# Patient Record
Sex: FEMALE | Race: WHITE | Employment: OTHER | URBAN - NONMETROPOLITAN AREA
[De-identification: names, ages, dates, MRNs, and addresses within clinical notes are randomized per-mention and may not be internally consistent; named-entity substitution may affect disease eponyms.]

---

## 2017-05-24 PROBLEM — E78.5 HYPERLIPIDEMIA: Status: ACTIVE | Noted: 2017-05-24

## 2017-06-07 ENCOUNTER — HOSPITAL ENCOUNTER (OUTPATIENT)
Dept: LAB | Age: 67
Discharge: HOME | End: 2017-06-07
Payer: MEDICARE

## 2017-06-07 DIAGNOSIS — E78.5 HYPERLIPIDEMIA, UNSPECIFIED HYPERLIPIDEMIA TYPE: ICD-10-CM

## 2017-06-07 DIAGNOSIS — Z13.220 SCREENING, LIPID: ICD-10-CM

## 2017-06-07 DIAGNOSIS — E11.9 TYPE 2 DIABETES MELLITUS WITHOUT COMPLICATION, WITHOUT LONG-TERM CURRENT USE OF INSULIN (HCC): ICD-10-CM

## 2017-06-07 LAB
ALT SERPL-CCNC: 12 U/L (ref 5–33)
AST SERPL-CCNC: 17 U/L
CHOLESTEROL/HDL RATIO: 2.9
CHOLESTEROL: 157 MG/DL
ESTIMATED AVERAGE GLUCOSE: 154 MG/DL
HBA1C MFR BLD: 7 % (ref 4.8–5.9)
HDLC SERPL-MCNC: 55 MG/DL
LDL CHOLESTEROL: 75 MG/DL (ref 0–130)
TRIGL SERPL-MCNC: 136 MG/DL
VLDLC SERPL CALC-MCNC: NORMAL MG/DL (ref 1–30)

## 2017-06-07 PROCEDURE — 84460 ALANINE AMINO (ALT) (SGPT): CPT

## 2017-06-07 PROCEDURE — 80061 LIPID PANEL: CPT

## 2017-06-07 PROCEDURE — 84450 TRANSFERASE (AST) (SGOT): CPT

## 2017-06-07 PROCEDURE — 83036 HEMOGLOBIN GLYCOSYLATED A1C: CPT

## 2017-06-07 PROCEDURE — 36415 COLL VENOUS BLD VENIPUNCTURE: CPT

## 2017-12-06 ENCOUNTER — HOSPITAL ENCOUNTER (OUTPATIENT)
Dept: LAB | Age: 67
Discharge: HOME | End: 2017-12-06
Payer: MEDICARE

## 2017-12-06 DIAGNOSIS — E11.9 TYPE 2 DIABETES MELLITUS WITHOUT COMPLICATION, WITHOUT LONG-TERM CURRENT USE OF INSULIN (HCC): ICD-10-CM

## 2017-12-06 LAB
ESTIMATED AVERAGE GLUCOSE: 146 MG/DL
HBA1C MFR BLD: 6.7 % (ref 4.8–5.9)

## 2017-12-06 PROCEDURE — 83036 HEMOGLOBIN GLYCOSYLATED A1C: CPT

## 2017-12-06 PROCEDURE — 36415 COLL VENOUS BLD VENIPUNCTURE: CPT

## 2017-12-08 ENCOUNTER — HOSPITAL ENCOUNTER (OUTPATIENT)
Dept: WOMENS IMAGING | Age: 67
Discharge: HOME | End: 2017-12-08
Payer: MEDICARE

## 2017-12-08 DIAGNOSIS — Z12.31 SCREENING MAMMOGRAM, ENCOUNTER FOR: ICD-10-CM

## 2017-12-08 PROCEDURE — G0202 SCR MAMMO BI INCL CAD: HCPCS

## 2018-03-19 ENCOUNTER — HOSPITAL ENCOUNTER (OUTPATIENT)
Dept: LAB | Age: 68
Discharge: HOME | End: 2018-03-19
Payer: MEDICARE

## 2018-03-19 DIAGNOSIS — E78.5 HYPERLIPIDEMIA, UNSPECIFIED HYPERLIPIDEMIA TYPE: ICD-10-CM

## 2018-03-19 DIAGNOSIS — E11.9 TYPE 2 DIABETES MELLITUS WITHOUT COMPLICATION, WITHOUT LONG-TERM CURRENT USE OF INSULIN (HCC): ICD-10-CM

## 2018-03-19 LAB
ANION GAP SERPL CALCULATED.3IONS-SCNC: 12 MMOL/L (ref 9–17)
BUN BLDV-MCNC: 21 MG/DL (ref 8–23)
BUN/CREAT BLD: 41 (ref 9–20)
CALCIUM SERPL-MCNC: 9.5 MG/DL (ref 8.6–10.4)
CHLORIDE BLD-SCNC: 97 MMOL/L (ref 98–107)
CHOLESTEROL, FASTING: 128 MG/DL
CHOLESTEROL/HDL RATIO: 2.2
CO2: 29 MMOL/L (ref 20–31)
CREAT SERPL-MCNC: 0.51 MG/DL (ref 0.5–0.9)
ESTIMATED AVERAGE GLUCOSE: 157 MG/DL
GFR AFRICAN AMERICAN: >60 ML/MIN
GFR NON-AFRICAN AMERICAN: >60 ML/MIN
GFR SERPL CREATININE-BSD FRML MDRD: ABNORMAL ML/MIN/{1.73_M2}
GFR SERPL CREATININE-BSD FRML MDRD: ABNORMAL ML/MIN/{1.73_M2}
GLUCOSE FASTING: 163 MG/DL (ref 70–99)
HBA1C MFR BLD: 7.1 % (ref 4.8–5.9)
HDLC SERPL-MCNC: 58 MG/DL
LDL CHOLESTEROL: 50 MG/DL (ref 0–130)
POTASSIUM SERPL-SCNC: 4.4 MMOL/L (ref 3.7–5.3)
SODIUM BLD-SCNC: 138 MMOL/L (ref 135–144)
TRIGLYCERIDE, FASTING: 100 MG/DL
VLDLC SERPL CALC-MCNC: NORMAL MG/DL (ref 1–30)

## 2018-03-19 PROCEDURE — 83036 HEMOGLOBIN GLYCOSYLATED A1C: CPT

## 2018-03-19 PROCEDURE — 80061 LIPID PANEL: CPT

## 2018-03-19 PROCEDURE — 80048 BASIC METABOLIC PNL TOTAL CA: CPT

## 2018-03-19 PROCEDURE — 36415 COLL VENOUS BLD VENIPUNCTURE: CPT

## 2018-06-04 ENCOUNTER — HOSPITAL ENCOUNTER (OUTPATIENT)
Dept: PREADMISSION TESTING | Age: 68
Discharge: HOME | End: 2018-06-08
Attending: PODIATRIST
Payer: MEDICARE

## 2018-06-04 VITALS
BODY MASS INDEX: 35.19 KG/M2 | SYSTOLIC BLOOD PRESSURE: 126 MMHG | RESPIRATION RATE: 18 BRPM | WEIGHT: 198.6 LBS | HEIGHT: 63 IN | TEMPERATURE: 98.1 F | OXYGEN SATURATION: 95 % | DIASTOLIC BLOOD PRESSURE: 70 MMHG | HEART RATE: 77 BPM

## 2018-06-04 LAB
EKG ATRIAL RATE: 66 BPM
EKG P AXIS: 42 DEGREES
EKG P-R INTERVAL: 182 MS
EKG Q-T INTERVAL: 442 MS
EKG QRS DURATION: 76 MS
EKG QTC CALCULATION (BAZETT): 463 MS
EKG R AXIS: -4 DEGREES
EKG T AXIS: 29 DEGREES
EKG VENTRICULAR RATE: 66 BPM
HCT VFR BLD CALC: 39.4 % (ref 36.3–47.1)
HEMOGLOBIN: 12.9 G/DL (ref 11.9–15.1)
MCH RBC QN AUTO: 28.8 PG (ref 25.2–33.5)
MCHC RBC AUTO-ENTMCNC: 32.7 G/DL (ref 28.4–34.8)
MCV RBC AUTO: 87.9 FL (ref 82.6–102.9)
NRBC AUTOMATED: 0 PER 100 WBC
PDW BLD-RTO: 13.5 % (ref 11.8–14.4)
PLATELET # BLD: 334 K/UL (ref 138–453)
PMV BLD AUTO: 9.5 FL (ref 8.1–13.5)
RBC # BLD: 4.48 M/UL (ref 3.95–5.11)
WBC # BLD: 7.8 K/UL (ref 3.5–11.3)

## 2018-06-04 PROCEDURE — 85027 COMPLETE CBC AUTOMATED: CPT

## 2018-06-04 PROCEDURE — 93005 ELECTROCARDIOGRAM TRACING: CPT

## 2018-06-04 PROCEDURE — 36415 COLL VENOUS BLD VENIPUNCTURE: CPT

## 2018-06-14 ENCOUNTER — ANESTHESIA EVENT (OUTPATIENT)
Dept: OPERATING ROOM | Age: 68
End: 2018-06-14
Payer: MEDICARE

## 2018-06-15 ENCOUNTER — APPOINTMENT (OUTPATIENT)
Dept: GENERAL RADIOLOGY | Age: 68
End: 2018-06-15
Attending: PODIATRIST
Payer: MEDICARE

## 2018-06-15 ENCOUNTER — HOSPITAL ENCOUNTER (OUTPATIENT)
Age: 68
Setting detail: OUTPATIENT SURGERY
Discharge: HOME | End: 2018-06-15
Attending: PODIATRIST | Admitting: PODIATRIST
Payer: MEDICARE

## 2018-06-15 ENCOUNTER — ANESTHESIA (OUTPATIENT)
Dept: OPERATING ROOM | Age: 68
End: 2018-06-15
Payer: MEDICARE

## 2018-06-15 VITALS
SYSTOLIC BLOOD PRESSURE: 127 MMHG | BODY MASS INDEX: 35.08 KG/M2 | HEART RATE: 64 BPM | WEIGHT: 198 LBS | HEIGHT: 63 IN | TEMPERATURE: 98.8 F | OXYGEN SATURATION: 96 % | RESPIRATION RATE: 18 BRPM | DIASTOLIC BLOOD PRESSURE: 67 MMHG

## 2018-06-15 VITALS — OXYGEN SATURATION: 95 % | DIASTOLIC BLOOD PRESSURE: 52 MMHG | SYSTOLIC BLOOD PRESSURE: 86 MMHG

## 2018-06-15 DIAGNOSIS — M20.41 HAMMER TOE OF RIGHT FOOT: ICD-10-CM

## 2018-06-15 PROCEDURE — 88300 SURGICAL PATH GROSS: CPT

## 2018-06-15 PROCEDURE — 7100000011 HC PHASE II RECOVERY - ADDTL 15 MIN: Performed by: PODIATRIST

## 2018-06-15 PROCEDURE — 3700000001 HC ADD 15 MINUTES (ANESTHESIA): Performed by: PODIATRIST

## 2018-06-15 PROCEDURE — 3700000000 HC ANESTHESIA ATTENDED CARE: Performed by: PODIATRIST

## 2018-06-15 PROCEDURE — 6360000002 HC RX W HCPCS: Performed by: PODIATRIST

## 2018-06-15 PROCEDURE — 2500000003 HC RX 250 WO HCPCS: Performed by: NURSE ANESTHETIST, CERTIFIED REGISTERED

## 2018-06-15 PROCEDURE — 3600000013 HC SURGERY LEVEL 3 ADDTL 15MIN: Performed by: PODIATRIST

## 2018-06-15 PROCEDURE — 2720000010 HC SURG SUPPLY STERILE: Performed by: PODIATRIST

## 2018-06-15 PROCEDURE — 88311 DECALCIFY TISSUE: CPT

## 2018-06-15 PROCEDURE — 2580000003 HC RX 258: Performed by: PODIATRIST

## 2018-06-15 PROCEDURE — 88304 TISSUE EXAM BY PATHOLOGIST: CPT

## 2018-06-15 PROCEDURE — L3260 AMBULATORY SURGICAL BOOT EAC: HCPCS | Performed by: PODIATRIST

## 2018-06-15 PROCEDURE — 6360000002 HC RX W HCPCS: Performed by: NURSE ANESTHETIST, CERTIFIED REGISTERED

## 2018-06-15 PROCEDURE — 7100000010 HC PHASE II RECOVERY - FIRST 15 MIN: Performed by: PODIATRIST

## 2018-06-15 PROCEDURE — 2500000003 HC RX 250 WO HCPCS: Performed by: PODIATRIST

## 2018-06-15 PROCEDURE — 6370000000 HC RX 637 (ALT 250 FOR IP): Performed by: PODIATRIST

## 2018-06-15 PROCEDURE — 3600000003 HC SURGERY LEVEL 3 BASE: Performed by: PODIATRIST

## 2018-06-15 RX ORDER — PROPOFOL 10 MG/ML
INJECTION, EMULSION INTRAVENOUS CONTINUOUS PRN
Status: DISCONTINUED | OUTPATIENT
Start: 2018-06-15 | End: 2018-06-15 | Stop reason: SDUPTHER

## 2018-06-15 RX ORDER — FENTANYL CITRATE 50 UG/ML
INJECTION, SOLUTION INTRAMUSCULAR; INTRAVENOUS PRN
Status: DISCONTINUED | OUTPATIENT
Start: 2018-06-15 | End: 2018-06-15 | Stop reason: SDUPTHER

## 2018-06-15 RX ORDER — FENTANYL CITRATE 50 UG/ML
50 INJECTION, SOLUTION INTRAMUSCULAR; INTRAVENOUS EVERY 5 MIN PRN
Status: DISCONTINUED | OUTPATIENT
Start: 2018-06-15 | End: 2018-06-15 | Stop reason: HOSPADM

## 2018-06-15 RX ORDER — PROPOFOL 10 MG/ML
INJECTION, EMULSION INTRAVENOUS PRN
Status: DISCONTINUED | OUTPATIENT
Start: 2018-06-15 | End: 2018-06-15 | Stop reason: SDUPTHER

## 2018-06-15 RX ORDER — ONDANSETRON 2 MG/ML
INJECTION INTRAMUSCULAR; INTRAVENOUS PRN
Status: DISCONTINUED | OUTPATIENT
Start: 2018-06-15 | End: 2018-06-15 | Stop reason: SDUPTHER

## 2018-06-15 RX ORDER — LIDOCAINE HYDROCHLORIDE 20 MG/ML
INJECTION, SOLUTION INFILTRATION; PERINEURAL PRN
Status: DISCONTINUED | OUTPATIENT
Start: 2018-06-15 | End: 2018-06-15 | Stop reason: HOSPADM

## 2018-06-15 RX ORDER — DEXAMETHASONE SODIUM PHOSPHATE 4 MG/ML
INJECTION, SOLUTION INTRA-ARTICULAR; INTRALESIONAL; INTRAMUSCULAR; INTRAVENOUS; SOFT TISSUE PRN
Status: DISCONTINUED | OUTPATIENT
Start: 2018-06-15 | End: 2018-06-15 | Stop reason: SDUPTHER

## 2018-06-15 RX ORDER — DEXAMETHASONE SODIUM PHOSPHATE 4 MG/ML
INJECTION, SOLUTION INTRA-ARTICULAR; INTRALESIONAL; INTRAMUSCULAR; INTRAVENOUS; SOFT TISSUE PRN
Status: DISCONTINUED | OUTPATIENT
Start: 2018-06-15 | End: 2018-06-15 | Stop reason: HOSPADM

## 2018-06-15 RX ORDER — ACETAMINOPHEN 325 MG/1
650 TABLET ORAL ONCE
Status: COMPLETED | OUTPATIENT
Start: 2018-06-15 | End: 2018-06-15

## 2018-06-15 RX ORDER — SODIUM CHLORIDE, SODIUM LACTATE, POTASSIUM CHLORIDE, CALCIUM CHLORIDE 600; 310; 30; 20 MG/100ML; MG/100ML; MG/100ML; MG/100ML
INJECTION, SOLUTION INTRAVENOUS CONTINUOUS
Status: DISCONTINUED | OUTPATIENT
Start: 2018-06-15 | End: 2018-06-15 | Stop reason: HOSPADM

## 2018-06-15 RX ORDER — BUPIVACAINE HYDROCHLORIDE AND EPINEPHRINE 5; 5 MG/ML; UG/ML
INJECTION, SOLUTION EPIDURAL; INTRACAUDAL; PERINEURAL PRN
Status: DISCONTINUED | OUTPATIENT
Start: 2018-06-15 | End: 2018-06-15 | Stop reason: HOSPADM

## 2018-06-15 RX ORDER — MIDAZOLAM HYDROCHLORIDE 1 MG/ML
INJECTION INTRAMUSCULAR; INTRAVENOUS PRN
Status: DISCONTINUED | OUTPATIENT
Start: 2018-06-15 | End: 2018-06-15 | Stop reason: SDUPTHER

## 2018-06-15 RX ORDER — LIDOCAINE HYDROCHLORIDE 20 MG/ML
INJECTION, SOLUTION EPIDURAL; INFILTRATION; INTRACAUDAL; PERINEURAL PRN
Status: DISCONTINUED | OUTPATIENT
Start: 2018-06-15 | End: 2018-06-15 | Stop reason: SDUPTHER

## 2018-06-15 RX ORDER — FENTANYL CITRATE 50 UG/ML
25 INJECTION, SOLUTION INTRAMUSCULAR; INTRAVENOUS EVERY 5 MIN PRN
Status: DISCONTINUED | OUTPATIENT
Start: 2018-06-15 | End: 2018-06-15 | Stop reason: HOSPADM

## 2018-06-15 RX ADMIN — FENTANYL CITRATE 25 MCG: 50 INJECTION INTRAMUSCULAR; INTRAVENOUS at 08:24

## 2018-06-15 RX ADMIN — SODIUM CHLORIDE, POTASSIUM CHLORIDE, SODIUM LACTATE AND CALCIUM CHLORIDE: 600; 310; 30; 20 INJECTION, SOLUTION INTRAVENOUS at 07:40

## 2018-06-15 RX ADMIN — DEXAMETHASONE SODIUM PHOSPHATE 4 MG: 4 INJECTION, SOLUTION INTRAMUSCULAR; INTRAVENOUS at 08:24

## 2018-06-15 RX ADMIN — LIDOCAINE HYDROCHLORIDE 50 MG: 20 INJECTION, SOLUTION EPIDURAL; INFILTRATION; INTRACAUDAL; PERINEURAL at 07:56

## 2018-06-15 RX ADMIN — FENTANYL CITRATE 25 MCG: 50 INJECTION INTRAMUSCULAR; INTRAVENOUS at 07:54

## 2018-06-15 RX ADMIN — FENTANYL CITRATE 50 MCG: 50 INJECTION INTRAMUSCULAR; INTRAVENOUS at 07:56

## 2018-06-15 RX ADMIN — ONDANSETRON 4 MG: 2 INJECTION INTRAMUSCULAR; INTRAVENOUS at 08:24

## 2018-06-15 RX ADMIN — Medication 2 G: at 07:46

## 2018-06-15 RX ADMIN — PROPOFOL 130 MG: 10 INJECTION, EMULSION INTRAVENOUS at 07:56

## 2018-06-15 RX ADMIN — ACETAMINOPHEN 650 MG: 325 TABLET ORAL at 07:07

## 2018-06-15 RX ADMIN — MIDAZOLAM HYDROCHLORIDE 2 MG: 1 INJECTION, SOLUTION INTRAMUSCULAR; INTRAVENOUS at 07:52

## 2018-06-15 RX ADMIN — PROPOFOL 125 MCG/KG/MIN: 10 INJECTION, EMULSION INTRAVENOUS at 08:00

## 2018-06-15 ASSESSMENT — PULMONARY FUNCTION TESTS
PIF_VALUE: 1
PIF_VALUE: 0
PIF_VALUE: 1
PIF_VALUE: 0
PIF_VALUE: 1
PIF_VALUE: 0
PIF_VALUE: 0
PIF_VALUE: 1
PIF_VALUE: 2
PIF_VALUE: 1
PIF_VALUE: 2

## 2018-06-15 ASSESSMENT — PAIN - FUNCTIONAL ASSESSMENT: PAIN_FUNCTIONAL_ASSESSMENT: 0-10

## 2018-06-15 ASSESSMENT — PAIN SCALES - GENERAL
PAINLEVEL_OUTOF10: 0

## 2018-06-21 LAB — SURGICAL PATHOLOGY REPORT: NORMAL

## 2018-07-12 ENCOUNTER — HOSPITAL ENCOUNTER (OUTPATIENT)
Dept: LAB | Age: 68
Discharge: HOME | End: 2018-07-12
Payer: MEDICARE

## 2018-07-12 DIAGNOSIS — E78.5 HYPERLIPIDEMIA, UNSPECIFIED HYPERLIPIDEMIA TYPE: ICD-10-CM

## 2018-07-12 DIAGNOSIS — E11.628 TYPE 2 DIABETES MELLITUS WITH OTHER SKIN COMPLICATION, WITHOUT LONG-TERM CURRENT USE OF INSULIN (HCC): ICD-10-CM

## 2018-07-12 DIAGNOSIS — E11.9 TYPE 2 DIABETES MELLITUS WITHOUT COMPLICATION, WITHOUT LONG-TERM CURRENT USE OF INSULIN (HCC): ICD-10-CM

## 2018-07-12 LAB
-: ABNORMAL
ALT SERPL-CCNC: 15 U/L (ref 5–33)
AMORPHOUS: ABNORMAL
AST SERPL-CCNC: 18 U/L
BACTERIA: ABNORMAL
BILIRUBIN URINE: NEGATIVE
CASTS UA: ABNORMAL /LPF
CHOLESTEROL, FASTING: 112 MG/DL
CHOLESTEROL/HDL RATIO: 2.2
COLOR: YELLOW
COMMENT UA: ABNORMAL
CREATININE URINE: 72.5 MG/DL (ref 28–217)
CRYSTALS, UA: ABNORMAL /HPF
EPITHELIAL CELLS UA: ABNORMAL /HPF (ref 0–25)
GLUCOSE URINE: NEGATIVE
HDLC SERPL-MCNC: 50 MG/DL
KETONES, URINE: NEGATIVE
LDL CHOLESTEROL: 46 MG/DL (ref 0–130)
LEUKOCYTE ESTERASE, URINE: ABNORMAL
MICROALBUMIN/CREAT 24H UR: <12 MG/L
MICROALBUMIN/CREAT UR-RTO: NORMAL MCG/MG CREAT
MUCUS: ABNORMAL
NITRITE, URINE: NEGATIVE
OTHER OBSERVATIONS UA: ABNORMAL
PH UA: 7 (ref 5–9)
PROTEIN UA: NEGATIVE
RBC UA: ABNORMAL /HPF (ref 0–2)
RENAL EPITHELIAL, UA: ABNORMAL /HPF
SPECIFIC GRAVITY UA: 1.01 (ref 1.01–1.02)
TRICHOMONAS: ABNORMAL
TRIGLYCERIDE, FASTING: 80 MG/DL
TURBIDITY: CLEAR
URINE HGB: NEGATIVE
UROBILINOGEN, URINE: ABNORMAL
VLDLC SERPL CALC-MCNC: NORMAL MG/DL (ref 1–30)
WBC UA: ABNORMAL /HPF (ref 0–5)
YEAST: ABNORMAL

## 2018-07-12 PROCEDURE — 84460 ALANINE AMINO (ALT) (SGPT): CPT

## 2018-07-12 PROCEDURE — 82043 UR ALBUMIN QUANTITATIVE: CPT

## 2018-07-12 PROCEDURE — 80061 LIPID PANEL: CPT

## 2018-07-12 PROCEDURE — 82570 ASSAY OF URINE CREATININE: CPT

## 2018-07-12 PROCEDURE — 84450 TRANSFERASE (AST) (SGOT): CPT

## 2018-07-12 PROCEDURE — 36415 COLL VENOUS BLD VENIPUNCTURE: CPT

## 2018-07-12 PROCEDURE — 81001 URINALYSIS AUTO W/SCOPE: CPT

## 2018-12-11 PROBLEM — H25.812 COMBINED FORMS OF AGE-RELATED CATARACT OF LEFT EYE: Chronic | Status: ACTIVE | Noted: 2018-12-11

## 2018-12-12 ENCOUNTER — HOSPITAL ENCOUNTER (OUTPATIENT)
Age: 68
Setting detail: OUTPATIENT SURGERY
Discharge: HOME | End: 2018-12-12
Attending: OPHTHALMOLOGY | Admitting: OPHTHALMOLOGY
Payer: MEDICARE

## 2018-12-12 VITALS
HEART RATE: 77 BPM | HEIGHT: 62 IN | WEIGHT: 193 LBS | OXYGEN SATURATION: 96 % | DIASTOLIC BLOOD PRESSURE: 97 MMHG | SYSTOLIC BLOOD PRESSURE: 147 MMHG | TEMPERATURE: 98.1 F | RESPIRATION RATE: 18 BRPM | BODY MASS INDEX: 35.51 KG/M2

## 2018-12-12 PROBLEM — H25.812 COMBINED FORMS OF AGE-RELATED CATARACT OF LEFT EYE: Chronic | Status: RESOLVED | Noted: 2018-12-11 | Resolved: 2018-12-12

## 2018-12-12 PROCEDURE — 3600000013 HC SURGERY LEVEL 3 ADDTL 15MIN: Performed by: OPHTHALMOLOGY

## 2018-12-12 PROCEDURE — 2580000003 HC RX 258: Performed by: OPHTHALMOLOGY

## 2018-12-12 PROCEDURE — 2500000003 HC RX 250 WO HCPCS: Performed by: OPHTHALMOLOGY

## 2018-12-12 PROCEDURE — 99152 MOD SED SAME PHYS/QHP 5/>YRS: CPT | Performed by: OPHTHALMOLOGY

## 2018-12-12 PROCEDURE — 6370000000 HC RX 637 (ALT 250 FOR IP): Performed by: OPHTHALMOLOGY

## 2018-12-12 PROCEDURE — 6360000002 HC RX W HCPCS: Performed by: OPHTHALMOLOGY

## 2018-12-12 PROCEDURE — V2632 POST CHMBR INTRAOCULAR LENS: HCPCS | Performed by: OPHTHALMOLOGY

## 2018-12-12 PROCEDURE — 7100000010 HC PHASE II RECOVERY - FIRST 15 MIN: Performed by: OPHTHALMOLOGY

## 2018-12-12 PROCEDURE — 2709999900 HC NON-CHARGEABLE SUPPLY: Performed by: OPHTHALMOLOGY

## 2018-12-12 PROCEDURE — 3600000003 HC SURGERY LEVEL 3 BASE: Performed by: OPHTHALMOLOGY

## 2018-12-12 PROCEDURE — 7100000011 HC PHASE II RECOVERY - ADDTL 15 MIN: Performed by: OPHTHALMOLOGY

## 2018-12-12 PROCEDURE — 99153 MOD SED SAME PHYS/QHP EA: CPT | Performed by: OPHTHALMOLOGY

## 2018-12-12 DEVICE — IMPLANTABLE DEVICE: Type: IMPLANTABLE DEVICE | Site: EYE | Status: FUNCTIONAL

## 2018-12-12 RX ORDER — PHENYLEPHRINE HCL 2.5 %
1 DROPS OPHTHALMIC (EYE) SEE ADMIN INSTRUCTIONS
Status: DISCONTINUED | OUTPATIENT
Start: 2018-12-12 | End: 2018-12-12 | Stop reason: HOSPADM

## 2018-12-12 RX ORDER — SODIUM CHLORIDE 0.9 % (FLUSH) 0.9 %
10 SYRINGE (ML) INJECTION EVERY 12 HOURS SCHEDULED
Status: DISCONTINUED | OUTPATIENT
Start: 2018-12-12 | End: 2018-12-12 | Stop reason: HOSPADM

## 2018-12-12 RX ORDER — ONDANSETRON 2 MG/ML
4 INJECTION INTRAMUSCULAR; INTRAVENOUS EVERY 6 HOURS PRN
Status: DISCONTINUED | OUTPATIENT
Start: 2018-12-12 | End: 2018-12-12 | Stop reason: HOSPADM

## 2018-12-12 RX ORDER — SODIUM CHLORIDE 0.9 % (FLUSH) 0.9 %
10 SYRINGE (ML) INJECTION PRN
Status: DISCONTINUED | OUTPATIENT
Start: 2018-12-12 | End: 2018-12-12 | Stop reason: HOSPADM

## 2018-12-12 RX ORDER — TETRACAINE HYDROCHLORIDE 5 MG/ML
1 SOLUTION OPHTHALMIC SEE ADMIN INSTRUCTIONS
Status: DISCONTINUED | OUTPATIENT
Start: 2018-12-12 | End: 2018-12-12 | Stop reason: HOSPADM

## 2018-12-12 RX ORDER — MIDAZOLAM HYDROCHLORIDE 1 MG/ML
INJECTION INTRAMUSCULAR; INTRAVENOUS PRN
Status: DISCONTINUED | OUTPATIENT
Start: 2018-12-12 | End: 2018-12-12 | Stop reason: HOSPADM

## 2018-12-12 RX ORDER — ACETAMINOPHEN 325 MG/1
650 TABLET ORAL EVERY 4 HOURS PRN
Status: DISCONTINUED | OUTPATIENT
Start: 2018-12-12 | End: 2018-12-12 | Stop reason: HOSPADM

## 2018-12-12 RX ORDER — LIDOCAINE HYDROCHLORIDE 20 MG/ML
INJECTION, SOLUTION INFILTRATION; PERINEURAL PRN
Status: DISCONTINUED | OUTPATIENT
Start: 2018-12-12 | End: 2018-12-12 | Stop reason: HOSPADM

## 2018-12-12 RX ORDER — FENTANYL CITRATE 50 UG/ML
INJECTION, SOLUTION INTRAMUSCULAR; INTRAVENOUS PRN
Status: DISCONTINUED | OUTPATIENT
Start: 2018-12-12 | End: 2018-12-12 | Stop reason: HOSPADM

## 2018-12-12 RX ORDER — TETRACAINE HYDROCHLORIDE 5 MG/ML
SOLUTION OPHTHALMIC PRN
Status: DISCONTINUED | OUTPATIENT
Start: 2018-12-12 | End: 2018-12-12 | Stop reason: HOSPADM

## 2018-12-12 RX ORDER — MECLIZINE HCL 12.5 MG/1
12.5 TABLET ORAL 3 TIMES DAILY PRN
COMMUNITY
End: 2019-05-13 | Stop reason: SDUPTHER

## 2018-12-12 RX ORDER — TROPICAMIDE 10 MG/ML
1 SOLUTION/ DROPS OPHTHALMIC SEE ADMIN INSTRUCTIONS
Status: DISCONTINUED | OUTPATIENT
Start: 2018-12-12 | End: 2018-12-12 | Stop reason: HOSPADM

## 2018-12-12 RX ORDER — PROPARACAINE HYDROCHLORIDE 5 MG/ML
1 SOLUTION/ DROPS OPHTHALMIC SEE ADMIN INSTRUCTIONS
Status: DISCONTINUED | OUTPATIENT
Start: 2018-12-12 | End: 2018-12-12 | Stop reason: HOSPADM

## 2018-12-12 RX ORDER — SODIUM CHLORIDE 9 MG/ML
INJECTION, SOLUTION INTRAVENOUS CONTINUOUS
Status: DISCONTINUED | OUTPATIENT
Start: 2018-12-12 | End: 2018-12-12 | Stop reason: HOSPADM

## 2018-12-12 RX ADMIN — PHENYLEPHRINE HYDROCHLORIDE 1 DROP: 25 SOLUTION/ DROPS OPHTHALMIC at 10:25

## 2018-12-12 RX ADMIN — TROPICAMIDE 1 DROP: 10 SOLUTION/ DROPS OPHTHALMIC at 10:25

## 2018-12-12 RX ADMIN — TROPICAMIDE 1 DROP: 10 SOLUTION/ DROPS OPHTHALMIC at 10:15

## 2018-12-12 RX ADMIN — PHENYLEPHRINE HYDROCHLORIDE 1 DROP: 25 SOLUTION/ DROPS OPHTHALMIC at 10:15

## 2018-12-12 RX ADMIN — SODIUM CHLORIDE: 9 INJECTION, SOLUTION INTRAVENOUS at 10:30

## 2018-12-12 RX ADMIN — PROPARACAINE HYDROCHLORIDE 1 DROP: 5 SOLUTION/ DROPS OPHTHALMIC at 10:25

## 2018-12-12 RX ADMIN — PROPARACAINE HYDROCHLORIDE 1 DROP: 5 SOLUTION/ DROPS OPHTHALMIC at 10:15

## 2018-12-12 RX ADMIN — PHENYLEPHRINE HYDROCHLORIDE 1 DROP: 25 SOLUTION/ DROPS OPHTHALMIC at 10:20

## 2018-12-12 RX ADMIN — TROPICAMIDE 1 DROP: 10 SOLUTION/ DROPS OPHTHALMIC at 10:20

## 2018-12-12 RX ADMIN — PROPARACAINE HYDROCHLORIDE 1 DROP: 5 SOLUTION/ DROPS OPHTHALMIC at 10:20

## 2018-12-12 ASSESSMENT — PAIN SCALES - GENERAL
PAINLEVEL_OUTOF10: 0
PAINLEVEL_OUTOF10: 0

## 2018-12-12 ASSESSMENT — PAIN - FUNCTIONAL ASSESSMENT: PAIN_FUNCTIONAL_ASSESSMENT: 0-10

## 2018-12-12 NOTE — H&P
MOUTH ONCE DAILY AS NEEDED 5/2/18   Albert Harvey MD   glucose blood VI test strips (ACCU-CHEK BRUNO PLUS) strip 1 each by In Vitro route daily DX--E11.9 NON INSULIN DEP 3/14/18   Albert Harvey MD   Blood Glucose Monitoring Suppl (ACCU-CHEK BRUNO) ANDREA Use once daily. Dx-E11.9 non-insulin dependant 3/12/18   Albert Harvey MD   Lancets MISC USE 1 QD. DX--E11.9 NON-INSULIN DEP 3/12/18   Albert Harvey MD   Multiple Vitamins-Minerals [facility]) TABS TAKE ONE TABLET BY MOUTH ONCE DAILY 2/16/18   Albert Harvey MD   fluocinonide (LIDEX) 0.05 % cream Apply topically 2 times daily. 3/13/17   Albert Harvey MD   clotrimazole (LOTRIMIN) 1 % cream Apply topically 2 times daily. 11/14/16   Albert Harvey MD   docusate sodium (COLACE, DULCOLAX) 100 MG CAPS Take 100 mg by mouth 2 times daily  Patient taking differently: Take 100 mg by mouth 2 times daily as needed  4/25/16   Rell Suggs MD   acetaminophen (TYLENOL) 325 MG tablet 1-2 q 12 hours prn pain 4/25/16   Rell Suggs MD     Scheduled Meds:  Continuous Infusions:  PRN Meds:. Allergies   Allergen Reactions    Aspirin     Ibuprofen     Pepto-Bismol [Bismuth Subsalicylate]        There were no vitals filed for this visit. 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 ASA Score-2  3. Mallampati Score-Class 2    Plan:   1. Risks, benefits, alternatives to surgery discussed with the patient and family. 2. All questions were answered to their satisfaction. 3. Ok to proceed with surgery as planned.     Sariah Gip, DO

## 2018-12-17 ENCOUNTER — HOSPITAL ENCOUNTER (OUTPATIENT)
Dept: PHYSICAL THERAPY | Age: 68
Setting detail: THERAPIES SERIES
Discharge: HOME | End: 2018-12-17
Payer: MEDICARE

## 2018-12-17 PROCEDURE — G8979 MOBILITY GOAL STATUS: HCPCS

## 2018-12-17 PROCEDURE — 97112 NEUROMUSCULAR REEDUCATION: CPT

## 2018-12-17 PROCEDURE — 97162 PT EVAL MOD COMPLEX 30 MIN: CPT

## 2018-12-17 PROCEDURE — G8978 MOBILITY CURRENT STATUS: HCPCS

## 2018-12-17 NOTE — PLAN OF CARE
: 6 weeks  Long term goal 1: Pt will be independent and compliant with HEP. Long term goal 2: Pt will report 75% improvement in dizziness and overall function for ease of ADLs and functional tasks. Long term goal 3: Pt will test (-) with manjit-hallpike maneuver bilaterally to decrease dizziness caused by BPPV.      Prognosis  Prognosis: Good    Treatment Plan   Times per week: 2  Plan weeks: 6  [x]HP/CP      []Electrical Stim   [x]Therapeutic Exercise      []Gait Training  []Aquatics   []Ultrasound         [x]Patient Education/HEP   [x]Manual Therapy  []Traction    [x]Neuro-brenda        [x]Soft Tissue Mobs            []Home TENS  []Iontophoresis    []Orthotic casting/fitting      []Dry Needling             Electronically signed by: Hank Manning PT, DPT    Date: 12/17/2018      ______________________________________ Date: 12/17/2018   Physician Signature

## 2018-12-19 ENCOUNTER — HOSPITAL ENCOUNTER (OUTPATIENT)
Dept: PHYSICAL THERAPY | Age: 68
Setting detail: THERAPIES SERIES
Discharge: HOME | End: 2018-12-19
Payer: MEDICARE

## 2018-12-19 PROCEDURE — 97112 NEUROMUSCULAR REEDUCATION: CPT

## 2018-12-19 NOTE — PROGRESS NOTES
Phone: [place]           Fax: [phone]                           Outpatient Physical Therapy                                                                            Daily Note    Patient: King Mendez : 1950  [facility] #: [id number]   Referring Practitioner:  Ron Rahman MD    Referral Date : 18     Date: 2018    Diagnosis: vertigo  Treatment Diagnosis: vertigo    Onset Date: 18  PT Insurance Information: [employer] Medicare  Total # of Visits Approved: 12 Per Physician Order  Total # of Visits to Date: 2  No Show: 0  Canceled Appointment: 0      Pre-Treatment Pain:  0/10  Subjective: States she feels better following first session. Does states she took 2 \"dizzy\" pills today     Exercises:  Exercise 1: repositioned x2 for (+) R manjit-hallpike                       Modalities:  Moist heat: x5mins between maneuvers for cervical and lumbar discomfort. Assessment  Assessment: pt with only mild complaints of dizziness with right manjit-hallpike. Neg nystagmas noted. Epleys performed but was modified due to limited CROM. Pt getting dizzy when performming supuine to sit transfer. Will see next Wed. Pt is going to try to go without meds if she can. Patient Education  HEP  Pt verbalized/demonstrated good understanding:     [x] Yes         [] No, pt required further clarification. Post Treatment Pain:  0/10      Plan  Times per week: 2  Plan weeks: 6      Goals  (Total # of Visits to Date: 2)   Short Term Goals - Time Frame for Short term goals: 3 weeks     Short term goal 1: Pt will be instructed in [place] April[place] smitaoff exercises as part of HEP. []Met   []Partially met  []Not met   Short term goal 2: Pt will report 25% improvement in dizziness for less dificulty getting in/out of bed.    []Met   []Partially met  []Not met      []Met   []Partially met  []Not met      []Met   []Partially met  []Not

## 2018-12-26 ENCOUNTER — HOSPITAL ENCOUNTER (OUTPATIENT)
Dept: PHYSICAL THERAPY | Age: 68
Setting detail: THERAPIES SERIES
Discharge: HOME | End: 2018-12-26
Payer: MEDICARE

## 2018-12-26 PROCEDURE — 97112 NEUROMUSCULAR REEDUCATION: CPT

## 2018-12-28 ENCOUNTER — APPOINTMENT (OUTPATIENT)
Dept: PHYSICAL THERAPY | Age: 68
End: 2018-12-28
Payer: MEDICARE

## 2019-01-06 PROBLEM — H25.811 COMBINED FORMS OF AGE-RELATED CATARACT OF RIGHT EYE: Chronic | Status: ACTIVE | Noted: 2019-01-06

## 2019-01-07 ENCOUNTER — HOSPITAL ENCOUNTER (OUTPATIENT)
Age: 69
Setting detail: OUTPATIENT SURGERY
Discharge: HOME | End: 2019-01-07
Attending: OPHTHALMOLOGY | Admitting: OPHTHALMOLOGY
Payer: MEDICARE

## 2019-01-07 VITALS
BODY MASS INDEX: 36.07 KG/M2 | SYSTOLIC BLOOD PRESSURE: 126 MMHG | TEMPERATURE: 97.2 F | RESPIRATION RATE: 18 BRPM | OXYGEN SATURATION: 96 % | HEIGHT: 62 IN | DIASTOLIC BLOOD PRESSURE: 69 MMHG | HEART RATE: 68 BPM | WEIGHT: 196 LBS

## 2019-01-07 PROBLEM — H25.811 COMBINED FORMS OF AGE-RELATED CATARACT OF RIGHT EYE: Chronic | Status: RESOLVED | Noted: 2019-01-06 | Resolved: 2019-01-07

## 2019-01-07 PROCEDURE — 99153 MOD SED SAME PHYS/QHP EA: CPT | Performed by: OPHTHALMOLOGY

## 2019-01-07 PROCEDURE — 6370000000 HC RX 637 (ALT 250 FOR IP): Performed by: OPHTHALMOLOGY

## 2019-01-07 PROCEDURE — 6360000002 HC RX W HCPCS: Performed by: OPHTHALMOLOGY

## 2019-01-07 PROCEDURE — 7100000010 HC PHASE II RECOVERY - FIRST 15 MIN: Performed by: OPHTHALMOLOGY

## 2019-01-07 PROCEDURE — 3600000013 HC SURGERY LEVEL 3 ADDTL 15MIN: Performed by: OPHTHALMOLOGY

## 2019-01-07 PROCEDURE — 2709999900 HC NON-CHARGEABLE SUPPLY: Performed by: OPHTHALMOLOGY

## 2019-01-07 PROCEDURE — 99152 MOD SED SAME PHYS/QHP 5/>YRS: CPT | Performed by: OPHTHALMOLOGY

## 2019-01-07 PROCEDURE — 7100000011 HC PHASE II RECOVERY - ADDTL 15 MIN: Performed by: OPHTHALMOLOGY

## 2019-01-07 PROCEDURE — V2632 POST CHMBR INTRAOCULAR LENS: HCPCS | Performed by: OPHTHALMOLOGY

## 2019-01-07 PROCEDURE — 2500000003 HC RX 250 WO HCPCS: Performed by: OPHTHALMOLOGY

## 2019-01-07 PROCEDURE — 2580000003 HC RX 258: Performed by: OPHTHALMOLOGY

## 2019-01-07 PROCEDURE — 3600000003 HC SURGERY LEVEL 3 BASE: Performed by: OPHTHALMOLOGY

## 2019-01-07 DEVICE — IMPLANTABLE DEVICE: Type: IMPLANTABLE DEVICE | Site: EYE | Status: FUNCTIONAL

## 2019-01-07 RX ORDER — FENTANYL CITRATE 50 UG/ML
INJECTION, SOLUTION INTRAMUSCULAR; INTRAVENOUS PRN
Status: DISCONTINUED | OUTPATIENT
Start: 2019-01-07 | End: 2019-01-07 | Stop reason: HOSPADM

## 2019-01-07 RX ORDER — SODIUM CHLORIDE 0.9 % (FLUSH) 0.9 %
10 SYRINGE (ML) INJECTION EVERY 12 HOURS SCHEDULED
Status: DISCONTINUED | OUTPATIENT
Start: 2019-01-07 | End: 2019-01-07 | Stop reason: HOSPADM

## 2019-01-07 RX ORDER — ONDANSETRON 2 MG/ML
4 INJECTION INTRAMUSCULAR; INTRAVENOUS EVERY 6 HOURS PRN
Status: CANCELLED | OUTPATIENT
Start: 2019-01-07

## 2019-01-07 RX ORDER — PHENYLEPHRINE HCL 2.5 %
1 DROPS OPHTHALMIC (EYE) SEE ADMIN INSTRUCTIONS
Status: DISCONTINUED | OUTPATIENT
Start: 2019-01-07 | End: 2019-01-07 | Stop reason: HOSPADM

## 2019-01-07 RX ORDER — TETRACAINE HYDROCHLORIDE 5 MG/ML
SOLUTION OPHTHALMIC PRN
Status: DISCONTINUED | OUTPATIENT
Start: 2019-01-07 | End: 2019-01-07 | Stop reason: HOSPADM

## 2019-01-07 RX ORDER — LIDOCAINE HYDROCHLORIDE 10 MG/ML
INJECTION, SOLUTION EPIDURAL; INFILTRATION; INTRACAUDAL; PERINEURAL PRN
Status: DISCONTINUED | OUTPATIENT
Start: 2019-01-07 | End: 2019-01-07 | Stop reason: HOSPADM

## 2019-01-07 RX ORDER — ACETAMINOPHEN 325 MG/1
650 TABLET ORAL EVERY 4 HOURS PRN
Status: CANCELLED | OUTPATIENT
Start: 2019-01-07

## 2019-01-07 RX ORDER — PROPARACAINE HYDROCHLORIDE 5 MG/ML
1 SOLUTION/ DROPS OPHTHALMIC SEE ADMIN INSTRUCTIONS
Status: DISCONTINUED | OUTPATIENT
Start: 2019-01-07 | End: 2019-01-07 | Stop reason: HOSPADM

## 2019-01-07 RX ORDER — TETRACAINE HYDROCHLORIDE 5 MG/ML
1 SOLUTION OPHTHALMIC SEE ADMIN INSTRUCTIONS
Status: DISCONTINUED | OUTPATIENT
Start: 2019-01-07 | End: 2019-01-07 | Stop reason: HOSPADM

## 2019-01-07 RX ORDER — TROPICAMIDE 10 MG/ML
1 SOLUTION/ DROPS OPHTHALMIC SEE ADMIN INSTRUCTIONS
Status: DISCONTINUED | OUTPATIENT
Start: 2019-01-07 | End: 2019-01-07 | Stop reason: HOSPADM

## 2019-01-07 RX ORDER — MIDAZOLAM HYDROCHLORIDE 1 MG/ML
INJECTION INTRAMUSCULAR; INTRAVENOUS PRN
Status: DISCONTINUED | OUTPATIENT
Start: 2019-01-07 | End: 2019-01-07 | Stop reason: HOSPADM

## 2019-01-07 RX ORDER — SODIUM CHLORIDE 0.9 % (FLUSH) 0.9 %
10 SYRINGE (ML) INJECTION EVERY 12 HOURS SCHEDULED
Status: CANCELLED | OUTPATIENT
Start: 2019-01-07

## 2019-01-07 RX ORDER — SODIUM CHLORIDE 9 MG/ML
INJECTION, SOLUTION INTRAVENOUS CONTINUOUS
Status: DISCONTINUED | OUTPATIENT
Start: 2019-01-07 | End: 2019-01-07 | Stop reason: HOSPADM

## 2019-01-07 RX ORDER — SODIUM CHLORIDE 0.9 % (FLUSH) 0.9 %
10 SYRINGE (ML) INJECTION PRN
Status: CANCELLED | OUTPATIENT
Start: 2019-01-07

## 2019-01-07 RX ORDER — SODIUM CHLORIDE 0.9 % (FLUSH) 0.9 %
10 SYRINGE (ML) INJECTION PRN
Status: DISCONTINUED | OUTPATIENT
Start: 2019-01-07 | End: 2019-01-07 | Stop reason: HOSPADM

## 2019-01-07 RX ADMIN — PHENYLEPHRINE HYDROCHLORIDE 1 DROP: 25 SOLUTION/ DROPS OPHTHALMIC at 15:10

## 2019-01-07 RX ADMIN — PHENYLEPHRINE HYDROCHLORIDE 1 DROP: 25 SOLUTION/ DROPS OPHTHALMIC at 15:03

## 2019-01-07 RX ADMIN — PROPARACAINE HYDROCHLORIDE 1 DROP: 5 SOLUTION/ DROPS OPHTHALMIC at 15:10

## 2019-01-07 RX ADMIN — PROPARACAINE HYDROCHLORIDE 1 DROP: 5 SOLUTION/ DROPS OPHTHALMIC at 14:54

## 2019-01-07 RX ADMIN — PHENYLEPHRINE HYDROCHLORIDE 1 DROP: 25 SOLUTION/ DROPS OPHTHALMIC at 14:55

## 2019-01-07 RX ADMIN — TROPICAMIDE 1 DROP: 10 SOLUTION/ DROPS OPHTHALMIC at 14:56

## 2019-01-07 RX ADMIN — TROPICAMIDE 1 DROP: 10 SOLUTION/ DROPS OPHTHALMIC at 15:03

## 2019-01-07 RX ADMIN — TETRACAINE HYDROCHLORIDE 1 DROP: 25 LIQUID OPHTHALMIC at 15:21

## 2019-01-07 RX ADMIN — PROPARACAINE HYDROCHLORIDE 1 DROP: 5 SOLUTION/ DROPS OPHTHALMIC at 15:03

## 2019-01-07 RX ADMIN — TROPICAMIDE 1 DROP: 10 SOLUTION/ DROPS OPHTHALMIC at 15:11

## 2019-01-07 RX ADMIN — SODIUM CHLORIDE: 9 INJECTION, SOLUTION INTRAVENOUS at 15:09

## 2019-01-07 ASSESSMENT — PAIN SCALES - GENERAL
PAINLEVEL_OUTOF10: 0

## 2019-01-07 ASSESSMENT — PAIN - FUNCTIONAL ASSESSMENT: PAIN_FUNCTIONAL_ASSESSMENT: 0-10

## 2019-07-17 ENCOUNTER — HOSPITAL ENCOUNTER (OUTPATIENT)
Dept: LAB | Age: 69
Discharge: HOME | End: 2019-07-17
Payer: MEDICARE

## 2019-07-17 DIAGNOSIS — E78.5 HYPERLIPIDEMIA, UNSPECIFIED HYPERLIPIDEMIA TYPE: ICD-10-CM

## 2019-07-17 DIAGNOSIS — E11.628 TYPE 2 DIABETES MELLITUS WITH OTHER SKIN COMPLICATION, WITHOUT LONG-TERM CURRENT USE OF INSULIN (HCC): ICD-10-CM

## 2019-07-17 LAB
-: NORMAL
ALT SERPL-CCNC: 25 U/L (ref 5–33)
AMORPHOUS: NORMAL
ANION GAP SERPL CALCULATED.3IONS-SCNC: 11 MMOL/L (ref 9–17)
AST SERPL-CCNC: 26 U/L
BACTERIA: NORMAL
BILIRUBIN URINE: NEGATIVE
BUN BLDV-MCNC: 16 MG/DL (ref 8–23)
BUN/CREAT BLD: 34 (ref 9–20)
CALCIUM SERPL-MCNC: 8.9 MG/DL (ref 8.6–10.4)
CASTS UA: NORMAL /LPF
CHLORIDE BLD-SCNC: 102 MMOL/L (ref 98–107)
CO2: 27 MMOL/L (ref 20–31)
COLOR: YELLOW
COMMENT UA: NORMAL
CREAT SERPL-MCNC: 0.47 MG/DL (ref 0.5–0.9)
CREATININE URINE: 44.3 MG/DL (ref 28–217)
CRYSTALS, UA: NORMAL /HPF
EPITHELIAL CELLS UA: NORMAL /HPF (ref 0–25)
GFR AFRICAN AMERICAN: >60 ML/MIN
GFR NON-AFRICAN AMERICAN: >60 ML/MIN
GFR SERPL CREATININE-BSD FRML MDRD: ABNORMAL ML/MIN/{1.73_M2}
GFR SERPL CREATININE-BSD FRML MDRD: ABNORMAL ML/MIN/{1.73_M2}
GLUCOSE BLD-MCNC: 182 MG/DL (ref 70–99)
GLUCOSE URINE: NEGATIVE
KETONES, URINE: NEGATIVE
LEUKOCYTE ESTERASE, URINE: NEGATIVE
MICROALBUMIN/CREAT 24H UR: <12 MG/L
MICROALBUMIN/CREAT UR-RTO: NORMAL MCG/MG CREAT
MUCUS: NORMAL
NITRITE, URINE: NEGATIVE
OTHER OBSERVATIONS UA: NORMAL
PH UA: 8 (ref 5–9)
POTASSIUM SERPL-SCNC: 4.5 MMOL/L (ref 3.7–5.3)
PROTEIN UA: NEGATIVE
RBC UA: NORMAL /HPF (ref 0–2)
RENAL EPITHELIAL, UA: NORMAL /HPF
SODIUM BLD-SCNC: 140 MMOL/L (ref 135–144)
SPECIFIC GRAVITY UA: 1.01 (ref 1.01–1.02)
TRICHOMONAS: NORMAL
TURBIDITY: CLEAR
URINE HGB: NEGATIVE
UROBILINOGEN, URINE: NORMAL
WBC UA: NORMAL /HPF (ref 0–5)
YEAST: NORMAL

## 2019-07-17 PROCEDURE — 84460 ALANINE AMINO (ALT) (SGPT): CPT

## 2019-07-17 PROCEDURE — 81001 URINALYSIS AUTO W/SCOPE: CPT

## 2019-07-17 PROCEDURE — 36415 COLL VENOUS BLD VENIPUNCTURE: CPT

## 2019-07-17 PROCEDURE — 82043 UR ALBUMIN QUANTITATIVE: CPT

## 2019-07-17 PROCEDURE — 80048 BASIC METABOLIC PNL TOTAL CA: CPT

## 2019-07-17 PROCEDURE — 84450 TRANSFERASE (AST) (SGOT): CPT

## 2019-07-17 PROCEDURE — 82570 ASSAY OF URINE CREATININE: CPT

## 2019-11-22 PROBLEM — R13.10 DYSPHAGIA: Status: ACTIVE | Noted: 2019-11-22

## 2019-11-26 ENCOUNTER — HOSPITAL ENCOUNTER (OUTPATIENT)
Age: 69
Setting detail: OUTPATIENT SURGERY
Discharge: HOME | End: 2019-11-26
Attending: INTERNAL MEDICINE | Admitting: INTERNAL MEDICINE
Payer: MEDICARE

## 2019-11-26 ENCOUNTER — ANESTHESIA EVENT (OUTPATIENT)
Dept: OPERATING ROOM | Age: 69
End: 2019-11-26
Payer: MEDICARE

## 2019-11-26 ENCOUNTER — ANESTHESIA (OUTPATIENT)
Dept: OPERATING ROOM | Age: 69
End: 2019-11-26
Payer: MEDICARE

## 2019-11-26 VITALS
HEIGHT: 62 IN | WEIGHT: 193 LBS | RESPIRATION RATE: 16 BRPM | OXYGEN SATURATION: 98 % | SYSTOLIC BLOOD PRESSURE: 106 MMHG | TEMPERATURE: 97.9 F | HEART RATE: 70 BPM | BODY MASS INDEX: 35.51 KG/M2 | DIASTOLIC BLOOD PRESSURE: 68 MMHG

## 2019-11-26 VITALS
DIASTOLIC BLOOD PRESSURE: 67 MMHG | RESPIRATION RATE: 15 BRPM | OXYGEN SATURATION: 97 % | SYSTOLIC BLOOD PRESSURE: 127 MMHG

## 2019-11-26 LAB — GLUCOSE BLD-MCNC: 157 MG/DL (ref 74–100)

## 2019-11-26 PROCEDURE — 2580000003 HC RX 258: Performed by: INTERNAL MEDICINE

## 2019-11-26 PROCEDURE — 43239 EGD BIOPSY SINGLE/MULTIPLE: CPT | Performed by: INTERNAL MEDICINE

## 2019-11-26 PROCEDURE — 3700000000 HC ANESTHESIA ATTENDED CARE: Performed by: INTERNAL MEDICINE

## 2019-11-26 PROCEDURE — 2500000003 HC RX 250 WO HCPCS: Performed by: NURSE ANESTHETIST, CERTIFIED REGISTERED

## 2019-11-26 PROCEDURE — 7100000010 HC PHASE II RECOVERY - FIRST 15 MIN: Performed by: INTERNAL MEDICINE

## 2019-11-26 PROCEDURE — 2709999900 HC NON-CHARGEABLE SUPPLY: Performed by: INTERNAL MEDICINE

## 2019-11-26 PROCEDURE — 3609017700 HC EGD DILATION GASTRIC/DUODENAL STRICTURE: Performed by: INTERNAL MEDICINE

## 2019-11-26 PROCEDURE — 3609012400 HC EGD TRANSORAL BIOPSY SINGLE/MULTIPLE: Performed by: INTERNAL MEDICINE

## 2019-11-26 PROCEDURE — 6360000002 HC RX W HCPCS: Performed by: NURSE ANESTHETIST, CERTIFIED REGISTERED

## 2019-11-26 PROCEDURE — 43248 EGD GUIDE WIRE INSERTION: CPT | Performed by: INTERNAL MEDICINE

## 2019-11-26 PROCEDURE — 88305 TISSUE EXAM BY PATHOLOGIST: CPT

## 2019-11-26 PROCEDURE — 82947 ASSAY GLUCOSE BLOOD QUANT: CPT

## 2019-11-26 PROCEDURE — 3700000001 HC ADD 15 MINUTES (ANESTHESIA): Performed by: INTERNAL MEDICINE

## 2019-11-26 PROCEDURE — 7100000011 HC PHASE II RECOVERY - ADDTL 15 MIN: Performed by: INTERNAL MEDICINE

## 2019-11-26 RX ORDER — LIDOCAINE HYDROCHLORIDE 20 MG/ML
INJECTION, SOLUTION INFILTRATION; PERINEURAL PRN
Status: DISCONTINUED | OUTPATIENT
Start: 2019-11-26 | End: 2019-11-26 | Stop reason: SDUPTHER

## 2019-11-26 RX ORDER — SODIUM CHLORIDE, SODIUM LACTATE, POTASSIUM CHLORIDE, CALCIUM CHLORIDE 600; 310; 30; 20 MG/100ML; MG/100ML; MG/100ML; MG/100ML
INJECTION, SOLUTION INTRAVENOUS CONTINUOUS
Status: DISCONTINUED | OUTPATIENT
Start: 2019-11-26 | End: 2019-11-26 | Stop reason: HOSPADM

## 2019-11-26 RX ORDER — PROPOFOL 10 MG/ML
INJECTION, EMULSION INTRAVENOUS CONTINUOUS PRN
Status: DISCONTINUED | OUTPATIENT
Start: 2019-11-26 | End: 2019-11-26 | Stop reason: SDUPTHER

## 2019-11-26 RX ADMIN — PHENYLEPHRINE HYDROCHLORIDE 80 MCG: 10 INJECTION INTRAVENOUS at 09:14

## 2019-11-26 RX ADMIN — PROPOFOL 180 MCG/KG/MIN: 10 INJECTION, EMULSION INTRAVENOUS at 09:05

## 2019-11-26 RX ADMIN — SODIUM CHLORIDE, POTASSIUM CHLORIDE, SODIUM LACTATE AND CALCIUM CHLORIDE: 600; 310; 30; 20 INJECTION, SOLUTION INTRAVENOUS at 08:43

## 2019-11-26 RX ADMIN — LIDOCAINE HYDROCHLORIDE 100 MG: 20 INJECTION, SOLUTION INFILTRATION; PERINEURAL at 09:05

## 2019-11-26 ASSESSMENT — PAIN SCALES - GENERAL: PAINLEVEL_OUTOF10: 0

## 2019-11-26 NOTE — PROGRESS NOTES
Patient verbalizes readiness for discharge. All criteria met. Discharge Criteria    Inpatients must meet Criteria 1 through 7. All other patients are either YES or N/A. If a NO is chosen then Anesthesia or Surgeon must be notified. 1.  Minimum 30 minutes after last dose of sedative medication, minimum 120 minutes after last dose of reversal agent. Yes      2. Systolic BP stable within 20 mmHg for 30 minutes & systolic BP between 90 & 553 or within 10 mmHg of baseline. Yes      3. Pulse between 60 and 100 or within 10 bpm of baseline. Yes      4. Spontaneous respiratory rate >/= 10 per minute. Yes      5. SaO2 >/= 95 or  >/= baseline. Yes      6. Able to cough and swallow or return to baseline function. Yes      7. Alert and oriented or return to baseline mental status. Yes      8. Demonstrates controlled, coordinated movements, ambulates with steady gait, or return to baseline activity function. Yes      9. Minimal or no pain or nausea, or at a level tolerable and acceptable to patient. Yes      10. Takes and retains oral fluids as allowed. Yes      11. Procedural / perioperative site stable. Minimal or no bleeding. Yes          12. If GI endoscopy procedure, minimal or no abdominal distention or passing flatus. Yes      13. Written discharge instructions and emergency telephone number provided. Yes      14. Accompanied by a responsible adult. Yes      Adult patient discharged from facility without responsible person meets above criteria plus the following:   a) remains awake without stimulus for 30 minutes     b) oriented appropriate for age      c) all vital signs stable   d) no significant risk of losing protective reflexes      e) able to maintain pre-procedure mobility without assistance   f) no nausea or dizziness      g) transportation arrangements that do not require patient to operate motor Vehicle.      N/A

## 2019-11-26 NOTE — PROGRESS NOTES
Discharge instructions reviewed with patient and patient's sister. Verbalized understanding and denied any questions.

## 2019-11-29 LAB — SURGICAL PATHOLOGY REPORT: NORMAL

## 2019-12-12 ENCOUNTER — HOSPITAL ENCOUNTER (OUTPATIENT)
Dept: LAB | Age: 69
Discharge: HOME | End: 2019-12-12
Payer: MEDICARE

## 2019-12-12 DIAGNOSIS — I10 ESSENTIAL HYPERTENSION: ICD-10-CM

## 2019-12-12 DIAGNOSIS — E78.5 HYPERLIPIDEMIA, UNSPECIFIED HYPERLIPIDEMIA TYPE: ICD-10-CM

## 2019-12-12 LAB
CHOLESTEROL, FASTING: 131 MG/DL
CHOLESTEROL/HDL RATIO: 2.3
HDLC SERPL-MCNC: 57 MG/DL
LDL CHOLESTEROL: 57 MG/DL (ref 0–130)
TRIGLYCERIDE, FASTING: 83 MG/DL
VLDLC SERPL CALC-MCNC: NORMAL MG/DL (ref 1–30)

## 2019-12-12 PROCEDURE — 80061 LIPID PANEL: CPT

## 2019-12-12 PROCEDURE — 36415 COLL VENOUS BLD VENIPUNCTURE: CPT

## 2020-01-23 PROBLEM — Z12.11 COLON CANCER SCREENING: Status: ACTIVE | Noted: 2020-01-23

## 2020-02-22 PROBLEM — Z12.11 COLON CANCER SCREENING: Status: RESOLVED | Noted: 2020-01-23 | Resolved: 2020-02-22

## 2020-02-25 PROBLEM — E66.01 MORBIDLY OBESE (HCC): Status: ACTIVE | Noted: 2020-02-25

## 2020-02-25 PROBLEM — F33.42 RECURRENT MAJOR DEPRESSIVE DISORDER, IN FULL REMISSION (HCC): Status: ACTIVE | Noted: 2020-02-25

## 2020-03-03 ENCOUNTER — HOSPITAL ENCOUNTER (OUTPATIENT)
Age: 70
Discharge: HOME | End: 2020-03-03
Payer: MEDICARE

## 2020-03-03 PROCEDURE — 87086 URINE CULTURE/COLONY COUNT: CPT

## 2020-03-04 LAB
CULTURE: NORMAL
Lab: NORMAL
SPECIMEN DESCRIPTION: NORMAL

## 2020-04-05 PROBLEM — Z12.11 COLON CANCER SCREENING: Status: RESOLVED | Noted: 2020-01-23 | Resolved: 2020-04-05

## 2020-05-14 ENCOUNTER — HOSPITAL ENCOUNTER (OUTPATIENT)
Dept: NON INVASIVE DIAGNOSTICS | Age: 70
Discharge: HOME | End: 2020-05-14
Payer: MEDICARE

## 2020-05-14 ENCOUNTER — HOSPITAL ENCOUNTER (OUTPATIENT)
Age: 70
Discharge: HOME | End: 2020-05-14
Payer: MEDICARE

## 2020-05-14 LAB
ABSOLUTE EOS #: 0.18 K/UL (ref 0–0.44)
ABSOLUTE IMMATURE GRANULOCYTE: 0.03 K/UL (ref 0–0.3)
ABSOLUTE LYMPH #: 2.29 K/UL (ref 1.1–3.7)
ABSOLUTE MONO #: 0.56 K/UL (ref 0.1–1.2)
ALBUMIN SERPL-MCNC: 4 G/DL (ref 3.5–5.2)
ALBUMIN/GLOBULIN RATIO: 1.3 (ref 1–2.5)
ALP BLD-CCNC: 103 U/L (ref 35–104)
ALT SERPL-CCNC: 19 U/L (ref 5–33)
ANION GAP SERPL CALCULATED.3IONS-SCNC: 13 MMOL/L (ref 9–17)
AST SERPL-CCNC: 23 U/L
BASOPHILS # BLD: 1 % (ref 0–2)
BASOPHILS ABSOLUTE: 0.06 K/UL (ref 0–0.2)
BILIRUB SERPL-MCNC: 0.44 MG/DL (ref 0.3–1.2)
BUN BLDV-MCNC: 17 MG/DL (ref 8–23)
BUN/CREAT BLD: 34 (ref 9–20)
CALCIUM SERPL-MCNC: 9 MG/DL (ref 8.6–10.4)
CHLORIDE BLD-SCNC: 97 MMOL/L (ref 98–107)
CO2: 26 MMOL/L (ref 20–31)
CREAT SERPL-MCNC: 0.5 MG/DL (ref 0.5–0.9)
DIFFERENTIAL TYPE: ABNORMAL
EOSINOPHILS RELATIVE PERCENT: 2 % (ref 1–4)
GFR AFRICAN AMERICAN: >60 ML/MIN
GFR NON-AFRICAN AMERICAN: >60 ML/MIN
GFR SERPL CREATININE-BSD FRML MDRD: ABNORMAL ML/MIN/{1.73_M2}
GFR SERPL CREATININE-BSD FRML MDRD: ABNORMAL ML/MIN/{1.73_M2}
GLUCOSE BLD-MCNC: 190 MG/DL (ref 70–99)
HCT VFR BLD CALC: 38.6 % (ref 36.3–47.1)
HEMOGLOBIN: 12.1 G/DL (ref 11.9–15.1)
IMMATURE GRANULOCYTES: 0 %
LV EF: 60 %
LVEF MODALITY: NORMAL
LYMPHOCYTES # BLD: 22 % (ref 24–43)
MCH RBC QN AUTO: 26.4 PG (ref 25.2–33.5)
MCHC RBC AUTO-ENTMCNC: 31.3 G/DL (ref 28.4–34.8)
MCV RBC AUTO: 84.3 FL (ref 82.6–102.9)
MONOCYTES # BLD: 6 % (ref 3–12)
NRBC AUTOMATED: 0 PER 100 WBC
PDW BLD-RTO: 15.2 % (ref 11.8–14.4)
PLATELET # BLD: 339 K/UL (ref 138–453)
PLATELET ESTIMATE: ABNORMAL
PMV BLD AUTO: 9.7 FL (ref 8.1–13.5)
POTASSIUM SERPL-SCNC: 3.5 MMOL/L (ref 3.7–5.3)
RBC # BLD: 4.58 M/UL (ref 3.95–5.11)
RBC # BLD: ABNORMAL 10*6/UL
SEG NEUTROPHILS: 69 % (ref 36–65)
SEGMENTED NEUTROPHILS ABSOLUTE COUNT: 7.11 K/UL (ref 1.5–8.1)
SODIUM BLD-SCNC: 136 MMOL/L (ref 135–144)
TOTAL PROTEIN: 7 G/DL (ref 6.4–8.3)
WBC # BLD: 10.2 K/UL (ref 3.5–11.3)
WBC # BLD: ABNORMAL 10*3/UL

## 2020-05-14 PROCEDURE — 93306 TTE W/DOPPLER COMPLETE: CPT

## 2020-05-14 PROCEDURE — 93225 XTRNL ECG REC<48 HRS REC: CPT

## 2020-05-14 PROCEDURE — 3430000000 HC RX DIAGNOSTIC RADIOPHARMACEUTICAL: Performed by: INTERNAL MEDICINE

## 2020-05-14 PROCEDURE — 80053 COMPREHEN METABOLIC PANEL: CPT

## 2020-05-14 PROCEDURE — 93017 CV STRESS TEST TRACING ONLY: CPT

## 2020-05-14 PROCEDURE — 36415 COLL VENOUS BLD VENIPUNCTURE: CPT

## 2020-05-14 PROCEDURE — 78452 HT MUSCLE IMAGE SPECT MULT: CPT

## 2020-05-14 PROCEDURE — 85025 COMPLETE CBC W/AUTO DIFF WBC: CPT

## 2020-05-14 PROCEDURE — A9500 TC99M SESTAMIBI: HCPCS | Performed by: INTERNAL MEDICINE

## 2020-05-14 PROCEDURE — 6360000002 HC RX W HCPCS: Performed by: FAMILY MEDICINE

## 2020-05-14 RX ADMIN — REGADENOSON 0.4 MG: 0.08 INJECTION, SOLUTION INTRAVENOUS at 08:45

## 2020-05-14 RX ADMIN — Medication 30 MILLICURIE: at 08:45

## 2020-05-15 ENCOUNTER — HOSPITAL ENCOUNTER (OUTPATIENT)
Dept: NON INVASIVE DIAGNOSTICS | Age: 70
Discharge: HOME | End: 2020-05-15
Payer: MEDICARE

## 2020-05-15 PROCEDURE — 3430000000 HC RX DIAGNOSTIC RADIOPHARMACEUTICAL: Performed by: INTERNAL MEDICINE

## 2020-05-15 PROCEDURE — A9500 TC99M SESTAMIBI: HCPCS | Performed by: INTERNAL MEDICINE

## 2020-05-15 PROCEDURE — 78452 HT MUSCLE IMAGE SPECT MULT: CPT

## 2020-05-15 RX ADMIN — Medication 30 MILLICURIE: at 09:21

## 2020-05-18 ENCOUNTER — OFFICE VISIT (OUTPATIENT)
Dept: CARDIOLOGY | Age: 70
End: 2020-05-18
Payer: MEDICARE

## 2020-05-18 VITALS
HEIGHT: 62 IN | RESPIRATION RATE: 20 BRPM | DIASTOLIC BLOOD PRESSURE: 69 MMHG | SYSTOLIC BLOOD PRESSURE: 126 MMHG | WEIGHT: 200 LBS | HEART RATE: 83 BPM | BODY MASS INDEX: 36.8 KG/M2 | OXYGEN SATURATION: 94 %

## 2020-05-18 PROCEDURE — 99205 OFFICE O/P NEW HI 60 MIN: CPT | Performed by: FAMILY MEDICINE

## 2020-05-18 RX ORDER — ASPIRIN 81 MG/1
81 TABLET ORAL DAILY
Qty: 90 TABLET | Refills: 3 | Status: ON HOLD | COMMUNITY
Start: 2020-05-18 | End: 2020-05-21 | Stop reason: HOSPADM

## 2020-05-18 RX ORDER — VITAMIN B COMPLEX
1 CAPSULE ORAL DAILY
COMMUNITY

## 2020-05-19 ENCOUNTER — TELEPHONE (OUTPATIENT)
Dept: CARDIOLOGY | Age: 70
End: 2020-05-19

## 2020-05-19 LAB
ACQUISITION DURATION: NORMAL S
AVERAGE HEART RATE: 75 BPM
EKG DIAGNOSIS: NORMAL
FASTEST SUPRAVENTRICULAR RATE: 135 BPM
HOLTER MAX HEART RATE: 126 BPM
HOOKUP DATE: NORMAL
HOOKUP TIME: NORMAL
LONGEST SUPRAVENTRICULAR RATE: 129 BPM
MAX HEART RATE TIME/DATE: NORMAL
MIN HEART RATE TIME/DATE: NORMAL
MIN HEART RATE: 54 BPM
NUMBER OF FASTEST SUPRAVENTRICULAR BEATS: 3
NUMBER OF LONGEST SUPRAVENTRICULAR BEATS: 5
NUMBER OF QRS COMPLEXES: NORMAL
NUMBER OF SUPRAVENTRICULAR BEATS IN RUNS: 8
NUMBER OF SUPRAVENTRICULAR COUPLETS: 0
NUMBER OF SUPRAVENTRICULAR ECTOPICS: 37
NUMBER OF SUPRAVENTRICULAR ISOLATED BEATS: 29
NUMBER OF SUPRAVENTRICULAR RUNS: 2
NUMBER OF VENTRICULAR BEATS IN RUNS: 0
NUMBER OF VENTRICULAR BIGEMINAL CYCLES: 434
NUMBER OF VENTRICULAR COUPLETS: 0
NUMBER OF VENTRICULAR ECTOPICS: 1374
NUMBER OF VENTRICULAR ISOLATED BEATS: 1374
NUMBER OF VENTRICULAR RUNS: 0

## 2020-05-21 ENCOUNTER — HOSPITAL ENCOUNTER (OUTPATIENT)
Dept: CARDIAC CATH/INVASIVE PROCEDURES | Age: 70
Discharge: HOME | End: 2020-05-21
Attending: FAMILY MEDICINE | Admitting: FAMILY MEDICINE
Payer: MEDICARE

## 2020-05-21 VITALS
DIASTOLIC BLOOD PRESSURE: 67 MMHG | SYSTOLIC BLOOD PRESSURE: 126 MMHG | HEART RATE: 80 BPM | OXYGEN SATURATION: 95 % | HEIGHT: 62 IN | WEIGHT: 200 LBS | RESPIRATION RATE: 16 BRPM | BODY MASS INDEX: 36.8 KG/M2 | TEMPERATURE: 98.9 F

## 2020-05-21 PROBLEM — R94.39 ABNORMAL CARDIOVASCULAR STRESS TEST: Status: ACTIVE | Noted: 2020-05-21

## 2020-05-21 LAB — GLUCOSE BLD-MCNC: 167 MG/DL (ref 74–100)

## 2020-05-21 PROCEDURE — 82947 ASSAY GLUCOSE BLOOD QUANT: CPT

## 2020-05-21 PROCEDURE — U0003 INFECTIOUS AGENT DETECTION BY NUCLEIC ACID (DNA OR RNA); SEVERE ACUTE RESPIRATORY SYNDROME CORONAVIRUS 2 (SARS-COV-2) (CORONAVIRUS DISEASE [COVID-19]), AMPLIFIED PROBE TECHNIQUE, MAKING USE OF HIGH THROUGHPUT TECHNOLOGIES AS DESCRIBED BY CMS-2020-01-R: HCPCS

## 2020-05-21 PROCEDURE — 99152 MOD SED SAME PHYS/QHP 5/>YRS: CPT | Performed by: FAMILY MEDICINE

## 2020-05-21 PROCEDURE — C1725 CATH, TRANSLUMIN NON-LASER: HCPCS

## 2020-05-21 PROCEDURE — 6360000004 HC RX CONTRAST MEDICATION: Performed by: FAMILY MEDICINE

## 2020-05-21 PROCEDURE — 93458 L HRT ARTERY/VENTRICLE ANGIO: CPT | Performed by: FAMILY MEDICINE

## 2020-05-21 PROCEDURE — C1769 GUIDE WIRE: HCPCS

## 2020-05-21 PROCEDURE — C1894 INTRO/SHEATH, NON-LASER: HCPCS

## 2020-05-21 PROCEDURE — 2580000003 HC RX 258: Performed by: FAMILY MEDICINE

## 2020-05-21 PROCEDURE — 2709999900 HC NON-CHARGEABLE SUPPLY

## 2020-05-21 RX ORDER — NITROGLYCERIN 0.4 MG/1
0.4 TABLET SUBLINGUAL EVERY 5 MIN PRN
Status: DISCONTINUED | OUTPATIENT
Start: 2020-05-21 | End: 2020-05-21 | Stop reason: HOSPADM

## 2020-05-21 RX ORDER — SODIUM CHLORIDE 9 MG/ML
INJECTION, SOLUTION INTRAVENOUS CONTINUOUS
Status: DISCONTINUED | OUTPATIENT
Start: 2020-05-21 | End: 2020-05-21 | Stop reason: HOSPADM

## 2020-05-21 RX ORDER — ACETAMINOPHEN 325 MG/1
650 TABLET ORAL EVERY 4 HOURS PRN
Status: DISCONTINUED | OUTPATIENT
Start: 2020-05-21 | End: 2020-05-21 | Stop reason: HOSPADM

## 2020-05-21 RX ORDER — ACETAMINOPHEN 325 MG/1
650 TABLET ORAL EVERY 6 HOURS PRN
COMMUNITY

## 2020-05-21 RX ORDER — SODIUM CHLORIDE 0.9 % (FLUSH) 0.9 %
10 SYRINGE (ML) INJECTION PRN
Status: DISCONTINUED | OUTPATIENT
Start: 2020-05-21 | End: 2020-05-21 | Stop reason: HOSPADM

## 2020-05-21 RX ORDER — SODIUM CHLORIDE 0.9 % (FLUSH) 0.9 %
10 SYRINGE (ML) INJECTION EVERY 12 HOURS SCHEDULED
Status: DISCONTINUED | OUTPATIENT
Start: 2020-05-21 | End: 2020-05-21 | Stop reason: HOSPADM

## 2020-05-21 RX ORDER — DIPHENHYDRAMINE HCL 25 MG
50 CAPSULE ORAL ONCE
Status: DISCONTINUED | OUTPATIENT
Start: 2020-05-21 | End: 2020-05-21 | Stop reason: HOSPADM

## 2020-05-21 RX ADMIN — SODIUM CHLORIDE: 9 INJECTION, SOLUTION INTRAVENOUS at 10:48

## 2020-05-21 RX ADMIN — IOPAMIDOL 60 ML: 755 INJECTION, SOLUTION INTRAVENOUS at 12:13

## 2020-05-21 NOTE — PROGRESS NOTES
Writer contacted Khari Cardoza,  patient's sister and reviewed discharge instructions with patient. hKari Cardoza denies questions.

## 2020-05-23 LAB — SARS-COV-2, NAA: NOT DETECTED

## 2020-05-24 ENCOUNTER — TELEPHONE (OUTPATIENT)
Dept: PRIMARY CARE CLINIC | Age: 70
End: 2020-05-24

## 2020-10-07 ENCOUNTER — HOSPITAL ENCOUNTER (OUTPATIENT)
Dept: WOMENS IMAGING | Age: 70
Discharge: HOME | End: 2020-10-09
Payer: MEDICARE

## 2020-10-07 ENCOUNTER — HOSPITAL ENCOUNTER (OUTPATIENT)
Dept: LAB | Age: 70
Discharge: HOME | End: 2020-10-07
Payer: MEDICARE

## 2020-10-07 LAB
ALBUMIN SERPL-MCNC: 4 G/DL (ref 3.5–5.2)
ALBUMIN/GLOBULIN RATIO: 1.3 (ref 1–2.5)
ALP BLD-CCNC: 95 U/L (ref 35–104)
ALT SERPL-CCNC: 16 U/L (ref 5–33)
ANION GAP SERPL CALCULATED.3IONS-SCNC: 13 MMOL/L (ref 9–17)
AST SERPL-CCNC: 20 U/L
BILIRUB SERPL-MCNC: 0.42 MG/DL (ref 0.3–1.2)
BUN BLDV-MCNC: 22 MG/DL (ref 8–23)
BUN/CREAT BLD: 48 (ref 9–20)
CALCIUM SERPL-MCNC: 8.9 MG/DL (ref 8.6–10.4)
CHLORIDE BLD-SCNC: 99 MMOL/L (ref 98–107)
CHOLESTEROL/HDL RATIO: 2.4
CHOLESTEROL: 125 MG/DL
CO2: 25 MMOL/L (ref 20–31)
CREAT SERPL-MCNC: 0.46 MG/DL (ref 0.5–0.9)
CREATININE URINE: 69.1 MG/DL (ref 28–217)
GFR AFRICAN AMERICAN: >60 ML/MIN
GFR NON-AFRICAN AMERICAN: >60 ML/MIN
GFR SERPL CREATININE-BSD FRML MDRD: ABNORMAL ML/MIN/{1.73_M2}
GFR SERPL CREATININE-BSD FRML MDRD: ABNORMAL ML/MIN/{1.73_M2}
GLUCOSE BLD-MCNC: 154 MG/DL (ref 70–99)
HCT VFR BLD CALC: 39.1 % (ref 36.3–47.1)
HDLC SERPL-MCNC: 52 MG/DL
HEMOGLOBIN: 12.5 G/DL (ref 11.9–15.1)
LDL CHOLESTEROL: 53 MG/DL (ref 0–130)
MCH RBC QN AUTO: 27.6 PG (ref 25.2–33.5)
MCHC RBC AUTO-ENTMCNC: 32 G/DL (ref 28.4–34.8)
MCV RBC AUTO: 86.3 FL (ref 82.6–102.9)
MICROALBUMIN/CREAT 24H UR: 41 MG/L
MICROALBUMIN/CREAT UR-RTO: 59 MCG/MG CREAT
NRBC AUTOMATED: 0 PER 100 WBC
PDW BLD-RTO: 15.6 % (ref 11.8–14.4)
PLATELET # BLD: 343 K/UL (ref 138–453)
PMV BLD AUTO: 9.8 FL (ref 8.1–13.5)
POTASSIUM SERPL-SCNC: 4.3 MMOL/L (ref 3.7–5.3)
RBC # BLD: 4.53 M/UL (ref 3.95–5.11)
SODIUM BLD-SCNC: 137 MMOL/L (ref 135–144)
TOTAL PROTEIN: 7.2 G/DL (ref 6.4–8.3)
TRIGL SERPL-MCNC: 98 MG/DL
VLDLC SERPL CALC-MCNC: NORMAL MG/DL (ref 1–30)
WBC # BLD: 8 K/UL (ref 3.5–11.3)

## 2020-10-07 PROCEDURE — 36415 COLL VENOUS BLD VENIPUNCTURE: CPT

## 2020-10-07 PROCEDURE — 82570 ASSAY OF URINE CREATININE: CPT

## 2020-10-07 PROCEDURE — 83036 HEMOGLOBIN GLYCOSYLATED A1C: CPT

## 2020-10-07 PROCEDURE — 85027 COMPLETE CBC AUTOMATED: CPT

## 2020-10-07 PROCEDURE — 80053 COMPREHEN METABOLIC PANEL: CPT

## 2020-10-07 PROCEDURE — 82043 UR ALBUMIN QUANTITATIVE: CPT

## 2020-10-07 PROCEDURE — 80061 LIPID PANEL: CPT

## 2020-10-07 PROCEDURE — 77080 DXA BONE DENSITY AXIAL: CPT

## 2020-10-08 LAB
ESTIMATED AVERAGE GLUCOSE: 160 MG/DL
HBA1C MFR BLD: 7.2 % (ref 4–6)

## 2020-11-05 ENCOUNTER — HOSPITAL ENCOUNTER (OUTPATIENT)
Dept: LAB | Age: 70
Discharge: HOME | End: 2020-11-05
Payer: MEDICARE

## 2020-11-05 ENCOUNTER — HOSPITAL ENCOUNTER (OUTPATIENT)
Dept: CT IMAGING | Age: 70
Discharge: HOME | End: 2020-11-07
Payer: MEDICARE

## 2020-11-05 LAB
BUN BLDV-MCNC: 18 MG/DL (ref 8–23)
CREAT SERPL-MCNC: 0.45 MG/DL (ref 0.5–0.9)
GFR AFRICAN AMERICAN: >60 ML/MIN
GFR NON-AFRICAN AMERICAN: >60 ML/MIN
GFR SERPL CREATININE-BSD FRML MDRD: ABNORMAL ML/MIN/{1.73_M2}
GFR SERPL CREATININE-BSD FRML MDRD: ABNORMAL ML/MIN/{1.73_M2}

## 2020-11-05 PROCEDURE — 82565 ASSAY OF CREATININE: CPT

## 2020-11-05 PROCEDURE — 36415 COLL VENOUS BLD VENIPUNCTURE: CPT

## 2020-11-05 PROCEDURE — 74177 CT ABD & PELVIS W/CONTRAST: CPT

## 2020-11-05 PROCEDURE — 6360000004 HC RX CONTRAST MEDICATION: Performed by: NURSE PRACTITIONER

## 2020-11-05 PROCEDURE — 84520 ASSAY OF UREA NITROGEN: CPT

## 2020-11-05 RX ADMIN — IOPAMIDOL 18 ML: 755 INJECTION, SOLUTION INTRAVENOUS at 12:17

## 2020-11-05 RX ADMIN — IOPAMIDOL 75 ML: 755 INJECTION, SOLUTION INTRAVENOUS at 12:17

## 2020-11-18 ENCOUNTER — OFFICE VISIT (OUTPATIENT)
Dept: SURGERY | Age: 70
End: 2020-11-18
Payer: MEDICARE

## 2020-11-18 VITALS
HEART RATE: 72 BPM | SYSTOLIC BLOOD PRESSURE: 132 MMHG | BODY MASS INDEX: 37.69 KG/M2 | HEIGHT: 61 IN | DIASTOLIC BLOOD PRESSURE: 80 MMHG | TEMPERATURE: 98.2 F | WEIGHT: 199.6 LBS | RESPIRATION RATE: 20 BRPM

## 2020-11-18 PROCEDURE — 99203 OFFICE O/P NEW LOW 30 MIN: CPT | Performed by: SURGERY

## 2020-11-18 RX ORDER — POLYETHYLENE GLYCOL 3350 17 G/17G
17 POWDER, FOR SOLUTION ORAL DAILY
COMMUNITY

## 2020-11-18 NOTE — PROGRESS NOTES
GENERAL SURGERY CONSULTATION / OFFICE VISIT      Patient's Name/ Date of Birth/ Gender: Richy Dean / 1950 (34 y.o.) / female     PCP: OLIVIA Mcdaniels CNP    History of present Illness:  Patient is a pleasant 69 y.o. female  kindly referred by OLIVIA Mcdaniels CNP for evaluation of hernia. She also is due for a screening colonoscopy. She has a history of open RNY gastric bypass 20 years ago with about a 100 lb weight loss. She had a year later open large ventral incisional hernia repair, she thinks she overdid lifting or returned to work too soon, got a recurrence of there hernia. She says she was supposed to get a follow up screening colonoscopy with Dr. Kendall Burns, then KAITLYN oneill, then Dr. Kendall Burns retired, she is now overdue. Family history colon cancer. She denies any obstructive symptoms and has done a good job avoiding excessive heavy lifting or straining/constipation. She is 70. She lives with her sister. She says I did surgery on her family members in the past. She uses a walker. She is a non-smoker. She is diabetic. No fevers or chills. No significant abdominal pain. No nausea or vomiting. No GI obstructive symptoms. Uses walker. At 68 yo she does not do heavy lifting and avoids constipation/straining. Lives with sister. Nonsmoker. Had open VIH with mesh 1 yr after bypass in [place] many years ago. Past Medical History:  has a past medical history of Anemia, Arthritis, Depression, Diabetes mellitus ([address] History of cardiac cath, Hypertension, Neck fracture ([address] Obesity (BMI 30-39.9), Splenic artery aneurysm ([address] Type II or unspecified type diabetes mellitus without mention of complication, not stated as uncontrolled, and Ventral incisional hernia.     Past Surgical History:   Past Surgical History:   Procedure Laterality Date    APPENDECTOMY      CATARACT REMOVAL WITH IMPLANT Left 12/12/2018    per Dr. Jame Thomas Right 01/07/2019     [place]    CERVICAL LAMINECTOMY  03/24/2016    C 2- C 7    CHOLECYSTECTOMY      COLONOSCOPY  2014    DILATATION, ESOPHAGUS      GASTRIC BYPASS SURGERY  2001    HERNIA REPAIR      [facility]    INTRACAPSULAR CATARACT EXTRACTION Left 12/12/2018    EYE CATARACT EMULSIFICATION IOL IMPLANT performed by Katie Kim DO at [address] Right 1/7/2019    EYE CATARACT EMULSIFICATION IOL IMPLANT performed by Katie Kim DO at [address] OFFICE/OUTPT [facility] Right 6/15/2018    TOE HAMMER REPAIR, RIGHT 5TH DIGIT DEROTATIONAL SKINPLASTY/ARTHOPLASTY performed by Pollo Camargo DPM at [facility] 105 N/A 11/26/2019    EGD DILATION SAVORY performed by Samuel Leary MD at [facility].  11/26/2019    -bx(normal)dilation,evidence of gastric bypass with very small gastric remnant       Social History:  reports that she has never smoked. She has never used smokeless tobacco. She reports that she does not drink alcohol or use drugs. Family History: family history includes Alcohol Abuse in her father; Cancer in her mother; Diabetes in her mother; Heart Attack in her mother; High Blood Pressure in her mother; High Cholesterol in her mother. Review of Systems:   General: Denies fever, chills, night sweats, weight loss, malaise, fatigue  HEENT: Denies sore throat, sinus problems, allergic rhinosinusitis  Card: Denies chest pain, palpitations, orthopnea/PND. HTN. Pulm: Denies cough, shortness of breath, dyspnea on exertion  GI:  per HPI. : Denies polyuria, dysuria, hematuria  Endo: DM  Heme: neg  Psych: depression  Neuro: Denies h/o CVA, TIA  Skin: Denies rashes, ulcers  Musculoskeletal: no gross motor dysfunction.  OA    Allergies: Ibuprofen and Pepto-bismol [bismuth subsalicylate]    Current Meds:  Current Outpatient Medications:     polyethylene glycol (MIRALAX) 17 g PACK packet, Take needed ), Disp: , Rfl:     Physical Exam:  Vital signs and Nurse's note reviewed  Gen:  A&Ox3, NAD. Pleasant and cooperative. HEENT: PERRLA, EOMI, no scleral icterus  Neck:  no goiter  CVS: Regular rate and rhythm  Resp: Good bilateral air entry, no active wheezing, no labored breathing  Abd: soft, non-tender, non-distended, bowel sounds present. Large midline incision with ventral incisional hernia at superior aspect near falciform ligament with reducible colon. Ext: Moves all extremities, no gross focal motor deficits  Skin: No erythema or ulcerations     Labs:   Lab Results   Component Value Date    WBC 8.0 10/07/2020    HGB 12.5 10/07/2020    HCT 39.1 10/07/2020    MCV 86.3 10/07/2020     10/07/2020     05/07/2012     Lab Results   Component Value Date     10/07/2020    K 4.3 10/07/2020    CL 99 10/07/2020    CO2 25 10/07/2020    BUN 18 11/05/2020    CREATININE 0.45 11/05/2020    GLUCOSE 154 10/07/2020    GLUCOSE 156 05/07/2012    CALCIUM 8.9 10/07/2020     Lab Results   Component Value Date    ALKPHOS 95 10/07/2020    ALT 16 10/07/2020    AST 20 10/07/2020    PROT 7.2 10/07/2020    BILITOT 0.42 10/07/2020    BILIDIR 0.12 04/06/2016    LABALBU 4.0 10/07/2020     No results found for: LACTA  Lab Results   Component Value Date    AMYLASE 40 02/13/2013     Lab Results   Component Value Date    LIPASE 31 02/13/2013     Lab Results   Component Value Date    INR 0.9 03/22/2016       Radiologic Studies:    EXAMINATION:    CT OF THE ABDOMEN AND PELVIS WITH CONTRAST         11/5/2020 11:57 am         TECHNIQUE:    CT of the abdomen and pelvis was performed with the administration of    intravenous contrast. Multiplanar reformatted images are provided for review.     Dose modulation, iterative reconstruction, and/or weight based adjustment of    the mA/kV was utilized to reduce the radiation dose to as low as reasonably    achievable.         COMPARISON:    Abdomen and pelvis CT 02/14/2013      HISTORY:    ORDERING SYSTEM PROVIDED HISTORY: Abdominal hernia without obstruction and    without gangrene, recurrence not specified, unspecified hernia type    TECHNOLOGIST PROVIDED HISTORY:         abdominal wall hernia         FINDINGS:    LOWER CHEST:  Patchy linear opacities in each lung base, likely scarring or    atelectasis.  Mild cardiomegaly.  No pleural nor pericardial effusions.         ORGANS:  Hepatic granulomatous calcification.  0.4 cm hypodense lesion in    hepatic segment 5, too small to characterize but likely a cyst or hemangioma.     Surgically absent gallbladder.  No intrahepatic nor extrahepatic biliary    dilation.  Moderate pancreatic atrophy.  Normal spleen, adrenal glands, and    kidneys.         GI/BOWEL:  Tiny sliding hiatal hernia.  Changes of Jose Angel-en-Y gastric bypass    with expected anastomotic dilation.  Otherwise normal course and caliber of    the stomach, small bowel, colon, and rectum without obstruction.  Eccentric    wall thickening in the jejunum at the Jose Angel limb anastomosis.  No    visualization of the appendix, reportedly surgically absent.  Mild to    moderate stool.  Mild colonic diverticulosis without findings of acute    diverticulitis.         PELVIS:  Age-appropriate atrophy of the uterus and ovaries.  Normal urinary    bladder.  Laxity of the pelvic floor musculature.         PERITONEUM/RETROPERITONEUM:  Mild systemic atherosclerosis.  Unchanged 0.9 cm    peripherally calcified aneurysm of mid to distal splenic artery.  Nonenlarged    to mildly enlarged mesenteric lymph nodes.  No retroperitoneal nor pelvic    lymphadenopathy.  No free intraperitoneal fluid nor gas.         SOFT TISSUES/BONES:  Healed midline laparotomy incision.  Laxity of the    anterior and lateral abdominal wall musculature with diastasis recti.  Small    right paramedian supraumbilical hernia containing nonobstructed transverse    colon and associated mesenteric fat, associated with a fascial defect    measuring up to 2.5 cm x 4.6 cm.  Additional tiny fat containing midline and    right paramedian supraumbilical hernias.  Small fat containing right direct    inguinal hernia.  No inguinal lymphadenopathy.  Diffuse osseous    demineralization.  No acute fractures nor suspicious osseous lesions.              Impression    1. A healed midline laparotomy incision is associated with diastasis recti. There are a few tiny to small supraumbilical incisional hernias predominantly    just right of midline, the largest containing nonobstructed transverse colon    and associated with a 2.5 cm x 4.6 cm fascial defect. 2. Eccentric wall thickening in the jejunum at the Jose Angel limb anastomosis    could be related to inflammation but could also be seen with malignancy. Adjacent mesenteric lymph nodes could be reactive or metastatic.  Consider    short-term follow-up or endoscopy. 3. Unchanged 0.9 cm aneurysm of the mid to distal splenic artery.  Recommend    follow-up imaging in 1 year as below.         RECOMMENDATIONS:    Incidental Vascular Findings on CT and MRI         Splenic artery aneurysm, <2 cm:  Follow-up imaging every 1 year         Reference:  Tanisha et al. Ketty Zeng incidental findings on abdominal and    pelvic CT and MRI, part 2: white paper of the ACR Incidental Findings    Committee II on vascular findings.  J Am Nolan Radiol 7477;[id number].             Impressions/Recommendations:       1) History RNY gastric bypass 2001. Prior ventral hernia repair. Recurrent Complex ventral incisional hernia with transverse colon reducible, defect is 5 cm. I discussed risks and benefits of repair. Recommend open repair with mesh rather than robotic/laparoscopic due to large open incision. I told her since she has already had an open repair with mesh, any subsequent repair increases chances of recurrence. She is at low risk for incarceration given the location and size of defect.  It could potentially get bigger if she strains or gains weight. This hernia is chronic. She asks intelligent questions and has a good grasp of the information conveyed today. I can repair this open in [place] with a likely overnight stay. She would like to hold off on the hernia repair and first get the colonoscopy that is overdue. I do recommend the colonoscopy first anyway. I will see if her symptoms have changed any with regards to the hernia when I see her in pre-op at the time of the overdue screening colonoscopy. 2) Overdue for screening colonoscopy. Family history colon cancer (mother). Observe hernia for now. Re-assess hernia during sedation at scope. Risks and benefits of colonoscopy have been discussed in detail with the patient. Risks of the procedure include bleeding, bowel perforation, possibly missing smaller lesions if the bowel prep is not adequate. Alternative studies include barium enema and virtual colonoscopy; however, if a lesion is seen, then one would still need to proceed with the gold standard colonoscopy to retrieve or biopsy the lesion. All the patient's questions are answered. Will proceed with colonoscopy under MAC. Thank you OLIVIA Esparza - CNP for allowing me to participate in the care of this very nice lady.     Pollo Calles DO, MPH, [address] [facility], [facility] Surgery, [address] [place] [address] office [phone]  [place] office [phone]

## 2020-12-28 ENCOUNTER — HOSPITAL ENCOUNTER (OUTPATIENT)
Dept: PREADMISSION TESTING | Age: 70
Setting detail: SPECIMEN
Discharge: HOME | End: 2021-01-01
Payer: MEDICARE

## 2020-12-28 DIAGNOSIS — Z01.818 PREOPERATIVE TESTING: ICD-10-CM

## 2020-12-28 PROCEDURE — C9803 HOPD COVID-19 SPEC COLLECT: HCPCS

## 2020-12-28 PROCEDURE — U0003 INFECTIOUS AGENT DETECTION BY NUCLEIC ACID (DNA OR RNA); SEVERE ACUTE RESPIRATORY SYNDROME CORONAVIRUS 2 (SARS-COV-2) (CORONAVIRUS DISEASE [COVID-19]), AMPLIFIED PROBE TECHNIQUE, MAKING USE OF HIGH THROUGHPUT TECHNOLOGIES AS DESCRIBED BY CMS-2020-01-R: HCPCS

## 2020-12-29 LAB
SARS-COV-2, RAPID: NORMAL
SARS-COV-2: NORMAL
SARS-COV-2: NOT DETECTED
SOURCE: NORMAL

## 2020-12-31 ENCOUNTER — ANESTHESIA EVENT (OUTPATIENT)
Dept: OPERATING ROOM | Age: 70
End: 2020-12-31
Payer: MEDICARE

## 2021-01-04 ENCOUNTER — ANESTHESIA (OUTPATIENT)
Dept: OPERATING ROOM | Age: 71
End: 2021-01-04
Payer: MEDICARE

## 2021-01-04 ENCOUNTER — HOSPITAL ENCOUNTER (OUTPATIENT)
Age: 71
Setting detail: OUTPATIENT SURGERY
Discharge: HOME | End: 2021-01-04
Attending: SURGERY | Admitting: SURGERY
Payer: MEDICARE

## 2021-01-04 VITALS
OXYGEN SATURATION: 97 % | RESPIRATION RATE: 16 BRPM | HEIGHT: 61 IN | TEMPERATURE: 98.3 F | WEIGHT: 201.4 LBS | HEART RATE: 81 BPM | SYSTOLIC BLOOD PRESSURE: 128 MMHG | DIASTOLIC BLOOD PRESSURE: 71 MMHG | BODY MASS INDEX: 38.02 KG/M2

## 2021-01-04 VITALS
RESPIRATION RATE: 12 BRPM | SYSTOLIC BLOOD PRESSURE: 106 MMHG | OXYGEN SATURATION: 95 % | DIASTOLIC BLOOD PRESSURE: 41 MMHG

## 2021-01-04 LAB — GLUCOSE BLD-MCNC: 141 MG/DL (ref 74–100)

## 2021-01-04 PROCEDURE — G0105 COLORECTAL SCRN; HI RISK IND: HCPCS | Performed by: SURGERY

## 2021-01-04 PROCEDURE — 3700000000 HC ANESTHESIA ATTENDED CARE: Performed by: SURGERY

## 2021-01-04 PROCEDURE — 6360000002 HC RX W HCPCS: Performed by: NURSE ANESTHETIST, CERTIFIED REGISTERED

## 2021-01-04 PROCEDURE — 2709999900 HC NON-CHARGEABLE SUPPLY: Performed by: SURGERY

## 2021-01-04 PROCEDURE — 7100000011 HC PHASE II RECOVERY - ADDTL 15 MIN: Performed by: SURGERY

## 2021-01-04 PROCEDURE — 3609027000 HC COLONOSCOPY: Performed by: SURGERY

## 2021-01-04 PROCEDURE — 2500000003 HC RX 250 WO HCPCS: Performed by: NURSE ANESTHETIST, CERTIFIED REGISTERED

## 2021-01-04 PROCEDURE — 2580000003 HC RX 258: Performed by: SURGERY

## 2021-01-04 PROCEDURE — 82947 ASSAY GLUCOSE BLOOD QUANT: CPT

## 2021-01-04 PROCEDURE — 3700000001 HC ADD 15 MINUTES (ANESTHESIA): Performed by: SURGERY

## 2021-01-04 PROCEDURE — 7100000010 HC PHASE II RECOVERY - FIRST 15 MIN: Performed by: SURGERY

## 2021-01-04 RX ORDER — PROPOFOL 10 MG/ML
INJECTION, EMULSION INTRAVENOUS CONTINUOUS PRN
Status: DISCONTINUED | OUTPATIENT
Start: 2021-01-04 | End: 2021-01-04 | Stop reason: SDUPTHER

## 2021-01-04 RX ORDER — PROPOFOL 10 MG/ML
INJECTION, EMULSION INTRAVENOUS PRN
Status: DISCONTINUED | OUTPATIENT
Start: 2021-01-04 | End: 2021-01-04 | Stop reason: SDUPTHER

## 2021-01-04 RX ORDER — SODIUM CHLORIDE, SODIUM LACTATE, POTASSIUM CHLORIDE, CALCIUM CHLORIDE 600; 310; 30; 20 MG/100ML; MG/100ML; MG/100ML; MG/100ML
INJECTION, SOLUTION INTRAVENOUS CONTINUOUS
Status: DISCONTINUED | OUTPATIENT
Start: 2021-01-04 | End: 2021-01-04 | Stop reason: HOSPADM

## 2021-01-04 RX ORDER — SODIUM, POTASSIUM,MAG SULFATES 17.5-3.13G
1 SOLUTION, RECONSTITUTED, ORAL ORAL ONCE
Qty: 1 BOTTLE | Refills: 0 | Status: SHIPPED | OUTPATIENT
Start: 2021-01-08 | End: 2021-01-07

## 2021-01-04 RX ORDER — LIDOCAINE HYDROCHLORIDE 20 MG/ML
INJECTION, SOLUTION EPIDURAL; INFILTRATION; INTRACAUDAL; PERINEURAL PRN
Status: DISCONTINUED | OUTPATIENT
Start: 2021-01-04 | End: 2021-01-04 | Stop reason: SDUPTHER

## 2021-01-04 RX ADMIN — PROPOFOL 50 MG: 10 INJECTION, EMULSION INTRAVENOUS at 09:00

## 2021-01-04 RX ADMIN — LIDOCAINE HYDROCHLORIDE 50 MG: 20 INJECTION, SOLUTION EPIDURAL; INFILTRATION; INTRACAUDAL; PERINEURAL at 09:00

## 2021-01-04 RX ADMIN — PROPOFOL 150 MCG/KG/MIN: 10 INJECTION, EMULSION INTRAVENOUS at 09:00

## 2021-01-04 RX ADMIN — SODIUM CHLORIDE, POTASSIUM CHLORIDE, SODIUM LACTATE AND CALCIUM CHLORIDE: 600; 310; 30; 20 INJECTION, SOLUTION INTRAVENOUS at 08:50

## 2021-01-04 ASSESSMENT — ENCOUNTER SYMPTOMS: SHORTNESS OF BREATH: 1

## 2021-01-04 NOTE — ANESTHESIA PRE PROCEDURE
Department of Anesthesiology  Preprocedure Note       Name:  Nestor Heller   Age:  69 y.o.  :  1950                                          MRN:  [id number]         Date:  2021      Surgeon: Jorge Mcclain):  Tye Hudson DO    Procedure: Procedure(s):  COLORECTAL CANCER SCREENING, NOT HIGH RISK    Medications prior to admission:   Prior to Admission medications    Medication Sig Start Date End Date Taking? Authorizing Provider   gabapentin (NEURONTIN) 300 MG capsule TAKE 1 CAPSULE BY MOUTH TWICE DAILY FOR 30 DAYS 20  OLIVIA Tomas CNP   amLODIPine (NORVASC) 10 MG tablet TAKE 1 TABLET DAILY 20   OLIVIA Tomas CNP   Lancets MISC USE 1 QD. DX--E11.9 NON-INSULIN DEP 20   OLIVIA Tomas CNP   blood glucose test strips (ASCENSIA AUTODISC VI;ONE TOUCH ULTRA TEST VI) strip 1 each by In Vitro route daily ONE TOUCH VERIO STRIPS. DX--E11.9 NON-INSULIN DEPENDENT 20   OLIVIA Tomas CNP   blood glucose monitor strips USE 1 QD.   DX--E11.9 NON-INSULIN DEPENDENT  ONE TOUCH 20   OLIVIA Tomas CNP   oxybutynin (DITROPAN) 5 MG tablet Take 5 mg by mouth 2 times daily    Historical Provider, MD   polyethylene glycol (MIRALAX) 17 g PACK packet Take 17 g by mouth daily    Historical Provider, MD   sertraline (ZOLOFT) 100 MG tablet TAKE 1 TABLET DAILY 11/3/20   OLIVIA Tomas CNP   calcium carbonate (OSCAL) 500 MG TABS tablet Take 1,000 mg by mouth daily    Historical Provider, MD   vitamin D3 (CHOLECALCIFEROL) 10 MCG (400 UNIT) TABS tablet Take 800 Units by mouth daily    Historical Provider, MD   alendronate (FOSAMAX) 70 MG tablet Take 1 tablet by mouth every 7 days 10/8/20   OLIVIA Tomas CNP   metFORMIN (GLUCOPHAGE) 1000 MG tablet Take 1 tablet by mouth 2 times daily (with meals) 10/8/20   OLIVIA Tomas CNP   Blood Glucose Monitoring Suppl (ACCU-CHEK BRUNO) ANDREA Use once daily. Dx-E11.9 non-insulin dependant 9/29/20   Barrett Flowers, APRN - CNP   ALPRAZolam Jobrittanie Kate) 0.25 MG tablet Take 1 tablet by mouth 3 times daily as needed for Anxiety for up to 180 days. 9/2/20 3/1/21  Arlena Saint, MD   acetaminophen (TYLENOL) 325 MG tablet Take 650 mg by mouth every 6 hours as needed for Pain (arthritis)    Historical Provider, MD   b complex vitamins capsule Take 1 capsule by mouth daily    Historical Provider, MD   triamterene-hydroCHLOROthiazide (DYAZIDE) 37.5-25 MG per capsule TAKE 1 CAPSULE ONCE DAILY  AS NEEDED  Patient taking differently: Take by mouth daily  3/16/20   Yolanda Persaud MD   atorvastatin (LIPITOR) 10 MG tablet TAKE 1 TABLET DAILY 3/16/20   Yolanda Persaud MD   docusate sodium (COLACE, DULCOLAX) 100 MG CAPS Take 100 mg by mouth 2 times daily  Patient taking differently: Take 100 mg by mouth 2 times daily as needed  4/25/16   Alana Gonzalez MD       Current medications:    Current Facility-Administered Medications   Medication Dose Route Frequency Provider Last Rate Last Admin    lactated ringers infusion   Intravenous Continuous Moni I Nazemi, DO           Allergies: Allergies   Allergen Reactions    Ibuprofen     Pepto-Bismol [Bismuth Subsalicylate]        Problem List:    Patient Active Problem List   Diagnosis Code    Acute reaction to stress F43.0    Depression F32.9    Osteoarthritis M19.90    Asymptomatic varicose veins I83.90    Iron deficiency anemia D50.9    Fall at home W19. Divya Alt, Y92.009    Closed fracture of orbital floor (HCC) S02.30XA    Closed fracture of left orbit (HCC) S02.85XA    Closed fracture of maxilla (HCC) S02.401A    Upper extremity weakness R29.898    Facial abrasion S00.81XA    Leukocytosis D72.829    Concussion and edema of cervical spinal cord (HCC) S14. 0XXA    Sprain of ligaments of cervical spine S13. 4XXA    Decreased mobility R26.89    Fall from other slipping, tripping, or stumbling W01. 1821 Pyrites, Ne cord syndrome ([facility] [address] S14.129A    Wound of right leg S81.801A    Wound of left leg S81.802A    Diabetes mellitus ([facility] [address] E11.9    Quadriparesis (HCC) G82.50    Mobility impaired Z74.09    Essential hypertension I10    Urinary incontinence R32    Hyperlipidemia E78.5    Dysphagia R13.10    Status post gastric bypass for obesity Z98.84    Morbidly obese (HCC) E66.01    Recurrent major depressive disorder, in full remission ([facility] [address] F33.42    Abnormal cardiovascular stress test R94.39       Past Medical History:        Diagnosis Date    Anemia     Arthritis     Depression     Diabetes mellitus ([facility] [address]     History of cardiac cath 05/21/2020    [employer]/Dr. Valderrama/Left [facility]     Hypertension     Neck fracture ([facility] [address] 2016    Obesity (BMI 30-39. 9)     Splenic artery aneurysm (HCC)     small less than 1.5 cm, stable    Type II or unspecified type diabetes mellitus without mention of complication, not stated as uncontrolled     Ventral incisional hernia        Past Surgical History:        Procedure Laterality Date    APPENDECTOMY      CATARACT REMOVAL WITH IMPLANT Left 12/12/2018    per Dr. Crow Raza Right 01/07/2019    Dr. Issa Muller  03/24/2016    C 2- C 7    CHOLECYSTECTOMY      COLONOSCOPY  2014    DILATATION, ESOPHAGUS      GASTRIC BYPASS SURGERY  2001    HERNIA REPAIR      [facility]    INTRACAPSULAR CATARACT EXTRACTION Left 12/12/2018    EYE CATARACT EMULSIFICATION IOL IMPLANT performed by Elle Jones DO at [address]  Right 1/7/2019    EYE CATARACT EMULSIFICATION IOL IMPLANT performed by Elle Jones DO at [address] [facility] OFFICE/OUTPT [facility] Right 6/15/2018    TOE HAMMER REPAIR, RIGHT 5TH DIGIT DEROTATIONAL SKINPLASTY/ARTHOPLASTY performed by Ana Gray DPM at [address] [facility] 11/26/2019    EGD DILATION SAVORY performed by Estella Stearns Natanael Addison MD at [address] [facility]  11/26/2019    -bx(normal)dilation,evidence of gastric bypass with very small gastric remnant       Social History:    Social History     Tobacco Use    Smoking status: Never Smoker    Smokeless tobacco: Never Used   Substance Use Topics    Alcohol use: No                                Counseling given: Not Answered      Vital Signs (Current):   Vitals:    12/18/20 0917 01/04/21 0800   BP:  132/67   Pulse:  74   Resp:  18   Temp:  36.8 °C (98.3 °F)   TempSrc:  Temporal   SpO2:  96%   Weight: 197 lb (89.4 kg) 201 lb 6.4 oz (91.4 kg)   Height: 5' 1\" (1.549 m) 5' 1\" (1.549 m)                                              BP Readings from Last 3 Encounters:   01/04/21 132/67   11/18/20 132/80   10/27/20 135/85       NPO Status: Time of last liquid consumption: 0330                        Time of last solid consumption: 1700                        Date of last liquid consumption: 01/04/21                        Date of last solid food consumption: 01/02/21    BMI:   Wt Readings from Last 3 Encounters:   01/04/21 201 lb 6.4 oz (91.4 kg)   11/18/20 199 lb 9.6 oz (90.5 kg)   10/27/20 196 lb 8 oz (89.1 kg)     Body mass index is 38.05 kg/m².     CBC:   Lab Results   Component Value Date    WBC 8.0 10/07/2020    RBC 4.53 10/07/2020    RBC 4.46 05/07/2012    HGB 12.5 10/07/2020    HCT 39.1 10/07/2020    MCV 86.3 10/07/2020    RDW 15.6 10/07/2020     10/07/2020     05/07/2012       CMP:   Lab Results   Component Value Date     10/07/2020    K 4.3 10/07/2020    CL 99 10/07/2020    CO2 25 10/07/2020    BUN 18 11/05/2020    CREATININE 0.45 11/05/2020    GFRAA >60 11/05/2020    LABGLOM >60 11/05/2020    GLUCOSE 154 10/07/2020    GLUCOSE 156 05/07/2012    PROT 7.2 10/07/2020    CALCIUM 8.9 10/07/2020    BILITOT 0.42 10/07/2020    ALKPHOS 95 10/07/2020    AST 20 10/07/2020    ALT 16 10/07/2020       POC Tests: No results for input(s): POCGLU, Louisa Henley, POCCL, POCBUN, POCHEMO, POCHCT in the last 72 hours. Coags:   Lab Results   Component Value Date    PROTIME 10.1 03/22/2016    INR 0.9 03/22/2016    APTT 23.3 03/22/2016       HCG (If Applicable): No results found for: PREGTESTUR, PREGSERUM, HCG, HCGQUANT     ABGs: No results found for: PHART, PO2ART, PGO6YWK, GZB6XRY, BEART, D3HLLRVT     Type & Screen (If Applicable):  No results found for: LABABO, LABRH    Drug/Infectious Status (If Applicable):  No results found for: HIV, HEPCAB    COVID-19 Screening (If Applicable):   Lab Results   Component Value Date    COVID19 Not Detected 12/28/2020    COVID19 Not Detected 05/21/2020         Anesthesia Evaluation  Patient summary reviewed and Nursing notes reviewed  Airway: Mallampati: II  TM distance: >3 FB   Neck ROM: full  Mouth opening: > = 3 FB Dental:          Pulmonary:normal exam  breath sounds clear to auscultation  (+) shortness of breath: no interval change,                             Cardiovascular:  Exercise tolerance: no interval change,   (+) hypertension: mild, CAD: no interval change,       ECG reviewed  Rhythm: regular  Rate: normal  Echocardiogram reviewed  Stress test reviewed  Cleared by cardiology           ROS comment: Pt with abnormal EKG previously, unchanged. History of abnormal echo with left heart cath showing mild CAD. EF 60%. No interval changes     Neuro/Psych:   (+) psychiatric history: stable with treatmentdepression/anxiety             GI/Hepatic/Renal: Neg GI/Hepatic/Renal ROS            Endo/Other:    (+) DiabetesType II DM, , .                 Abdominal:           Vascular: negative vascular ROS. Anesthesia Plan      MAC     ASA 3       Induction: intravenous. Anesthetic plan and risks discussed with patient.                       OLIVIA Zepeda - CRNA   1/4/2021

## 2021-01-04 NOTE — BRIEF OP NOTE
Brief Postoperative Note      Patient: Jake Jones  YOB: 1950  MRN: Jez Coronel, APRN - CNP      Date of Procedure: 1/4/2021    Pre-Op Diagnosis: family history colon cancer (1st degree relative). Screening colonoscopy    Post-Op Diagnosis: Same. Very poor prep. Procedure:    Colonoscopy to cecum (exttremely poor prep throughout- needs rescheduled one month.  Message sent to clinic)    Surgeon(s):  Pauline Oakes DO      Anesthesia: Monitor Anesthesia Care    Estimated Blood Loss (mL): none    Complications: None    Specimens: none    Electronically signed by Pauline Oakes DO, FACOS, FACS on 1/4/2021 at 9:35 AM

## 2021-01-04 NOTE — OP NOTE
Operative Note        Patient: Fei Boudreaux  YOB: 1950  MRN: Jez Quinn, APRN - CNP      Date of Procedure: 1/4/2021    Pre-Op Diagnosis: family history colon cancer (1st degree relative). Screening colonoscopy    Post-Op Diagnosis: Same. Very poor prep. Procedure:    Colonoscopy to cecum (exttremely poor prep throughout- needs rescheduled one month. Message sent to clinic)    Surgeon(s):  Michaela Myles DO      Anesthesia: Monitor Anesthesia Care    Estimated Blood Loss (mL): none    Complications: None    Specimens: none    Procedure details:    I do meet with the patient in preoperative holding area. Please see H&P for indications for the above named procedure. Patient was brought to the endoscopy suite and placed under monitored anesthesia. Patient was positioned in left lateral position. Time out called and procedure confirmed. The lubricated variable stiffness pediatric colonoscope was carefully passed under direct vision into the rectum, advanced through the sigmoid colon, transverse colon, ascending colon and the cecum. The ileocecal valve was well visualized. Additional findings:    Findings:     Patient had additional tap water enema in pre-op. All segments of the colon are coated with thick liquid stool. No obvious lumen masses but needs a repeat scope with a better prep , will reschedule for 1 month. Cecum was reached but covered with food particles, stool, old pills. Next colonoscopy  1 month. Will send Suprep to pharmacy and send message to office.          Electronically signed by Michaela Myles DO, FACOS, FACS on 1/4/2021 at 9:35 AM
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
## 2021-01-05 ENCOUNTER — TELEPHONE (OUTPATIENT)
Dept: SURGERY | Age: 71
End: 2021-01-05

## 2021-01-05 NOTE — TELEPHONE ENCOUNTER
Writer MARVIN for pt to call us back. We had a cancellation on Friday 2/5/2021 and we were checking to see if we could put her there.

## 2021-01-05 NOTE — TELEPHONE ENCOUNTER
----- Message from Francine Siegel DO sent at 1/4/2021  9:42 AM EST -----  Regarding: reschedule colon needed see below  Surgery clinic:    Her bowel prep was very very poor. She needs to be rescheduled for colonoscopy next month, please try to squeeze her in somewhere. I sent the Suprep bowel prep to her pharmacy [facility].      Cc: Connee Oktibbeha

## 2021-01-07 ENCOUNTER — HOSPITAL ENCOUNTER (OUTPATIENT)
Age: 71
Discharge: HOME | End: 2021-01-07
Payer: MEDICARE

## 2021-01-07 DIAGNOSIS — R30.0 DYSURIA: ICD-10-CM

## 2021-01-07 DIAGNOSIS — E11.628 TYPE 2 DIABETES MELLITUS WITH OTHER SKIN COMPLICATION, WITHOUT LONG-TERM CURRENT USE OF INSULIN (HCC): ICD-10-CM

## 2021-01-07 LAB
-: ABNORMAL
ALBUMIN SERPL-MCNC: 4.1 G/DL (ref 3.5–5.2)
ALBUMIN/GLOBULIN RATIO: 1.3 (ref 1–2.5)
ALP BLD-CCNC: 73 U/L (ref 35–104)
ALT SERPL-CCNC: 23 U/L (ref 5–33)
AMORPHOUS: ABNORMAL
ANION GAP SERPL CALCULATED.3IONS-SCNC: 10 MMOL/L (ref 9–17)
AST SERPL-CCNC: 26 U/L
BACTERIA: ABNORMAL
BILIRUB SERPL-MCNC: 0.36 MG/DL (ref 0.3–1.2)
BILIRUBIN URINE: NEGATIVE
BUN BLDV-MCNC: 12 MG/DL (ref 8–23)
BUN/CREAT BLD: 25 (ref 9–20)
CALCIUM SERPL-MCNC: 8.9 MG/DL (ref 8.6–10.4)
CASTS UA: ABNORMAL /LPF
CHLORIDE BLD-SCNC: 95 MMOL/L (ref 98–107)
CO2: 27 MMOL/L (ref 20–31)
COLOR: YELLOW
COMMENT UA: ABNORMAL
CREAT SERPL-MCNC: 0.48 MG/DL (ref 0.5–0.9)
CRYSTALS, UA: ABNORMAL /HPF
EPITHELIAL CELLS UA: ABNORMAL /HPF (ref 0–25)
GFR AFRICAN AMERICAN: >60 ML/MIN
GFR NON-AFRICAN AMERICAN: >60 ML/MIN
GFR SERPL CREATININE-BSD FRML MDRD: ABNORMAL ML/MIN/{1.73_M2}
GFR SERPL CREATININE-BSD FRML MDRD: ABNORMAL ML/MIN/{1.73_M2}
GLUCOSE BLD-MCNC: 108 MG/DL (ref 70–99)
GLUCOSE URINE: NEGATIVE
KETONES, URINE: NEGATIVE
LEUKOCYTE ESTERASE, URINE: ABNORMAL
MUCUS: ABNORMAL
NITRITE, URINE: POSITIVE
OTHER OBSERVATIONS UA: ABNORMAL
PH UA: 7.5 (ref 5–9)
POTASSIUM SERPL-SCNC: 3.6 MMOL/L (ref 3.7–5.3)
PROTEIN UA: NEGATIVE
RBC UA: ABNORMAL /HPF (ref 0–2)
RENAL EPITHELIAL, UA: ABNORMAL /HPF
SODIUM BLD-SCNC: 132 MMOL/L (ref 135–144)
SPECIFIC GRAVITY UA: 1.01 (ref 1.01–1.02)
TOTAL PROTEIN: 7.2 G/DL (ref 6.4–8.3)
TRICHOMONAS: ABNORMAL
TURBIDITY: ABNORMAL
URINE HGB: ABNORMAL
UROBILINOGEN, URINE: NORMAL
WBC UA: ABNORMAL /HPF (ref 0–5)
YEAST: ABNORMAL

## 2021-01-07 PROCEDURE — 87086 URINE CULTURE/COLONY COUNT: CPT

## 2021-01-07 PROCEDURE — 81001 URINALYSIS AUTO W/SCOPE: CPT

## 2021-01-07 PROCEDURE — 80053 COMPREHEN METABOLIC PANEL: CPT

## 2021-01-07 PROCEDURE — 87186 SC STD MICRODIL/AGAR DIL: CPT

## 2021-01-07 PROCEDURE — 87088 URINE BACTERIA CULTURE: CPT

## 2021-01-07 PROCEDURE — 36415 COLL VENOUS BLD VENIPUNCTURE: CPT

## 2021-01-07 PROCEDURE — 83036 HEMOGLOBIN GLYCOSYLATED A1C: CPT

## 2021-01-08 LAB
CULTURE: ABNORMAL
ESTIMATED AVERAGE GLUCOSE: 140 MG/DL
HBA1C MFR BLD: 6.5 % (ref 4–6)
Lab: ABNORMAL
SPECIMEN DESCRIPTION: ABNORMAL

## 2021-01-28 ENCOUNTER — TELEPHONE (OUTPATIENT)
Dept: SURGERY | Age: 71
End: 2021-01-28

## 2021-01-28 NOTE — TELEPHONE ENCOUNTER
Patient called inquiring about prep. Prep instructions thoroughly reviewed along with diabetic instructions. Patient voiced understanding. Encouraged patient to call office with any further questions/concerns.

## 2021-02-01 ENCOUNTER — HOSPITAL ENCOUNTER (OUTPATIENT)
Dept: PREADMISSION TESTING | Age: 71
Setting detail: SPECIMEN
Discharge: HOME | End: 2021-02-05
Payer: MEDICARE

## 2021-02-01 DIAGNOSIS — Z01.818 PREOP TESTING: Primary | ICD-10-CM

## 2021-02-01 DIAGNOSIS — Z01.818 PREOP TESTING: ICD-10-CM

## 2021-02-01 PROCEDURE — U0005 INFEC AGEN DETEC AMPLI PROBE: HCPCS

## 2021-02-01 PROCEDURE — C9803 HOPD COVID-19 SPEC COLLECT: HCPCS

## 2021-02-01 PROCEDURE — U0003 INFECTIOUS AGENT DETECTION BY NUCLEIC ACID (DNA OR RNA); SEVERE ACUTE RESPIRATORY SYNDROME CORONAVIRUS 2 (SARS-COV-2) (CORONAVIRUS DISEASE [COVID-19]), AMPLIFIED PROBE TECHNIQUE, MAKING USE OF HIGH THROUGHPUT TECHNOLOGIES AS DESCRIBED BY CMS-2020-01-R: HCPCS

## 2021-02-02 ENCOUNTER — TELEPHONE (OUTPATIENT)
Dept: SURGERY | Age: 71
End: 2021-02-02

## 2021-02-02 LAB
SARS-COV-2, RAPID: NORMAL
SARS-COV-2: NORMAL
SARS-COV-2: NOT DETECTED
SOURCE: NORMAL

## 2021-02-02 NOTE — TELEPHONE ENCOUNTER
Patient called to review bowel prep instructions for colonoscopy scheduled for 2/5/21. Reviewed instructions, patient voiced understanding. Encouraged patient to call with any further instructions.

## 2021-02-04 ENCOUNTER — TELEPHONE (OUTPATIENT)
Dept: SURGERY | Age: 71
End: 2021-02-04

## 2021-02-04 NOTE — TELEPHONE ENCOUNTER
Patient called to review bowel prep, prep reviewed and all questions answered. Encouraged patient to call office with any further questions/concerns.

## 2021-02-05 ENCOUNTER — ANESTHESIA EVENT (OUTPATIENT)
Dept: OPERATING ROOM | Age: 71
End: 2021-02-05
Payer: MEDICARE

## 2021-02-05 ENCOUNTER — HOSPITAL ENCOUNTER (OUTPATIENT)
Age: 71
Setting detail: OUTPATIENT SURGERY
Discharge: HOME | End: 2021-02-05
Attending: SURGERY | Admitting: SURGERY
Payer: MEDICARE

## 2021-02-05 ENCOUNTER — ANESTHESIA (OUTPATIENT)
Dept: OPERATING ROOM | Age: 71
End: 2021-02-05
Payer: MEDICARE

## 2021-02-05 VITALS
TEMPERATURE: 98.2 F | DIASTOLIC BLOOD PRESSURE: 65 MMHG | HEART RATE: 68 BPM | OXYGEN SATURATION: 97 % | RESPIRATION RATE: 16 BRPM | HEIGHT: 61 IN | WEIGHT: 196 LBS | SYSTOLIC BLOOD PRESSURE: 132 MMHG | BODY MASS INDEX: 37 KG/M2

## 2021-02-05 VITALS — DIASTOLIC BLOOD PRESSURE: 46 MMHG | OXYGEN SATURATION: 99 % | SYSTOLIC BLOOD PRESSURE: 132 MMHG

## 2021-02-05 LAB — GLUCOSE BLD-MCNC: 131 MG/DL (ref 74–100)

## 2021-02-05 PROCEDURE — 2709999900 HC NON-CHARGEABLE SUPPLY: Performed by: SURGERY

## 2021-02-05 PROCEDURE — 3700000001 HC ADD 15 MINUTES (ANESTHESIA): Performed by: SURGERY

## 2021-02-05 PROCEDURE — 7100000011 HC PHASE II RECOVERY - ADDTL 15 MIN: Performed by: SURGERY

## 2021-02-05 PROCEDURE — 6360000002 HC RX W HCPCS: Performed by: NURSE ANESTHETIST, CERTIFIED REGISTERED

## 2021-02-05 PROCEDURE — 3609027000 HC COLONOSCOPY: Performed by: SURGERY

## 2021-02-05 PROCEDURE — 2580000003 HC RX 258: Performed by: SURGERY

## 2021-02-05 PROCEDURE — 2500000003 HC RX 250 WO HCPCS: Performed by: NURSE ANESTHETIST, CERTIFIED REGISTERED

## 2021-02-05 PROCEDURE — G0105 COLORECTAL SCRN; HI RISK IND: HCPCS | Performed by: SURGERY

## 2021-02-05 PROCEDURE — 3700000000 HC ANESTHESIA ATTENDED CARE: Performed by: SURGERY

## 2021-02-05 PROCEDURE — 7100000010 HC PHASE II RECOVERY - FIRST 15 MIN: Performed by: SURGERY

## 2021-02-05 PROCEDURE — 82947 ASSAY GLUCOSE BLOOD QUANT: CPT

## 2021-02-05 RX ORDER — LIDOCAINE HYDROCHLORIDE 20 MG/ML
INJECTION, SOLUTION EPIDURAL; INFILTRATION; INTRACAUDAL; PERINEURAL PRN
Status: DISCONTINUED | OUTPATIENT
Start: 2021-02-05 | End: 2021-02-05 | Stop reason: SDUPTHER

## 2021-02-05 RX ORDER — PROPOFOL 10 MG/ML
INJECTION, EMULSION INTRAVENOUS PRN
Status: DISCONTINUED | OUTPATIENT
Start: 2021-02-05 | End: 2021-02-05 | Stop reason: SDUPTHER

## 2021-02-05 RX ORDER — SODIUM CHLORIDE, SODIUM LACTATE, POTASSIUM CHLORIDE, CALCIUM CHLORIDE 600; 310; 30; 20 MG/100ML; MG/100ML; MG/100ML; MG/100ML
INJECTION, SOLUTION INTRAVENOUS CONTINUOUS
Status: DISCONTINUED | OUTPATIENT
Start: 2021-02-05 | End: 2021-02-05 | Stop reason: HOSPADM

## 2021-02-05 RX ORDER — PROPOFOL 10 MG/ML
INJECTION, EMULSION INTRAVENOUS CONTINUOUS PRN
Status: DISCONTINUED | OUTPATIENT
Start: 2021-02-05 | End: 2021-02-05 | Stop reason: SDUPTHER

## 2021-02-05 RX ORDER — PHENYLEPHRINE HYDROCHLORIDE 10 MG/ML
INJECTION INTRAVENOUS PRN
Status: DISCONTINUED | OUTPATIENT
Start: 2021-02-05 | End: 2021-02-05 | Stop reason: SDUPTHER

## 2021-02-05 RX ADMIN — PHENYLEPHRINE HYDROCHLORIDE 100 MCG: 10 INJECTION INTRAVENOUS at 10:41

## 2021-02-05 RX ADMIN — PHENYLEPHRINE HYDROCHLORIDE 100 MCG: 10 INJECTION INTRAVENOUS at 10:29

## 2021-02-05 RX ADMIN — SODIUM CHLORIDE, POTASSIUM CHLORIDE, SODIUM LACTATE AND CALCIUM CHLORIDE: 600; 310; 30; 20 INJECTION, SOLUTION INTRAVENOUS at 09:57

## 2021-02-05 RX ADMIN — LIDOCAINE HYDROCHLORIDE 100 MG: 20 INJECTION, SOLUTION EPIDURAL; INFILTRATION; INTRACAUDAL; PERINEURAL at 10:09

## 2021-02-05 RX ADMIN — PHENYLEPHRINE HYDROCHLORIDE 100 MCG: 10 INJECTION INTRAVENOUS at 10:24

## 2021-02-05 RX ADMIN — PHENYLEPHRINE HYDROCHLORIDE 200 MCG: 10 INJECTION INTRAVENOUS at 10:43

## 2021-02-05 RX ADMIN — PROPOFOL 50 MG: 10 INJECTION, EMULSION INTRAVENOUS at 10:48

## 2021-02-05 RX ADMIN — PROPOFOL 80 MG: 10 INJECTION, EMULSION INTRAVENOUS at 10:09

## 2021-02-05 RX ADMIN — PROPOFOL 180 MCG/KG/MIN: 10 INJECTION, EMULSION INTRAVENOUS at 10:09

## 2021-02-05 RX ADMIN — PHENYLEPHRINE HYDROCHLORIDE 200 MCG: 10 INJECTION INTRAVENOUS at 10:31

## 2021-02-05 RX ADMIN — PHENYLEPHRINE HYDROCHLORIDE 100 MCG: 10 INJECTION INTRAVENOUS at 10:20

## 2021-02-05 ASSESSMENT — PAIN SCALES - GENERAL
PAINLEVEL_OUTOF10: 0
PAINLEVEL_OUTOF10: 0

## 2021-02-05 ASSESSMENT — ENCOUNTER SYMPTOMS: SHORTNESS OF BREATH: 1

## 2021-02-05 NOTE — ANESTHESIA PRE PROCEDURE
Department of Anesthesiology  Preprocedure Note       Name:  Win Miguel   Age:  69 y.o.  :  1950                                          MRN:  [id number]         Date:  2021      Surgeon: Monique Ware):  Grace Hernandez DO    Procedure: Procedure(s):  COLORECTAL CANCER SCREENING, HIGH RISK    Medications prior to admission:   Prior to Admission medications    Medication Sig Start Date End Date Taking? Authorizing Provider   gabapentin (NEURONTIN) 300 MG capsule TAKE 1 CAPSULE BY MOUTH Three times DAILY FOR 30 DAYS 21  OLIVIA Chiang CNP   amLODIPine (NORVASC) 10 MG tablet TAKE 1 TABLET DAILY 20   OLIVIA Chiang CNP   Lancets MISC USE 1 QD. DX--E11.9 NON-INSULIN DEP 20   OLIVIA Chiang CNP   blood glucose test strips (ASCENSIA AUTODISC VI;ONE TOUCH ULTRA TEST VI) strip 1 each by In Vitro route daily ONE TOUCH VERIO STRIPS. DX--E11.9 NON-INSULIN DEPENDENT 20   OLIVIA Chiang CNP   blood glucose monitor strips USE 1 QD. DX--E11.9 NON-INSULIN DEPENDENT  ONE TOUCH 20   OLIVIA Chiang CNP   oxybutynin (DITROPAN) 5 MG tablet Take 5 mg by mouth 3 times daily     Historical Provider, MD   polyethylene glycol (MIRALAX) 17 g PACK packet Take 17 g by mouth daily    Historical Provider, MD   sertraline (ZOLOFT) 100 MG tablet TAKE 1 TABLET DAILY 11/3/20   OLIVIA Chiang CNP   vitamin D3 (CHOLECALCIFEROL) 10 MCG (400 UNIT) TABS tablet Take 800 Units by mouth daily    Historical Provider, MD   alendronate (FOSAMAX) 70 MG tablet Take 1 tablet by mouth every 7 days 10/8/20   OLIVIA Chiang CNP   metFORMIN (GLUCOPHAGE) 1000 MG tablet Take 1 tablet by mouth 2 times daily (with meals) 10/8/20   OLIVIA Chiang CNP   Blood Glucose Monitoring Suppl (ACCU-CHEK BRUNO) ANDREA Use once daily.   Dx-E11.9 non-insulin dependant 20   OLIVIA Chiang CNP   acetaminophen History:        Diagnosis Date    Anemia     Arthritis     Depression     [facility]     History of cardiac cath 05/21/2020    [facility] [place]/Dr. Valderrama/Left Rachelner     Hypertension     Neck fracture ([facility] 2016    Obesity (BMI 30-39. 9)     Splenic artery aneurysm (HCC)     small less than 1.5 cm, stable    Type II or unspecified type diabetes mellitus without mention of complication, not stated as uncontrolled     Ventral incisional hernia        Past Surgical History:        Procedure Laterality Date    APPENDECTOMY      CATARACT REMOVAL WITH IMPLANT Left 12/12/2018    per Dr. Riri Monroy Right 01/07/2019    Dr. Davi Mitchell  03/24/2016    C 2- C 7    CHOLECYSTECTOMY      COLONOSCOPY  2014    COLONOSCOPY  01/04/2021    COLONOSCOPY N/A 1/4/2021    COLORECTAL CANCER SCREENING, NOT HIGH RISK performed by Una Howard DO at [address], [facility]      GASTRIC BYPASS SURGERY  2001    HERNIA REPAIR      [facility]    INTRACAPSULAR CATARACT EXTRACTION Left 12/12/2018    EYE CATARACT EMULSIFICATION IOL IMPLANT performed by Jaime Kruse DO at [address] Right 1/7/2019    EYE CATARACT EMULSIFICATION IOL IMPLANT performed by Jaime Kruse DO at [address] [facility] OFFICE/OUTPT [facility] Right 6/15/2018    TOE HAMMER REPAIR, RIGHT 5TH DIGIT DEROTATIONAL SKINPLASTY/ARTHOPLASTY performed by Kristie Escobedo DPM at [facility] 11/26/2019    EGD DILATION SAVORY performed by Jamie Ascencio MD at [address] [facility]  11/26/2019    -bx(normal)dilation,evidence of gastric bypass with very small gastric remnant       Social History:    Social History     Tobacco Use    Smoking status: Never Smoker    Smokeless tobacco: Never Used   Substance Use Topics    Alcohol use:  No Counseling given: Not Answered      Vital Signs (Current): There were no vitals filed for this visit. BP Readings from Last 3 Encounters:   02/05/21 116/81   01/07/21 124/87   01/04/21 (!) 106/41       NPO Status:                                                                                 BMI:   Wt Readings from Last 3 Encounters:   02/05/21 196 lb (88.9 kg)   01/07/21 196 lb (88.9 kg)   01/04/21 201 lb 6.4 oz (91.4 kg)     There is no height or weight on file to calculate BMI.    CBC:   Lab Results   Component Value Date    WBC 8.0 10/07/2020    RBC 4.53 10/07/2020    RBC 4.46 05/07/2012    HGB 12.5 10/07/2020    HCT 39.1 10/07/2020    MCV 86.3 10/07/2020    RDW 15.6 10/07/2020     10/07/2020     05/07/2012       CMP:   Lab Results   Component Value Date     01/07/2021    K 3.6 01/07/2021    CL 95 01/07/2021    CO2 27 01/07/2021    BUN 12 01/07/2021    CREATININE 0.48 01/07/2021    GFRAA >60 01/07/2021    LABGLOM >60 01/07/2021    GLUCOSE 108 01/07/2021    GLUCOSE 156 05/07/2012    PROT 7.2 01/07/2021    CALCIUM 8.9 01/07/2021    BILITOT 0.36 01/07/2021    ALKPHOS 73 01/07/2021    AST 26 01/07/2021    ALT 23 01/07/2021       POC Tests: No results for input(s): POCGLU, POCNA, POCK, POCCL, POCBUN, POCHEMO, POCHCT in the last 72 hours.     Coags:   Lab Results   Component Value Date    PROTIME 10.1 03/22/2016    INR 0.9 03/22/2016    APTT 23.3 03/22/2016       HCG (If Applicable): No results found for: PREGTESTUR, PREGSERUM, HCG, HCGQUANT     ABGs: No results found for: PHART, PO2ART, PUZ3UYR, SJD6QUH, BEART, M0MIFDRF     Type & Screen (If Applicable):  No results found for: LABABO, LABRH    Drug/Infectious Status (If Applicable):  No results found for: HIV, HEPCAB    COVID-19 Screening (If Applicable):   Lab Results   Component Value Date    COVID19 Not Detected 02/01/2021    COVID19 Not Detected 05/21/2020         Anesthesia Evaluation  Patient summary reviewed and Nursing notes reviewed  Airway: Mallampati: II  TM distance: >3 FB   Neck ROM: full  Mouth opening: > = 3 FB Dental:          Pulmonary:normal exam  breath sounds clear to auscultation  (+) shortness of breath: no interval change,                             Cardiovascular:  Exercise tolerance: no interval change,   (+) hypertension: mild, CAD: no interval change,       ECG reviewed  Rhythm: regular  Rate: normal  Echocardiogram reviewed  Stress test reviewed  Cleared by cardiology           ROS comment: Pt with abnormal EKG previously, unchanged. History of abnormal echo with left heart cath showing mild CAD. EF 60%. No interval changes     Neuro/Psych:   (+) psychiatric history: stable with treatmentdepression/anxiety             GI/Hepatic/Renal: Neg GI/Hepatic/Renal ROS            Endo/Other:    (+) DiabetesType II DM, , .                 Abdominal:           Vascular: negative vascular ROS. Anesthesia Plan      MAC     ASA 3       Induction: intravenous. Anesthetic plan and risks discussed with patient.                       OLIVIA Sierra - CRNA   2/5/2021

## 2021-02-05 NOTE — H&P
GENERAL SURGERY H&P        Patient's Name/ Date of Birth/ Gender: Elle Vincent / [birth year] (71 y.o.) Taras Love     PCP: OLIVIA Wick CNP     History of present Illness:  Patient is a pleasant 67 y. o. female  kindly referred by OLIVIA Piedra CNP for evaluation of hernia. She also is due for a screening colonoscopy. She has a history of open RNY gastric bypass 20 years ago with about a 100 lb weight loss. She had a year later open large ventral incisional hernia repair, she thinks she overdid lifting or returned to work too soon, got a recurrence of there hernia. She says she was supposed to get a follow up screening colonoscopy with Dr. Bety Francis, then KAITLYN oneill, then Dr. Bety Francis retired, she is now overdue. Family history colon cancer. She denies any obstructive symptoms and has done a good job avoiding excessive heavy lifting or straining/constipation. She is 70. She lives with her sister. She says I did surgery on her family members in the past. She uses a walker. She is a non-smoker. She is diabetic. No fevers or chills. No significant abdominal pain. No nausea or vomiting. No GI obstructive symptoms. Uses walker. At 68 yo she does not do heavy lifting and avoids constipation/straining. Lives with sister. Nonsmoker.  Had open VIH with mesh 1 yr after bypass in [place] many years ago.     Had a very poor bowel prep last month, rescheduled for today 3/5/21     Past Medical History:  has a past medical history of Anemia, Arthritis, Depression, Diabetes mellitus ([address] History of cardiac cath, Hypertension, Neck fracture ([address] Obesity (BMI 30-39.9), Splenic artery aneurysm ([address] Type II or unspecified type diabetes mellitus without mention of complication, not stated as uncontrolled, and Ventral incisional hernia.     Past Surgical History:   Past Surgical History             Past Surgical History:   Procedure Laterality Date    APPENDECTOMY        CATARACT REMOVAL WITH IMPLANT Left 12/12/2018     per Dr. Venancio Noriega Right 01/07/2019     Dr. Skinny Agarwal   03/24/2016     C 2- C 7    CHOLECYSTECTOMY        COLONOSCOPY   2014    DILATATION, ESOPHAGUS        GASTRIC BYPASS SURGERY   2001    HERNIA REPAIR         [facility]    INTRACAPSULAR CATARACT EXTRACTION Left 12/12/2018     EYE CATARACT EMULSIFICATION IOL IMPLANT performed by Renita Medina DO at [address] Right 1/7/2019     EYE CATARACT EMULSIFICATION IOL IMPLANT performed by Renita Medina DO at [address] OFFICE/OUTPT [address] Right 6/15/2018     TOE HAMMER REPAIR, RIGHT 5TH DIGIT DEROTATIONAL SKINPLASTY/ARTHOPLASTY performed by Apple Kirkpatrick DPM at [address] [facility] 11/26/2019     EGD DILATION SAVORY performed by Rachel Pretty MD at [facility]   11/26/2019     -bx(normal)dilation,evidence of gastric bypass with very small gastric remnant            Social History:  reports that she has never smoked. She has never used smokeless tobacco. She reports that she does not drink alcohol or use drugs.     Family History: family history includes Alcohol Abuse in her father; Cancer in her mother; Diabetes in her mother; Heart Attack in her mother; High Blood Pressure in her mother; High Cholesterol in her mother.     Review of Systems:   General: Denies fever, chills, night sweats, weight loss, malaise, fatigue  HEENT: Denies sore throat, sinus problems, allergic rhinosinusitis  Card: Denies chest pain, palpitations, orthopnea/PND. HTN. Pulm: Denies cough, shortness of breath, dyspnea on exertion  GI:  per HPI. : Denies polyuria, dysuria, hematuria  Endo: DM  Heme: neg  Psych: depression  Neuro: Denies h/o CVA, TIA  Skin: Denies rashes, ulcers  Musculoskeletal: no gross motor dysfunction.  OA     Allergies: Ibuprofen and Pepto-bismol [bismuth subsalicylate]     Current Meds:  Current Medication   Current Outpatient Medications:     polyethylene glycol (MIRALAX) 17 g PACK packet, Take 17 g by mouth daily, Disp: , Rfl:     gabapentin (NEURONTIN) 300 MG capsule, Take 1 capsule by mouth 2 times daily for 30 days. Intended supply: 30 days, Disp: 60 capsule, Rfl: 0    sertraline (ZOLOFT) 100 MG tablet, TAKE 1 TABLET DAILY, Disp: 90 tablet, Rfl: 3    blood glucose monitor strips, USE 1 QD.  DX--E11.9 NON-INSULIN DEPENDENT  ONE TOUCH, Disp: 100 strip, Rfl: 3    calcium carbonate (OSCAL) 500 MG TABS tablet, Take 1,000 mg by mouth daily, Disp: , Rfl:     vitamin D3 (CHOLECALCIFEROL) 10 MCG (400 UNIT) TABS tablet, Take 800 Units by mouth daily, Disp: , Rfl:     alendronate (FOSAMAX) 70 MG tablet, Take 1 tablet by mouth every 7 days, Disp: 4 tablet, Rfl: 11    metFORMIN (GLUCOPHAGE) 1000 MG tablet, Take 1 tablet by mouth 2 times daily (with meals), Disp: 180 tablet, Rfl: 1    Blood Glucose Monitoring Suppl (ACCU-CHEK BRUNO) ANDREA, Use once daily.  Dx-E11.9 non-insulin dependant, Disp: 1 Device, Rfl: 0    ALPRAZolam (XANAX) 0.25 MG tablet, Take 1 tablet by mouth 3 times daily as needed for Anxiety for up to 180 days. , Disp: 90 tablet, Rfl: 5    acetaminophen (TYLENOL) 325 MG tablet, Take 650 mg by mouth every 6 hours as needed for Pain (arthritis), Disp: , Rfl:     b complex vitamins capsule, Take 1 capsule by mouth daily, Disp: , Rfl:     triamterene-hydroCHLOROthiazide (DYAZIDE) 37.5-25 MG per capsule, TAKE 1 CAPSULE ONCE DAILY  AS NEEDED (Patient taking differently: Take by mouth daily ), Disp: 90 capsule, Rfl: 3    atorvastatin (LIPITOR) 10 MG tablet, TAKE 1 TABLET DAILY, Disp: 90 tablet, Rfl: 3    amLODIPine (NORVASC) 10 MG tablet, TAKE 1 TABLET DAILY, Disp: 90 tablet, Rfl: 3    Lancets MISC, USE 1 QD.  DX--E11.9 NON-INSULIN DEP, Disp: 100 each, Rfl: 3    oxybutynin (DITROPAN) 5 MG tablet, Take 5 mg by mouth 2 times daily, Disp: , Rfl:   docusate sodium (COLACE, DULCOLAX) 100 MG CAPS, Take 100 mg by mouth 2 times daily (Patient taking differently: Take 100 mg by mouth 2 times daily as needed ), Disp: , Rfl:          Physical Exam:  Vital signs and Nurse's note reviewed  Gen:  A&Ox3, NAD. Pleasant and cooperative.   HEENT: PERRLA, EOMI, no scleral icterus  Neck:  no goiter  CVS: Regular rate and rhythm  Resp: Good bilateral air entry, no active wheezing, no labored breathing  Abd: soft, non-tender, non-distended, bowel sounds present. Large midline incision with ventral incisional hernia at superior aspect near falciform ligament with reducible colon.   Ext: Moves all extremities, no gross focal motor deficits  Skin: No erythema or ulcerations      Labs:             Lab Results   Component Value Date     WBC 8.0 10/07/2020     HGB 12.5 10/07/2020     HCT 39.1 10/07/2020     MCV 86.3 10/07/2020      10/07/2020      05/07/2012                Lab Results   Component Value Date      10/07/2020     K 4.3 10/07/2020     CL 99 10/07/2020     CO2 25 10/07/2020     BUN 18 11/05/2020     CREATININE 0.45 11/05/2020     GLUCOSE 154 10/07/2020     GLUCOSE 156 05/07/2012     CALCIUM 8.9 10/07/2020                Lab Results   Component Value Date     ALKPHOS 95 10/07/2020     ALT 16 10/07/2020     AST 20 10/07/2020     PROT 7.2 10/07/2020     BILITOT 0.42 10/07/2020     BILIDIR 0.12 04/06/2016     LABALBU 4.0 10/07/2020      No results found for: LACTA            Lab Results   Component Value Date     AMYLASE 40 02/13/2013                Lab Results   Component Value Date     LIPASE 31 02/13/2013                Lab Results   Component Value Date     INR 0.9 03/22/2016         Radiologic Studies:     EXAMINATION:    CT OF THE ABDOMEN AND PELVIS WITH CONTRAST         11/5/2020 11:57 am         TECHNIQUE:    CT of the abdomen and pelvis was performed with the administration of    intravenous contrast. Multiplanar reformatted images are provided for review. Dose modulation, iterative reconstruction, and/or weight based adjustment of    the mA/kV was utilized to reduce the radiation dose to as low as reasonably    achievable.         COMPARISON:    Abdomen and pelvis CT 02/14/2013         HISTORY:    ORDERING SYSTEM PROVIDED HISTORY: Abdominal hernia without obstruction and    without gangrene, recurrence not specified, unspecified hernia type    TECHNOLOGIST PROVIDED HISTORY:         abdominal wall hernia         FINDINGS:    LOWER CHEST:  Patchy linear opacities in each lung base, likely scarring or    atelectasis.  Mild cardiomegaly.  No pleural nor pericardial effusions.         ORGANS:  Hepatic granulomatous calcification.  0.4 cm hypodense lesion in    hepatic segment 5, too small to characterize but likely a cyst or hemangioma.     Surgically absent gallbladder.  No intrahepatic nor extrahepatic biliary    dilation.  Moderate pancreatic atrophy.  Normal spleen, adrenal glands, and    kidneys.         GI/BOWEL:  Tiny sliding hiatal hernia.  Changes of [place] Angel-en-Y gastric bypass    with expected anastomotic dilation.  Otherwise normal course and caliber of    the stomach, small bowel, colon, and rectum without obstruction.  Eccentric    wall thickening in the jejunum at the [place] Angel limb anastomosis.  No    visualization of the appendix, reportedly surgically absent.  Mild to    moderate stool.  Mild colonic diverticulosis without findings of acute    diverticulitis.         PELVIS:  Age-appropriate atrophy of the uterus and ovaries.  Normal urinary    bladder.  Laxity of the pelvic floor musculature.         PERITONEUM/RETROPERITONEUM:  Mild systemic atherosclerosis.  Unchanged 0.9 cm    peripherally calcified aneurysm of mid to distal splenic artery.  Nonenlarged    to mildly enlarged mesenteric lymph nodes.  No retroperitoneal nor pelvic    lymphadenopathy.  No free intraperitoneal fluid nor gas.         SOFT TISSUES/BONES:  Healed midline laparotomy incision.  Laxity of the    anterior and lateral abdominal wall musculature with diastasis recti.  Small    right paramedian supraumbilical hernia containing nonobstructed transverse    colon and associated mesenteric fat, associated with a fascial defect    measuring up to 2.5 cm x 4.6 cm.  Additional tiny fat containing midline and    right paramedian supraumbilical hernias.  Small fat containing right direct    inguinal hernia.  No inguinal lymphadenopathy.  Diffuse osseous    demineralization.  No acute fractures nor suspicious osseous lesions.           Impression    1. A healed midline laparotomy incision is associated with diastasis recti. There are a few tiny to small supraumbilical incisional hernias predominantly    just right of midline, the largest containing nonobstructed transverse colon    and associated with a 2.5 cm x 4.6 cm fascial defect. 2. Eccentric wall thickening in the jejunum at the [place] Angel limb anastomosis    could be related to inflammation but could also be seen with malignancy. Adjacent mesenteric lymph nodes could be reactive or metastatic.  Consider    short-term follow-up or endoscopy. 3. Unchanged 0.9 cm aneurysm of the mid to distal splenic artery.  Recommend    follow-up imaging in 1 year as below.         RECOMMENDATIONS:    Incidental Vascular Findings on CT and MRI         Splenic artery aneurysm, <2 cm:  Follow-up imaging every 1 year         Reference:  Tanisha et al. Ruth Jacobs incidental findings on abdominal and    pelvic CT and MRI, part 2: white paper of the ACR Incidental Findings    Committee II on vascular findings.  J Am Nolan Radiol 9555;54:[id number].               Impressions/Recommendations:         1) History RNY gastric bypass 2001. Prior ventral hernia repair. Recurrent Complex ventral incisional hernia with transverse colon reducible, defect is 5 cm.   I discussed risks and benefits of repair.  Recommend open repair with mesh rather than their perioperative period and any recovery window from their procedure. The patient was made aware that [place] Covid-19  may worsen their prognosis for recovering from their procedure  and lend to a higher morbidity and/or mortality risk. All material risks, benefits, and reasonable alternatives including postponing the procedure were discussed. The patient does wish to proceed with the procedure at this time.         Electronically signed by Eusebia Haynes DO  on 2/5/2021 at 9:57 AM

## 2021-02-05 NOTE — OP NOTE
Operative Note        Patient: Tyrone Nye  YOB: 1950  MRN: Jez Dhillon, APRN - CNP      Date of Procedure: 2/5/2021    Pre-Op Diagnosis: overdue screening colonoscopy. Family history (mother colon cancer)    Post-Op Diagnosis: Same. Less than ideal bowel prep, redundant colon, sigmoid diverticulosis, no large polyps seen, additional time spent pedal irrigating mucosal surfaces of left colon where the prep was less than ideal. The right colon had a good prep    Procedure:    Colonoscopy to cecum      Surgeon(s):  Jermaine Sanchez DO    Assistant:  * No surgical staff found *    Anesthesia: Monitor Anesthesia Care    Estimated Blood Loss (mL): none    Complications: None    Specimens:   None    Procedure details:    I do meet with the patient in preoperative holding area. Please see H&P for indications for the above named procedure. Patient was brought to the endoscopy suite and placed under monitored anesthesia. Patient was positioned in left lateral position. Time out called and procedure confirmed. The lubricated variable stiffness pediatric colonoscope was carefully passed under direct vision into the rectum, advanced through the sigmoid colon, transverse colon, ascending colon and the cecum. The ileocecal valve was well visualized. Additional findings:    Findings:     Cecum: normal cecal pouch. IC valve and appendiceal orifice well confirmed  Ascending: normal  Transverse: normal, redundant  Descending: normal, redundant  Sigmoid: mild diverticulosis, redundant  Rectum: normal  Rectal exam: no masses, mild external hemorrhoids without bleeding or thrombosis  Bowel prep quality: less than adequate on left side as above described    At the distal 1/3 of the rectum, the scope was retroflexed and was negative. The patient tolerated the procedure well. The abdomen was soft after the procedure. I spoke with pt/family afterwards.      Recommendations:  One more colonoscopy 5 yrs due to family history  mother colon cancer, patient age (appears younger), and do a 2-3 day bowel prep next time    Electronically signed by Amarilys Reyna DO on 2/5/2021 at 9:58 AM

## 2021-02-05 NOTE — PROGRESS NOTES
Discharge instructions given to pt and visitor. Discharge Criteria    Inpatients must meet Criteria 1 through 7. All other patients are either YES or N/A. If a NO is chosen then Anesthesia or Surgeon must be notified. 1.  Minimum 30 minutes after last dose of sedative medication, minimum 120 minutes after last dose of reversal agent. Yes      2. Systolic BP stable within 20 mmHg for 30 minutes & systolic BP between 90 & 008 or within 10 mmHg of baseline. Yes      3. Pulse between 60 and 100 or within 10 bpm of baseline. Yes      4. Spontaneous respiratory rate >/= 10 per minute. Yes      5. SaO2 >/= 95 or  >/= baseline. Yes      6. Able to cough and swallow or return to baseline function. Yes      7. Alert and oriented or return to baseline mental status. Yes      8. Demonstrates controlled, coordinated movements, ambulates with steady gait, or return to baseline activity function. Yes      9. Minimal or no pain or nausea, or at a level tolerable and acceptable to patient. Yes      10. Takes and retains oral fluids as allowed. Yes      11. Procedural / perioperative site stable. Minimal or no bleeding. Yes          12. If GI endoscopy procedure, minimal or no abdominal distention or passing flatus. Yes      13. Written discharge instructions and emergency telephone number provided. Yes      14. Accompanied by a responsible adult.     Yes

## 2021-02-05 NOTE — ANESTHESIA POSTPROCEDURE EVALUATION
Department of Anesthesiology  Postprocedure Note    Patient: Nestor Heller  MRN: [id number]  YOB: 1950  Date of evaluation: 2/5/2021  Time:  11:12 AM     Procedure Summary     Date: 02/05/21 Room / Location: [address] [place] [address]    Anesthesia Start: 1004 Anesthesia Stop: 1101    Procedure: [address], HIGH RISK (N/A ) Diagnosis: (FAMILY HX OF COLON CANCER OVERDUE SCREENING)    Surgeons: Tye Hudson DO Responsible Provider: OLIVIA Saucedo CRNA    Anesthesia Type: MAC ASA Status: 3          Anesthesia Type: MAC    Thor Phase I: Thor Score: 10    Thor Phase II: Thor Score: 10    Last vitals: Reviewed and per EMR flowsheets.        Anesthesia Post Evaluation    Patient location during evaluation: PACU  Patient participation: complete - patient participated  Level of consciousness: awake and alert  Airway patency: patent  Nausea & Vomiting: no vomiting and no nausea  Complications: no  Cardiovascular status: hemodynamically stable  Respiratory status: spontaneous ventilation and room air  Hydration status: stable

## 2021-05-12 ENCOUNTER — HOSPITAL ENCOUNTER (OUTPATIENT)
Age: 71
Discharge: HOME | End: 2021-05-12
Payer: MEDICARE

## 2021-06-22 ENCOUNTER — HOSPITAL ENCOUNTER (OUTPATIENT)
Age: 71
Discharge: HOME | End: 2021-06-22
Payer: MEDICARE

## 2021-06-22 DIAGNOSIS — E11.628 TYPE 2 DIABETES MELLITUS WITH OTHER SKIN COMPLICATION, WITHOUT LONG-TERM CURRENT USE OF INSULIN (HCC): ICD-10-CM

## 2021-06-22 LAB
ALBUMIN SERPL-MCNC: 3.9 G/DL (ref 3.5–5.2)
ALBUMIN/GLOBULIN RATIO: 1.1 (ref 1–2.5)
ALP BLD-CCNC: 73 U/L (ref 35–104)
ALT SERPL-CCNC: 17 U/L (ref 5–33)
ANION GAP SERPL CALCULATED.3IONS-SCNC: 15 MMOL/L (ref 9–17)
AST SERPL-CCNC: 23 U/L
BILIRUB SERPL-MCNC: 0.36 MG/DL (ref 0.3–1.2)
BUN BLDV-MCNC: 21 MG/DL (ref 8–23)
BUN/CREAT BLD: 38 (ref 9–20)
CALCIUM SERPL-MCNC: 9.6 MG/DL (ref 8.6–10.4)
CHLORIDE BLD-SCNC: 97 MMOL/L (ref 98–107)
CO2: 26 MMOL/L (ref 20–31)
CREAT SERPL-MCNC: 0.56 MG/DL (ref 0.5–0.9)
ESTIMATED AVERAGE GLUCOSE: 137 MG/DL
GFR AFRICAN AMERICAN: >60 ML/MIN
GFR NON-AFRICAN AMERICAN: >60 ML/MIN
GFR SERPL CREATININE-BSD FRML MDRD: ABNORMAL ML/MIN/{1.73_M2}
GFR SERPL CREATININE-BSD FRML MDRD: ABNORMAL ML/MIN/{1.73_M2}
GLUCOSE BLD-MCNC: 154 MG/DL (ref 70–99)
HBA1C MFR BLD: 6.4 % (ref 4–6)
POTASSIUM SERPL-SCNC: 4.4 MMOL/L (ref 3.7–5.3)
SODIUM BLD-SCNC: 138 MMOL/L (ref 135–144)
TOTAL PROTEIN: 7.3 G/DL (ref 6.4–8.3)

## 2021-06-22 PROCEDURE — 36415 COLL VENOUS BLD VENIPUNCTURE: CPT

## 2021-06-22 PROCEDURE — 83036 HEMOGLOBIN GLYCOSYLATED A1C: CPT

## 2021-06-22 PROCEDURE — 80053 COMPREHEN METABOLIC PANEL: CPT

## 2021-07-06 ENCOUNTER — TELEPHONE (OUTPATIENT)
Dept: SURGERY | Age: 71
End: 2021-07-06

## 2021-07-06 NOTE — TELEPHONE ENCOUNTER
Patient called inquiring how \"risky\" hernia surgery will be. Patient stated hernia \"really bothered her over the weekend\" and she \"doesn't want it to bust.\" Patient also inquired what surgeon is recommended for repair. Please advice.

## 2021-07-07 NOTE — TELEPHONE ENCOUNTER
She will need to see me in office for examination/re-assessment, detailed discussion in person, thank you

## 2021-07-13 ENCOUNTER — HOSPITAL ENCOUNTER (OUTPATIENT)
Dept: PHYSICAL THERAPY | Age: 71
Setting detail: THERAPIES SERIES
Discharge: HOME | End: 2021-07-13
Payer: MEDICARE

## 2021-07-13 PROCEDURE — 97161 PT EVAL LOW COMPLEX 20 MIN: CPT

## 2021-07-13 PROCEDURE — 97110 THERAPEUTIC EXERCISES: CPT

## 2021-07-13 NOTE — PLAN OF CARE
[facility]           Phone: [phone]             Outpatient Physical Therapy  Fax: [phone]                                           Date: 2021  Patient: Cinthya Norris : 1950 CSN #: [id number]   Referring Practitioner:  Dr. Nova Moralez MD Referral Date:  21       [x] Plan of Care   [] Updated Plan of Care    Dates of Service to Include: 2021 to 21    Diagnosis:  Weakness of both legs, R29.898    Rehab (Treatment) Diagnosis:  Difficulty walking             Onset Date:       Attendance  Total # of Visits to Date: 1 No Show: 0 Canceled Appointment: 0    Assessment  Assessment: Patient is 69year old female with dx of B LE weakness who presents with sig hx falls and progressive weakness over the past year. Patient amb with FWW and fwd flexed posture with wide SHAD and increased postural sway. Patient with B LE and UE ROM WFL. B LE weakness:hip flex/abd/add: 3+/5, knee ext: 3+/5, flex: 4-/5, ankle DF: 4-/5 and decreased B UE strength 4-/5 grossly. TUG: hhax2, FWW, SBA 24seconds, fwd flexed posture, lack of B push off; 5x sit/stand from mat table: 14seconds. Patient to benefit from physical therapy to improve general functional strength and endurance and decrease fall risk and improve gait with LRAD to return to PLOF. Goals  Short term goals  Time Frame for Short term goals: 3 weeks  Short term goal 1: Patient  to initiate HEP to improve core and B LE and UE strength. Short term goal 2: Patient to be instructed in general functional strengthening of core and B LE's and UE's. Short term goal 3: Patient to tolerate 45 min of ther ex/act with minimal rest breaks to improve endurance. Long term goals  Time Frame for Long term goals : 6 weeks  Long term goal 1: Patient to be independent and compliant with HEP.   Long term goal 2: Patient to have improved B LE strength >/=4/5 grossly and improved B UE strength >/=4/5 grossly for improved ease with transfers and ADL's. Long term goal 3: Patient to initiate gait training with SPC as able to improve functional mobility and independence. Long term goal 4: Patient to have improved Tinetti balance score >/=24/28 to decrease fall risk.      Prognosis  Prognosis: Good    Treatment Plan   Times per week: 3  Plan weeks: 4  [x] HP/CP      [] Electrical Stim   [x] Therapeutic Exercise      [x] Gait Training  [] Aquatics   [] Ultrasound         [x] Patient Education/HEP   [x] Manual Therapy  [] Traction    [x] Neuro-brenda        [x] Soft Tissue Mobs            [] Home TENS  [] Iontophoresis    [] Orthotic casting/fitting      [] Dry Needling             Electronically signed by: Navid Renteria PT DPT    Date: 7/13/2021      ______________________________________ Date: 7/13/2021   Physician Signature

## 2021-07-13 NOTE — PROGRESS NOTES
Phone: [facility]          Fax: [phone]                      Outpatient Physical Therapy                                                                      Evaluation  Date: 2021  Patient: René Hills  : 1950  CSN #: [id number]  Referring Practitioner: Dr. 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 Patient reports she has been relying on her walker since 2020 but would like to return to walking with a cane and being able to reach up high into shelves without her back hurting. She reports problems with balance and has fallen twice in the past year and had to call the squad. Additional Pertinent Hx: DM2, anemia, arthritis, depression, HTN    Objective     Observation/Palpation  Observation: Patient amb with FWW and fwd flexed posture with wide SHAD and increased postural sway    Strength RLE  Comment: hip flex/abd/add: 3+/5, knee ext: 3+/5, flex: 4-/5, ankle DF: 4-/5  Strength LLE  Comment: hip flex/abd/add: 3+/5, knee ext: 3+/5, flex: 4-/5, ankle DF: 4-/5  Strength RUE  Comment: 4-/5 grossly  Strength LUE  Comment: 4-/5 grossly    Additional Measures  Special Tests: TUG: hhax2, FWW, SBA 24seconds, fwd flexed posture, lack of B push off; 5x sit/stand from mat table: 14seconds      Exercises:  Exercise 1: HEP: sink exercises    Functional Outcome Measures  Sitting Balance: Steady, safe  Arises: Able, uses arms to help  Attempts to Arise: Able, requires more than one attempt  Immediate Standing Balance (First 5 Seconds):  Unsteady (swaggers, moves feet, trunk sway)  Standing Balance: Steady but wide stance, uses cane or other support  Nudged: Staggers, grabs, catches self  Eyes Closed: Unsteady  Turned 360 Degrees: Steadiness: Unsteady (grabs, staggers)  Turned 360 Degrees: Continuity of Steps: Discontinuous steps  Sitting Down: Uses arms or not a smooth motion  Initiation of Gait: No hesitancy  Step Height: R Swing Foot: Right foot complete clears floor  Step Length: R Swing Foot: Does not pass left stance foot with step  Step Height: L Swing Foot: Left foot complete clears floor  Step Length: L Swing Foot: Does not pass right stance foot with step  Step Symmetry: Right and left step appear equal  Step Continuity: Steps appear continuous  Path: Mild/moderate deviation or uses walking aid  Trunk: Marked sway or uses walking aid  Walking Time: Heels apart  Gait Score: 6  Tinetti Total Score: 12    Assessment  Assessment: Patient is 69year old female with dx of B LE weakness who presents with sig hx falls and progressive weakness over the past year. Patient amb with FWW and fwd flexed posture with wide SHAD and increased postural sway. Patient with B LE and UE ROM WFL. B LE weakness:hip flex/abd/add: 3+/5, knee ext: 3+/5, flex: 4-/5, ankle DF: 4-/5 and decreased B UE strength 4-/5 grossly. TUG: hhax2, FWW, SBA 24seconds, fwd flexed posture, lack of B push off; 5x sit/stand from mat table: 14seconds. Patient to benefit from physical therapy to improve general functional strength and endurance and decrease fall risk and improve gait with LRAD to return to PLOF. Prognosis: Good    Patient Education  PT eval, POC, HEP    Pt verbalized/demonstrated good understanding:     [X] Yes         [] No, pt required further clarification. Goals  Short term goals  Time Frame for Short term goals: 3 weeks  Short term goal 1: Patient  to initiate HEP to improve core and B LE and UE strength.   Short term goal 2: Patient to be instructed in general functional strengthening of core and B LE's and UE's. Short term goal 3: Patient to tolerate 45 min of ther ex/act with minimal rest breaks to improve endurance. Long term goals  Time Frame for Long term goals : 6 weeks  Long term goal 1: Patient to be independent and compliant with HEP. Long term goal 2: Patient to have improved B LE strength >/=4/5 grossly and improved B UE strength >/=4/5 grossly for improved ease with transfers and ADL's. Long term goal 3: Patient to initiate gait training with SPC as able to improve functional mobility and independence. Long term goal 4: Patient to have improved Tinetti balance score >/=24/28 to decrease fall risk.       Patient goals : \"Walk straight, walk with cane only\"        Minutes Tracking:  Time In: 1333  Time Out: 1407  Minutes: 34  Timed Code Treatment Minutes: [facility], PT, DPT    7/13/2021

## 2021-07-16 ENCOUNTER — OFFICE VISIT (OUTPATIENT)
Dept: SURGERY | Age: 71
End: 2021-07-16
Payer: MEDICARE

## 2021-07-16 VITALS
BODY MASS INDEX: 35.87 KG/M2 | SYSTOLIC BLOOD PRESSURE: 115 MMHG | DIASTOLIC BLOOD PRESSURE: 67 MMHG | TEMPERATURE: 98.2 F | HEIGHT: 61 IN | HEART RATE: 41 BPM | WEIGHT: 190 LBS

## 2021-07-16 DIAGNOSIS — K43.2 RECURRENT VENTRAL INCISIONAL HERNIA: Primary | ICD-10-CM

## 2021-07-16 DIAGNOSIS — Z01.818 PRE-OP TESTING: ICD-10-CM

## 2021-07-16 PROCEDURE — 99214 OFFICE O/P EST MOD 30 MIN: CPT | Performed by: SURGERY

## 2021-07-16 NOTE — PROGRESS NOTES
7/16/21 office visit    Carlos Goodson is here with her sister today. She has a known large ventral incisional hernia that I evaluated in the past. She is getting increasingly symptomatic. I do have a detailed discussion with her and her sister about modified plans for surgery, mainly my concer is on her exam today, the colon herniating through is getting more pronounced and symptomatic and I am concerned about intermittent incarceration and potential strangulation of the bowel in the future. She is doing everything possible to avoid constipation. Taking stool softeners and/or Metamucil or Miralax with good results. She does not do heavy lifting. She uses a walker. She is a non-smoker. She had a prior open gastric bypass in 2001. She had a prior open VIH repair with mesh elsewhere. CT scan images from 2020 reviewed. Not much different in comparison on today's exam. No fevers or chills. Vitals stable. Colon herniating through is reducible but with effort. No peritonitis. I recommend open repair with reduction of bowel. Mesh reinforcement, mainly limited to the colon at risk. Plan as outpatient, likely overnight stay as a precaution. Risks and benefits discussed. All questions answered. She asks intelligent questions. Clear liquid diet day prior to surgery. Pre-op EKG ordered. Labs already done. 2 hours plan. [place] [facility].

## 2021-07-20 ENCOUNTER — HOSPITAL ENCOUNTER (OUTPATIENT)
Dept: PHYSICAL THERAPY | Age: 71
Setting detail: THERAPIES SERIES
Discharge: HOME | End: 2021-07-20
Payer: MEDICARE

## 2021-07-20 PROCEDURE — 97110 THERAPEUTIC EXERCISES: CPT

## 2021-07-20 NOTE — PROGRESS NOTES
Phone: [facility]           Fax: [phone]                           Outpatient Physical Therapy                                                                            Daily Note    Patient: Steven Hurt : 1950  CSN #: [id number]   Referring Practitioner:  Dr. Thuy Noel MD    Referral Date : 21     Date: 2021    Diagnosis: Weakness of both legs, R29.898  Treatment Diagnosis: Difficulty walking    PT Insurance Information: [employer] Medicare/Medicaid  Total # of Visits Approved: 12 Per Physician Order  Total # of Visits to Date: 2  No Show: 0  Canceled Appointment: 0      Pre-Treatment Pain:  0/10  Subjective: Pt denies pain, states she is weak in LE and UE's. States she is having abdominal surgery on 21. Exercises:  Exercise 2: Bike 10 min  Exercise 3: sit to stands 10x2  Exercise 4: sink exercie   10-15x  Exercise 5: walking along island 4x  Exercise 6: Seated UBE 4/4      Assessment  Assessment: Pt initiated on LE and UE strengthening with good tolerance. States getting off low surfaces is becoming more difficult. Rest needed during session due to fatigue. Plan to slowly progress program as tolerated    Activity Tolerance  Activity Tolerance: Patient Tolerated treatment well    Patient Education  Patient Education: Progression of exercise  Pt verbalized/demonstrated good understanding:     [x] Yes         [] No, pt required further clarification. Post Treatment Pain:  0/10      Plan  Times per week: 3  Plan weeks: 4      Goals  (Total # of Visits to Date: 2)      Short term goals  Time Frame for Short term goals: 3 weeks  Short term goal 1: Patient  to initiate HEP to improve core and B LE and UE strength. Short term goal 2: Patient to be instructed in general functional strengthening of core and B LE's and UE's. Short term goal 3: Patient to tolerate 45 min of ther ex/act with minimal rest breaks to improve endurance.     Long term goals  Time Frame for Long term goals : 6 weeks  Long term goal 1: Patient to be independent and compliant with HEP. Long term goal 2: Patient to have improved B LE strength >/=4/5 grossly and improved B UE strength >/=4/5 grossly for improved ease with transfers and ADL's. Long term goal 3: Patient to initiate gait training with SPC as able to improve functional mobility and independence. Long term goal 4: Patient to have improved Tinetti balance score >/=24/28 to decrease fall risk.     Minutes Tracking:  Time In: 0511  Time Out: 3683  Minutes: 48  Timed Code Treatment Minutes: 45 Minutes       Jake Trevino     Date: 7/20/2021

## 2021-07-21 ENCOUNTER — HOSPITAL ENCOUNTER (OUTPATIENT)
Dept: PHYSICAL THERAPY | Age: 71
Setting detail: THERAPIES SERIES
Discharge: HOME | End: 2021-07-21
Payer: MEDICARE

## 2021-07-21 PROCEDURE — 97110 THERAPEUTIC EXERCISES: CPT

## 2021-07-21 NOTE — PROGRESS NOTES
Phone: [facility]           Fax: [phone]                           Outpatient Physical Therapy                                                                            Daily Note    Patient: Franck Rodrigez : 1950  CSN #: [id number]   Referring Practitioner:  Dr. Misty Bernheim, MD    Referral Date : 21     Date: 2021    Diagnosis: Weakness of both legs, R29.898  Treatment Diagnosis: Difficulty walking    PT Insurance Information: [employer]na Medicare/Medicaid  Total # of Visits Approved: 12 Per Physician Order  Total # of Visits to Date: 3  No Show: 0  Canceled Appointment: 0      Pre-Treatment Pain:  0/10  Subjective: Patient reports she had a good workout last time. Exercises:  Exercise 1: HEP: sink exercises  Exercise 2: Bike 10 min  Exercise 3: sit to stands 10x2, no hha  Exercise 4: sink exercise   10-15x  Exercise 5: walking along island 4x  Exercise 7: GTB rows/ext 15x ea    Assessment  Assessment: Added GTB rows/ext to progress scapular strength and stability. Patient still requires frequent, prolonged rest breaks d/t fatigue. Able to complete about half of her sit/stands with no hha. Will continue. Activity Tolerance  Activity Tolerance: Patient Tolerated treatment well    Patient Education  Exercise technique    Pt verbalized/demonstrated good understanding:     [x] Yes         [] No, pt required further clarification. Post Treatment Pain:  0/10      Plan  Times per week: 3  Plan weeks: 4      Goals  (Total # of Visits to Date: 3)      Short term goals  Time Frame for Short term goals: 3 weeks  Short term goal 1: Patient  to initiate HEP to improve core and B LE and UE strength. -met  Short term goal 2: Patient to be instructed in general functional strengthening of core and B LE's and UE's.-met/cont  Short term goal 3: Patient to tolerate 45 min of ther ex/act with minimal rest breaks to improve endurance.     Long term goals  Time Frame for Long term goals : 6 weeks  Long term goal 1: Patient to be independent and compliant with HEP. Long term goal 2: Patient to have improved B LE strength >/=4/5 grossly and improved B UE strength >/=4/5 grossly for improved ease with transfers and ADL's. Long term goal 3: Patient to initiate gait training with SPC as able to improve functional mobility and independence. Long term goal 4: Patient to have improved Tinetti balance score >/=24/28 to decrease fall risk.     Minutes Tracking:  Time In: 1344  Time Out: [facility]  Minutes: 45  Timed Code Treatment Minutes: Angelina Hollins , DPT     Date: 7/21/2021

## 2021-07-23 ENCOUNTER — HOSPITAL ENCOUNTER (OUTPATIENT)
Dept: PHYSICAL THERAPY | Age: 71
Setting detail: THERAPIES SERIES
Discharge: HOME | End: 2021-07-23
Payer: MEDICARE

## 2021-07-23 PROCEDURE — 97110 THERAPEUTIC EXERCISES: CPT

## 2021-07-23 NOTE — PROGRESS NOTES
Phone: [facility]           Fax: [phone]                           Outpatient Physical Therapy                                                                            Daily Note    Patient: Ronald Pascual : 1950  CSN #: [id number]   Referring Practitioner:  Dr. Amaya Pineda MD    Referral Date : 21     Date: 2021    Diagnosis: Weakness of both legs, R29.898  Treatment Diagnosis: Difficulty walking    PT Insurance Information: [employer]na Medicare/Medicaid  Total # of Visits Approved: 12 Per Physician Order  Total # of Visits to Date: 4  No Show: 0  Canceled Appointment: 0      Pre-Treatment Pain:  0/10  Subjective: Patient with no complaints; is feeling well. Exercises:  Exercise 1: HEP: sink exercises  Exercise 2: Bike 10 min  Exercise 3: sit to stands 10x2, no hha  Exercise 4: sink exercise   15x  Exercise 5: walking along island 4x  Exercise 7: GTB rows/ext 20x ea--seated today    Assessment  Assessment: Seated GTB rows/ext this date d/t balance concerns. Able to increase reps of ther ex this date with decreased rest breaks. Will progress endurance and strength as able. Activity Tolerance  Activity Tolerance: Patient Tolerated treatment well    Patient Education  Exercise technique    Pt verbalized/demonstrated good understanding:     [x] Yes         [] No, pt required further clarification. Post Treatment Pain:  0/10      Plan  Times per week: 3  Plan weeks: 4      Goals  (Total # of Visits to Date: 4)      Short term goals  Time Frame for Short term goals: 3 weeks  Short term goal 1: Patient  to initiate HEP to improve core and B LE and UE strength. -met  Short term goal 2: Patient to be instructed in general functional strengthening of core and B LE's and UE's.-met/cont  Short term goal 3: Patient to tolerate 45 min of ther ex/act with minimal rest breaks to improve endurance.     Long term goals  Time Frame for Long term goals : 6 weeks  Long term goal 1: Patient to be independent and compliant with HEP. Long term goal 2: Patient to have improved B LE strength >/=4/5 grossly and improved B UE strength >/=4/5 grossly for improved ease with transfers and ADL's. Long term goal 3: Patient to initiate gait training with SPC as able to improve functional mobility and independence. Long term goal 4: Patient to have improved Tinetti balance score >/=24/28 to decrease fall risk.     Minutes Tracking:  Time In: 1400  Time Out: 3358  Minutes: 41  Timed Code Treatment Minutes: 1800 Tony Cordon,[address], PT , DPT     Date: 7/23/2021

## 2021-07-27 ENCOUNTER — HOSPITAL ENCOUNTER (OUTPATIENT)
Dept: PHYSICAL THERAPY | Age: 71
Setting detail: THERAPIES SERIES
Discharge: HOME | End: 2021-07-27
Payer: MEDICARE

## 2021-07-27 PROCEDURE — 97110 THERAPEUTIC EXERCISES: CPT

## 2021-07-27 NOTE — PROGRESS NOTES
Phone: [facility]           Fax: [phone]                           Outpatient Physical Therapy                                                                            Daily Note    Patient: Val Arteaga : 1950  CSN #: [id number]   Referring Practitioner:  Dr. Arelis Brown MD    Referral Date : 21     Date: 2021    Diagnosis: Weakness of both legs, R29.898  Treatment Diagnosis: Difficulty walking    PT Insurance Information: A[employer]na Medicare/Medicaid  Total # of Visits Approved: 12 Per Physician Order  Total # of Visits to Date: 5  No Show: 0  Canceled Appointment: 0      Pre-Treatment Pain:  0/10  Subjective: Patient was feeling sick this weekend but feeling better now. Exercises:  Exercise 1: HEP: sink exercises  Exercise 2: Bike 10 min, hills, L1.0  Exercise 3: sit to stands 10x2, no hha  Exercise 5: walking along island 4x  Exercise 7: BTB rows/ext 20x ea--seated today  Exercise 8: FSU 6in 10x ea LE    Assessment  Assessment: Added FSU's to progress functional strength of B LE's; patient able to complete 10reps ea LE before seated rest break required. Will continue. Activity Tolerance  Activity Tolerance: Patient Tolerated treatment well    Patient Education  Exercise technique    Pt verbalized/demonstrated good understanding:     [x] Yes         [] No, pt required further clarification. Post Treatment Pain:  0/10      Plan  Times per week: 3  Plan weeks: 4      Goals  (Total # of Visits to Date: 5)      Short term goals  Time Frame for Short term goals: 3 weeks  Short term goal 1: Patient  to initiate HEP to improve core and B LE and UE strength. -met  Short term goal 2: Patient to be instructed in general functional strengthening of core and B LE's and UE's.-met/cont  Short term goal 3: Patient to tolerate 45 min of ther ex/act with minimal rest breaks to improve endurance.     Long term goals  Time Frame for Long term goals : 6 weeks  Long term goal 1: Patient to be independent and compliant with HEP. Long term goal 2: Patient to have improved B LE strength >/=4/5 grossly and improved B UE strength >/=4/5 grossly for improved ease with transfers and ADL's. Long term goal 3: Patient to initiate gait training with SPC as able to improve functional mobility and independence. Long term goal 4: Patient to have improved Tinetti balance score >/=24/28 to decrease fall risk.     Minutes Tracking:  Time In: 8942  Time Out: 9235  Minutes: 46  Timed Code Treatment Minutes: [facility] , DPT     Date: 7/27/2021

## 2021-07-28 ENCOUNTER — HOSPITAL ENCOUNTER (OUTPATIENT)
Dept: PHYSICAL THERAPY | Age: 71
Setting detail: THERAPIES SERIES
Discharge: HOME | End: 2021-07-28
Payer: MEDICARE

## 2021-07-28 PROCEDURE — 97110 THERAPEUTIC EXERCISES: CPT

## 2021-07-30 ENCOUNTER — HOSPITAL ENCOUNTER (OUTPATIENT)
Dept: PHYSICAL THERAPY | Age: 71
Setting detail: THERAPIES SERIES
Discharge: HOME | End: 2021-07-30
Payer: MEDICARE

## 2021-07-30 PROCEDURE — 97110 THERAPEUTIC EXERCISES: CPT

## 2021-07-30 PROCEDURE — 97116 GAIT TRAINING THERAPY: CPT

## 2021-07-30 NOTE — PROGRESS NOTES
Phone: [facility]           Fax: [phone]                           Outpatient Physical Therapy                                                                            Daily Note    Patient: Chanelle Zhu : 1950  CSN #: [id number]   Referring Practitioner:  Dr. Fely Roach MD    Referral Date : 21     Date: 2021    Diagnosis: Weakness of both legs, R29.898  Treatment Diagnosis: Difficulty walking    PT Insurance Information: A[employer]na Medicare/Medicaid  Total # of Visits Approved: 12 Per Physician Order  Total # of Visits to Date: 7  No Show: 0  Canceled Appointment: 0      Pre-Treatment Pain:  0/10  Subjective: Patient reports she is feeling well today, just a little tired. Exercises:  Exercise 1: HEP: sink exercises  Exercise 2: Bike 10 min, hills, L2.5  Exercise 3: sit to stands 10x2, no hha  Exercise 5: walking along island 4x GTB  Exercise 7: BTB rows/ext 20x ea--seated today  Exercise 8: FSU 6in 10x ea LE  Exercise 9: toe taps on steps 10x ea  Exercise 10: GTB hip ABD at counter 10x ea  Exercise 11: 50'x2 SC and quad cane with HHA    Assessment  Assessment: shorter and less frequent rest breaks required today during exercise meeting short term goal. Practiced walking with quad cane again. Patient states she is getting her own walker. Will progress as able. Activity Tolerance  Activity Tolerance: Patient Tolerated treatment well    Patient Education  Exercise technique    Pt verbalized/demonstrated good understanding:     [x] Yes         [] No, pt required further clarification. Post Treatment Pain:  0/10      Plan  Times per week: 3  Plan weeks: 4      Goals  (Total # of Visits to Date: 7)      Short term goals  Time Frame for Short term goals: 3 weeks  Short term goal 1: Patient  to initiate HEP to improve core and B LE and UE strength. -met  Short term goal 2: Patient to be instructed in general functional strengthening of core and B LE's and UE's.-met/cont  Short term goal 3: Patient to tolerate 45 min of ther ex/act with minimal rest breaks to improve endurance. -met    Long term goals  Time Frame for Long term goals : 6 weeks  Long term goal 1: Patient to be independent and compliant with HEP. Long term goal 2: Patient to have improved B LE strength >/=4/5 grossly and improved B UE strength >/=4/5 grossly for improved ease with transfers and ADL's. Long term goal 3: Patient to initiate gait training with SPC as able to improve functional mobility and independence. Long term goal 4: Patient to have improved Tinetti balance score >/=24/28 to decrease fall risk.     Minutes Tracking:  Time In: 1314  Time Out: 0798  Minutes: 44  Timed Code Treatment Minutes: [address], [place] , DPT     Date: 7/30/2021

## 2021-08-03 ENCOUNTER — HOSPITAL ENCOUNTER (OUTPATIENT)
Dept: PHYSICAL THERAPY | Age: 71
Setting detail: THERAPIES SERIES
Discharge: HOME | End: 2021-08-03
Payer: MEDICARE

## 2021-08-03 PROCEDURE — 97110 THERAPEUTIC EXERCISES: CPT

## 2021-08-03 NOTE — PROGRESS NOTES
Phone: [facility]           Fax: [phone]                           Outpatient Physical Therapy                                                                            Daily Note    Patient: Urbano Pino : 1950  CSN #: [id number]   Referring Practitioner:  Dr. Brcie Brunson MD    Referral Date : 21     Date: 8/3/2021    Diagnosis: Weakness of both legs, R29.898  Treatment Diagnosis: Difficulty walking    PT Insurance Information: A[employer]na Medicare/Medicaid  Total # of Visits Approved: 12 Per Physician Order  Total # of Visits to Date: 8  No Show: 0      Pre-Treatment Pain:  0/10  Subjective: Pt feels like her strength is coming along    Exercises:  Exercise 2: Bike 10 min, hills, L2.5  Exercise 3: sit to stands 10x2, no hha  Exercise 4: sink exercise   15x  Exercise 5: walking along [employer] GTB  Exercise 6: Seated UBE 4/4  Exercise 7: BTB rows/ext 20x ea--seated today  Exercise 8: FSU 6in 10x ea LE  Exercise 9: toe taps on steps 10x ea       Assessment  Assessment: Pt feels therapy is helping. Pt unsteady when not using her waker. Pt to continue to use for safety. Will continue to progress with exercise as tolerated    Activity Tolerance       Patient Education  Patient Education: Home safety  Pt verbalized/demonstrated good understanding:     [x] Yes         [] No, pt required further clarification. Post Treatment Pain:  0/10      Plan  Times per week: 3  Plan weeks: 4      Goals  (Total # of Visits to Date: 8)      Short term goals  Time Frame for Short term goals: 3 weeks  Short term goal 1: Patient  to initiate HEP to improve core and B LE and UE strength. -met  Short term goal 2: Patient to be instructed in general functional strengthening of core and B LE's and UE's.-met/cont  Short term goal 3: Patient to tolerate 45 min of ther ex/act with minimal rest breaks to improve endurance. -met    Long term goals  Time Frame for Long term goals : 6 weeks  Long term goal 1: Patient to be independent and compliant with HEP. Long term goal 2: Patient to have improved B LE strength >/=4/5 grossly and improved B UE strength >/=4/5 grossly for improved ease with transfers and ADL's. Long term goal 3: Patient to initiate gait training with SPC as able to improve functional mobility and independence. Long term goal 4: Patient to have improved Tinetti balance score >/=24/28 to decrease fall risk.     Minutes Tracking:  Time In: 1430  Time Out: 1398  Minutes: 45  Timed Code Treatment Minutes: [facility] 76.     Date: 8/3/2021

## 2021-08-04 ENCOUNTER — HOSPITAL ENCOUNTER (OUTPATIENT)
Dept: PHYSICAL THERAPY | Age: 71
Setting detail: THERAPIES SERIES
Discharge: HOME | End: 2021-08-04
Payer: MEDICARE

## 2021-08-04 PROCEDURE — 97110 THERAPEUTIC EXERCISES: CPT

## 2021-08-04 PROCEDURE — 97116 GAIT TRAINING THERAPY: CPT

## 2021-08-04 NOTE — PROGRESS NOTES
Phone: [facility]           Fax: [phone]                           Outpatient Physical Therapy                                                                            Daily Note    Patient: Amanda Rodas : 1950  CSN #: [id number]   Referring Practitioner:  Dr. Reji Taylor MD    Referral Date : 21     Date: 2021    Diagnosis: Weakness of both legs, R29.898  Treatment Diagnosis: Difficulty walking    PT Insurance Information: A[employer]na Medicare/Medicaid  Total # of Visits Approved: 12 Per Physician Order  Total # of Visits to Date: 9  No Show: 0  Canceled Appointment: 0      Pre-Treatment Pain:  0/10  Subjective: Patient with no complaints this date. Exercises:  Exercise 2: Bike 10 min, hills, L2.5  Exercise 3: sit to stands 10x2, no hha  Exercise 4: sink exercise   15x  Exercise 5: walking along island 4x GTB  Exercise 6: Seated UBE 4/4  Exercise 7: BTB rows/ext 20x ea--seated today  Exercise 8: FSU 6in 10x ea LE  Exercise 9: toe taps on steps 10x ea  Exercise 10: GTB hip ABD at counter 10x ea  Exercise 11: 50'x2 SC and quad cane with HHA    Assessment  Assessment: Patient with less reliance on L hand held assist during gait training with hurrycane 50ftx2. Will progress gait and functional strength and endurance as tolerated. Activity Tolerance  Activity Tolerance: Patient Tolerated treatment well    Patient Education  Exercise technique; continue use of walker for safety    Pt verbalized/demonstrated good understanding:     [x] Yes         [] No, pt required further clarification. Post Treatment Pain:  0/10      Plan  Times per week: 3  Plan weeks: 4      Goals  (Total # of Visits to Date: 5)      Short term goals  Time Frame for Short term goals: 3 weeks  Short term goal 1: Patient  to initiate HEP to improve core and B LE and UE strength. -met  Short term goal 2: Patient to be instructed in general functional strengthening of core and B LE's and UE's.-met/cont  Short term goal 3: Patient to tolerate 45 min of ther ex/act with minimal rest breaks to improve endurance. -met    Long term goals  Time Frame for Long term goals : 6 weeks  Long term goal 1: Patient to be independent and compliant with HEP. Long term goal 2: Patient to have improved B LE strength >/=4/5 grossly and improved B UE strength >/=4/5 grossly for improved ease with transfers and ADL's. Long term goal 3: Patient to initiate gait training with SPC as able to improve functional mobility and independence. Long term goal 4: Patient to have improved Tinetti balance score >/=24/28 to decrease fall risk.     Minutes Tracking:  Time In: 2867  Time Out: 5763  Minutes: 54  Timed Code Treatment Minutes: [address], [place] , DPT     Date: 8/4/2021

## 2021-08-10 ENCOUNTER — HOSPITAL ENCOUNTER (OUTPATIENT)
Dept: PHYSICAL THERAPY | Age: 71
Setting detail: THERAPIES SERIES
Discharge: HOME | End: 2021-08-10
Payer: MEDICARE

## 2021-08-10 PROCEDURE — 97110 THERAPEUTIC EXERCISES: CPT

## 2021-08-10 NOTE — PROGRESS NOTES
Phone: [facility]           Fax: [phone]                           Outpatient Physical Therapy                                                                            Daily Note    Patient: Augustina Chen : 1950  CSN #: [id number]   Referring Practitioner:  Dr. Danielle Pratt MD    Referral Date : 21     Date: 8/10/2021    Diagnosis: Weakness of both legs, R29.898  Treatment Diagnosis: Difficulty walking    PT Insurance Information: A[employer]na Medicare/Medicaid  Total # of Visits Approved: 12 Per Physician Order  Total # of Visits to Date: 10  No Show: 0  Canceled Appointment: 0      Pre-Treatment Pain:  0/10  Subjective: Pt feels strength is improving    Exercises:  Exercise 2: Bike 10 min, hills, L2.5  Exercise 3: sit to stands 10x2, no hha  Exercise 4: sink exercise   15x  Exercise 5: walking along [employer] GTB  Exercise 6: Seated UBE 4/4  Exercise 7: BTB rows/ext 20x ea--seated today  Exercise 8: FSU 6in 10x ea LE  Exercise 9: toe taps on steps 10x ea  Exercise 11: ST cane the entire session             Assessment  Assessment: Pt walked in using cane today. Pt completed all exercise with several rest breaks needed. Pt feels stronger overall. Pt feeling more comportable using st cane . Will continue as planned    Activity Tolerance  Activity Tolerance: Patient Tolerated treatment well    Patient Education  Patient Education: HEP as tolerated  Pt verbalized/demonstrated good understanding:     [x] Yes         [] No, pt required further clarification. Post Treatment Pain:  0/10      Plan  Times per week: 3  Plan weeks: 4      Goals  (Total # of Visits to Date: 10)      Short term goals  Time Frame for Short term goals: 3 weeks  Short term goal 1: Patient  to initiate HEP to improve core and B LE and UE strength. -met  Short term goal 2: Patient to be instructed in general functional strengthening of core and B LE's and UE's.-met/cont  Short term goal 3: Patient to tolerate 45 min of ther ex/act with minimal rest breaks to improve endurance. -met    Long term goals  Time Frame for Long term goals : 6 weeks  Long term goal 1: Patient to be independent and compliant with HEP. Long term goal 2: Patient to have improved B LE strength >/=4/5 grossly and improved B UE strength >/=4/5 grossly for improved ease with transfers and ADL's. Long term goal 3: Patient to initiate gait training with SPC as able to improve functional mobility and independence. Long term goal 4: Patient to have improved Tinetti balance score >/=24/28 to decrease fall risk.     Minutes Tracking:  Time In: 0900  Time Out: 0945  Minutes: 45  Timed Code Treatment Minutes: [facility]     Date: 8/10/2021

## 2021-08-11 ENCOUNTER — HOSPITAL ENCOUNTER (OUTPATIENT)
Dept: PHYSICAL THERAPY | Age: 71
Setting detail: THERAPIES SERIES
Discharge: HOME | End: 2021-08-11
Payer: MEDICARE

## 2021-08-11 ENCOUNTER — OFFICE VISIT (OUTPATIENT)
Dept: CARDIOLOGY | Age: 71
End: 2021-08-11
Payer: MEDICARE

## 2021-08-11 VITALS
DIASTOLIC BLOOD PRESSURE: 68 MMHG | HEIGHT: 61 IN | WEIGHT: 187.2 LBS | BODY MASS INDEX: 35.34 KG/M2 | RESPIRATION RATE: 17 BRPM | SYSTOLIC BLOOD PRESSURE: 104 MMHG | OXYGEN SATURATION: 97 % | HEART RATE: 89 BPM

## 2021-08-11 DIAGNOSIS — R94.31 ABNORMAL ECG: Primary | ICD-10-CM

## 2021-08-11 DIAGNOSIS — Z01.810 PREOP CARDIOVASCULAR EXAM: ICD-10-CM

## 2021-08-11 DIAGNOSIS — E78.2 MIXED HYPERLIPIDEMIA: ICD-10-CM

## 2021-08-11 DIAGNOSIS — I10 ESSENTIAL HYPERTENSION: ICD-10-CM

## 2021-08-11 PROCEDURE — 93000 ELECTROCARDIOGRAM COMPLETE: CPT | Performed by: FAMILY MEDICINE

## 2021-08-11 PROCEDURE — 97110 THERAPEUTIC EXERCISES: CPT

## 2021-08-11 PROCEDURE — 99214 OFFICE O/P EST MOD 30 MIN: CPT | Performed by: FAMILY MEDICINE

## 2021-08-11 NOTE — PROGRESS NOTES
Courtney Herrera am scribing for and in the presence of Rociada Chamber. Shaji Barker MD, MS, F.A.C.C..    Patient: Skip Ramirez  : 1950  Date of Visit: 2021    REASON FOR VISIT / CONSULTATION: Cardiac Clearance (Hx:HTN,HLD,Mild CAD. She is having a hernia/mesh repair on / Collin Half. She is feeling fine. Some SOB, lightheaded/dizziness, not worsening. Palpitations that happen randomly, do not last long. Denies: CP. )    History of Present Illness:         Dear Gilles Cullen, APRN - CNP    I had the pleasure of seeing Skip Ramirez in my office today. Ms. Phil Bloom is a 66 y.o. female who recently underwent a cardiovascular stress test because of increased shortness of breath which shown evidence of reversible ischemia. She also was feeling heart palpitations that have been occurring about a couple weeks ago. This can last a couple minutes in duration. She can hear it in her ears at times. Some episodes are more strong than other. She also reported a significant increase in shortness of breath with exertion leading to her recent stress test and echo. denies any previous heart related issues. She does have history of hypertensions and hyperlipidemia. Her mother had heart conditions that started in her 52's. She never was a smoker. She had her stress test done on 2020 that was abnormal myocardial perfusion study. 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 She also had an Echo done on 2020 that showed an EF of 60% with mildly increased left ventricular wall thickness with a normal left ventricular cavity size and also evidence of mild diastolic dysfunction is seen. Heart cath done on 2020 showed No significant coronary artery disease.  Normal left ventricular end diastolic pressure (LVEDP). Ms. Eulalia Causey is here today for a cardiac clearance. She has had some chest pain that feels like a pounding. Its only happening a couple times a month and only last a few seconds. She does have some shortness of breath. She denies any dizziness or lightheadedness. She denied any abdominal pain, bleeding problems, problems with her medications or any other concerns at this time. She did fall off the couch. Exercise Tolerance: Ms. Eulalia Causey reports that she has a fairly good exercise tolerance. She did go to physical therapy and walked to our office from there with her waler. She did have some shortness of breath doing this. PAST MEDICAL HISTORY:        Past Medical History:   Diagnosis Date    Anemia     Arthritis     Depression     Diabetes mellitus ([facility]     History of cardiac cath 05/21/2020    [facility]) [place]/Dr. Valderrama/Left Ulner     Hypertension     Neck fracture ([facility] 2016    Obesity (BMI 30-39. 9)     Splenic artery aneurysm (HCC)     small less than 1.5 cm, stable    Type II or unspecified type diabetes mellitus without mention of complication, not stated as uncontrolled     Ventral incisional hernia            CURRENT ALLERGIES: Ibuprofen and Pepto-bismol [bismuth subsalicylate] REVIEW OF SYSTEMS: 14 systems were reviewed. Pertinent positives and negatives as above, all else negative.      Past Surgical History:   Procedure Laterality Date    APPENDECTOMY      CATARACT REMOVAL WITH IMPLANT Left 12/12/2018    per Dr. Remington Herman Right 01/07/2019    Dr. Adriana Patterson  03/24/2016    C 2- C 7    CHOLECYSTECTOMY      COLONOSCOPY  2014    COLONOSCOPY  01/04/2021    COLONOSCOPY N/A 1/4/2021    COLORECTAL CANCER SCREENING, NOT HIGH RISK performed by Zee Oliveira DO at [facility] COLONOSCOPY  02/05/2021    COLONOSCOPY N/A 2/5/2021    COLORECTAL CANCER SCREENING, HIGH RISK performed by Zee Oliveira DO at [address] [place], [place] [address]orly ESOPHAGUS      GASTRIC BYPASS SURGERY  2001    HERNIA REPAIR      [facility]    INTRACAPSULAR CATARACT EXTRACTION Left 12/12/2018    EYE CATARACT EMULSIFICATION IOL IMPLANT performed by Elbert Saunders DO at [address] Right 1/7/2019    EYE CATARACT EMULSIFICATION IOL IMPLANT performed by Elbert Saunders DO at [address] [facility] [address] OFFICE/OUTPT [address] [facility] Right 6/15/2018    TOE HAMMER REPAIR, RIGHT 5TH DIGIT DEROTATIONAL SKINPLASTY/ARTHOPLASTY performed by Neetu Tee DPM at [address] [place] [address] 11/26/2019    EGD DILATION SAVORY performed by Shoaib Fairbanks MD at [place] [address]  11/26/2019    -bx(normal)dilation,evidence of gastric bypass with very small gastric remnant    Social History:  Social History     Tobacco Use    Smoking status: Never Smoker    Smokeless tobacco: Never Used   Vaping Use    Vaping Use: Never used   Substance Use Topics    Alcohol use: No    Drug use: No        CURRENT MEDICATIONS:        Outpatient Medications Marked as Taking for the 8/11/21 encounter (Office Visit) with Goyo Maria MD   Medication Sig Dispense Refill    oxybutynin (DITROPAN) 5 MG tablet TAKE 1 TABLET 3 TIMES A DAY 90 tablet 1    amLODIPine (NORVASC) 10 MG tablet TAKE 1 TABLET DAILY 90 tablet 1    atorvastatin (LIPITOR) 10 MG tablet Take 1 tablet by mouth daily 90 tablet 1    triamterene-hydroCHLOROthiazide (DYAZIDE) 37.5-25 MG per capsule Take 1 capsule by mouth daily TAKE 1 CAPSULE ONCE DAILY  AS NEEDED 90 capsule 1    gabapentin (NEURONTIN) 300 MG capsule TAKE 1 CAPSULE BY MOUTH Three times DAILY FOR 30 DAYS 270 capsule 0    alendronate (FOSAMAX) 70 MG tablet Take 1 tablet by mouth every 7 days 12 tablet 2    omeprazole (PRILOSEC) 40 MG delayed release capsule Take 1 capsule by mouth every morning (before breakfast) 90 capsule 1    metFORMIN (GLUCOPHAGE) 1000 MG tablet Take 1 tablet by mouth 2 times daily (with meals) 180 tablet 1    Handicap Placard MISC by Does not apply route 1 each 0    Lancets MISC USE 1 QD. DX--E11.9 NON-INSULIN  each 3    blood glucose test strips (ASCENSIA AUTODISC VI;ONE TOUCH ULTRA TEST VI) strip 1 each by In Vitro route daily ONE TOUCH VERIO STRIPS. DX--E11.9 NON-INSULIN DEPENDENT 100 each 3    blood glucose monitor strips USE 1 QD. DX--E11.9 NON-INSULIN DEPENDENT  ONE TOUCH 100 strip 3    polyethylene glycol (MIRALAX) 17 g PACK packet Take 17 g by mouth daily       sertraline (ZOLOFT) 100 MG tablet TAKE 1 TABLET DAILY 90 tablet 3    vitamin D3 (CHOLECALCIFEROL) 10 MCG (400 UNIT) TABS tablet Take 800 Units by mouth daily      Blood Glucose Monitoring Suppl (ACCU-CHEK BRUNO) ANDREA Use once daily. Dx-E11.9 non-insulin dependant 1 Device 0    acetaminophen (TYLENOL) 325 MG tablet Take 650 mg by mouth every 6 hours as needed for Pain (arthritis)      b complex vitamins capsule Take 1 capsule by mouth daily         FAMILY HISTORY: family history includes Alcohol Abuse in her father; Cancer in her mother; Diabetes in her mother; Heart Attack in her mother; High Blood Pressure in her mother; High Cholesterol in her mother. Physical Examination:     /68 (Site: Left Upper Arm, Position: Sitting, Cuff Size: Medium Adult)   Pulse 89   Resp 17   Ht 5' 1\" (1.549 m)   Wt 187 lb 3.2 oz (84.9 kg)   LMP  (LMP Unknown)   SpO2 97%   BMI 35.37 kg/m²  Body mass index is 35.37 kg/m². Constitutional: She appeared oriented to person and place. She appears well-developed and well-nourished. In no acute distress. HEENT: Normocephalic and atraumatic. No JVD present. Carotid bruit is not present. No mass and no thyromegaly present. No lymphadenopathy noted. Cardiovascular: Normal rate, regularly irregular rhythm, normal heart sounds. Exam reveals no gallop and no friction rubs. No murmur was heard.   Pulmonary/Chest: Effort normal and breath sounds normal. No respiratory distress. She has no wheezes, rhonchi or rales. Abdominal: Soft, non-tender. She exhibits no organomegaly, mass or bruit. Extremities: Trace. No cyanosis or clubbing. 2+ radial and carotid pulses. Distal extremity pulses: 2+ bilaterally. Compression stockings in place. Neurological: Alertness and orientation as per Constitutional exam. No evidence of gross cranial nerve deficit. Coordination appeared normal.   Skin: Skin is warm and dry. There is no rash or diaphoresis. Psychiatric: She has a normal mood and affect. Her speech is normal and behavior is normal.      MOST RECENT LABS ON RECORD:   Lab Results   Component Value Date    WBC 8.0 10/07/2020    HGB 12.5 10/07/2020    HCT 39.1 10/07/2020     10/07/2020    CHOL 125 10/07/2020    TRIG 98 10/07/2020    HDL 52 10/07/2020    ALT 17 06/22/2021    AST 23 06/22/2021     06/22/2021    K 4.4 06/22/2021    CL 97 (L) 06/22/2021    CREATININE 0.56 06/22/2021    BUN 21 06/22/2021    CO2 26 06/22/2021    TSH 1.15 03/24/2016    INR 0.9 03/22/2016    GLUF 163 (H) 03/19/2018    LABA1C 6.4 (H) 06/22/2021    LABMICR 59 (H) 10/07/2020       ASSESSMENT:        1. Abnormal ECG    2. Preop cardiovascular exam    3. Essential hypertension    4. Mixed hyperlipidemia       PLAN:      Pre-Op Clearance:   Pre-Operative Risk assessment using 2014 ACC/AHA guidelines.  Hernia/ mesh repair with Dr Hemal Hogan on 8/19/2021  Emergent procedure No  Active Cardiac Condition No (decompensated HF, Arrhythmia, MI <3 weeks, severe valve disease)  Risk Level of Procedure Intermediate Risk (intraperitoneal, intrathoracic, HENT, orthopedic, or carotid endarterectomy, etc.)  Revised Cardiac Risk Index Risk factors: None  Measurement of Exercise Tolerance before Surgery >4 Yes  According to the 2014 ACC/AHA pre-operative risk assessment guidelines Kelsey Amador is at intermediate risk for major cardiac complications during a intermediate risk procedure and may completely reflects the services I personally performed and the decisions made by me. Myla Guaman MD, MS, F.A.C.C.  August 11, 2021

## 2021-08-11 NOTE — PATIENT INSTRUCTIONS
SURVEY:    You may be receiving a survey from [employer] regarding your visit today. Please complete the survey to enable us to provide the highest quality of care to you and your family. If you cannot score us a very good on any question, please call the office to discuss how we could have made your experience a very good one. Thank you.

## 2021-08-11 NOTE — PROGRESS NOTES
Phone: [facility]           Fax: [phone]                           Outpatient Physical Therapy                                                                            Daily Note    Patient: Skip Ramirez : 1950  CSN #: [id number]   Referring Practitioner:  Dr. Gerda Mahmood MD    Referral Date : 21     Date: 2021    Diagnosis: Weakness of both legs, R29.898  Treatment Diagnosis: Difficulty walking    PT Insurance Information: Aetna Medicare/Medicaid  Total # of Visits Approved: 12 Per Physician Order  Total # of Visits to Date: 11  No Show: 0  Canceled Appointment: 0      Pre-Treatment Pain:  3/10  Subjective: No new pain reported. Exercises:  Exercise 2: Bike 10 min, hills, L2.5  Exercise 3: sit to stands 10x2, no hha  Exercise 5: walking along island 4x GTB  Exercise 6: Seated UBE 5/5  Exercise 8: FSU 6in 10x ea LE   :          Assessment  Assessment: No new pain complaints. Pt is doing well with strengthening exercise and can see a difference with endurance. Will continue to progress towards goals. Pt had to leave early for another appt. Activity Tolerance  Activity Tolerance: Patient Tolerated treatment well    Patient Education  Patient Education: HEP as tolerated  Pt verbalized/demonstrated good understanding:     [x] Yes         [] No, pt required further clarification. Post Treatment Pain:  310      Plan  Times per week: 3  Plan weeks: 4      Goals  (Total # of Visits to Date: 6)      Short term goals  Time Frame for Short term goals: 3 weeks  Short term goal 1: Patient  to initiate HEP to improve core and B LE and UE strength. -met  Short term goal 2: Patient to be instructed in general functional strengthening of core and B LE's and UE's.-met/cont  Short term goal 3: Patient to tolerate 45 min of ther ex/act with minimal rest breaks to improve endurance. -met    Long term goals  Time Frame for Long term goals : 6 weeks  Long term goal 1: Patient to be independent and compliant with HEP. Long term goal 2: Patient to have improved B LE strength >/=4/5 grossly and improved B UE strength >/=4/5 grossly for improved ease with transfers and ADL's. Long term goal 3: Patient to initiate gait training with SPC as able to improve functional mobility and independence. Long term goal 4: Patient to have improved Tinetti balance score >/=24/28 to decrease fall risk.     Minutes Tracking:  Time In: 1330  Time Out: [address] [facility]  Minutes: 33  Timed Code Treatment Minutes: Mike Huber A [facility]     Date: 8/11/2021

## 2021-08-13 ENCOUNTER — HOSPITAL ENCOUNTER (OUTPATIENT)
Dept: PHYSICAL THERAPY | Age: 71
Setting detail: THERAPIES SERIES
Discharge: HOME | End: 2021-08-13
Payer: MEDICARE

## 2021-08-13 PROCEDURE — 97110 THERAPEUTIC EXERCISES: CPT

## 2021-08-13 NOTE — PROGRESS NOTES
Phone: [facility]           Fax: [phone]                           Outpatient Physical Therapy                                                                            Daily Note    Patient: Valeriy Man : 1950  CSN #: [id number]   Referring Practitioner:  Dr. Kiarra Horan MD    Referral Date : 21     Date: 2021    Diagnosis: Weakness of both legs, R29.898  Treatment Diagnosis: Difficulty walking    PT Insurance Information: Aetna Medicare/Medicaid  Total # of Visits Approved: 12 Per Physician Order  Total # of Visits to Date: 12  No Show: 0  Canceled Appointment: 0      Pre-Treatment Pain:  0/10  Subjective: Patient reports today is her last day of therapy before her surgery on 21. Exercises:  Exercise 1: HEP: sink exercises  Exercise 2: Bike 10 min, hills, L2.5  Exercise 3: sit to stands 10x2, no hha  Exercise 4: sink exercise   15x  Exercise 7: BTB rows/ext 20x ea--seated today  Exercise 8: FSU 6in 10x ea LE  Exercise 9: toe taps on steps 10x ea      Assessment  Assessment: Patient has attended 12 PT visits for difficulty walking and general weakness and partially met goals for improved B UE and LE strength (4- to 4/5 grossly) and for improved Tinetti score (17/28) and is independent and compliant with HEP. Patient will be placed on hold for two weeks and then dc prn. Activity Tolerance  Activity Tolerance: Patient Tolerated treatment well    Patient Education  Exercise technique    Pt verbalized/demonstrated good understanding:     [x] Yes         [] No, pt required further clarification. Post Treatment Pain:  0/10      Plan  Times per week: 3  Plan weeks: 4      Goals  (Total # of Visits to Date: 15)      Short term goals  Time Frame for Short term goals: 3 weeks  Short term goal 1: Patient  to initiate HEP to improve core and B LE and UE strength. -met  Short term goal 2: Patient to be instructed in general functional strengthening of core and B LE's and UE's.-met/cont  Short term goal 3: Patient to tolerate 45 min of ther ex/act with minimal rest breaks to improve endurance. -met    Long term goals  Time Frame for Long term goals : 6 weeks  Long term goal 1: Patient to be independent and compliant with HEP.   Long term goal 2: Patient to have improved B LE strength >/=4/5 grossly and improved B UE strength >/=4/5 grossly for improved ease with transfers and ADL's.partially met  Long term goal 3: Patient to initiate gait training with SPC as able to improve functional mobility and independence.-not met  Long term goal 4: Patient to have improved Tinetti balance score >/=24/28 to decrease fall risk.-not met (17/28)    Minutes Tracking:  Time In: 1315  Time Out: 1400  Minutes: 45  Timed Code Treatment Minutes: HUMPHREY Hollins , DPT     Date: 8/13/2021

## 2021-08-17 ENCOUNTER — HOSPITAL ENCOUNTER (OUTPATIENT)
Dept: NON INVASIVE DIAGNOSTICS | Age: 71
Discharge: HOME | End: 2021-08-17
Payer: MEDICARE

## 2021-08-17 DIAGNOSIS — Z01.810 PREOP CARDIOVASCULAR EXAM: ICD-10-CM

## 2021-08-17 DIAGNOSIS — I10 ESSENTIAL HYPERTENSION: ICD-10-CM

## 2021-08-17 DIAGNOSIS — E78.2 MIXED HYPERLIPIDEMIA: ICD-10-CM

## 2021-08-17 LAB
LV EF: 55 %
LVEF MODALITY: NORMAL

## 2021-08-17 PROCEDURE — 93306 TTE W/DOPPLER COMPLETE: CPT

## 2021-08-18 ENCOUNTER — TELEPHONE (OUTPATIENT)
Dept: CARDIOLOGY | Age: 71
End: 2021-08-18

## 2021-08-18 ENCOUNTER — ANESTHESIA EVENT (OUTPATIENT)
Dept: OPERATING ROOM | Age: 71
End: 2021-08-18
Payer: MEDICARE

## 2021-08-18 NOTE — TELEPHONE ENCOUNTER
----- Message from Ramiro Meza MD sent at 8/17/2021  6:39 PM EDT -----  Let Ms. Vincent know their test result was ok. Will discuss at next visit. Thanks.

## 2021-08-19 ENCOUNTER — ANESTHESIA (OUTPATIENT)
Dept: OPERATING ROOM | Age: 71
End: 2021-08-19
Payer: MEDICARE

## 2021-08-19 ENCOUNTER — HOSPITAL ENCOUNTER (OUTPATIENT)
Age: 71
Setting detail: OBSERVATION
Discharge: HOME | End: 2021-08-20
Attending: SURGERY | Admitting: SURGERY
Payer: MEDICARE

## 2021-08-19 VITALS — DIASTOLIC BLOOD PRESSURE: 88 MMHG | OXYGEN SATURATION: 99 % | SYSTOLIC BLOOD PRESSURE: 185 MMHG

## 2021-08-19 DIAGNOSIS — K43.2 VENTRAL INCISIONAL HERNIA: ICD-10-CM

## 2021-08-19 DIAGNOSIS — G89.18 ACUTE POSTOPERATIVE PAIN: Primary | ICD-10-CM

## 2021-08-19 LAB — GLUCOSE BLD-MCNC: 135 MG/DL (ref 74–100)

## 2021-08-19 PROCEDURE — 3600000003 HC SURGERY LEVEL 3 BASE: Performed by: SURGERY

## 2021-08-19 PROCEDURE — 6370000000 HC RX 637 (ALT 250 FOR IP): Performed by: SURGERY

## 2021-08-19 PROCEDURE — 7100000011 HC PHASE II RECOVERY - ADDTL 15 MIN: Performed by: SURGERY

## 2021-08-19 PROCEDURE — 49565 PR REPAIR RECURR INCIS HERNIA,REDUC: CPT | Performed by: SURGERY

## 2021-08-19 PROCEDURE — 7100000010 HC PHASE II RECOVERY - FIRST 15 MIN: Performed by: SURGERY

## 2021-08-19 PROCEDURE — G0378 HOSPITAL OBSERVATION PER HR: HCPCS

## 2021-08-19 PROCEDURE — 2500000003 HC RX 250 WO HCPCS: Performed by: NURSE ANESTHETIST, CERTIFIED REGISTERED

## 2021-08-19 PROCEDURE — 3700000001 HC ADD 15 MINUTES (ANESTHESIA): Performed by: SURGERY

## 2021-08-19 PROCEDURE — 7100000001 HC PACU RECOVERY - ADDTL 15 MIN: Performed by: SURGERY

## 2021-08-19 PROCEDURE — 2580000003 HC RX 258: Performed by: NURSE ANESTHETIST, CERTIFIED REGISTERED

## 2021-08-19 PROCEDURE — 2720000010 HC SURG SUPPLY STERILE: Performed by: SURGERY

## 2021-08-19 PROCEDURE — 6360000002 HC RX W HCPCS: Performed by: NURSE ANESTHETIST, CERTIFIED REGISTERED

## 2021-08-19 PROCEDURE — 6360000002 HC RX W HCPCS: Performed by: SURGERY

## 2021-08-19 PROCEDURE — C1765 ADHESION BARRIER: HCPCS | Performed by: SURGERY

## 2021-08-19 PROCEDURE — 2580000003 HC RX 258: Performed by: SURGERY

## 2021-08-19 PROCEDURE — 64488 TAP BLOCK BI INJECTION: CPT | Performed by: NURSE ANESTHETIST, CERTIFIED REGISTERED

## 2021-08-19 PROCEDURE — 2709999900 HC NON-CHARGEABLE SUPPLY: Performed by: SURGERY

## 2021-08-19 PROCEDURE — 7100000000 HC PACU RECOVERY - FIRST 15 MIN: Performed by: SURGERY

## 2021-08-19 PROCEDURE — 3700000000 HC ANESTHESIA ATTENDED CARE: Performed by: SURGERY

## 2021-08-19 PROCEDURE — 82947 ASSAY GLUCOSE BLOOD QUANT: CPT

## 2021-08-19 PROCEDURE — 3600000013 HC SURGERY LEVEL 3 ADDTL 15MIN: Performed by: SURGERY

## 2021-08-19 DEVICE — BARRIER, ABSORBABLE, ADHESION
Type: IMPLANTABLE DEVICE | Site: ABDOMEN | Status: FUNCTIONAL
Brand: SEPRAFILM®

## 2021-08-19 RX ORDER — FENTANYL CITRATE 50 UG/ML
25 INJECTION, SOLUTION INTRAMUSCULAR; INTRAVENOUS EVERY 5 MIN PRN
Status: DISCONTINUED | OUTPATIENT
Start: 2021-08-19 | End: 2021-08-19 | Stop reason: HOSPADM

## 2021-08-19 RX ORDER — ONDANSETRON 2 MG/ML
4 INJECTION INTRAMUSCULAR; INTRAVENOUS EVERY 6 HOURS PRN
Status: DISCONTINUED | OUTPATIENT
Start: 2021-08-19 | End: 2021-08-20 | Stop reason: HOSPADM

## 2021-08-19 RX ORDER — LIDOCAINE HYDROCHLORIDE 20 MG/ML
INJECTION, SOLUTION EPIDURAL; INFILTRATION; INTRACAUDAL; PERINEURAL PRN
Status: DISCONTINUED | OUTPATIENT
Start: 2021-08-19 | End: 2021-08-19 | Stop reason: SDUPTHER

## 2021-08-19 RX ORDER — CHOLECALCIFEROL (VITAMIN D3) 10 MCG
1 TABLET ORAL DAILY
Status: DISCONTINUED | OUTPATIENT
Start: 2021-08-19 | End: 2021-08-20 | Stop reason: HOSPADM

## 2021-08-19 RX ORDER — HYDROCODONE BITARTRATE AND ACETAMINOPHEN 5; 325 MG/1; MG/1
1 TABLET ORAL
Status: COMPLETED | OUTPATIENT
Start: 2021-08-19 | End: 2021-08-19

## 2021-08-19 RX ORDER — FENTANYL CITRATE 50 UG/ML
50 INJECTION, SOLUTION INTRAMUSCULAR; INTRAVENOUS EVERY 5 MIN PRN
Status: DISCONTINUED | OUTPATIENT
Start: 2021-08-19 | End: 2021-08-19 | Stop reason: HOSPADM

## 2021-08-19 RX ORDER — ACETAMINOPHEN 325 MG/1
650 TABLET ORAL EVERY 6 HOURS PRN
Status: DISCONTINUED | OUTPATIENT
Start: 2021-08-19 | End: 2021-08-20 | Stop reason: HOSPADM

## 2021-08-19 RX ORDER — HYDROCODONE BITARTRATE AND ACETAMINOPHEN 5; 325 MG/1; MG/1
1 TABLET ORAL EVERY 4 HOURS PRN
Status: DISCONTINUED | OUTPATIENT
Start: 2021-08-19 | End: 2021-08-20 | Stop reason: HOSPADM

## 2021-08-19 RX ORDER — ALENDRONATE SODIUM 70 MG/1
70 TABLET ORAL
Status: DISCONTINUED | OUTPATIENT
Start: 2021-08-19 | End: 2021-08-19

## 2021-08-19 RX ORDER — GABAPENTIN 300 MG/1
300 CAPSULE ORAL 3 TIMES DAILY
Status: DISCONTINUED | OUTPATIENT
Start: 2021-08-19 | End: 2021-08-20 | Stop reason: HOSPADM

## 2021-08-19 RX ORDER — ONDANSETRON 2 MG/ML
4 INJECTION INTRAMUSCULAR; INTRAVENOUS
Status: DISCONTINUED | OUTPATIENT
Start: 2021-08-19 | End: 2021-08-19 | Stop reason: HOSPADM

## 2021-08-19 RX ORDER — AMLODIPINE BESYLATE 10 MG/1
10 TABLET ORAL DAILY
Status: DISCONTINUED | OUTPATIENT
Start: 2021-08-20 | End: 2021-08-20 | Stop reason: HOSPADM

## 2021-08-19 RX ORDER — OXYBUTYNIN CHLORIDE 5 MG/1
5 TABLET ORAL 3 TIMES DAILY
Status: DISCONTINUED | OUTPATIENT
Start: 2021-08-19 | End: 2021-08-20 | Stop reason: HOSPADM

## 2021-08-19 RX ORDER — SERTRALINE HYDROCHLORIDE 100 MG/1
100 TABLET, FILM COATED ORAL DAILY
Status: DISCONTINUED | OUTPATIENT
Start: 2021-08-19 | End: 2021-08-20 | Stop reason: HOSPADM

## 2021-08-19 RX ORDER — GABAPENTIN 100 MG/1
100 CAPSULE ORAL ONCE
Status: COMPLETED | OUTPATIENT
Start: 2021-08-19 | End: 2021-08-19

## 2021-08-19 RX ORDER — DEXAMETHASONE SODIUM PHOSPHATE 4 MG/ML
INJECTION, SOLUTION INTRA-ARTICULAR; INTRALESIONAL; INTRAMUSCULAR; INTRAVENOUS; SOFT TISSUE PRN
Status: DISCONTINUED | OUTPATIENT
Start: 2021-08-19 | End: 2021-08-19 | Stop reason: SDUPTHER

## 2021-08-19 RX ORDER — FENTANYL CITRATE 50 UG/ML
INJECTION, SOLUTION INTRAMUSCULAR; INTRAVENOUS PRN
Status: DISCONTINUED | OUTPATIENT
Start: 2021-08-19 | End: 2021-08-19 | Stop reason: SDUPTHER

## 2021-08-19 RX ORDER — PROPOFOL 10 MG/ML
INJECTION, EMULSION INTRAVENOUS PRN
Status: DISCONTINUED | OUTPATIENT
Start: 2021-08-19 | End: 2021-08-19 | Stop reason: SDUPTHER

## 2021-08-19 RX ORDER — HYDROCODONE BITARTRATE AND ACETAMINOPHEN 5; 325 MG/1; MG/1
1 TABLET ORAL EVERY 4 HOURS PRN
Qty: 20 TABLET | Refills: 0 | Status: SHIPPED | OUTPATIENT
Start: 2021-08-19 | End: 2021-08-26

## 2021-08-19 RX ORDER — MORPHINE SULFATE 2 MG/ML
2 INJECTION, SOLUTION INTRAMUSCULAR; INTRAVENOUS EVERY 4 HOURS PRN
Status: DISCONTINUED | OUTPATIENT
Start: 2021-08-19 | End: 2021-08-20 | Stop reason: HOSPADM

## 2021-08-19 RX ORDER — DEXAMETHASONE SODIUM PHOSPHATE 10 MG/ML
INJECTION, SOLUTION INTRAMUSCULAR; INTRAVENOUS PRN
Status: DISCONTINUED | OUTPATIENT
Start: 2021-08-19 | End: 2021-08-19 | Stop reason: SDUPTHER

## 2021-08-19 RX ORDER — SODIUM CHLORIDE, SODIUM LACTATE, POTASSIUM CHLORIDE, CALCIUM CHLORIDE 600; 310; 30; 20 MG/100ML; MG/100ML; MG/100ML; MG/100ML
INJECTION, SOLUTION INTRAVENOUS CONTINUOUS
Status: DISCONTINUED | OUTPATIENT
Start: 2021-08-19 | End: 2021-08-19

## 2021-08-19 RX ORDER — TRIAMTERENE AND HYDROCHLOROTHIAZIDE 37.5; 25 MG/1; MG/1
1 TABLET ORAL DAILY
Status: DISCONTINUED | OUTPATIENT
Start: 2021-08-19 | End: 2021-08-20 | Stop reason: HOSPADM

## 2021-08-19 RX ORDER — SODIUM CHLORIDE 9 MG/ML
INJECTION INTRAVENOUS PRN
Status: DISCONTINUED | OUTPATIENT
Start: 2021-08-19 | End: 2021-08-19 | Stop reason: SDUPTHER

## 2021-08-19 RX ORDER — ROCURONIUM BROMIDE 10 MG/ML
INJECTION, SOLUTION INTRAVENOUS PRN
Status: DISCONTINUED | OUTPATIENT
Start: 2021-08-19 | End: 2021-08-19 | Stop reason: SDUPTHER

## 2021-08-19 RX ORDER — PANTOPRAZOLE SODIUM 40 MG/1
40 TABLET, DELAYED RELEASE ORAL
Status: DISCONTINUED | OUTPATIENT
Start: 2021-08-20 | End: 2021-08-20 | Stop reason: HOSPADM

## 2021-08-19 RX ORDER — ATORVASTATIN CALCIUM 10 MG/1
10 TABLET, FILM COATED ORAL DAILY
Status: DISCONTINUED | OUTPATIENT
Start: 2021-08-19 | End: 2021-08-20 | Stop reason: HOSPADM

## 2021-08-19 RX ORDER — POLYETHYLENE GLYCOL 3350 17 G/17G
17 POWDER, FOR SOLUTION ORAL DAILY
Status: DISCONTINUED | OUTPATIENT
Start: 2021-08-19 | End: 2021-08-20 | Stop reason: HOSPADM

## 2021-08-19 RX ORDER — ROPIVACAINE HYDROCHLORIDE 5 MG/ML
INJECTION, SOLUTION EPIDURAL; INFILTRATION; PERINEURAL PRN
Status: DISCONTINUED | OUTPATIENT
Start: 2021-08-19 | End: 2021-08-19 | Stop reason: SDUPTHER

## 2021-08-19 RX ORDER — ONDANSETRON 2 MG/ML
INJECTION INTRAMUSCULAR; INTRAVENOUS PRN
Status: DISCONTINUED | OUTPATIENT
Start: 2021-08-19 | End: 2021-08-19 | Stop reason: SDUPTHER

## 2021-08-19 RX ORDER — OMEGA-3S/DHA/EPA/FISH OIL/D3 300MG-1000
800 CAPSULE ORAL DAILY
Status: DISCONTINUED | OUTPATIENT
Start: 2021-08-19 | End: 2021-08-20 | Stop reason: HOSPADM

## 2021-08-19 RX ORDER — ONDANSETRON 4 MG/1
4 TABLET, FILM COATED ORAL EVERY 8 HOURS PRN
Qty: 15 TABLET | Refills: 0 | Status: SHIPPED | OUTPATIENT
Start: 2021-08-19 | End: 2021-09-24 | Stop reason: SDUPTHER

## 2021-08-19 RX ORDER — ACETAMINOPHEN 325 MG/1
650 TABLET ORAL ONCE
Status: COMPLETED | OUTPATIENT
Start: 2021-08-19 | End: 2021-08-19

## 2021-08-19 RX ADMIN — ATORVASTATIN CALCIUM 10 MG: 10 TABLET, FILM COATED ORAL at 20:20

## 2021-08-19 RX ADMIN — PROPOFOL 200 MG: 10 INJECTION, EMULSION INTRAVENOUS at 12:17

## 2021-08-19 RX ADMIN — ACETAMINOPHEN 650 MG: 325 TABLET ORAL at 10:29

## 2021-08-19 RX ADMIN — GABAPENTIN 300 MG: 300 CAPSULE ORAL at 20:20

## 2021-08-19 RX ADMIN — TRIAMTERENE AND HYDROCHLOROTHIAZIDE 1 TABLET: 37.5; 25 TABLET ORAL at 20:20

## 2021-08-19 RX ADMIN — METFORMIN HYDROCHLORIDE 1000 MG: 500 TABLET ORAL at 20:19

## 2021-08-19 RX ADMIN — SODIUM CHLORIDE 30 ML: 9 INJECTION, SOLUTION INTRAMUSCULAR; INTRAVENOUS; SUBCUTANEOUS at 11:26

## 2021-08-19 RX ADMIN — LIDOCAINE HYDROCHLORIDE 100 MG: 20 INJECTION, SOLUTION EPIDURAL; INFILTRATION; INTRACAUDAL; PERINEURAL at 12:17

## 2021-08-19 RX ADMIN — DEXAMETHASONE SODIUM PHOSPHATE 4 MG: 4 INJECTION, SOLUTION INTRAMUSCULAR; INTRAVENOUS at 12:27

## 2021-08-19 RX ADMIN — SERTRALINE 100 MG: 100 TABLET, FILM COATED ORAL at 20:20

## 2021-08-19 RX ADMIN — SODIUM CHLORIDE, POTASSIUM CHLORIDE, SODIUM LACTATE AND CALCIUM CHLORIDE: 600; 310; 30; 20 INJECTION, SOLUTION INTRAVENOUS at 10:29

## 2021-08-19 RX ADMIN — CHOLECALCIFEROL TAB 10 MCG (400 UNIT) 800 UNITS: 10 TAB at 20:27

## 2021-08-19 RX ADMIN — HYDROCODONE BITARTRATE AND ACETAMINOPHEN 1 TABLET: 5; 325 TABLET ORAL at 14:42

## 2021-08-19 RX ADMIN — ROCURONIUM BROMIDE 50 MG: 10 INJECTION, SOLUTION INTRAVENOUS at 12:17

## 2021-08-19 RX ADMIN — CEFAZOLIN 2000 MG: 10 INJECTION, POWDER, FOR SOLUTION INTRAVENOUS at 12:04

## 2021-08-19 RX ADMIN — SODIUM CHLORIDE, POTASSIUM CHLORIDE, SODIUM LACTATE AND CALCIUM CHLORIDE: 600; 310; 30; 20 INJECTION, SOLUTION INTRAVENOUS at 15:03

## 2021-08-19 RX ADMIN — ONDANSETRON 4 MG: 2 INJECTION INTRAMUSCULAR; INTRAVENOUS at 12:57

## 2021-08-19 RX ADMIN — OXYBUTYNIN CHLORIDE 5 MG: 5 TABLET ORAL at 20:20

## 2021-08-19 RX ADMIN — NEPHROCAP 1 MG: 1 CAP ORAL at 20:20

## 2021-08-19 RX ADMIN — FENTANYL CITRATE 50 MCG: 50 INJECTION, SOLUTION INTRAMUSCULAR; INTRAVENOUS at 11:18

## 2021-08-19 RX ADMIN — ROPIVACAINE HYDROCHLORIDE 52 ML: 5 INJECTION, SOLUTION EPIDURAL; INFILTRATION; PERINEURAL at 11:26

## 2021-08-19 RX ADMIN — DEXAMETHASONE SODIUM PHOSPHATE 10 MG: 10 INJECTION, SOLUTION INTRAMUSCULAR; INTRAVENOUS at 11:26

## 2021-08-19 RX ADMIN — GABAPENTIN 100 MG: 100 CAPSULE ORAL at 10:29

## 2021-08-19 RX ADMIN — HYDROCODONE BITARTRATE AND ACETAMINOPHEN 1 TABLET: 5; 325 TABLET ORAL at 20:27

## 2021-08-19 RX ADMIN — PHENYLEPHRINE HYDROCHLORIDE 150 MCG: 10 INJECTION INTRAVENOUS at 13:07

## 2021-08-19 ASSESSMENT — PAIN DESCRIPTION - DESCRIPTORS
DESCRIPTORS: SORE
DESCRIPTORS: SORE

## 2021-08-19 ASSESSMENT — PAIN SCALES - GENERAL
PAINLEVEL_OUTOF10: 0
PAINLEVEL_OUTOF10: 5
PAINLEVEL_OUTOF10: 0
PAINLEVEL_OUTOF10: 5
PAINLEVEL_OUTOF10: 4
PAINLEVEL_OUTOF10: 0
PAINLEVEL_OUTOF10: 1
PAINLEVEL_OUTOF10: 5

## 2021-08-19 ASSESSMENT — PAIN DESCRIPTION - PROGRESSION
CLINICAL_PROGRESSION: GRADUALLY WORSENING
CLINICAL_PROGRESSION: NOT CHANGED

## 2021-08-19 ASSESSMENT — PAIN DESCRIPTION - LOCATION
LOCATION: ABDOMEN

## 2021-08-19 ASSESSMENT — PAIN - FUNCTIONAL ASSESSMENT
PAIN_FUNCTIONAL_ASSESSMENT: ACTIVITIES ARE NOT PREVENTED
PAIN_FUNCTIONAL_ASSESSMENT: ACTIVITIES ARE NOT PREVENTED

## 2021-08-19 ASSESSMENT — PAIN DESCRIPTION - ONSET
ONSET: GRADUAL
ONSET: GRADUAL

## 2021-08-19 ASSESSMENT — PAIN DESCRIPTION - PAIN TYPE
TYPE: SURGICAL PAIN

## 2021-08-19 ASSESSMENT — PAIN DESCRIPTION - FREQUENCY
FREQUENCY: INTERMITTENT
FREQUENCY: INTERMITTENT

## 2021-08-19 NOTE — DISCHARGE SUMMARY
Surgery Discharge Summary     Patient Identification  Amanda Rodas is a 66 y.o. female. :  1950  Admit Date:  2021  PCP: OLIVIA Parks CNP    Discharge date:   2021  1:30 PM                                   Disposition: home with home PT and home health aide arrangements    Discharge Diagnoses:   Patient Active Problem List   Diagnosis    Depression    Osteoarthritis    Iron deficiency anemia    Concussion and edema of cervical spinal cord ([address]    Central cord syndrome ([address]    Diabetes mellitus ([address]    Quadriparesis ([address]    Mobility impaired    Essential hypertension    Urinary incontinence    Hyperlipidemia    Dysphagia    Status post gastric bypass for obesity    Morbidly obese ([address]    Recurrent major depressive disorder, in full remission ([address]    Abnormal cardiovascular stress test    Abnormal ECG    Ventral incisional hernia         Consults:     Surgery: open recurrent ventral incisional hernia repair primarily and adhesiolysis greater omental adhesions 35 minutes    Patient Instructions: Follow-up in clinic 1 week please  With Dr. Micah Wood, call for appt [phone]     Expect some pulling tugging, numbness, swelling, puffiness, bruising, drainage \"hardness\" all over belly and incisions along left side for 3-4 weeks. Ok to shower after 24 hours after arriving home  You have skin glue that is over the incisions, do not pick at it, it will come off in couple weeks  Ok to leave the cotton ball dressing inside the belly button, ok to shower over it  Wear binder snugly during the daytime when up and about x 1 week  No driving for a week  Ice pack helps reduce swelling and pain over the incisions  NO heating pads. Call office if fevers over 101, uncontrolled nausea or vomiting. Clear to full liquid diet for the rest of the day.  Wait till you pass gas or have a bowel movement before going back to your regular diet     Ok to walk a lot and climb stairs. Just go at a slower than usual pace. It's better to be standing or laying flat or sitting at arecline as best as reasonably possible  Avoid straining/constipation. Take over the counter stool softeners as needed. If no bowel movement in 2-3 days take any over the counter laxative such as Magnesium Citrate (comes in a glass bottle), or Miralax powder as directed. No lifting over 10 pounds x 1 week, then 15 pound limit weeks 2-3, then 20 pound limit weeks 4-6. Discharge Medications:      Chaitanya Morgan   Home Medication Instructions [id number]    Printed on:08/20/21 4696   Medication Information                      acetaminophen (TYLENOL) 325 MG tablet  Take 650 mg by mouth every 6 hours as needed for Pain (arthritis)             alendronate (FOSAMAX) 70 MG tablet  Take 1 tablet by mouth every 7 days             amLODIPine (NORVASC) 10 MG tablet  TAKE 1 TABLET DAILY             atorvastatin (LIPITOR) 10 MG tablet  Take 1 tablet by mouth daily             b complex vitamins capsule  Take 1 capsule by mouth daily             blood glucose monitor strips  USE 1 QD. DX--E11.9 NON-INSULIN DEPENDENT  ONE TOUCH             Blood Glucose Monitoring Suppl (ACCU-CHEK BRUNO) ANDREA  Use once daily. Dx-E11.9 non-insulin dependant             blood glucose test strips (ASCENSIA AUTODISC VI;ONE TOUCH ULTRA TEST VI) strip  1 each by In Vitro route daily ONE TOUCH VERIO STRIPS. DX--E11.9 NON-INSULIN DEPENDENT             gabapentin (NEURONTIN) 300 MG capsule  TAKE 1 CAPSULE BY MOUTH Three times DAILY FOR 30 DAYS             Handicap Placard MISC  by Does not apply route             HYDROcodone-acetaminophen (NORCO) 5-325 MG per tablet  Take 1 tablet by mouth every 4 hours as needed for Pain for up to 7 days. Intended supply: 3 days. Take lowest dose possible to manage pain             Lancets MISC  USE 1 QD.   DX--E11.9 NON-INSULIN DEP             metFORMIN (GLUCOPHAGE) 1000 MG tablet  Take 1 tablet by mouth 2 times daily (with meals)             omeprazole (PRILOSEC) 40 MG delayed release capsule  Take 1 capsule by mouth every morning (before breakfast)             ondansetron (ZOFRAN) 4 MG tablet  Take 1 tablet by mouth every 8 hours as needed for Nausea or Vomiting             oxybutynin (DITROPAN) 5 MG tablet  TAKE 1 TABLET 3 TIMES A DAY             polyethylene glycol (MIRALAX) 17 g PACK packet  Take 17 g by mouth daily              sertraline (ZOLOFT) 100 MG tablet  TAKE 1 TABLET DAILY             triamterene-hydroCHLOROthiazide (DYAZIDE) 37.5-25 MG per capsule  Take 1 capsule by mouth daily TAKE 1 CAPSULE ONCE DAILY  AS NEEDED             vitamin D3 (CHOLECALCIFEROL) 10 MCG (400 UNIT) TABS tablet  Take 800 Units by mouth daily                  HPI and Hospital Course:   Patient was admitted for symptomatic recurrent VIH causing pain and increased risk for bowel obstruction and underwent the above surgery. She required admission for IV pain control and observation monitoring as her caregiver was sick with bronchitis and home health aid not coming till later this week.  helped arrange necessary home PT , she uses a walker. She cannot drive for at least a week. LUCÍA drain training for home.        Evon Pendleton DO, MPH, [address] [place] [address], [place] [address] Surgery  [address] [phone]

## 2021-08-19 NOTE — BRIEF OP NOTE
Brief Postoperative Note      Patient: Cinthya Norris  YOB: 1950  MRN: Jez Lawrence Lazaro, APRN - CNP      Date of Procedure: 8/19/2021    Pre-Op Diagnosis: recurrent ventral incisional hernia  (prior repair done elsewhere)    Post-Op Diagnosis: Same. Greater omental adhesions.         Procedure(s):  Exploratory laparotomy with lysis of adhesions of greater omental adhesions for 35 minutes and primary recurrent repair ventral hernia    Surgeon(s):  Pedro Pablo Flores DO    Anesthesia: General + US guided TAP block    Estimated Blood Loss (mL): 15 ml    Complications: None    Specimens: none    Counts: correct    Drains: 1 LUCÍA in subcutaneous layer    Electronically signed by Pedro Pablo Flores DO, FACOS, CATE on 8/19/2021 at 5:00 PM

## 2021-08-19 NOTE — ANESTHESIA POSTPROCEDURE EVALUATION
Department of Anesthesiology  Postprocedure Note    Patient: Ibis Bautista  MRN: [id number]  YOB: 1950  Date of evaluation: 8/19/2021  Time:  3:13 PM     Procedure Summary     Date: 08/19/21 Room / Location: [address] [place] [address]    Anesthesia Start: 4474 Anesthesia Stop: 6988    Procedure: Exploratory lap, lysis of adhesions, primary repair ventral hernia. (N/A ) Diagnosis: (VENTRAL HERNIA)    Surgeons: Meeta Weiss DO Responsible Provider: OLIVIA Lyon CRNA    Anesthesia Type: general ASA Status: 3          Anesthesia Type: general    Thor Phase I: Thor Score: 9    Thor Phase II: Thor Score: 10    Last vitals: Reviewed and per EMR flowsheets. Anesthesia Post Evaluation    Patient location during evaluation: PACU  Patient participation: complete - patient participated  Level of consciousness: awake and alert  Pain scale: Patient has received Norco for oain at 5/10, states its improving.   Airway patency: patent  Nausea & Vomiting: no nausea and no vomiting  Complications: no  Cardiovascular status: blood pressure returned to baseline  Respiratory status: acceptable and room air  Hydration status: stable

## 2021-08-19 NOTE — ANESTHESIA PRE PROCEDURE
Department of Anesthesiology  Preprocedure Note       Name:  Marciano Myers   Age:  66 y.o.  :  1950                                          MRN:  [id number]         Date:  2021      Surgeon: Nikki Menard):  Gisselle Sharif DO    Procedure: Procedure(s): HERNIA VENTRAL REPAIR OPEN WITH MESH    Medications prior to admission:   Prior to Admission medications    Medication Sig Start Date End Date Taking? Authorizing Provider   oxybutynin (DITROPAN) 5 MG tablet TAKE 1 TABLET 3 TIMES A DAY 7/15/21  Yes OLIVIA Hill CNP   amLODIPine (NORVASC) 10 MG tablet TAKE 1 TABLET DAILY 7/15/21  Yes OLIVIA Hill CNP   atorvastatin (LIPITOR) 10 MG tablet Take 1 tablet by mouth daily 7/15/21  Yes OLIVIA Hill CNP   triamterene-hydroCHLOROthiazide (DYAZIDE) 37.5-25 MG per capsule Take 1 capsule by mouth daily TAKE 1 CAPSULE ONCE DAILY  AS NEEDED 7/15/21  Yes OLIVIA Hill CNP   gabapentin (NEURONTIN) 300 MG capsule TAKE 1 CAPSULE BY MOUTH Three times DAILY FOR 30 DAYS 21 Yes OLIVIA Hill CNP   omeprazole (PRILOSEC) 40 MG delayed release capsule Take 1 capsule by mouth every morning (before breakfast) 3/29/21  Yes OLIVIA Hill CNP   metFORMIN (GLUCOPHAGE) 1000 MG tablet Take 1 tablet by mouth 2 times daily (with meals) 3/29/21  Yes OLIVIA Hill CNP   Handicap Placard MISC by Does not apply route 3/29/21  Yes OLIVIA Hill CNP   Lancets MISC USE 1 QD. DX--E11.9 NON-INSULIN DEP 20  Yes OLIVIA Hill CNP   blood glucose test strips (ASCENSIA AUTODISC VI;ONE TOUCH ULTRA TEST VI) strip 1 each by In Vitro route daily ONE TOUCH VERIO STRIPS. DX--E11.9 NON-INSULIN DEPENDENT 20  Yes OLIVIA Hill CNP   blood glucose monitor strips USE 1 QD.   DX--E11.9 NON-INSULIN DEPENDENT  ONE TOUCH 20  Yes OLIVIA Hill CNP   polyethylene glycol (MIRALAX) 17 g PACK packet Take 17 g by mouth daily    Yes Historical Provider, MD   sertraline (ZOLOFT) 100 MG tablet TAKE 1 TABLET DAILY 11/3/20  Yes OLIVIA Cortez CNP   Blood Glucose Monitoring Suppl (ACCU-CHEK BRUNO) ANDREA Use once daily. Dx-E11.9 non-insulin dependant 9/29/20  Yes OLIVIA Cortez CNP   acetaminophen (TYLENOL) 325 MG tablet Take 650 mg by mouth every 6 hours as needed for Pain (arthritis)   Yes Historical Provider, MD   b complex vitamins capsule Take 1 capsule by mouth daily   Yes Historical Provider, MD   alendronate (FOSAMAX) 70 MG tablet Take 1 tablet by mouth every 7 days 8/19/21   OLIVIA Cortez CNP   vitamin D3 (CHOLECALCIFEROL) 10 MCG (400 UNIT) TABS tablet Take 800 Units by mouth daily    Historical Provider, MD       Current medications:    Current Facility-Administered Medications   Medication Dose Route Frequency Provider Last Rate Last Admin    ceFAZolin (ANCEF) 2000 mg in dextrose 5 % 100 mL IVPB  2,000 mg Intravenous Once Moni I Nazemi, DO        lactated ringers infusion   Intravenous Continuous Moni I Nazemi,  mL/hr at 08/19/21 1029 New Bag at 08/19/21 1029       Allergies: Allergies   Allergen Reactions    Ibuprofen     Pepto-Bismol [Bismuth Subsalicylate]        Problem List:    Patient Active Problem List   Diagnosis Code    Depression F32.9    Osteoarthritis M19.90    Iron deficiency anemia D50.9    Concussion and edema of cervical spinal cord (HCC) S14. 0XXA    Central cord syndrome ([facility] L46.793V    Diabetes mellitus ([address] E11.9    Quadriparesis ([address] G82.50    Mobility impaired Z74.09    Essential hypertension I10    Urinary incontinence R32    Hyperlipidemia E78.5    Dysphagia R13.10    Status post gastric bypass for obesity Z98.84    Morbidly obese (HCC) E66.01    Recurrent major depressive disorder, in full remission ([address] F33.42    Abnormal cardiovascular stress test R94.39    Abnormal ECG R94.31       Past Medical History:        Diagnosis Date    Anemia     Arthritis     Depression     [facility]     History of cardiac cath 05/21/2020    [facility]) [place]/Dr. Valderrama/Yohan Stephanie     Hypertension     Neck fracture ([facility] 2016    Obesity (BMI 30-39. 9)     Splenic artery aneurysm (HCC)     small less than 1.5 cm, stable    Type II or unspecified type diabetes mellitus without mention of complication, not stated as uncontrolled     Ventral incisional hernia        Past Surgical History:        Procedure Laterality Date    APPENDECTOMY      CATARACT REMOVAL WITH IMPLANT Left 12/12/2018    per Dr. Lemuel Libman Right 01/07/2019    Dr. Hamilton Hurst  03/24/2016    C 2- C 7    CHOLECYSTECTOMY      COLONOSCOPY  2014    COLONOSCOPY  01/04/2021    COLONOSCOPY N/A 1/4/2021    COLORECTAL CANCER SCREENING, NOT HIGH RISK performed by Adelaide Fontenot DO at [facility]  02/05/2021    COLONOSCOPY N/A 2/5/2021    COLORECTAL CANCER SCREENING, HIGH RISK performed by Adelaide Fontenot DO at [address]      GASTRIC BYPASS SURGERY  2001    HERNIA REPAIR      [address] [place] [address]    INTRACAPSULAR CATARACT EXTRACTION Left 12/12/2018    EYE CATARACT EMULSIFICATION IOL IMPLANT performed by Queenie Tao DO at [address] Right 1/7/2019    EYE CATARACT EMULSIFICATION IOL IMPLANT performed by Queenie Tao DO at [address] [facility] OFFICE/OUTPT [facility] Right 6/15/2018    TOE HAMMER REPAIR, RIGHT 5TH DIGIT DEROTATIONAL SKINPLASTY/ARTHOPLASTY performed by Zayda Alvarez DPM at [facility] 11/26/2019    EGD DILATION SAVORY performed by Rox Rice MD at [address]  11/26/2019    -bx(normal)dilation,evidence of gastric bypass with very small gastric remnant       Social History:    Social History     Tobacco Use    Smoking status: Never Smoker    Smokeless tobacco: Never Used   Substance Use Topics    Alcohol use: No                                Counseling given: Not Answered      Vital Signs (Current):   Vitals:    08/09/21 1402 08/19/21 1000   BP:  (!) 128/91   Pulse:  86   Resp:  20   Temp:  36.2 °C (97.1 °F)   TempSrc:  Temporal   SpO2:  94%   Weight: 190 lb (86.2 kg) 185 lb (83.9 kg)   Height: 5' 1\" (1.549 m) 5' 1\" (1.549 m)                                              BP Readings from Last 3 Encounters:   08/19/21 (!) 128/91   08/11/21 104/68   07/16/21 115/67       NPO Status: Time of last liquid consumption: 2230                        Time of last solid consumption: 1800                        Date of last liquid consumption: 08/18/21                        Date of last solid food consumption: 08/17/21    BMI:   Wt Readings from Last 3 Encounters:   08/19/21 185 lb (83.9 kg)   08/11/21 187 lb 3.2 oz (84.9 kg)   07/16/21 190 lb (86.2 kg)     Body mass index is 34.96 kg/m².     CBC:   Lab Results   Component Value Date    WBC 8.0 10/07/2020    RBC 4.53 10/07/2020    RBC 4.46 05/07/2012    HGB 12.5 10/07/2020    HCT 39.1 10/07/2020    MCV 86.3 10/07/2020    RDW 15.6 10/07/2020     10/07/2020     05/07/2012       CMP:   Lab Results   Component Value Date     06/22/2021    K 4.4 06/22/2021    CL 97 06/22/2021    CO2 26 06/22/2021    BUN 21 06/22/2021    CREATININE 0.56 06/22/2021    GFRAA >60 06/22/2021    LABGLOM >60 06/22/2021    GLUCOSE 154 06/22/2021    GLUCOSE 156 05/07/2012    PROT 7.3 06/22/2021    CALCIUM 9.6 06/22/2021    BILITOT 0.36 06/22/2021    ALKPHOS 73 06/22/2021    AST 23 06/22/2021    ALT 17 06/22/2021       POC Tests:   Recent Labs     08/19/21  1024   POCGLU 135*       Coags:   Lab Results   Component Value Date    PROTIME 10.1 03/22/2016    INR 0.9 03/22/2016    APTT 23.3 03/22/2016       HCG (If Applicable): No results found for: PREGTESTUR, PREGSERUM, HCG, HCGQUANT     ABGs: No results found for: PHART, PO2ART, PCY6FYO, XGP8KWJ, BEART, W8LDNYRB     Type & Screen (If Applicable):  No results found for: LABABO, LABRH    Drug/Infectious Status (If Applicable):  No results found for: HIV, HEPCAB    COVID-19 Screening (If Applicable):   Lab Results   Component Value Date    COVID19 Not Detected 02/01/2021    COVID19 Not Detected 05/21/2020           Anesthesia Evaluation  Patient summary reviewed and Nursing notes reviewed  Airway: Mallampati: I  TM distance: >3 FB   Neck ROM: limited  Comment: very limited ROM for flexion and extension, approx 45'left lateral and 60' right lateral  Mouth opening: > = 3 FB Dental:    (+) upper dentures and lower dentures      Pulmonary:Negative Pulmonary ROS and normal exam  breath sounds clear to auscultation                             Cardiovascular:  Exercise tolerance: good (>4 METS),   (+) hypertension: mild,         Rhythm: regular  Rate: normal  Echocardiogram reviewed    Cleared by cardiology           ROS comment: EF 55% echo 8/2021     Neuro/Psych:   (+) depression/anxiety    (-) psychiatric history           GI/Hepatic/Renal:   (+) hiatal hernia,          ROS comment: h/o gastric bypass surgery. Endo/Other:    (+) DiabetesType II DM, well controlled, , .                  ROS comment:  today, A1C 6/2021 6.4 Abdominal:             Vascular:           ROS comment: 11/2020 Unchanged 0.9 cm aneurysm of the mid to distal splenic artery. . Other Findings:             Anesthesia Plan      general     ASA 3       Induction: intravenous. Anesthetic plan and risks discussed with patient (Patient agrees to UGRA as part of post-operative pain anengment).                   Veto Mtz, OLIVIA - CRNA   8/19/2021

## 2021-08-19 NOTE — FLOWSHEET NOTE
Transferred from [facility] to room 315 per w/c. To chair at bedside. Denies pain. Water given. Vitals checked and assessment completed. No needs at present time. Family at bedside.

## 2021-08-19 NOTE — H&P
repair with mesh, any subsequent repair increases chances of recurrence. She is at low risk for incarceration given the location and size of defect. It could potentially get bigger if she strains or gains weight. This hernia is chronic. Past Medical History:  has a past medical history of Anemia, Arthritis, Depression, Diabetes mellitus ([address] History of cardiac cath, Hypertension, Neck fracture ([address] Obesity (BMI 30-39.9), Splenic artery aneurysm ([address] Type II or unspecified type diabetes mellitus without mention of complication, not stated as uncontrolled, and Ventral incisional hernia.     Past Surgical History:   Past Surgical History:   Procedure Laterality Date    APPENDECTOMY      CATARACT REMOVAL WITH IMPLANT Left 12/12/2018    per Dr. Hayden Band Right 01/07/2019    Dr. Warner Morning  03/24/2016    C 2- C 7    CHOLECYSTECTOMY      COLONOSCOPY  2014    COLONOSCOPY  01/04/2021    COLONOSCOPY N/A 1/4/2021    COLORECTAL CANCER SCREENING, NOT HIGH RISK performed by Agustin Pinedo DO at [address] COLONOSCOPY  02/05/2021    COLONOSCOPY N/A 2/5/2021    COLORECTAL CANCER SCREENING, HIGH RISK performed by Agustin Pinedo DO at [facility]      GASTRIC BYPASS SURGERY  2001    HERNIA REPAIR      [facility]    INTRACAPSULAR CATARACT EXTRACTION Left 12/12/2018    EYE CATARACT EMULSIFICATION IOL IMPLANT performed by Brittney Hyman DO [facility] [address] [facility] Right 1/7/2019    EYE CATARACT EMULSIFICATION IOL IMPLANT performed by Brittney Hyman DO at [address] [facility] OFFICE/OUTPT [facility] Right 6/15/2018    TOE HAMMER REPAIR, RIGHT 5TH DIGIT DEROTATIONAL SKINPLASTY/ARTHOPLASTY performed by Mendel Baumgarten, DPM at [address] [facility] 11/26/2019    EGD DILATION SAVORY performed by Oli Villafuerte MD at [facility]  11/26/2019 -bx(normal)dilation,evidence of gastric bypass with very small gastric remnant       Social History:  reports that she has never smoked. She has never used smokeless tobacco. She reports that she does not drink alcohol and does not use drugs. Family History: family history includes Alcohol Abuse in her father; Cancer in her mother; Diabetes in her mother; Heart Attack in her mother; High Blood Pressure in her mother; High Cholesterol in her mother.     Review of Systems:   General: Completed and, except as mentioned above, was negative or noncontributory  Psychological:  Completed and, except as mentioned above, was negative or noncontributory  Ophthalmic:  Completed and, except as mentioned above, was negative or noncontributory  ENT:  Completed and, except as mentioned above, was negative or noncontributory  Allergy and Immunology:  Completed and, except as mentioned above, was negative or noncontributory  Hematological and Lymphatic:  Completed and, except as mentioned above, was negative or noncontributory  Endocrine: Completed and, except as mentioned above, was negative or noncontributory  Breast:  Completed and, except as mentioned above, was negative or noncontributory  Respiratory:  Completed and, except as mentioned above, was negative or noncontributory  Cardiovascular:  Completed and, except as mentioned above, was negative or noncontributory  Gastrointestinal: Completed and, except as mentioned above, was negative or noncontributory  Genito-Urinary:  Completed and, except as mentioned above, was negative or noncontributory  Musculoskeletal:  Completed and, except as mentioned above, was negative or noncontributory  Neurological:  Completed and, except as mentioned above, was negative or noncontributory  Dermatological:  Completed and, except as mentioned above, was negative or noncontributory    Allergies: Ibuprofen and Pepto-bismol [bismuth subsalicylate]    Current Meds:  Current Facility-Administered Medications:     ceFAZolin (ANCEF) 2000 mg in dextrose 5 % 100 mL IVPB, 2,000 mg, Intravenous, Once, Moni I Nazemi, DO    lactated ringers infusion, , Intravenous, Continuous, Moni I Nazemi, DO, Last Rate: 100 mL/hr at 08/19/21 1029, New Bag at 08/19/21 1029    Facility-Administered Medications Ordered in Other Encounters:     fentaNYL (SUBLIMAZE) injection, , Intravenous, PRN, OLIVIA Norton CRNA, 50 mcg at 08/19/21 1118    ropivacaine (NAROPIN) 0.5% injection, , Spinal/Regional, PRN, OLIVIA Norton CRNA, 52 mL at 08/19/21 1126    sodium chloride (PF) 0.9 % injection, , Other, PRN, OLIVIA Norton CRNA, 30 mL at 08/19/21 1126    dexamethasone (PF) (DECADRON) injection, , Other, PRN, OLIVIA Norton CRNA, 10 mg at 08/19/21 1126    Physical Exam:  Vital signs and Nurse's note reviewed. BP (!) 128/91   Pulse 86   Temp 97.1 °F (36.2 °C) (Temporal)   Resp 20   Ht 5' 1\" (1.549 m)   Wt 185 lb (83.9 kg)   LMP  (LMP Unknown)   SpO2 94%   BMI 34.96 kg/m²    height is 5' 1\" (1.549 m) and weight is 185 lb (83.9 kg). Her temporal temperature is 97.1 °F (36.2 °C). Her blood pressure is 128/91 (abnormal) and her pulse is 86. Her respiration is 20 and oxygen saturation is 94%. Gen:  A&Ox3, NAD. Pleasant and cooperative.   HEENT: PERRLA, EOMI, no scleral icterus  Neck:  no goiter  CVS: Regular rate and rhythm  Resp: Good bilateral air entry, no active wheezing, no labored breathing  Abd: soft, non-tender, non-distended, bowel sounds present, ventral incisional hernia recurrent defect containing colon and increasingly symptomatic  Ext: Moves all extremities, no gross focal motor deficits  Skin: No erythema or ulcerations     Labs:   Lab Results   Component Value Date    WBC 8.0 10/07/2020    HGB 12.5 10/07/2020    HCT 39.1 10/07/2020    MCV 86.3 10/07/2020     10/07/2020     05/07/2012     Lab Results   Component Value Date     06/22/2021    K 4.4 06/22/2021    CL 97 06/22/2021    CO2 26 06/22/2021    BUN 21 06/22/2021    CREATININE 0.56 06/22/2021    GLUCOSE 154 06/22/2021    GLUCOSE 156 05/07/2012    CALCIUM 9.6 06/22/2021     Lab Results   Component Value Date    ALKPHOS 73 06/22/2021    ALT 17 06/22/2021    AST 23 06/22/2021    PROT 7.3 06/22/2021    BILITOT 0.36 06/22/2021    BILIDIR 0.12 04/06/2016    LABALBU 3.9 06/22/2021     Lab Results   Component Value Date    AMYLASE 40 02/13/2013     Lab Results   Component Value Date    LIPASE 31 02/13/2013     Lab Results   Component Value Date    INR 0.9 03/22/2016       Radiologic Studies:    Narrative   EXAMINATION:   CT OF THE ABDOMEN AND PELVIS WITH CONTRAST       11/5/2020 11:57 am       TECHNIQUE:   CT of the abdomen and pelvis was performed with the administration of   intravenous contrast. Multiplanar reformatted images are provided for review. Dose modulation, iterative reconstruction, and/or weight based adjustment of   the mA/kV was utilized to reduce the radiation dose to as low as reasonably   achievable.       COMPARISON:   Abdomen and pelvis CT 02/14/2013       HISTORY:   ORDERING SYSTEM PROVIDED HISTORY: Abdominal hernia without obstruction and   without gangrene, recurrence not specified, unspecified hernia type   TECHNOLOGIST PROVIDED HISTORY:       abdominal wall hernia       FINDINGS:   LOWER CHEST:  Patchy linear opacities in each lung base, likely scarring or   atelectasis.  Mild cardiomegaly.  No pleural nor pericardial effusions.       ORGANS:  Hepatic granulomatous calcification.  0.4 cm hypodense lesion in   hepatic segment 5, too small to characterize but likely a cyst or hemangioma. Surgically absent gallbladder.  No intrahepatic nor extrahepatic biliary   dilation.  Moderate pancreatic atrophy.  Normal spleen, adrenal glands, and   kidneys.       GI/BOWEL:  Tiny sliding hiatal hernia.  Changes of Jose Angel-en-Y gastric bypass   with expected anastomotic dilation.  Otherwise normal course and caliber of   the stomach, small bowel, colon, and rectum without obstruction.  Eccentric   wall thickening in the jejunum at the Jose Angel limb anastomosis.  No   visualization of the appendix, reportedly surgically absent.  Mild to   moderate stool.  Mild colonic diverticulosis without findings of acute   diverticulitis.       PELVIS:  Age-appropriate atrophy of the uterus and ovaries.  Normal urinary   bladder.  Laxity of the pelvic floor musculature.       PERITONEUM/RETROPERITONEUM:  Mild systemic atherosclerosis.  Unchanged 0.9 cm   peripherally calcified aneurysm of mid to distal splenic artery.  Nonenlarged   to mildly enlarged mesenteric lymph nodes.  No retroperitoneal nor pelvic   lymphadenopathy.  No free intraperitoneal fluid nor gas.       SOFT TISSUES/BONES:  Healed midline laparotomy incision.  Laxity of the   anterior and lateral abdominal wall musculature with diastasis recti.  Small   right paramedian supraumbilical hernia containing nonobstructed transverse   colon and associated mesenteric fat, associated with a fascial defect   measuring up to 2.5 cm x 4.6 cm.  Additional tiny fat containing midline and   right paramedian supraumbilical hernias.  Small fat containing right direct   inguinal hernia.  No inguinal lymphadenopathy.  Diffuse osseous   demineralization.  No acute fractures nor suspicious osseous lesions.           Impression   1. A healed midline laparotomy incision is associated with diastasis recti. There are a few tiny to small supraumbilical incisional hernias predominantly   just right of midline, the largest containing nonobstructed transverse colon   and associated with a 2.5 cm x 4.6 cm fascial defect. 2. Eccentric wall thickening in the jejunum at the Jose Angel limb anastomosis   could be related to inflammation but could also be seen with malignancy.    Adjacent mesenteric lymph nodes could be reactive or metastatic.  Consider   short-term follow-up or endoscopy. 3. Unchanged 0.9 cm aneurysm of the mid to distal splenic artery.  Recommend   follow-up imaging in 1 year as below.       RECOMMENDATIONS:   Incidental Vascular Findings on CT and MRI       Splenic artery aneurysm, <2 cm:  Follow-up imaging every 1 year       Reference:  Tanisha et al.  [place] incidental findings on abdominal and   pelvic CT and MRI, part 2: white paper of the ACR Incidental Findings   Committee II on vascular findings.  J Am Nolan Radiol 9001;31:[id number].           Impressions/Recommendations:     Chronic recurrent ventral incisional hernia containing colon/bowel and getting increasingly symptomatic. She wishes to have repair, risks and benefits of recurrence, infection, seroma, etc discussed in detail       H&P  General Surgery        Pt Name: Cinthya Norris  MRN: [id number]  YOB: 1950  Date of evaluation: 8/19/2021      [x] I have examined the patient and reviewed the H&P/Consult completed, and there are no changes to the patient or plans. [] I have examined the patient and reviewed the H&P/Consult and have noted the following changes: The patient was counseled at length about the risks of [place] Covid-19 during their perioperative period and any recovery window from their procedure. The patient was made aware that [place] Covid-19  may worsen their prognosis for recovering from their procedure  and lend to a higher morbidity and/or mortality risk. All material risks, benefits, and reasonable alternatives including postponing the procedure were discussed. The patient does wish to proceed with the procedure at this time.         Electronically signed by Pedro Pablo Flores DO  on 8/19/2021 at 11:46 AM

## 2021-08-19 NOTE — ANESTHESIA PROCEDURE NOTES
Peripheral Block    Patient location during procedure: pre-op  Start time: 8/19/2021 11:20 AM  End time: 8/19/2021 11:26 AM  Staffing  Performed: resident/CRNA   Resident/CRNA: OLIVIA Arriaza CRNA  Preanesthetic Checklist  Completed: patient identified, IV checked, site marked, risks and benefits discussed, surgical consent, monitors and equipment checked, pre-op evaluation, timeout performed, anesthesia consent given, oxygen available and patient being monitored  Peripheral Block  Patient position: supine  Prep: ChloraPrep  Patient monitoring: continuous pulse ox and IV access (NIBP and EKG immed avail)  Block type: Rectus sheath and TAP  Laterality: bilateral  Injection technique: single-shot  Guidance: ultrasound guided  Local infiltration: decadron and ropivacaine  Provider prep: mask and sterile gloves  Local infiltration: decadron and ropivacaine  Needle  Needle type:  Other   Needle gauge: 22 G  Needle length: 8 cm  Needle localization: ultrasound guidanceOther needle type: Pajunk sonotap  Assessment  Injection assessment: negative aspiration for heme and no paresthesia on injection (local spread in fascial planes observed)  Paresthesia pain: none  Slow fractionated injection: yes  Hemodynamics: stable  Additional Notes  TAP - 20 ml ropivaciane 0.5%+10 ml NaCl inj + 5mg dexamethasone per side  Rectus sheath - 6 ml ropivacaine 0.5%+5ml NaCl inj per side  Reason for block: post-op pain management and at surgeon's request

## 2021-08-19 NOTE — OP NOTE
Operative Note      Patient: Monico Spurling  YOB: 1950  MRN: Jez Mitchell, APRN - CNP      Date of Procedure: 8/19/2021    Pre-Op Diagnosis: recurrent ventral incisional hernia  (prior repair done elsewhere)    Post-Op Diagnosis: Same. Greater omental adhesions. Procedure(s):  Exploratory laparotomy with lysis of adhesions of greater omental adhesions for 35 minutes and primary recurrent repair ventral hernia    Surgeon(s):  Adelaide Fontenot DO    Anesthesia: General + US guided TAP block    Estimated Blood Loss (mL): 15 ml    Complications: None    Specimens: none    Counts: correct    Drains: 1 LUCÍA in subcutaneous layer    Procedure details:    Please see H&P. Pre-op IV antibiotics given. Site inspected/marked in pre-op. Patient brought to OR suite, placed under anesthesia, positioned appropriately, surgical pause performed, site prepped and draped in sterile fashion. Murillo placed. Anesthesia performed US guided TAP block pre-op. CT scan re-reviewed and patient re-examined. Upper midline incision created sharply with 10 blade. Hernia sac containing omentum and colon is encountered in the subcutaneous tissue and opened up to enter the abdominal cavity and open up incision further while protecting the bowel. Ligasure is used to perform adhesiolysis of the greater omental vascular adhesions entangling the intestine and attached to the anterior abdominal wall. The fascia is very thin/attenuated from prior repairs, age, co-morbidities, etc. Several smaller fascial defects containing omentum are also freed up. Several \"Swiss-cheese\" like defects all converted into one defect with Bovie. The hernia containing the colon portion was 4 cm in length. All converted to one large defect, very thin tissue. Irrigation performed. Bowel is inspected and healthy. Prior yovana-n-y surgery. Antiadhesive Sepra film barrier placed. The fascia is re-approximated with looped 0 PDS x 2 sutures.  All sponge,needle, instrument counts correct. Large dead space in subcutaneous layer, LUCÍA 23 Bengali drain placed to drain/prevent seroma. Secured with nylon. Skin closed with staples. Aquacel dressing placed. Tolerated well. Spoke with sister afterwards.       Electronically signed by Chelle Motta DO, FACOS, FACS on 8/19/2021 at 5:00 PM

## 2021-08-20 VITALS
DIASTOLIC BLOOD PRESSURE: 61 MMHG | SYSTOLIC BLOOD PRESSURE: 118 MMHG | TEMPERATURE: 99 F | HEIGHT: 61 IN | OXYGEN SATURATION: 94 % | BODY MASS INDEX: 35.82 KG/M2 | RESPIRATION RATE: 16 BRPM | HEART RATE: 71 BPM | WEIGHT: 189.7 LBS

## 2021-08-20 PROCEDURE — 99024 POSTOP FOLLOW-UP VISIT: CPT | Performed by: SURGERY

## 2021-08-20 PROCEDURE — 6370000000 HC RX 637 (ALT 250 FOR IP): Performed by: SURGERY

## 2021-08-20 PROCEDURE — G0378 HOSPITAL OBSERVATION PER HR: HCPCS

## 2021-08-20 RX ADMIN — NEPHROCAP 1 MG: 1 CAP ORAL at 08:23

## 2021-08-20 RX ADMIN — HYDROCODONE BITARTRATE AND ACETAMINOPHEN 1 TABLET: 5; 325 TABLET ORAL at 07:17

## 2021-08-20 RX ADMIN — AMLODIPINE BESYLATE 10 MG: 10 TABLET ORAL at 08:23

## 2021-08-20 RX ADMIN — OXYBUTYNIN CHLORIDE 5 MG: 5 TABLET ORAL at 08:23

## 2021-08-20 RX ADMIN — POLYETHYLENE GLYCOL (3350) 17 G: 17 POWDER, FOR SOLUTION ORAL at 08:23

## 2021-08-20 RX ADMIN — TRIAMTERENE AND HYDROCHLOROTHIAZIDE 1 TABLET: 37.5; 25 TABLET ORAL at 08:23

## 2021-08-20 RX ADMIN — PANTOPRAZOLE SODIUM 40 MG: 40 TABLET, DELAYED RELEASE ORAL at 07:18

## 2021-08-20 RX ADMIN — CHOLECALCIFEROL TAB 10 MCG (400 UNIT) 800 UNITS: 10 TAB at 08:23

## 2021-08-20 RX ADMIN — GABAPENTIN 300 MG: 300 CAPSULE ORAL at 08:23

## 2021-08-20 RX ADMIN — ATORVASTATIN CALCIUM 10 MG: 10 TABLET, FILM COATED ORAL at 08:23

## 2021-08-20 RX ADMIN — METFORMIN HYDROCHLORIDE 1000 MG: 500 TABLET ORAL at 07:17

## 2021-08-20 RX ADMIN — SERTRALINE 100 MG: 100 TABLET, FILM COATED ORAL at 08:23

## 2021-08-20 ASSESSMENT — PAIN DESCRIPTION - FREQUENCY
FREQUENCY: INTERMITTENT
FREQUENCY: INTERMITTENT

## 2021-08-20 ASSESSMENT — PAIN DESCRIPTION - DESCRIPTORS
DESCRIPTORS: SORE
DESCRIPTORS: ACHING

## 2021-08-20 ASSESSMENT — PAIN DESCRIPTION - ONSET
ONSET: GRADUAL
ONSET: GRADUAL

## 2021-08-20 ASSESSMENT — PAIN DESCRIPTION - PAIN TYPE
TYPE: SURGICAL PAIN
TYPE: SURGICAL PAIN

## 2021-08-20 ASSESSMENT — PAIN SCALES - GENERAL
PAINLEVEL_OUTOF10: 2
PAINLEVEL_OUTOF10: 5
PAINLEVEL_OUTOF10: 0

## 2021-08-20 ASSESSMENT — PAIN DESCRIPTION - PROGRESSION: CLINICAL_PROGRESSION: GRADUALLY WORSENING

## 2021-08-20 ASSESSMENT — PAIN DESCRIPTION - LOCATION
LOCATION: ABDOMEN
LOCATION: ABDOMEN

## 2021-08-20 ASSESSMENT — PAIN DESCRIPTION - ORIENTATION: ORIENTATION: MID

## 2021-08-20 NOTE — PROGRESS NOTES
1605 report given to [facility]. on mmsu.
8/19/21    Patient underwent open ventral incisional hernia, recurrent, today. She was going to go home, but requires now observation stay because she has limited mobility/walks with walker and lives with her sister GERALD Who is currently sick with bronchitis and unable to care for her. Patient does have a home aide that comes to check on her but not everyday. We did check with other sisters, no one is able to care for patient tonight at home and thus patient requires admission observation stay overnight until famliy/aide arrangements can be safely made for planned discharge tomorrow. LUCÍA drain training ( in subcutaneous layer to decrease seroma risk). Regular diet. IV pain control prn, IV antiemetics prn, IV fluids as needed until tolerating po diet. I spoke with sister. I spoke later with admitting RN at 4:57 PM regarding orders.
Discussed discharge plans with the patient. Patient is a 66year old female here with Exploratory laparotomy with lysis of adhesions of greater omental adhesions for 35 minutes and primary recurrent repair ventral hernia. She is alert , oriented , pleasant , and cooperative during our conversation. Patient is a  and lives with her sister. She uses a cane and or walker at home. Patient receives aides twice a week for bathing and ADL's. The sister does the cooking and the house keeping at home. Patient manages her own medication. She drives but her sister does most of the driving. Her PCP is OLIVIA Montes CNP. She has medical insurance that helps with medication costs. The discharge plan is home with her aide's services to resume. She has a DNR CCA on file. VICKYW to monitor and assist with any needs or concerns as they arise.     LORENZO Polanco
Discussed with Dr. Nisha Sherwood and Johan Acevedo regarding PT home health-Patient would PT [facility] to come and help her out.  Zahida Guzman will set this up for her
GENERAL SURGERY PROGRESS NOTE- inpatient      PATIENT NAME: Constance Gandhi     DATE: 8/20/2021    SUBJECTIVE:    Patient POD#1 open VIH repair. Up in chair. Doing well. Good spirits. Good pain control. Needs [facility] PT/home health. Lives with sister. Uses walker. No N/V. Tolerating regular diet. She likes the binder. She normally sleeps on the couch at home. OBJECTIVE:   VITALS:  /61   Pulse 71   Temp 99 °F (37.2 °C) (Temporal)   Resp 16   Ht 5' 1\" (1.549 m)   Wt 189 lb 11.2 oz (86 kg)   LMP  (LMP Unknown)   SpO2 94%   BMI 35.84 kg/m²    height is 5' 1\" (1.549 m) and weight is 189 lb 11.2 oz (86 kg). Her temporal temperature is 99 °F (37.2 °C). Her blood pressure is 118/61 and her pulse is 71. Her respiration is 16 and oxygen saturation is 94%. INTAKE/OUTPUT:    I/O last 3 completed shifts: In: 393 [P.O.:560; I.V.:25]  Out: 530 [Urine:500; Drains:30]  No intake/output data recorded. CONSTITUTIONAL:  awake and alert  LUNGS:  clear to auscultation  HEART:regular rate  ABDOMEN:   normal bowel sounds, soft, non-distended, incisional tenderness, LUCÍA serous. INCISION: clean, dry  EXTREMITIES: no c/c/e      Data:  CBC: No results for input(s): WBC, HGB, HCT, PLT in the last 72 hours. BMP:  No results for input(s): NA, K, CL, CO2, BUN, CREATININE, GLUCOSE in the last 72 hours. Hepatic: No results for input(s): AST, ALT, ALB, BILITOT, ALKPHOS in the last 72 hours. ASSESSMENT & PLAN:    POD#1  Open VIH primary repair/RO omentum. Discharge today. [facility] has helped arrange home PT. Follow up 1 week for LUCÍA and staples removal. Wear binder. Spoke with RN.      Dr. Marlis Brunner
Incentive spirometer given to pt. States she knows how to use and used it. Nina winslow spoke with pt and sister. Pt and her family want her to stay in hospital overnight. Waiting on room on mmsu.
LUCÍA with drain sponge and telfa/tegaderm dressing.
Patient chose [facility] for home PT for 2 weeks.     LORENZO Santamaria
Patient denied to be washed up this morning.
Patient discharged with discharge instructions given to patient with all belongings. All questions and concerns were answered at this time. IV removed with no complications. Gunjan Saldaña Aide-taking patient down to private vehicle via wheelchair.
Patient instructed on the pre-operative, intra-operative, and post-operative process. Patient's surgery arrival time to the hospital and surgery start time confirmed for the day of surgery. Patient instructed on NPO status. Medication instructions reviewed with patient. Pre operative instruction sheet reviewed and given to patient in PAT. CHG skin prep instructions reviewed with the patient via telephone. Pt instructed to only take zoloft, gabapentin, lipitor, amlodipine, and prilosec the morning of surgery with a sip of water only.
EDT    Contact: [phone]

## 2021-08-20 NOTE — PLAN OF CARE
Problem: Falls - Risk of:  Goal: Will remain free from falls  Description: Will remain free from falls  Outcome: Ongoing  Note: Fall assessment completed daily. Bed in lowest position. Call light within reach. Falling star posted to outside of door. Fall risk sticker in place on ID band. Side rails up 2/4. Bed alarm on for safety. Patient ambulates with 1 assist and walker. Will continue to monitor. Goal: Absence of physical injury  Description: Absence of physical injury  Outcome: Ongoing     Problem: Infection:  Goal: Will remain free from infection  Description: Will remain free from infection  Outcome: Ongoing     Problem: Safety:  Goal: Free from accidental physical injury  Description: Free from accidental physical injury  Outcome: Ongoing  Note: ID band correct and in place. Bed locked and in lowest position. Call light within reach. Patient free from injury. Will continue to monitor. Goal: Free from intentional harm  Description: Free from intentional harm  Outcome: Ongoing     Problem: Daily Care:  Goal: Daily care needs are met  Description: Daily care needs are met  Outcome: Ongoing     Problem: Pain:  Goal: Patient's pain/discomfort is manageable  Description: Patient's pain/discomfort is manageable  Outcome: Ongoing  Note: Pain assessed every 4 hours. Patient is able to communicate with staff the need for pain interventions. Patient currently denies pain. Will continue to monitor. Problem: Skin Integrity:  Goal: Skin integrity will stabilize  Description: Skin integrity will stabilize  Outcome: Ongoing  Note: Skin assessment performed, no breakdown noted at this time. Surgical incision to abdomen with aquacel dressing. Patient skin is dry and intact. Will continue to monitor for redness or breakdown.         Problem: Discharge Planning:  Goal: Patients continuum of care needs are met  Description: Patients continuum of care needs are met  Outcome: Ongoing

## 2021-08-23 ENCOUNTER — CARE COORDINATION (OUTPATIENT)
Dept: CASE MANAGEMENT | Age: 71
End: 2021-08-23

## 2021-08-23 DIAGNOSIS — K43.2 VENTRAL INCISIONAL HERNIA: Primary | ICD-10-CM

## 2021-08-23 PROCEDURE — 1111F DSCHRG MED/CURRENT MED MERGE: CPT | Performed by: NURSE PRACTITIONER

## 2021-08-23 NOTE — CARE COORDINATION
Nurse (CTN) contacted the patient by telephone to perform post hospital discharge assessment. Verified name and  with patient as identifiers. Provided introduction to self, and explanation of the CTN role. CTN reviewed discharge instructions, medical action plan and red flags with patient who verbalized understanding. Patient given an opportunity to ask questions and does not have any further questions or concerns at this time. Were discharge instructions available to patient? Yes. Reviewed appropriate site of care based on symptoms and resources available to patient including: PCP, Specialist and When to call 911. The patient agrees to contact the PCP office for questions related to their healthcare. Medication reconciliation was performed with patient, who verbalizes understanding of administration of home medications. Covid Risk Education     Educated patient about risk for severe COVID-19 due to risk factors according to CDC guidelines. CTN reviewed discharge instructions, medical action plan and red flag symptoms with the patient who verbalized understanding. Discussed COVID vaccination status: Yes and pt has been vaccinated. Education provided on COVID-19 vaccination as appropriate. Discussed exposure protocols and quarantine with CDC Guidelines. Patient was given an opportunity to verbalize any questions and concerns and agrees to contact CTN or health care provider for questions related to their healthcare. Reviewed and educated patient on any new and changed medications related to discharge diagnosis. Was patient discharged with a pulse oximeter? No Discussed and confirmed pulse oximeter discharge instructions and when to notify provider or seek emergency care. CTN provided contact information. Plan for follow-up call in 5-7 days based on severity of symptoms and risk factors.   Plan for next call: routine follow up        Care Transitions 24 Hour Call    Care Transitions Interventions         Follow Up  Future Appointments   Date Time Provider Susanne Miguel   8/27/2021  4:30 PM Kwaku Trevino, [place] [facility]   9/24/2021  9:00 AM Walker Ish, APRN - CNP [facility]   3/31/2022  8:15 AM OLIVIA Fam CNP, RN

## 2021-08-24 NOTE — DISCHARGE SUMMARY
Phone: [facility]          Fax: [phone]                            Outpatient Physical Therapy                                                                    Discharge Summary    Patient: Chanelle Zhu  : 1950  [facility] #: [id number]   Referring physician: No admitting provider for patient encounter. Referring Practitioner: Dr. Fely Roach MD      Diagnosis: Weakness of both legs, R29.898      Date Treatment Initiated: 2021  Date of Last Treatment: 2021      PT Visit Information  PT Insurance Information: Aetna Medicare/Medicaid  Total # of Visits Approved: 12  Total # of Visits to Date: 12  Plan of Care/Certification Expiration Date: 21  No Show: 0  Canceled Appointment: 0      Frequency/Duration  3 times per week  4 weeks      Treatment Received  [x] HP/CP      [] Electrical Stim   [x] Therapeutic Exercise      [x] Gait Training  [] Aquatics   [] Ultrasound         [x] Patient Education/HEP   [x] Manual Therapy  [] Traction    [x] Neuro-brenda        [x] Soft Tissue Mobs            [] Home TENS  [] Iontophoresis    [] Orthotic casting/fitting      [] Dry Needling    Assessment  Assessment: Patient attended 12 PT visits for difficulty walking and general weakness and partially met goals for improved general strength and for improved Tinetti score and was Independent and compliant with HEP. Will dc patient at this time d/t optimal function reached. Goals  Short term goals  Time Frame for Short term goals: 3 weeks  Short term goal 1: Patient  to initiate HEP to improve core and B LE and UE strength. -met  Short term goal 2: Patient to be instructed in general functional strengthening of core and B LE's and UE's.-met/cont  Short term goal 3: Patient to tolerate 45 min of ther ex/act with minimal rest breaks to improve endurance. -met    Long term goals  Time Frame for Long term goals : 6 weeks  Long term goal 1: Patient to be independent and compliant with HEP.   Long term goal 2: Patient to have improved B LE strength >/=4/5 grossly and improved B UE strength >/=4/5 grossly for improved ease with transfers and ADL's.partially met  Long term goal 3: Patient to initiate gait training with SPC as able to improve functional mobility and independence.-not met  Long term goal 4: Patient to have improved Tinetti balance score >/=24/28 to decrease fall risk.-not met (17/28)      Reason for Discharge  [] Goals Achieved                        []  Poor Follow Through/Attendance                  [x]  Optimal Function Achieved     []  Patient Discharged Self    []  Hospitalization                         []  Physician discharge      Thank you for this referral      Blair Caballero, PT, DPT               Date: 8/24/2021

## 2021-08-27 ENCOUNTER — OFFICE VISIT (OUTPATIENT)
Dept: SURGERY | Age: 71
End: 2021-08-27
Payer: MEDICARE

## 2021-08-27 VITALS — WEIGHT: 180 LBS | BODY MASS INDEX: 34.01 KG/M2

## 2021-08-27 DIAGNOSIS — Z09 POSTOP CHECK: Primary | ICD-10-CM

## 2021-08-27 PROCEDURE — 99024 POSTOP FOLLOW-UP VISIT: CPT | Performed by: SURGERY

## 2021-08-27 NOTE — PROGRESS NOTES
8/27/21 postop    Bharathi Lee is doing well 1 week s/p open repair recurrent ventral hernia. LUCÍA removed from subcutaneous layer. Still at risk for seroma formation. Return 2 weeks for routine post-op evaluation. Staples removed, steris placed. Feeling great, no complaints. All questions answered. Thank you OLIVIA Angelo CNP for allowing me to participate in the care of your patients.

## 2021-09-29 ENCOUNTER — HOSPITAL ENCOUNTER (OUTPATIENT)
Dept: PHYSICAL THERAPY | Age: 71
Setting detail: THERAPIES SERIES
Discharge: HOME | End: 2021-09-29
Payer: MEDICARE

## 2021-09-29 ENCOUNTER — HOSPITAL ENCOUNTER (OUTPATIENT)
Dept: OCCUPATIONAL THERAPY | Age: 71
Setting detail: THERAPIES SERIES
Discharge: HOME | End: 2021-09-29
Payer: MEDICARE

## 2021-09-29 PROCEDURE — 97166 OT EVAL MOD COMPLEX 45 MIN: CPT

## 2021-09-29 PROCEDURE — 97110 THERAPEUTIC EXERCISES: CPT

## 2021-09-29 PROCEDURE — 97014 ELECTRIC STIMULATION THERAPY: CPT

## 2021-09-29 PROCEDURE — 97530 THERAPEUTIC ACTIVITIES: CPT

## 2021-09-29 PROCEDURE — 97161 PT EVAL LOW COMPLEX 20 MIN: CPT

## 2021-09-29 NOTE — PROGRESS NOTES
Phone: [address]          Fax: [phone]                      Outpatient Physical Therapy                                                                      Evaluation  Date: 2021  Patient: Steven Hurt  : 1950  [facility] #: [id number]  Referring Practitioner: KASANDRA Chapman    Referral Date : 21     Medical Diagnosis: Weakness, R52.1; Frequent falls, R29.6    Treatment Diagnosis: general weakness; difficulty walking  Onset Date: 21  PT Insurance Information: Aetna Medicare  Total # of Visits Approved: 18   Total # of Visits to Date: 1  No Show: 0  Canceled Appointment: 0     Subjective  Subjective: Pt was getting PT earlier this year for weakness and falls. Had hernia repair on the right side and had to stop; mesh needed replaced. Pt states she has had one fall since then which was actually last Saturday. Was trying to step down out the house and fell backwards. Using RW at home and states she occasionally feels as if she is falling backwards. Additional Pertinent Hx: HTN, OA, DM, anxiety, depression    Objective  Observation/Palpation  Observation: Pt ambulates with RW and foward flexed posture    Strength RLE  Comment: hip and knee 3-/5, ankle 4-/5  Strength LLE  Comment: hip and knee 3-/5, ankle 4-/5    Additional Measures  Special Tests: 2 sit to stands in 30 seconds with arms across chest  Other: 3:45 mins; 278ft      Exercises:  Exercise 1: **HEP - sinks  Exercise 2: bike 6 mins  Exercise 3: 2 laps with RW      Functional Outcome Measures  Timed Up and Go: 37.02       Assessment  Assessment: Pt is 67 y/o female with complaints of general weakness and frequent falls. Pt ambulates with RW and foward flexed posture. Strength bilateral hips and knees 3-/5, ankles 4-/5. Pt able to complete 2 sit to stands in 30 seconds with arms across chest. Pt able to ambulate 3mins 45secs without sitting rest break; 278ft.  TUG with RW and SBA: 37.04 seconds. Pt will benefit from PT. Prognosis: Good        Decision Making: Low Complexity    Patient Education  Patient Education: PT eval, POC, and HEP  Pt verbalized/demonstrated good understanding:     [X] Yes         [] No, pt required further clarification. Goals  Short term goals  Time Frame for Short term goals: 3 weeks  Short term goal 1: Pt to be instructed in home program.  Short term goal 2: Pt to complete TUG in less than 35.0 seconds indicating improved gait and stability. Long term goals  Time Frame for Long term goals : 6 weeks  Long term goal 1: Pt to report independence and compliance with home program.  Long term goal 2: Pt to complete TUG in less than 20.0 seconds indicating improved gait. Long term goal 3: Pt to complete 6 sit to stands in 30 seconds with arms across chest indicating improved functional strength of LE's. Long term goal 4: Pt to ambulate 6 mins with RW without sitting rest break indicating improved gait and endurance.       Patient goals : \"to not fall - hand leg strengthen arm\"        Minutes Tracking:  Time In: 5885  Time Out: 3903  Minutes: 50  Timed Code Treatment Minutes: 309 [facility], PT, DPT, CMPT    9/29/2021

## 2021-09-29 NOTE — PLAN OF CARE
Phone: [address]           Fax: [phone]                           [facility]                                                                            Initial Evaluation      Name:  Marciano Myers  Date: 9/29/2021  Referring Practitioner: OLIVIA Orozco CNP  Medical Diagnosis: Weakness, R53.1; Falls, R29.6  Rehab Diagnosis/ICD-10#s:Weakness, M62.81  Insurance: OT Insurance Information: Aetna Medicare - Advantage, Medicaid  Total # of Visits to Date: 1  Next  Appointment: December 2021  Chief Complaint: decreased B hand strength  CSN #: [id number]  Onset of Injury/Condition:  gradually      Precautions:   [x]None  [] Fall Risk  []WB Status  [] Pacemaker   []Other:            Involved Extremity:      [x] Left [x] Right  Dominant: [] Left [x]Right    Work Status:   [] Normal [] Restricted [] Off D/T Injury/Condition [x] Retired [] Unemployed [] Disabled []Other:  Critical Job/Daily Tasks:     Subjective: Patient presents this date with order for OT eval and tx for increased hand weakness and decreased functional use. Patient states that it has gradually gotten worse. Patient states she is even having difficulty holding a pen/pencil. Patient states that she is also experiencing BUE arm pain c pushing walker or cart for longer distance. Patient states that she is having trouble opening her hand, completes slowly. Ptient states numbness/tingling B hands, R > L d/t CTS. Patient states multiple fall history, has PT order.   PMH: HTN, OA, DM, anxiety, depression, neck injury C2-C7    Pain: Intensity:   7/10 Location:   B arm pain  Pain Type: [] Constant [x] Intermittent   [  ] with pain meds at rest   [x] With movement/Resistive activity - sharp [] With Sedentary activity      Objective:    /PINCH STRENGTH  (measured in #) RIGHT LEFT    27 24   2 pt pinch 1 2   3 pt pinch 5 3   Key/lateral pinch 7 5.5     Gross BUE ROM: WFL  *shoulders slightly limited, able to reach functionally to shoulder height    Gross BUE strength  Shoulders: 3+/5  Elbow: 3+/5  Wrist: 4-/5    9 hole peg RIGHT LEFT   1st trial 50 52   2nd trial 37 46   3rd trial 38 33   Average in seconds 41.7 43.7       Functional Impairment:  [] Mobility:    [] Current [] Goal [] Discharge  [x] Carry: [] Current [] Goal [] Discharge  [] Body Position:  [] Current [] Goal [] Discharge  [] Self Care:    [] Current [] Goal [] Discharge  [] Other:    [] Current [] Goal [] Discharge    Functional Impairment Current: Functional Impairment Goal:   [] 0%(CH)    [] 0%(CH)  [] 1-19%(CI)     [] 1-19%(CI)  [] 20-39%(CJ)    [x] 20-39%(CJ)  [] 40-59%(CK)    [] 40-59%(CK)   [x] 60-79%(CL)     [] 60-79%(CL)    [] 80-99%(CM)    [] 80-99%(CM)    [] 100%(CN)    [] 100%(CN)  [] NA  [] NA         Functional Impairment determined by:  [] Clinical Judgment   [x] Outcome Measure: DASH: 70.1% impairment    Patient Goals:   improve hand strength    Problems:     [x] Pain   [] Adherent Scar    [x] ROM  [] Edema  [x] Strength  [] Open Wound  [x] Function  [x] Coordination               [x] Sensation            [x] Falls: History/Risk of      Goals:   Short term goals  Time Frame for Short term goals: 4 weeks  Short term goal 1: Patient to be educated on and state compliance c HEP. Short term goal 2: Patient to improve B FMC and dexterity AEB ability to complete 9 hole peg test <40 seconds. Long term goals  Time Frame for Long term goals : 6 weeks  Long term goal 1: Patient to state <40% impairment throughout daily tasks, as measured by the DASH. Long term goal 2: Patient to improve BUE strength grossly to 4/5 to improve independence. Long term goal 3: Patient to state pain <2/10 at worst B shoulders to improve QOL. Long term goal 4: Patient to improve B FMC and dexterity, AEB ability to complete 9 hole peg test <33 seconds.   Long term goal 5: Patient to improve B  >35#, 2 pt >3#, 3 pt > 7# [id number]  ______________________________________________________________________       HEP Weight/  Level Reps/Time Comments   FMC/dexterity tasks   Initiated as HEP   Putty Green  Initiated as HEP   UE strengthening   Initiated as HEP                          Electronically signed by ERICA Campos/L,KIKO OTR/L 9/29/2021 4:42 PM    Minutes Tracking:  Time In: 0529  Time Out: 1001  Minutes: 62  Timed Code Treatment Minutes: 55 Minutes      Medicare/Regulatory Requirements  We must have a signed plan of care statement by the physician for the current time frame of the prescription. [] I have reviewed this plan of care and certify the need for medically necessary services.     Physician Signature: _________________________ Date: _______________

## 2021-10-01 ENCOUNTER — HOSPITAL ENCOUNTER (OUTPATIENT)
Dept: OCCUPATIONAL THERAPY | Age: 71
Setting detail: THERAPIES SERIES
Discharge: HOME | End: 2021-10-01
Payer: MEDICARE

## 2021-10-01 PROCEDURE — 97014 ELECTRIC STIMULATION THERAPY: CPT

## 2021-10-01 PROCEDURE — 97110 THERAPEUTIC EXERCISES: CPT

## 2021-10-01 PROCEDURE — 97530 THERAPEUTIC ACTIVITIES: CPT

## 2021-10-01 NOTE — PROGRESS NOTES
Phone: [facility]    Fax: [phone]                       Outpatient Occupational Therapy                 DAILY TREATMENT NOTE    Date: 10/1/2021  Patients Name:  Tr Mail  YOB: 1950 (66 y.o.)  Gender:  female  MRN:  [id number]  [facility] #: [id number]  Medical Diagnosis: Weakness, R53.1; Falls, R29.6    Referring Practitioner: OLIVIA Yeager CNP     INSURANCE  OT Insurance Information: Aetna Medicare - Advantage, Medicaid       Total # of Visits to Date: 2       PAIN  []No     [x]Yes      Location:  B upper arms  Pain Rating (0-10 pain scale): 3/10  Pain Description:     SUBJECTIVE   Patient states pain about 3/10 B upper arms. States that she was able to walk for a bit the other day. Flow Sheet   Exercise /   Manual treatment Weight/  Level Reps/Time Comments   K-tape x x R hand index and long for MC and P1 alignment to improve FM; poor results. Egg Yellow - L  Red - R x20 B  and to improve strength   Duckbill Yellow Egg x15  B hands for instrinsic strengthening    Flexbar Yellow x20 Twist and bend to improve UE strength                                              FMC/dexterity/acts 15 minutes x Pop beads to improve both strength as well as [address] and dexterity. Mod difficulty, scissoring of long and ring finger noted. Handwriting: patient wrote one sentence and signed name. Fair legibility, required increased time to complete. Modality Flow Sheet:   START STOP Tx Modality    10' Electrical Stim: 10 minutes B wrist/digits flexors/extensors - Marshallese, recip mode, 2 second ramp with 10/10 cycle time for improvement of strength as well as FMC/dexterity.        Ultrasound: ___ W/cm2 x ___ mins  Duty factor: __100%  __50%  __20% __10%  Head size:   MHz: __1mHz __2 mHz  __3mHz  Location:     Hot Pack:     Paraffin:     Cold Pack:         GOALS   Time Frame for Long term goals : 6 weeks     Long term goal 1: Patient to state <40% impairment throughout daily tasks, as measured by the DASH. Continue []Met  [x]Partially met  []Not met   Long term goal 2: Patient to improve BUE strength grossly to 4/5 to improve independence. Continue      []Met  [x]Partially met  []Not met   Long term goal 3: Patient to state pain <2/10 at worst B shoulders to improve QOL. Continue []Met  [x]Partially met  []Not met   Long term goal 4: Patient to improve B FMC and dexterity, AEB ability to complete 9 hole peg test <33 seconds. Continue []Met  [x]Partially met  []Not met   Long term goal 5: Patient to improve B  >35#, 2 pt >3#, 3 pt > 7# and lateral >8#. Continue []Met  [x]Partially met  []Not met   Time Frame for Short term goals: 4 weeks     Short term goal 1: Patient to be educated on and state compliance c HEP. Continue - Clarified [address] and dexterity this date. []Met  [x]Partially met  []Not met   Short term goal 2: Patient to improve B FMC and dexterity AEB ability to complete 9 hole peg test <40 seconds.  Continue []Met  [x]Partially met  []Not met   [address] Education provided to patient/family/caregiver:    []Yes:     [x]No (Continued review of prior education)   If yes Education Provided:     Method of Education:     [x]Discussion     []Demonstration    [] Written     []Other  Evaluation of Patients Response to Education:         [x]Patient and or caregiver verbalized understanding  []Patient and or Caregiver Demonstrated without assistance   []Patient and or Caregiver Demonstrated with assistance  []Needs additional instruction to demonstrate understanding of education    ASSESSMENT  Patient tolerated todays treatment session:    [x] Good   []  Fair   []  Poor  Limitations/difficulties with treatment session due to:   []Pain     []Fatigue     []Other medical complications     []Other  Goal Assessment: [] No Change    [x]Improved    Minutes Tracking:   Time In: 1016  Time Out: 1101  Minutes: 45  Timed Code Treatment Minutes: 44 Minutes      PLAN  [x]Continue with current plan of care  [][facility]  []IHold per patient request  [] Change Treatment plan:  [] Insurance hold  __ Other        Electronically signed by SHYLA Mcclendon,  10/1/2021 11:07 AM

## 2021-10-04 ENCOUNTER — HOSPITAL ENCOUNTER (OUTPATIENT)
Dept: OCCUPATIONAL THERAPY | Age: 71
Setting detail: THERAPIES SERIES
Discharge: HOME | End: 2021-10-04
Payer: MEDICARE

## 2021-10-04 PROCEDURE — 97530 THERAPEUTIC ACTIVITIES: CPT

## 2021-10-04 PROCEDURE — 97014 ELECTRIC STIMULATION THERAPY: CPT

## 2021-10-04 PROCEDURE — 97110 THERAPEUTIC EXERCISES: CPT

## 2021-10-04 NOTE — PROGRESS NOTES
Phone: [facility]    Fax: [phone]                       Outpatient Occupational Therapy                 DAILY TREATMENT NOTE    Date: 10/4/2021  Patients Name:  Froylan Sherman  YOB: 1950 (66 y.o.)  Gender:  female  MRN:  [id number]  [facility] #: [id number]  Medical Diagnosis: Weakness, R53.1; Falls, R29.6    Referring Practitioner: OLIVIA Thomas CNP     INSURANCE  OT Insurance Information: Aetna Medicare - Advantage, Medicaid       Total # of Visits to Date: 3       PAIN  []No     [x]Yes      Location:  B arms  Pain Rating (0-10 pain scale): 2/10  Pain Description:     SUBJECTIVE   Patient issued ulnar drift splint for R hand to align MP and phalanges for improvement with FM and functional use. Trailed throughout tx.               Flow Sheet   Exercise /   Manual treatment Weight/  Level Reps/Time Comments   K-tape x x R hand index and long for MC and P1 alignment to improve FM; poor results. Egg Yellow - L  Red - R x30 B  and pinches to improve strength   Duckbill    B hands for instrinsic strengthening    Flexbar Yellow x30 Twist and bend to improve UE strength                                                          FMC/dexterity/acts 15 minutes x Attempted writing c ulnar drift splint, patient unable to write with splint. With use of ulnar drift splint, patient completed FM c popping out 1/4\" magnetic discs from board. Min difficulty noted. Various sized binder clips on edge of container to improve strength.  Min difficulty.                                                                                                  Modality Flow Sheet:   START STOP Tx Modality     10' Electrical Stim: 10 minutes B wrist/digits flexors/extensors - Ghanaian, recip mode, 2 second ramp with 10/10 cycle time for improvement of strength as well as FMC/dexterity.          Ultrasound: ___ W/cm2 x ___ mins  Duty factor: __100%  __50%  __20% __10%  Head size: MHz: __1mHz __2 mHz  __3mHz  Location:       Hot Pack:       Paraffin:       Cold Pack:                 GOALS   Time Frame for Long term goals : 6 weeks       Long term goal 1: Patient to state <40% impairment throughout daily tasks, as measured by the DASH. Continue []? Met  [x]? Partially met  []? Not met   Long term goal 2: Patient to improve BUE strength grossly to 4/5 to improve independence. Continue        []? Met  [x]? Partially met  []? Not met   Long term goal 3: Patient to state pain <2/10 at worst B shoulders to improve QOL. Continue []? Met  [x]? Partially met  []? Not met   Long term goal 4: Patient to improve B FMC and dexterity, AEB ability to complete 9 hole peg test <33 seconds. Continue []? Met  [x]? Partially met  []? Not met   Long term goal 5: Patient to improve B  >35#, 2 pt >3#, 3 pt > 7# and lateral >8#. Continue []? Met  [x]? Partially met  []? Not met   Time Frame for Short term goals: 4 weeks       Short term goal 1: Patient to be educated on and state compliance c HEP. Continue - States compliance [x]? Met  []? Partially met  []? Not met   Short term goal 2: Patient to improve B FMC and dexterity AEB ability to complete 9 hole peg test <40 seconds. Continue []? Met  [x]? Partially met  []? Not met   ADDITIONAL COMMENTS            EDUCATION  New Education provided to patient/family/caregiver:    []Yes:     [x]No (Continued review of prior education)   If yes Education Provided:     Method of Education:     [x]Discussion     []Demonstration    [] Written     []Other  Evaluation of Patients Response to Education:         [x]Patient and or caregiver verbalized understanding  []Patient and or Caregiver Demonstrated without assistance   []Patient and or Caregiver Demonstrated with assistance  []Needs additional instruction to demonstrate understanding of education    ASSESSMENT  Patient tolerated todays treatment session:    [x] Good   []  Fair   []  Poor  Limitations/difficulties with treatment session due to:   []Pain     []Fatigue     []Other medical complications     []Other  Goal Assessment: [] No Change    [x]Improved      Minutes Tracking:  Time In: 1030  Time Out: 1430  Minutes: 43  Timed Code Treatment Minutes: 41 Minutes        PLAN  [x]Continue with current plan of care  [][facility]  []IHold per patient request  [] Change Treatment plan:  [] Insurance hold  __ Other        Electronically signed by SHYLA Mckenzie,  10/4/2021 2:38 PM

## 2021-10-06 ENCOUNTER — HOSPITAL ENCOUNTER (OUTPATIENT)
Dept: PHYSICAL THERAPY | Age: 71
Setting detail: THERAPIES SERIES
Discharge: HOME | End: 2021-10-06
Payer: MEDICARE

## 2021-10-06 ENCOUNTER — HOSPITAL ENCOUNTER (OUTPATIENT)
Dept: OCCUPATIONAL THERAPY | Age: 71
Setting detail: THERAPIES SERIES
Discharge: HOME | End: 2021-10-06
Payer: MEDICARE

## 2021-10-06 PROCEDURE — 97110 THERAPEUTIC EXERCISES: CPT

## 2021-10-06 PROCEDURE — 97014 ELECTRIC STIMULATION THERAPY: CPT

## 2021-10-06 PROCEDURE — 97530 THERAPEUTIC ACTIVITIES: CPT

## 2021-10-06 NOTE — PROGRESS NOTES
as FMC/dexterity.          Ultrasound: ___ W/cm2 x ___ mins  Duty factor: __100%  __50%  __20% __10%  Head size:   MHz: __1mHz __2 mHz  __3mHz  Location:       Hot Pack:       Paraffin:       Cold Pack:                    GOALS   Time Frame for Long term goals : 6 weeks       Long term goal 1: Patient to state <40% impairment throughout daily tasks, as measured by the DASH. Continue []? ?Met  [x]? ?Partially met  []? ? Not met   Long term goal 2: Patient to improve BUE strength grossly to 4/5 to improve independence. Continue        []? ?Met  [x]? ?Partially met  []? ? Not met   Long term goal 3: Patient to state pain <2/10 at worst B shoulders to improve QOL. Continue []? ?Met  [x]? ?Partially met  []? ? Not met   Long term goal 4: Patient to improve B FMC and dexterity, AEB ability to complete 9 hole peg test <33 seconds. Continue []? ?Met  [x]? ?Partially met  []? ? Not met   Long term goal 5: Patient to improve B  >35#, 2 pt >3#, 3 pt > 7# and lateral >8#. Continue []? ?Met  [x]? ?Partially met  []? ? Not met   Time Frame for Short term goals: 4 weeks       Short term goal 1: Patient to be educated on and state compliance c HEP. Continue - States compliance [x]? ? Met  []? ?Partially met  []? ? Not met   Short term goal 2: Patient to improve B FMC and dexterity AEB ability to complete 9 hole peg test <40 seconds. Continue []? ?Met  [x]? ?Partially met  []? ? Not met   ADDITIONAL COMMENTS            EDUCATION  New Education provided to patient/family/caregiver:    []Yes:     [x]No (Continued review of prior education)   If yes Education Provided:     Method of Education:     [x]Discussion     []Demonstration    [] Written     []Other  Evaluation of Patients Response to Education:         [x]Patient and or caregiver verbalized understanding  []Patient and or Caregiver Demonstrated without assistance   []Patient and or Caregiver Demonstrated with assistance  []Needs additional instruction to demonstrate understanding of

## 2021-10-08 ENCOUNTER — HOSPITAL ENCOUNTER (OUTPATIENT)
Dept: OCCUPATIONAL THERAPY | Age: 71
Setting detail: THERAPIES SERIES
Discharge: HOME | End: 2021-10-08
Payer: MEDICARE

## 2021-10-08 ENCOUNTER — HOSPITAL ENCOUNTER (OUTPATIENT)
Dept: PHYSICAL THERAPY | Age: 71
Setting detail: THERAPIES SERIES
Discharge: HOME | End: 2021-10-08
Payer: MEDICARE

## 2021-10-08 PROCEDURE — 97014 ELECTRIC STIMULATION THERAPY: CPT

## 2021-10-08 PROCEDURE — 97530 THERAPEUTIC ACTIVITIES: CPT

## 2021-10-08 PROCEDURE — 97110 THERAPEUTIC EXERCISES: CPT

## 2021-10-08 NOTE — PROGRESS NOTES
Phone: [place]    Fax: [phone]                       Outpatient Occupational Therapy                 DAILY TREATMENT NOTE    Date: 10/8/2021  Patients Name:  David Cuevas  YOB: 1950 (66 y.o.)  Gender:  female  MRN:  [id number]  [facility] #: [id number]  Medical Diagnosis: Weakness, R53.1; Falls, R29.6    Referring Practitioner: OLIVIA Stauffer CNP     INSURANCE  OT Insurance Information: Aetna Medicare - Advantage, Medicaid       Total # of Visits to Date: 5       PAIN  []No     [x]Yes      Location: upper arms  Pain Rating (0-10 pain scale): 2/10  Pain Description:      SUBJECTIVE   Patient states that she wore ulnar drift splint, as she feels it does really help.                   Flow Sheet   Exercise /   Manual treatment Weight/  Level Reps/Time Comments   K-tape     R hand index and long for MC and P1 alignment to improve FM; poor results.    Egg Red- L  Green - R x30 B  and pinches to improve strength   Duckbill  red x30  B hands for instrinsic strengthening    Flexbar   Twist and bend to improve UE strength    6 pt stars x x10 Removal of interlocking stars to improve strength    Velcro board x  x B hands to improve FM + strength                                            FMC/dexterity/acts 15 minutes x Lace board - Interlacing various sized ribbons B hands to improve FM coodination    Button snake - Placement of felt into button \"snake\" to improve [address] and dexterity                                                                                                              Modality Flow Sheet:   START STOP Tx Modality     10' Electrical Stim: 10 minutes B wrist/digits flexors/extensors - [place], recip mode, 2 second ramp with 10/10 cycle time for improvement of strength as well as FMC/dexterity.          Ultrasound: ___ W/cm2 x ___ mins  Duty factor: __100%  __50%  __20% __10%  Head size:   MHz: __1mHz __2 mHz  __3mHz  Location:       Hot Pack:     Paraffin:       Cold Pack:                    GOALS   Time Frame for Long term goals : 6 weeks       Long term goal 1: Patient to state <40% impairment throughout daily tasks, as measured by the DASH. Continue []? ? ?Met  [x]? ?? Partially met  []? ? ? Not met   Long term goal 2: Patient to improve BUE strength grossly to 4/5 to improve independence. Continue        []? ? ?Met  [x]? ?? Partially met  []? ? ? Not met   Long term goal 3: Patient to state pain <2/10 at worst B shoulders to improve QOL. Continue []? ? ?Met  [x]? ?? Partially met  []? ? ? Not met   Long term goal 4: Patient to improve B FMC and dexterity, AEB ability to complete 9 hole peg test <33 seconds. Continue []? ? ?Met  [x]? ?? Partially met  []? ? ? Not met   Long term goal 5: Patient to improve B  >35#, 2 pt >3#, 3 pt > 7# and lateral >8#. Continue - see below    Met B 2 pt []? ? ?Met  [x]? ?? Partially met  []? ? ? Not met   Time Frame for Short term goals: 4 weeks       Short term goal 1: Patient to be educated on and state compliance c HEP. Continue - States compliance [x]? ? ? Met  []? ??Partially met  []? ? ? Not met   Short term goal 2: Patient to improve B FMC and dexterity AEB ability to complete 9 hole peg test <40 seconds. Continue []? ? ?Met  [x]? ?? Partially met  []? ? ? Not met   ADDITIONAL COMMENTS       /PINCH STRENGTH  (measured in #) RIGHT LEFT   2 pt pinch 5 3   3 pt pinch 5 7   Key/lateral pinch 5 6       EDUCATION  New Education provided to patient/family/caregiver:    []Yes:     [x]No (Continued review of prior education)   If yes Education Provided:     Method of Education:     [x]Discussion     []Demonstration    [] Written     []Other  Evaluation of Patients Response to Education:         [x]Patient and or caregiver verbalized understanding  []Patient and or Caregiver Demonstrated without assistance   []Patient and or Caregiver Demonstrated with assistance  []Needs additional instruction to demonstrate understanding of education    ASSESSMENT  Patient tolerated todays treatment session:    [x] Good   []  Fair   []  Poor  Limitations/difficulties with treatment session due to:   []Pain     []Fatigue     []Other medical complications     []Other  Goal Assessment: [] No Change    [x]Improved      Therapists observations or comments: improved B hand strength    Minutes Tracking:  Time In: 0930  Time Out: 1018  Minutes: 48  Timed Code Treatment Minutes: [address] [address] [place]  [x]Continue with current plan of care  [][facility]  [][facility] per patient request  [] Change Treatment plan:  [] Insurance hold  __ Other        Electronically signed by SHYLA Torrez,  10/8/2021 10:26 AM

## 2021-10-08 NOTE — PROGRESS NOTES
Phone: [place]           Fax: [phone]                           Outpatient Physical Therapy                                                                            Daily Note    Patient: Goran Blackwell : 1950  [facility] #: [id number]   Referring Practitioner:  KASANDRA Lizama    Referral Date : 21     Date: 10/6/2021    Diagnosis: Weakness, R52.1; Frequent falls, R29.6  Treatment Diagnosis: general weakness; difficulty walking    Onset Date: 21  PT Insurance Information: t Medicare  Total # of Visits Approved: 18 Per Physician Order  Total # of Visits to Date: 2  No Show: 0  Canceled Appointment: 0      Pre-Treatment Pain:  2/10  Subjective: Pt with no complaints this date. No recent falls. Exercises:  Exercise 2: bike 10 mins  Exercise 4: sink exercise   15x  Exercise 5: walking along island 4x no band  Exercise 8: FSU 6in 10x ea LE  Exercise 12: sit to stand 2x10 from chair with use of armrest; 10x from low mat without hands    Assessment  Assessment: Pt demo's significant hip weakness in WB with sink ex. Fatigue noted with sit to stand ex. Will progress LE strengthening as pt tolerates. Activity Tolerance  Activity Tolerance: Patient Tolerated treatment well    Patient Education  Patient Education: Progression of ex  Pt verbalized/demonstrated good understanding:     [x] Yes         [] No, pt required further clarification. Post Treatment Pain:  2/10      Plan  Times per week: 3  Plan weeks: 6      Goals  (Total # of Visits to Date: 2)      Short term goals  Time Frame for Short term goals: 3 weeks  Short term goal 1: Pt to be instructed in home program.  Short term goal 2: Pt to complete TUG in less than 35.0 seconds indicating improved gait and stability. Short term goal 3: Patient to tolerate 45 min of ther ex/act with minimal rest breaks to improve endurance. -met    Long term goals  Time Frame for Long term goals :

## 2021-10-08 NOTE — PROGRESS NOTES
Phone: [place]           Fax: [phone]                           Outpatient Physical Therapy                                                                            Daily Note    Patient: Syed Bryant : 1950  CSN #: [id number]   Referring Practitioner:  KASANDRA Curiel    Referral Date : 21     Date: 10/8/2021    Diagnosis: Weakness, R52.1; Frequent falls, R29.6  Treatment Diagnosis: general weakness; difficulty walking    Onset Date: 21  PT Insurance Information: Aetna Medicare  Total # of Visits Approved: 18 Per Physician Order  Total # of Visits to Date: 3  No Show: 0  Canceled Appointment: 0      Pre-Treatment Pain:  0/10  Subjective: Patient reports fatigue after last visit but feeling better today. Exercises:  Exercise 2: bike 10 mins  Exercise 4: sink exercise   15x  Exercise 5: walking along island 4x no band  Exercise 8: FSU 6in 10x ea LE  Exercise 12: sit to stand 2x10 from chair with use of armrest; 10x from low mat without hands    Assessment  Assessment: Patient requires frequent, moderate rest breaks d/t fatigue; using hands during sit/stands today. Sig L LE weakness noted during fwd step ups this date. Will continue. Activity Tolerance  Activity Tolerance: Patient Tolerated treatment well    Patient Education  Exercise technique    Pt verbalized/demonstrated good understanding:     [x] Yes         [] No, pt required further clarification. Post Treatment Pain:  0/10      Plan  Times per week: 3  Plan weeks: 6      Goals  (Total # of Visits to Date: 3)      Short term goals  Time Frame for Short term goals: 3 weeks  Short term goal 1: Pt to be instructed in home program.  Short term goal 2: Pt to complete TUG in less than 35.0 seconds indicating improved gait and stability. Short term goal 3: Patient to tolerate 45 min of ther ex/act with minimal rest breaks to improve endurance. -met    Long term goals  Time Frame for Long term goals : 6 weeks  Long term goal 1: Pt to report independence and compliance with home program.  Long term goal 2: Pt to complete TUG in less than 20.0 seconds indicating improved gait. Long term goal 3: Pt to complete 6 sit to stands in 30 seconds with arms across chest indicating improved functional strength of LE's. Long term goal 4: Pt to ambulate 6 mins with RW without sitting rest break indicating improved gait and endurance.     Minutes Tracking:  Time In: 1020  Time Out: 1101  Minutes: 41  Timed Code Treatment Minutes: [address], PT , DPT     Date: 10/8/2021

## 2021-10-11 ENCOUNTER — HOSPITAL ENCOUNTER (OUTPATIENT)
Dept: PHYSICAL THERAPY | Age: 71
Setting detail: THERAPIES SERIES
Discharge: HOME | End: 2021-10-11
Payer: MEDICARE

## 2021-10-11 ENCOUNTER — HOSPITAL ENCOUNTER (OUTPATIENT)
Dept: OCCUPATIONAL THERAPY | Age: 71
Setting detail: THERAPIES SERIES
Discharge: HOME | End: 2021-10-11
Payer: MEDICARE

## 2021-10-11 PROCEDURE — 97110 THERAPEUTIC EXERCISES: CPT

## 2021-10-11 PROCEDURE — 97530 THERAPEUTIC ACTIVITIES: CPT

## 2021-10-11 PROCEDURE — 97014 ELECTRIC STIMULATION THERAPY: CPT

## 2021-10-11 NOTE — PROGRESS NOTES
Phone: [place]    Fax: [phone]                       Outpatient Occupational Therapy                 DAILY TREATMENT NOTE    Date: 10/11/2021  Patients Name:  Levern Koyanagi  YOB: 1950 (66 y.o.)  Gender:  female  MRN:  [id number]  [facility] #: [id number]  Medical Diagnosis: Weakness, R53.1; Falls, R29.6    Referring Practitioner: OLIVIA Bernard CNP     INSURANCE  OT Insurance Information: Aetna Medicare - Advantage, Medicaid       Total # of Visits to Date: 6       PAIN  [x]No     []Yes      Location:   Pain Rating (0-10 pain scale):   Pain Description:     SUBJECTIVE   Patient request new straps for UD splint.                  Flow Sheet   Exercise /   Manual treatment Weight/  Level Reps/Time Comments   K-tape     R hand index and long for MC and P1 alignment to improve FM; poor results. Egg Red- L  Green - R x30 B  and pinches to improve strength   Duckbill  red x30  B hands for instrinsic strengthening    Flexbar     Twist and bend to improve UE strength    6 pt stars x x10 Removal of interlocking stars to improve strength, mod difficulty noted this date    Velcro board   B hands to improve FM + strength                                            FMC/dexterity/acts 15 minutes x Placement and retrieval of various sized binder clips on/off edge of container to improve strength and coordination. Min difficulty R hand noted (completed c UD splint)    With use of large tweezers, patient retrieved \"pom poms\" and placed into container.  Mod difficulty B hands.                                                                                                              Modality Flow Sheet:   START STOP Tx Modality     10' Electrical Stim: 10 minutes B wrist/digits flexors/extensors - [place], recip mode, 2 second ramp with 10/10 cycle time for improvement of strength as well as FMC/dexterity.          Ultrasound: ___ W/cm2 x ___ mins  Duty factor: __100% todays treatment session:    [x] Good   []  Fair   []  Poor  Limitations/difficulties with treatment session due to:   []Pain     []Fatigue     []Other medical complications     []Other  Goal Assessment: [] No Change    [x]Improved    Minutes Tracking:  Time In: 3580  Time Out: 9537  Minutes: 43  Timed Code Treatment Minutes: 41 Minutes      PLAN  [x]Continue with current plan of care  [][facility]  []IHold per patient request  [] Change Treatment plan:  [] Insurance hold  __ Other        Electronically signed by SHYLA Mccain,  10/11/2021 12:52 PM

## 2021-10-11 NOTE — PROGRESS NOTES
Phone: [facility]           Fax: [phone]                           Outpatient Physical Therapy                                                                            Daily Note    Patient: Sara Sanford : 1950  [facility] #: [id number]   Referring Practitioner:  KASANDRA Lentz    Referral Date : 21     Date: 10/11/2021    Diagnosis: Weakness, R52.1; Frequent falls, R29.6  Treatment Diagnosis: general weakness; difficulty walking    Onset Date: 21  PT Insurance Information: Aetna Medicare  Total # of Visits Approved: 18 Per Physician Order  Total # of Visits to Date: 4  No Show: 0  Canceled Appointment: 0      Pre-Treatment Pain:  0/10  Subjective: Pt with no new complaints. No recent falls. Doing ex at home. Exercises:  Exercise 2: bike 10 mins 3.0  Exercise 3: 2 laps with RW  Exercise 5: walking along island 4x no band  Exercise 8: FSU 6in 10x ea LE  Exercise 9: toe taps on steps 10x ea  Exercise 12: sit to stand 2x10 from chair with use of armrest; 10x from low mat without hands    Assessment  Assessment: Pt progressing well. Requested one sitting break during laps around department; approx 150ft. No LOB noted with use of RW. Will continue. Activity Tolerance  Activity Tolerance: Patient Tolerated treatment well    Patient Education  Patient Education: continue  Pt verbalized/demonstrated good understanding:     [x] Yes         [] No, pt required further clarification. Post Treatment Pain:  0/10      Plan  Times per week: 3  Plan weeks: 6      Goals  (Total # of Visits to Date: 4)      Short term goals  Time Frame for Short term goals: 3 weeks  Short term goal 1: Pt to be instructed in home program.  Short term goal 2: Pt to complete TUG in less than 35.0 seconds indicating improved gait and stability. Short term goal 3: Patient to tolerate 45 min of ther ex/act with minimal rest breaks to improve endurance. -met    Long term goals  Time Frame for Long term goals : 6 weeks  Long term goal 1: Pt to report independence and compliance with home program.  Long term goal 2: Pt to complete TUG in less than 20.0 seconds indicating improved gait. Long term goal 3: Pt to complete 6 sit to stands in 30 seconds with arms across chest indicating improved functional strength of LE's. Long term goal 4: Pt to ambulate 6 mins with RW without sitting rest break indicating improved gait and endurance.     Minutes Tracking:  Time In: 3341  Time Out: 7782  Minutes: 45  Timed Code Treatment Minutes: [address] Nima Hernandez PT , DPT, CMPT      Date: 10/11/2021

## 2021-10-13 ENCOUNTER — HOSPITAL ENCOUNTER (OUTPATIENT)
Dept: OCCUPATIONAL THERAPY | Age: 71
Setting detail: THERAPIES SERIES
Discharge: HOME | End: 2021-10-13
Payer: MEDICARE

## 2021-10-13 ENCOUNTER — HOSPITAL ENCOUNTER (OUTPATIENT)
Dept: PHYSICAL THERAPY | Age: 71
Setting detail: THERAPIES SERIES
Discharge: HOME | End: 2021-10-13
Payer: MEDICARE

## 2021-10-13 PROCEDURE — 97110 THERAPEUTIC EXERCISES: CPT

## 2021-10-13 PROCEDURE — 97530 THERAPEUTIC ACTIVITIES: CPT

## 2021-10-13 PROCEDURE — 97014 ELECTRIC STIMULATION THERAPY: CPT

## 2021-10-13 PROCEDURE — 97116 GAIT TRAINING THERAPY: CPT

## 2021-10-13 NOTE — PROGRESS NOTES
Phone: [place]    Fax: [phone]                       Outpatient Occupational Therapy                 DAILY TREATMENT NOTE    Date: 10/13/2021  Patients Name:  Syed Bryant  YOB: 1950 (66 y.o.)  Gender:  female  MRN:  [id number]  [facility] #: [id number]  Medical Diagnosis: Weakness, R53.1; Falls, R29.6    Referring Practitioner: OLIVIA Penn - CNP     INSURANCE  OT Insurance Information: Aetna Medicare - Advantage, Medicaid       Total # of Visits to Date: 7       PAIN  [x]No     []Yes      Location:   Pain Rating (0-10 pain scale):   Pain Description:     SUBJECTIVE     Patient reports less pain in shoulders                 Flow Sheet   Exercise /   Manual treatment Weight/  Level Reps/Time Comments   K-tape     R hand index and long for MC and P1 alignment to improve FM; poor results.    Egg Red- L  Green - R x30 B  and pinches to improve strength   Duckbill  red x30  B hands for instrinsic strengthening    Flexbar  yellow x20 Twist and bend to improve UE strength    6 pt stars   Removal of interlocking stars to improve strength, mod difficulty noted this date    Velcro board  x  x B hands to improve FM + strength    Digi flex red x25-30 B hands to improve  strength                                  FMC/dexterity/acts 15 minutes x Mini light bright - retrieval and placement of mini pegs into board to improve FMC and dexterity; mod to max difficulty B hands.                                                                                                              Modality Flow Sheet:   START STOP Tx Modality     10' Electrical Stim: 10 minutes B wrist/digits flexors/extensors - [place], recip mode, 2 second ramp with 10/10 cycle time for improvement of strength as well as FMC/dexterity.          Ultrasound: ___ W/cm2 x ___ mins  Duty factor: __100%  __50%  __20% __10%  Head size:   MHz: __1mHz __2 mHz  __3mHz  Location:       Hot Pack:       Paraffin:       Cold Pack:                    GOALS   Time Frame for Long term goals : 6 weeks       Long term goal 1: Patient to state <40% impairment throughout daily tasks, as measured by the DASH. Continue []??? ? ?Met  [x]??? ? ? Partially met  []??? ??Not met   Long term goal 2: Patient to improve BUE strength grossly to 4/5 to improve independence. Continue        []??? ? ?Met  [x]??? ? ? Partially met  []??? ??Not met   Long term goal 3: Patient to state pain <2/10 at worst B shoulders to improve QOL. Continue []??? ? ?Met  [x]??? ? ? Partially met  []??? ??Not met   Long term goal 4: Patient to improve B FMC and dexterity, AEB ability to complete 9 hole peg test <33 seconds. Continue []??? ? ?Met  [x]??? ? ? Partially met  []??? ??Not met   Long term goal 5: Patient to improve B  >35#, 2 pt >3#, 3 pt > 7# and lateral >8#. Continue     Met B 2 pt []??? ? ?Met  [x]??? ? ? Partially met  []??? ??Not met   Time Frame for Short term goals: 4 weeks       Short term goal 1: Patient to be educated on and state compliance c HEP. Continue - States compliance [x]??? ? ?Met  []??? ?? Partially met  []??? ??Not met   Short term goal 2: Patient to improve B FMC and dexterity AEB ability to complete 9 hole peg test <40 seconds. Continue []??? ? ?Met  [x]??? ? ? Partially met  []??? ??Not met   ADDITIONAL COMMENTS           EDUCATION  New Education provided to patient/family/caregiver:    []Yes:     [x]No (Continued review of prior education)   If yes Education Provided:     Method of Education:     [x]Discussion     []Demonstration    [] Written     []Other  Evaluation of Patients Response to Education:         [x]Patient and or caregiver verbalized understanding  []Patient and or Caregiver Demonstrated without assistance   []Patient and or Caregiver Demonstrated with assistance  []Needs additional instruction to demonstrate understanding of education    ASSESSMENT  Patient tolerated todays treatment session:    [x] Good   []  Fair   [] Poor  Limitations/difficulties with treatment session due to:   []Pain     []Fatigue     []Other medical complications     []Other  Goal Assessment: [] No Change    [x]Improved        Minutes Tracking:  Time In: 1300  Time Out: 6904  Minutes: 45  Timed Code Treatment Minutes: 43 Minutes      PLAN  [x]Continue with current plan of care  [][facility]  []IHold per patient request  [] Change Treatment plan:  [] Insurance hold  __ Other        Electronically signed by SHYLA Hitchcock,  10/13/2021 4:04 PM

## 2021-10-13 NOTE — PROGRESS NOTES
Phone: [facility]           Fax: [phone]                           Outpatient Physical Therapy                                                                            Daily Note    Patient: Levern Koyanagi : 1950  CSN #: [id number]   Referring Practitioner:  KASANDRA Gallegos    Referral Date : 21     Date: 10/13/2021    Diagnosis: Weakness, R52.1; Frequent falls, R29.6  Treatment Diagnosis: general weakness; difficulty walking    Onset Date: 21  PT Insurance Information: Aet Medicare  Total # of Visits Approved: 18 Per Physician Order  Total # of Visits to Date: 5  No Show: 0  Canceled Appointment: 0      Pre-Treatment Pain:  0/10  Subjective: Patient reports she's doing well today. No complaints. Exercises:  Exercise 1: **HEP - sinks  Exercise 2: bike 10 mins 3.5  Exercise 3: 2 laps with RW  Exercise 5: walking along island 4x no band  Exercise 8: FSU 6in 10x ea LE  Exercise 9: toe taps on steps 10x ea  Exercise 12: sit to stand 2x10 from chair with use of armrest    Assessment  Assessment: Progressed to sit/stands with hands on lap vs. use of armrests. Patient with one seated rest break during session. Minimal fatigue noted with standing ther ex. Will progress as tolerated. Activity Tolerance  Activity Tolerance: Patient Tolerated treatment well    Patient Education  Exercise technique    Pt verbalized/demonstrated good understanding:     [x] Yes         [] No, pt required further clarification. Post Treatment Pain:  0/10      Plan  Times per week: 3  Plan weeks: 6      Goals  (Total # of Visits to Date: 5)      Short term goals  Time Frame for Short term goals: 3 weeks  Short term goal 1: Pt to be instructed in home program.-met  Short term goal 2: Pt to complete TUG in less than 35.0 seconds indicating improved gait and stability.   Short term goal 3: Patient to tolerate 45 min of ther ex/act with minimal rest breaks to improve endurance. -met    Long term goals  Time Frame for Long term goals : 6 weeks  Long term goal 1: Pt to report independence and compliance with home program.  Long term goal 2: Pt to complete TUG in less than 20.0 seconds indicating improved gait. Long term goal 3: Pt to complete 6 sit to stands in 30 seconds with arms across chest indicating improved functional strength of LE's. Long term goal 4: Pt to ambulate 6 mins with RW without sitting rest break indicating improved gait and endurance.     Minutes Tracking:  Time In: 1350  Time Out: [facility]  Minutes: 39  Timed Code Treatment Minutes: Isaiah 22, PT , DPT     Date: 10/13/2021

## 2021-10-15 ENCOUNTER — HOSPITAL ENCOUNTER (OUTPATIENT)
Dept: OCCUPATIONAL THERAPY | Age: 71
Setting detail: THERAPIES SERIES
Discharge: HOME | End: 2021-10-15
Payer: MEDICARE

## 2021-10-15 ENCOUNTER — HOSPITAL ENCOUNTER (OUTPATIENT)
Dept: PHYSICAL THERAPY | Age: 71
Setting detail: THERAPIES SERIES
Discharge: HOME | End: 2021-10-15
Payer: MEDICARE

## 2021-10-15 PROCEDURE — 97530 THERAPEUTIC ACTIVITIES: CPT

## 2021-10-15 PROCEDURE — 97110 THERAPEUTIC EXERCISES: CPT

## 2021-10-15 PROCEDURE — 97113 AQUATIC THERAPY/EXERCISES: CPT

## 2021-10-15 PROCEDURE — 97014 ELECTRIC STIMULATION THERAPY: CPT

## 2021-10-15 NOTE — PROGRESS NOTES
minimal rest breaks to improve endurance. -met    Long term goals  Time Frame for Long term goals : 6 weeks  Long term goal 1: Pt to report independence and compliance with home program.  Long term goal 2: Pt to complete TUG in less than 20.0 seconds indicating improved gait. Long term goal 3: Pt to complete 6 sit to stands in 30 seconds with arms across chest indicating improved functional strength of LE's. Long term goal 4: Pt to ambulate 6 mins with RW without sitting rest break indicating improved gait and endurance.     Minutes Tracking:  Time In: 1244  Time Out: [address]  Minutes: [address], [facility]     Date: 10/15/2021

## 2021-10-15 NOTE — PROGRESS NOTES
Phone: [place]    Fax: [phone]                       Outpatient Occupational Therapy                 DAILY TREATMENT NOTE    Date: 10/15/2021  Patients Name:  Armond Brunner  YOB: 1950 (66 y.o.)  Gender:  female  MRN:  [id number]  [facility] #: [id number]  Medical Diagnosis: Weakness, R53.1; Falls, R29.6    Referring Practitioner: OLIVIA Rob CNP     INSURANCE  OT Insurance Information: A[employer]na Medicare - Advantage, Medicaid       Total # of Visits to Date: 8       PAIN  [x]No     []Yes      Location:   Pain Rating (0-10 pain scale):   Pain Description:     SUBJECTIVE    Patient presents with hand fidgets, explained use to improve FM and hand strength                  Flow Sheet   Exercise /   Manual treatment Weight/  Level Reps/Time Comments   K-tape     R hand index and long for MC and P1 alignment to improve FM; poor results. Egg Red- L  Green - R x30 B  and pinches to improve strength   Duckbill Yellow egg x30  B hands for instrinsic strengthening    Flexbar  yellow x20 Twist and bend to improve UE strength    6 pt stars     Removal of interlocking stars to improve strength, mod difficulty noted this date    Velcro board   B hands to improve FM + strength    Digi flex   B hands to improve  strength    Magnets x x10 Digit abduction c magnetic rings to improve strength                        FMC/dexterity/acts 15 minutes x Lacing c rubber lace through various sized straws. Min difficulty. Tweezers c retrieval and placement of pom poms B hands to improve strength + DELTA [facility]    Button bank - patient unable to hold handful of buttons and manipulate to put into bank.  trialed c larger buttons only c increased ability and accuracy.                                                                                                               Modality Flow Sheet:   START STOP Tx Modality     10' Electrical Stim: 10 minutes B wrist/digits flexors/extensors - Swazi, recip mode, 2 second ramp with 10/10 cycle time for improvement of strength as well as FMC/dexterity.          Ultrasound: ___ W/cm2 x ___ mins  Duty factor: __100%  __50%  __20% __10%  Head size:   MHz: __1mHz __2 mHz  __3mHz  Location:       Hot Pack:       Paraffin:       Cold Pack:                    GOALS   Time Frame for Long term goals : 6 weeks       Long term goal 1: Patient to state <40% impairment throughout daily tasks, as measured by the DASH. Continue []???? ??Met  [x]???? ?? Partially met  []???? ?? Not met   Long term goal 2: Patient to improve BUE strength grossly to 4/5 to improve independence. Continue        []???? ??Met  [x]???? ?? Partially met  []???? ?? Not met   Long term goal 3: Patient to state pain <2/10 at worst B shoulders to improve QOL. Continue []???? ??Met  [x]???? ?? Partially met  []???? ?? Not met   Long term goal 4: Patient to improve B FMC and dexterity, AEB ability to complete 9 hole peg test <33 seconds. Continue []???? ??Met  [x]???? ?? Partially met  []???? ?? Not met   Long term goal 5: Patient to improve B  >35#, 2 pt >3#, 3 pt > 7# and lateral >8#. Continue     Met B 2 pt []???? ??Met  [x]???? ?? Partially met  []???? ?? Not met   Time Frame for Short term goals: 4 weeks       Short term goal 1: Patient to be educated on and state compliance c HEP. Continue - States compliance [x]???? ??Met  []???? ?? Partially met  []???? ?? Not met   Short term goal 2: Patient to improve B FMC and dexterity AEB ability to complete 9 hole peg test <40 seconds. Continue []???? ??Met  [x]???? ?? Partially met  []???? ?? Not met   ADDITIONAL COMMENTS            EDUCATION  New Education provided to patient/family/caregiver:    []Yes:     [x]No (Continued review of prior education)   If yes Education Provided:     Method of Education:     [x]Discussion     []Demonstration    [] Written     []Other  Evaluation of Patients Response to Education:         [x]Patient and or caregiver verbalized understanding  []Patient and or Caregiver Demonstrated without assistance   []Patient and or Caregiver Demonstrated with assistance  []Needs additional instruction to demonstrate understanding of education    ASSESSMENT  Patient tolerated todays treatment session:    [x] Good   []  Fair   []  Poor  Limitations/difficulties with treatment session due to:   []Pain     []Fatigue     []Other medical complications     []Other  Goal Assessment: [] No Change    [x]Improved    Minutes Tracking:  Time In: 5470  Time Out: 1430  Minutes: 45  Timed Code Treatment Minutes: 43 Minutes      PLAN  [x]Continue with current plan of care  [][facility]  []IHold per patient request  [] Change Treatment plan:  [] Insurance hold  __ Other        Electronically signed by SHYLA Callejas,  10/15/2021 2:36 PM

## 2021-10-18 ENCOUNTER — HOSPITAL ENCOUNTER (OUTPATIENT)
Dept: OCCUPATIONAL THERAPY | Age: 71
Setting detail: THERAPIES SERIES
Discharge: HOME | End: 2021-10-18
Payer: MEDICARE

## 2021-10-18 ENCOUNTER — HOSPITAL ENCOUNTER (OUTPATIENT)
Dept: PHYSICAL THERAPY | Age: 71
Setting detail: THERAPIES SERIES
Discharge: HOME | End: 2021-10-18
Payer: MEDICARE

## 2021-10-18 PROCEDURE — 97110 THERAPEUTIC EXERCISES: CPT

## 2021-10-18 PROCEDURE — 97530 THERAPEUTIC ACTIVITIES: CPT

## 2021-10-18 PROCEDURE — 97014 ELECTRIC STIMULATION THERAPY: CPT

## 2021-10-18 NOTE — PROGRESS NOTES
Phone: [facility]           Fax: [phone]                           Outpatient Physical Therapy                                                                            Daily Note    Patient: Marva Miguel : 1950  CSN #: [id number]   Referring Practitioner:  KASANDRA Harris    Referral Date : 21     Date: 10/18/2021    Diagnosis: Weakness, R52.1; Frequent falls, R29.6  Treatment Diagnosis: general weakness; difficulty walking    Onset Date: 21  PT Insurance Information: [employer] Medicare  Total # of Visits Approved: 18 Per Physician Order  Total # of Visits to Date: 7  No Show: 0  Canceled Appointment: 0    Pre-Treatment Pain:  0/10  Subjective: Pt reports she was a little tired after the pool but the weekend went well. Pt denies pain but states she has some numbness in her legs. Exercises:  Exercise 1: **HEP - sinks  Exercise 2: bike 10 mins 3.5  Exercise 3: 2 laps with RW  Exercise 8: FSU 6in 10x ea LE  Exercise 9: toe taps on steps 10x ea  Exercise 12: sit to stand 2x10 from chair with use of armrest    Assessment  Assessment: TUG today using RW in 2 attempts 21.47 and 19.66 seconds which is down from initial eval meeting ST[facility]. Activity Tolerance  Activity Tolerance: Patient Tolerated treatment well    Patient Education  Patient Education: continue  Pt verbalized/demonstrated good understanding:     [x] Yes         [] No, pt required further clarification. Post Treatment Pain:  0/10    Plan  Times per week: 3  Plan weeks: 6    Goals  (Total # of Visits to Date: 7)      Short term goals  Time Frame for Short term goals: 3 weeks  Short term goal 1: Pt to be instructed in home program.-met  Short term goal 2: Pt to complete TUG in less than 35.0 seconds indicating improved gait and stability. -MET  Short term goal 3: Patient to tolerate 45 min of ther ex/act with minimal rest breaks to improve endurance. -met    Long term goals  Time Frame for Long term goals : 6 weeks  Long term goal 1: Pt to report independence and compliance with home program.  Long term goal 2: Pt to complete TUG in less than 20.0 seconds indicating improved gait. Long term goal 3: Pt to complete 6 sit to stands in 30 seconds with arms across chest indicating improved functional strength of LE's. Long term goal 4: Pt to ambulate 6 mins with RW without sitting rest break indicating improved gait and endurance.     Minutes Tracking:  Time In: 1146  Time Out: 01995 Ashok Hernandez  Minutes: 43  Timed Code Treatment Minutes: Tk Marinelli           Date: 10/18/2021

## 2021-10-18 NOTE — PROGRESS NOTES
Phone: [place]    Fax: [phone]                       Outpatient Occupational Therapy                 DAILY TREATMENT NOTE    Date: 10/18/2021  Patients Name:  Lea Cardoza  YOB: 1950 (66 y.o.)  Gender:  female  MRN:  [id number]  [facility] #: [id number]  Medical Diagnosis: Weakness, R53.1; Falls, R29.6    Referring Practitioner: OLIVIA Michaels CNP     INSURANCE  OT Insurance Information: A[employer]na Medicare - Advantage, Medicaid       Total # of Visits to Date: 9       PAIN  [x]No     []Yes      Location:   Pain Rating (0-10 pain scale):   Pain Description:     SUBJECTIVE     Patient stated that she practiced her [address] c organizing pills earlier this week.              Flow Sheet   Exercise /   Manual treatment Weight/  Level Reps/Time Comments   K-tape     R hand index and long for MC and P1 alignment to improve FM; poor results. Egg Red- L  Green - R x30 B  and pinches to improve strength   Duckbill Yellow egg x30  B hands for instrinsic strengthening    Flexbar  yellow x20 Twist and bend to improve UE strength    6 pt stars     Removal of interlocking stars to improve strength, mod difficulty noted this date    Velcro board     B hands to improve FM + strength    Digi flex     B hands to improve  strength    Magnets   Digit abduction c magnetic rings to improve strength    Power web Red x15 MP flexion and extension to improve strength   Binder clips x X    B hands to open and place binder clips to edge of container. Max difficulty L hand noted, min R.     FMC/dexterity/acts x 15' With use of large tweezers, patient retrieved and placed small beads to improve strength + FMC and dexterity.  Mod difficulty B hands                                                                                                              Modality Flow Sheet:   START STOP Tx Modality     10' Electrical Stim: 10 minutes B wrist/digits flexors/extensors - Egyptian, recip mode, 2 second ramp with 10/10 cycle time for improvement of strength as well as FMC/dexterity.          Ultrasound: ___ W/cm2 x ___ mins  Duty factor: __100%  __50%  __20% __10%  Head size:   MHz: __1mHz __2 mHz  __3mHz  Location:       Hot Pack:       Paraffin:       Cold Pack:                    GOALS   Time Frame for Long term goals : 6 weeks       Long term goal 1: Patient to state <40% impairment throughout daily tasks, as measured by the DASH. Continue []???????Met  [x]??????? Partially met  []????? ?? Not met   Long term goal 2: Patient to improve BUE strength grossly to 4/5 to improve independence. Continue        []???????Met  [x]??????? Partially met  []????? ?? Not met   Long term goal 3: Patient to state pain <2/10 at worst B shoulders to improve QOL. Continue []???????Met  [x]??????? Partially met  []????? ?? Not met   Long term goal 4: Patient to improve B FMC and dexterity, AEB ability to complete 9 hole peg test <33 seconds. Continue []???????Met  [x]??????? Partially met  []????? ?? Not met   Long term goal 5: Patient to improve B  >35#, 2 pt >3#, 3 pt > 7# and lateral >8#. Continue     Met B 2 pt []???????Met  [x]??????? Partially met  []????? ?? Not met   Time Frame for Short term goals: 4 weeks       Short term goal 1: Patient to be educated on and state compliance c HEP. Continue - States compliance [x]???????Met  []??????? Partially met  []????? ?? Not met   Short term goal 2: Patient to improve B FMC and dexterity AEB ability to complete 9 hole peg test <40 seconds. Continue []???????Met  [x]??????? Partially met  []????? ?? Not met   ADDITIONAL COMMENTS            EDUCATION  New Education provided to patient/family/caregiver:    []Yes:     [x]No (Continued review of prior education)   If yes Education Provided:     Method of Education:     [x]Discussion     []Demonstration    [] Written     []Other  Evaluation of Patients Response to Education:         [x]Patient and or caregiver verbalized understanding  []Patient and or Caregiver Demonstrated without assistance   []Patient and or Caregiver Demonstrated with assistance  []Needs additional instruction to demonstrate understanding of education    ASSESSMENT  Patient tolerated todays treatment session:    [x] Good   []  Fair   []  Poor  Limitations/difficulties with treatment session due to:   []Pain     []Fatigue     []Other medical complications     []Other  Goal Assessment: [] No Change    [x]Improved      Minutes Tracking:  Time In: 4428  Time Out: 1330  Minutes: 45  Timed Code Treatment Minutes: 43 Minutes      PLAN  [x]Continue with current plan of care  [][facility]  []IHold per patient request  [] Change Treatment plan:  [] Insurance hold  __ Other        Electronically signed by SHYLA Mccain,  10/18/2021 2:22 PM

## 2021-10-20 ENCOUNTER — HOSPITAL ENCOUNTER (OUTPATIENT)
Dept: PHYSICAL THERAPY | Age: 71
Setting detail: THERAPIES SERIES
Discharge: HOME | End: 2021-10-20
Payer: MEDICARE

## 2021-10-20 ENCOUNTER — HOSPITAL ENCOUNTER (OUTPATIENT)
Dept: OCCUPATIONAL THERAPY | Age: 71
Setting detail: THERAPIES SERIES
Discharge: HOME | End: 2021-10-20
Payer: MEDICARE

## 2021-10-20 PROCEDURE — 97530 THERAPEUTIC ACTIVITIES: CPT

## 2021-10-20 PROCEDURE — 97014 ELECTRIC STIMULATION THERAPY: CPT

## 2021-10-20 PROCEDURE — 97110 THERAPEUTIC EXERCISES: CPT

## 2021-10-20 NOTE — PROGRESS NOTES
Phone: [facility]           Fax: [phone]                           Outpatient Physical Therapy                                                                            Daily Note    Patient: Miri Freitas : 1950  CSN #: [id number]   Referring Practitioner:  KASANDRA Beltrán    Referral Date : 21     Date: 10/20/2021    Diagnosis: Weakness, R52.1; Frequent falls, R29.6  Treatment Diagnosis: general weakness; difficulty walking    Onset Date: 21  PT Insurance Information: Aetna Medicare  Total # of Visits Approved: 18 Per Physician Order  Total # of Visits to Date: 8  No Show: 0  Canceled Appointment: 0      Pre-Treatment Pain:  0/10  Subjective: Pt with no reports of increased pain. Exercises:  Exercise 1: **HEP - sinks  Exercise 2: bike 10 mins 3.5  Exercise 3: 3 laps with RW  Exercise 8: FSU 6in 10x ea LE  Exercise 9: toe taps on steps 10x ea  Exercise 12: sit to stand 2x10 from chair with use of armrest    Assessment  Assessment: Pt was able to ambulate continuously x3 big loops around clinic before fatigue. Pt required frequent cuing for close AD proximity, FWW height was lowered to decrease elbow flexion. Pt reports relief with adjustment to AD height. WIll progress. Activity Tolerance  Activity Tolerance: Patient Tolerated treatment well    Patient Education  Safety with AD, gait cycle. Pt verbalized/demonstrated good understanding:     [x] Yes         [] No, pt required further clarification. Post Treatment Pain:  0/10      Plan  Times per week: 3  Plan weeks: 6      Goals  (Total # of Visits to Date: 8)      Short term goals  Time Frame for Short term goals: 3 weeks  Short term goal 1: Pt to be instructed in home program.-met  Short term goal 2: Pt to complete TUG in less than 35.0 seconds indicating improved gait and stability.  -MET  Short term goal 3: Patient to tolerate 45 min of ther ex/act with minimal rest breaks to improve endurance. -met    Long term goals  Time Frame for Long term goals : 6 weeks  Long term goal 1: Pt to report independence and compliance with home program.  Long term goal 2: Pt to complete TUG in less than 20.0 seconds indicating improved gait. Long term goal 3: Pt to complete 6 sit to stands in 30 seconds with arms across chest indicating improved functional strength of LE's. -progressing  Long term goal 4: Pt to ambulate 6 mins with RW without sitting rest break indicating improved gait and endurance.     Minutes Tracking:  Time In: 6978  Time Out: 1115  Minutes: [address]     Date: 10/20/2021

## 2021-10-20 NOTE — PROGRESS NOTES
Phone: [phone]                 [facility]    Fax: [phone]                       Outpatient Occupational Therapy                 DAILY TREATMENT NOTE    Date: 10/20/2021  Patients Name:  Goran Blackwell  YOB: 1950 (66 y.o.)  Gender:  female  MRN:  [id number]  [facility] #: [id number]  Medical Diagnosis: Weakness, R53.1; Falls, R29.6    Referring Practitioner: OLIVIA Diaz CNP     INSURANCE  OT Insurance Information: A[employer]na Medicare - Advantage, Medicaid       Total # of Visits to Date: 10       PAIN  [x]No     []Yes      Location:   Pain Rating (0-10 pain scale):   Pain Description:     SUBJECTIVE   Patient states that she feels her handwriting has improved (c UD splint)                Flow Sheet   Exercise /   Manual treatment Weight/  Level Reps/Time Comments   K-tape     R hand index and long for MC and P1 alignment to improve FM; poor results. Egg Red- L  Green - R x30 B  and pinches to improve strength   Duckbill    B hands for instrinsic strengthening    Flexbar orange x30 Twist and bend to improve UE strength    6 pt stars     Removal of interlocking stars to improve strength, mod difficulty noted this date    Velcro board     B hands to improve FM + strength    Digi flex     B hands to improve  strength    Magnets     Digit abduction c magnetic rings to improve strength    Power web   MP flexion and extension to improve strength   Binder clips    B hands to open and place binder clips to edge of container. Max difficulty L hand noted, min R.     FMC/dexterity/acts x 15' *with R ulnar deviation splint c pads between IF and LF and LF and RF*    B hand task to improve FM; R removal of rubber circles from T bar and placement onto doll haile; L removal of bands from haile and placed onto t-bar.  Min to mod difficulty.                                                                                                              Modality Flow Sheet:   START STOP Tx Modality     10' Electrical Stim: 10 minutes B wrist/digits flexors/extensors - Martiniquais, recip mode, 2 second ramp with 10/10 cycle time for improvement of strength as well as FMC/dexterity.          Ultrasound: ___ W/cm2 x ___ mins  Duty factor: __100%  __50%  __20% __10%  Head size:   MHz: __1mHz __2 mHz  __3mHz  Location:       Hot Pack:       Paraffin:       Cold Pack:                    GOALS   Time Frame for Long term goals : 6 weeks       Long term goal 1: Patient to state <40% impairment throughout daily tasks, as measured by the DASH. Continue []????????Met  [x]???????? Partially met  []?????? ?? Not met   Long term goal 2: Patient to improve BUE strength grossly to 4/5 to improve independence. Continue        []????????Met  [x]???????? Partially met  []?????? ?? Not met   Long term goal 3: Patient to state pain <2/10 at worst B shoulders to improve QOL. Continue []????????Met  [x]???????? Partially met  []?????? ?? Not met   Long term goal 4: Patient to improve B FMC and dexterity, AEB ability to complete 9 hole peg test <33 seconds. Continue []????????Met  [x]???????? Partially met  []?????? ?? Not met   Long term goal 5: Patient to improve B  >35#, 2 pt >3#, 3 pt > 7# and lateral >8#. Continue     Met B 2 pt []????????Met  [x]???????? Partially met  []?????? ?? Not met   Time Frame for Short term goals: 4 weeks       Short term goal 1: Patient to be educated on and state compliance c HEP. Continue - States compliance [x]????????Met  []???????? Partially met  []?????? ?? Not met   Short term goal 2: Patient to improve B FMC and dexterity AEB ability to complete 9 hole peg test <40 seconds. Continue []????????Met  [x]???????? Partially met  []?????? ?? Not met   ADDITIONAL COMMENTS  Trialed UD splint R hand during functional task       EDUCATION  New Education provided to patient/family/caregiver:    []Yes:     [x]No (Continued review of prior education)   If yes Education Provided:     Method of Education:     [x]Discussion

## 2021-10-21 ENCOUNTER — HOSPITAL ENCOUNTER (OUTPATIENT)
Dept: OCCUPATIONAL THERAPY | Age: 71
Setting detail: THERAPIES SERIES
Discharge: HOME | End: 2021-10-21
Payer: MEDICARE

## 2021-10-21 ENCOUNTER — HOSPITAL ENCOUNTER (OUTPATIENT)
Dept: PHYSICAL THERAPY | Age: 71
Setting detail: THERAPIES SERIES
Discharge: HOME | End: 2021-10-21
Payer: MEDICARE

## 2021-10-21 PROCEDURE — 97110 THERAPEUTIC EXERCISES: CPT

## 2021-10-21 PROCEDURE — 97530 THERAPEUTIC ACTIVITIES: CPT

## 2021-10-21 PROCEDURE — 97014 ELECTRIC STIMULATION THERAPY: CPT

## 2021-10-21 NOTE — PROGRESS NOTES
Phone: [facility]    Fax: [phone]                       Outpatient Occupational Therapy                 DAILY TREATMENT NOTE    Date: 10/21/2021  Patients Name:  Hung Thompson  YOB: 1950 (66 y.o.)  Gender:  female  MRN:  [id number]  [facility] #: [id number]  Medical Diagnosis: Weakness, R53.1; Falls, R29.6    Referring Practitioner: OLIVIA Reina CNP     INSURANCE  OT Insurance Information: Aetna Medicare - Advantage, Medicaid       Total # of Visits to Date: 6       PAIN  [x]No     []Yes      Location:   Pain Rating (0-10 pain scale):   Pain Description:     SUBJECTIVE     Trailed UD splint - patient to trial over weekend for improved [facility].           Flow Sheet   Exercise /   Manual treatment Weight/  Level Reps/Time Comments   K-tape     R hand index and long for MC and P1 alignment to improve FM; poor results. Egg Red- L  Green - R x30 B  and pinches to improve strength   Duckbill      B hands for instrinsic strengthening    Flexbar orange x30 Twist and bend to improve UE strength   Hand Master Blue ball  Yellow band x20 B hands digit flexion and extension to improve strength    Velcro board     B hands to improve FM + strength    Digi flex     B hands to improve  strength   Resistive clothespins red x20 B hand pinch to improve strength - retrieved and placed pom poms into container    Power web     MP flexion and extension to improve strength   Binder clips      B hands to open and place binder clips to edge of container. Max difficulty L hand noted, min R.     FMC/dexterity/acts x 15' Stringing beads B hand to improve FM. Completed c ulnar drift splint.  Improvement c FMC c splint                                                                                                              Modality Flow Sheet:   START STOP Tx Modality     10' Electrical Stim: 10 minutes B wrist/digits flexors/extensors - Brazilian, recip mode, 2 second ramp with 10/10 cycle time for improvement of strength as well as FMC/dexterity.          Ultrasound: ___ W/cm2 x ___ mins  Duty factor: __100%  __50%  __20% __10%  Head size:   MHz: __1mHz __2 mHz  __3mHz  Location:       Hot Pack:       Paraffin:       Cold Pack:                    GOALS   Time Frame for Long term goals : 6 weeks       Long term goal 1: Patient to state <40% impairment throughout daily tasks, as measured by the DASH. Continue []?????????Met  [x]????????? Partially met  []??????? ?? Not met   Long term goal 2: Patient to improve BUE strength grossly to 4/5 to improve independence. Continue        []?????????Met  [x]????????? Partially met  []??????? ?? Not met   Long term goal 3: Patient to state pain <2/10 at worst B shoulders to improve QOL. Continue []?????????Met  [x]????????? Partially met  []??????? ?? Not met   Long term goal 4: Patient to improve B FMC and dexterity, AEB ability to complete 9 hole peg test <33 seconds. Continue []?????????Met  [x]????????? Partially met  []??????? ?? Not met   Long term goal 5: Patient to improve B  >35#, 2 pt >3#, 3 pt > 7# and lateral >8#. Continue     Met B 2 pt []?????????Met  [x]????????? Partially met  []??????? ?? Not met   Time Frame for Short term goals: 4 weeks       Short term goal 1: Patient to be educated on and state compliance c HEP. Continue - States compliance [x]?????????Met  []????????? Partially met  []??????? ?? Not met   Short term goal 2: Patient to improve B FMC and dexterity AEB ability to complete 9 hole peg test <40 seconds. Continue []?????????Met  [x]????????? Partially met  []??????? ?? Not met   ADDITIONAL COMMENTS  Trialed UD splint R hand during functional task          EDUCATION  New Education provided to patient/family/caregiver:    [x]Yes:     []No (Continued review of prior education)   If yes Education Provided: Splint wear    Method of Education:     [x]Discussion     []Demonstration    [] Written     []Other  Evaluation of Patients Response to Education:         [x]Patient and or caregiver verbalized understanding  []Patient and or Caregiver Demonstrated without assistance   []Patient and or Caregiver Demonstrated with assistance  []Needs additional instruction to demonstrate understanding of education    ASSESSMENT  Patient tolerated todays treatment session:    [x] Good   []  Fair   []  Poor  Limitations/difficulties with treatment session due to:   []Pain     []Fatigue     []Other medical complications     []Other  Goal Assessment: [] No Change    [x]Improved    Minutes Tracking:  Time In: 9270  Time Out: 6835  Minutes: 40  Timed Code Treatment Minutes: 38 Minutes    PLAN  [x]Continue with current plan of care  [][facility]  []IHold per patient request  [] Change Treatment plan:  [] Insurance hold  __ Other        Electronically signed by SHYLA Moralez,  10/21/2021 12:14 PM

## 2021-10-21 NOTE — PROGRESS NOTES
Phone: [place]           Fax: [phone]                           Outpatient Physical Therapy                                                                            Daily Note    Patient: Amy Barrera : 1950  [facility] #: [id number]   Referring Practitioner:  KASANDRA Richardson    Referral Date : 21     Date: 10/21/2021    Diagnosis: Weakness, R52.1; Frequent falls, R29.6  Treatment Diagnosis: general weakness; difficulty walking    Onset Date: 21  PT Insurance Information: t Medicare  Total # of Visits Approved: 18 Per Physician Order  Total # of Visits to Date: 9  No Show: 0  Canceled Appointment: 0      Pre-Treatment Pain:  0/10  Subjective: Pt denies pain or any recent falls    Exercises:  Exercise 2: bike 10 mins 3.5  hills  Exercise 4: sink exercise   15x  Exercise 5: walking along island 4x no band  Exercise 6: Seated UBE 4/4  Exercise 8: FSU 6in 10x ea LE  Exercise 9: toe taps on steps 10x ea  Exercise 12: sit to stand 2x10 from chair with use of armrest             Assessment  Assessment: Pt denies pain or any recent falls. Pt safe walking with rolling walker, CGA needed without. Standing exercise completed for LE strengthening. Reviewed home safety. Activity Tolerance  Activity Tolerance: Patient Tolerated treatment well    Patient Education  Patient Education: HEP as tolerated  Pt verbalized/demonstrated good understanding:     [x] Yes         [] No, pt required further clarification. Post Treatment Pain:  0/10      Plan  Times per week: 3  Plan weeks: 6      Goals  (Total # of Visits to Date: 5)      Short term goals  Time Frame for Short term goals: 3 weeks  Short term goal 1: Pt to be instructed in home program.-met  Short term goal 2: Pt to complete TUG in less than 35.0 seconds indicating improved gait and stability.  -MET  Short term goal 3: Patient to tolerate 45 min of ther ex/act with minimal rest breaks to improve endurance. -met    Long term goals  Time Frame for Long term goals : 6 weeks  Long term goal 1: Pt to report independence and compliance with home program.  Long term goal 2: Pt to complete TUG in less than 20.0 seconds indicating improved gait. Long term goal 3: Pt to complete 6 sit to stands in 30 seconds with arms across chest indicating improved functional strength of LE's. -progressing  Long term goal 4: Pt to ambulate 6 mins with RW without sitting rest break indicating improved gait and endurance.     Minutes Tracking:  Time In: 1045  Time Out: 3297  Minutes: 42  Timed Code Treatment Minutes: [address] [facility] [address] [place]     Date: 10/21/2021

## 2021-10-27 ENCOUNTER — HOSPITAL ENCOUNTER (OUTPATIENT)
Dept: PHYSICAL THERAPY | Age: 71
Setting detail: THERAPIES SERIES
Discharge: HOME | End: 2021-10-27
Payer: MEDICARE

## 2021-10-27 ENCOUNTER — HOSPITAL ENCOUNTER (OUTPATIENT)
Dept: OCCUPATIONAL THERAPY | Age: 71
Setting detail: THERAPIES SERIES
Discharge: HOME | End: 2021-10-27
Payer: MEDICARE

## 2021-10-27 PROCEDURE — 97116 GAIT TRAINING THERAPY: CPT

## 2021-10-27 PROCEDURE — 97110 THERAPEUTIC EXERCISES: CPT

## 2021-10-27 PROCEDURE — 97014 ELECTRIC STIMULATION THERAPY: CPT

## 2021-10-27 PROCEDURE — 97530 THERAPEUTIC ACTIVITIES: CPT

## 2021-10-27 NOTE — PROGRESS NOTES
Phone: [place]           Fax: [phone]                           Outpatient Physical Therapy                                                                            Daily Note    Patient: Tr Mail : 1950  [facility] #: [id number]   Referring Practitioner:  KASANDRA Mathias    Referral Date : 21     Date: 10/27/2021    Diagnosis: Weakness, R52.1; Frequent falls, R29.6  Treatment Diagnosis: general weakness; difficulty walking    Onset Date: 21  PT Insurance Information: t Medicare  Total # of Visits Approved: 18 Per Physician Order  Total # of Visits to Date: 10  No Show: 0  Canceled Appointment: 1      Pre-Treatment Pain:  0/10  Subjective: Patient reports no complaints this date and is feeling well. Exercises:  Exercise 1: **HEP - sinks  Exercise 2: bike 10 mins 4.0  hills  Exercise 3: 4 laps with RW  Exercise 8: FSU 6in 10x ea LE  Exercise 9: toe taps on steps 10x ea  Exercise 12: sit to stand 2x10 from chair with use of armrest, then hands on knees    Assessment  Assessment: Functional testing revealed improvements this date: TU sec with FWW and no LOB or fatigue. Pt able to complete 6 sit/stands in 24seconds with hands on knees progressing toward LTG. Pt met goal for improved functional endurance with ability to walk for 7 minutes without a seated rest break. Will continue to progress toward remaining goals as able. Activity Tolerance  Activity Tolerance: Patient Tolerated treatment well    Patient Education  Exercise technique; progression toward goals    Pt verbalized/demonstrated good understanding:     [x] Yes         [] No, pt required further clarification.        Post Treatment Pain:  0/10      Plan  Times per week: 3  Plan weeks: 6      Goals  (Total # of Visits to Date: 10)      Short term goals  Time Frame for Short term goals: 3 weeks  Short term goal 1: Pt to be instructed in home program.-met  Short term goal 2: Pt to complete TUG in less than 35.0 seconds indicating improved gait and stability. -MET  Short term goal 3: Patient to tolerate 45 min of ther ex/act with minimal rest breaks to improve endurance. -met    Long term goals  Time Frame for Long term goals : 6 weeks  Long term goal 1: Pt to report independence and compliance with home program.  Long term goal 2: Pt to complete TUG in less than 20.0 seconds indicating improved gait. -met (16.0 sec)  Long term goal 3: Pt to complete 6 sit to stands in 30 seconds with arms across chest indicating improved functional strength of LE's. -progressing  Long term goal 4: Pt to ambulate 6 mins with RW without sitting rest break indicating improved gait and endurance. -met (7min)    Minutes Tracking:  Time In: 1300  Time Out: 1403 [facility]  Minutes: 46  Timed Code Treatment Minutes: 900 Patience Sierra, PT , DPT     Date: 10/27/2021

## 2021-10-27 NOTE — PROGRESS NOTES
Phone: [place]    Fax: [phone]                       Outpatient Occupational Therapy                 DAILY TREATMENT NOTE    Date: 10/27/2021  Patients Name:  Joe Ricardo  YOB: 1950 (66 y.o.)  Gender:  female  MRN:  [id number]  [facility] #: [id number]  Medical Diagnosis: Weakness, R53.1; Falls, R29.6    Referring Practitioner: OLIVIA Armendariz CNP     INSURANCE  OT Insurance Information: A[employer]na Medicare - Advantage, Medicaid       Total # of Visits to Date: 15       PAIN  [x]No     []Yes      Location:   Pain Rating (0-10 pain scale):   Pain Description:     SUBJECTIVE   Patient states that she wore the UD splint over the weekend, seemed to help with all FM except c writing.                Flow Sheet   Exercise /   Manual treatment Weight/  Level Reps/Time Comments   K-tape     R hand index and long for MC and P1 alignment to improve FM; poor results. Egg Red- L  Green - R x30 B  and pinches to improve strength   Duckbill      B hands for instrinsic strengthening    Flexbar orange x30 Twist and bend to improve UE strength   Hand Master Blue ball  Yellow band x20 B hands digit flexion and extension to improve strength    Velcro board     B hands to improve FM + strength    Digi flex     B hands to improve  strength   Resistive clothespins red x20 B hand pinch to improve strength - retrieved and placed pom poms into container    Power web     MP flexion and extension to improve strength   Binder clips      B hands to open and place binder clips to edge of container. Max difficulty L hand noted, min R.     FMC/dexterity/acts x 15' Completed all FM tasks c use of R hand UD splint    B hands - Punching pre cut circles out of thin cardstocks to improve [facility] and dexterity skills. Min difficulty. B hands - Marbles in/out of pop it c large tweezers.  Mod difficulty.                                                                                                              Modality Flow Sheet:   START STOP Tx Modality     10' Electrical Stim: 10 minutes B wrist/digits flexors/extensors - [place]i, recip mode, 2 second ramp with 10/10 cycle time for improvement of strength as well as FMC/dexterity.          Ultrasound: ___ W/cm2 x ___ mins  Duty factor: __100%  __50%  __20% __10%  Head size:   MHz: __1mHz __2 mHz  __3mHz  Location:       Hot Pack:       Paraffin:       Cold Pack:                    GOALS   Time Frame for Long term goals : 6 weeks       Long term goal 1: Patient to state <40% impairment throughout daily tasks, as measured by the DASH. Continue []??????????Met  [x]?????????? Partially met  []???????? ?? Not met   Long term goal 2: Patient to improve BUE strength grossly to 4/5 to improve independence. Continue        []??????????Met  [x]?????????? Partially met  []???????? ?? Not met   Long term goal 3: Patient to state pain <2/10 at worst B shoulders to improve QOL. Continue []??????????Met  [x]?????????? Partially met  []???????? ?? Not met   Long term goal 4: Patient to improve B FMC and dexterity, AEB ability to complete 9 hole peg test <33 seconds. Continue []??????????Met  [x]?????????? Partially met  []???????? ?? Not met   Long term goal 5: Patient to improve B  >35#, 2 pt >3#, 3 pt > 7# and lateral >8#. Continue     Met B 2 pt []??????????Met  [x]?????????? Partially met  []???????? ?? Not met   Time Frame for Short term goals: 4 weeks       Short term goal 1: Patient to be educated on and state compliance c HEP. Continue - States compliance [x]??????????Met  []?????????? Partially met  []???????? ?? Not met   Short term goal 2: Patient to improve B FMC and dexterity AEB ability to complete 9 hole peg test <40 seconds. Continue []??????????Met  [x]?????????? Partially met  []???????? ?? Not met   ADDITIONAL COMMENTS           EDUCATION  New Education provided to patient/family/caregiver:    []Yes:     [x]No (Continued review of prior education)   If yes Education Provided:     Method of Education:     [x]Discussion     []Demonstration    [] Written     []Other  Evaluation of Patients Response to Education:         [x]Patient and or caregiver verbalized understanding  []Patient and or Caregiver Demonstrated without assistance   []Patient and or Caregiver Demonstrated with assistance  []Needs additional instruction to demonstrate understanding of education    ASSESSMENT  Patient tolerated todays treatment session:    [x] Good   []  Fair   []  Poor  Limitations/difficulties with treatment session due to:   []Pain     []Fatigue     []Other medical complications     []Other  Goal Assessment: [] No Change    [x]Improved        Minutes Tracking:  Time In: 8307  Time Out: 1430  Minutes: 43  Timed Code Treatment Minutes: 41 Minutes      PLAN  [x]Continue with current plan of care  [][facility]  []IHold per patient request  [] Change Treatment plan:  [] Insurance hold  __ Other        Electronically signed by SHYLA Jules,  10/27/2021 4:45 PM

## 2021-10-29 ENCOUNTER — HOSPITAL ENCOUNTER (OUTPATIENT)
Dept: PHYSICAL THERAPY | Age: 71
Setting detail: THERAPIES SERIES
Discharge: HOME | End: 2021-10-29
Payer: MEDICARE

## 2021-10-29 ENCOUNTER — HOSPITAL ENCOUNTER (OUTPATIENT)
Dept: OCCUPATIONAL THERAPY | Age: 71
Setting detail: THERAPIES SERIES
Discharge: HOME | End: 2021-10-29
Payer: MEDICARE

## 2021-10-29 PROCEDURE — 97530 THERAPEUTIC ACTIVITIES: CPT

## 2021-10-29 PROCEDURE — 97110 THERAPEUTIC EXERCISES: CPT

## 2021-10-29 PROCEDURE — 97014 ELECTRIC STIMULATION THERAPY: CPT

## 2021-10-29 NOTE — PROGRESS NOTES
Phone: [place]    Fax: [phone]                       Outpatient Occupational Therapy                 DAILY TREATMENT NOTE    Date: 10/29/2021  Patients Name:  Rodri Mas  YOB: 1950 (66 y.o.)  Gender:  female  MRN:  [id number]  [facility] #: [id number]  Medical Diagnosis: Weakness, R53.1; Falls, R29.6    Referring Practitioner: OLIVIA Barth CNP     INSURANCE  OT Insurance Information: A[employer]na Medicare - Advantage, Medicaid       Total # of Visits to Date: 15       PAIN  [x]No     []Yes      Location:   Pain Rating (0-10 pain scale):   Pain Description:     SUBJECTIVE    Patient reduced to 2x/week.                 Flow Sheet   Exercise /   Manual treatment Weight/  Level Reps/Time Comments   K-tape     R hand index and long for MC and P1 alignment to improve FM; poor results. Egg Red- L  Green - R x30 B  and pinches to improve strength   Duckbill      B hands for instrinsic strengthening    Flexbar orange x30 Twist and bend to improve UE strength   Hand Master Blue ball  Yellow band x20 B hands digit flexion and extension to improve strength    Velcro board     B hands to improve FM + strength    Digi flex     B hands to improve  strength   Resistive clothespins   B hand pinch to improve strength - retrieved and placed pom poms into container    Power web     MP flexion and extension to improve strength   Binder clips      B hands to open and place binder clips to edge of container. Max difficulty L hand noted, min R.     FMC/dexterity/acts x 15' Completed all FM tasks c use of R hand UD splint     B hands - Retreival and placement of small magnetic dots onto board and into corresponding slots to improve [address] and coordination.  Min difficulty.                                                                                                                   Modality Flow Sheet:   START STOP Tx Modality     10' Electrical Stim: 10 minutes B wrist/digits flexors/extensors - [place], recip mode, 2 second ramp with 10/10 cycle time for improvement of strength as well as FMC/dexterity.          Ultrasound: ___ W/cm2 x ___ mins  Duty factor: __100%  __50%  __20% __10%  Head size:   MHz: __1mHz __2 mHz  __3mHz  Location:       Hot Pack:       Paraffin:       Cold Pack:                    GOALS   Time Frame for Long term goals : 6 weeks       Long term goal 1: Patient to state <40% impairment throughout daily tasks, as measured by the DASH. Continue []???????????Met  [x]??????????? Partially met  []????????? ?? Not met   Long term goal 2: Patient to improve BUE strength grossly to 4/5 to improve independence. Continue        []???????????Met  [x]??????????? Partially met  []????????? ?? Not met   Long term goal 3: Patient to state pain <2/10 at worst B shoulders to improve QOL. Continue []???????????Met  [x]??????????? Partially met  []????????? ?? Not met   Long term goal 4: Patient to improve B FMC and dexterity, AEB ability to complete 9 hole peg test <33 seconds. Continue []???????????Met  [x]??????????? Partially met  []????????? ?? Not met   Long term goal 5: Patient to improve B  >35#, 2 pt >3#, 3 pt > 7# and lateral >8#. Continue     Met B 2 pt []???????????Met  [x]??????????? Partially met  []????????? ?? Not met   Time Frame for Short term goals: 4 weeks       Short term goal 1: Patient to be educated on and state compliance c HEP. Continue - States compliance [x]???????????Met  []??????????? Partially met  []????????? ?? Not met   Short term goal 2: Patient to improve B FMC and dexterity AEB ability to complete 9 hole peg test <40 seconds. Continue []???????????Met  [x]??????????? Partially met  []????????? ?? Not met   ADDITIONAL COMMENTS            EDUCATION  New Education provided to patient/family/caregiver:    []Yes:     [x]No (Continued review of prior education)   If yes Education Provided:     Method of Education:     [x]Discussion     []Demonstration    [] Written     []Other  Evaluation of Patients Response to Education:         [x]Patient and or caregiver verbalized understanding  []Patient and or Caregiver Demonstrated without assistance   []Patient and or Caregiver Demonstrated with assistance  []Needs additional instruction to demonstrate understanding of education    ASSESSMENT  Patient tolerated todays treatment session:    [x] Good   []  Fair   []  Poor  Limitations/difficulties with treatment session due to:   []Pain     []Fatigue     []Other medical complications     []Other  Goal Assessment: [] No Change    [x]Improved    Minutes Tracking:  Time In: 8514  Time Out: 4457  Minutes: 44  Timed Code Treatment Minutes: 42 Minutes    PLAN  []Continue with current plan of care  [][facility]  []IHold per patient request  [] Change Treatment plan:  [] Insurance hold  _x_ Other: reduce to 2x/week for 2 weeks        Electronically signed by ERICA Soler/L,  10/29/2021 12:23 PM

## 2021-11-01 ENCOUNTER — HOSPITAL ENCOUNTER (OUTPATIENT)
Dept: PHYSICAL THERAPY | Age: 71
Setting detail: THERAPIES SERIES
Discharge: HOME | End: 2021-11-01
Payer: MEDICARE

## 2021-11-01 ENCOUNTER — HOSPITAL ENCOUNTER (OUTPATIENT)
Dept: OCCUPATIONAL THERAPY | Age: 71
Setting detail: THERAPIES SERIES
Discharge: HOME | End: 2021-11-01
Payer: MEDICARE

## 2021-11-01 PROCEDURE — 97014 ELECTRIC STIMULATION THERAPY: CPT

## 2021-11-01 PROCEDURE — 97530 THERAPEUTIC ACTIVITIES: CPT

## 2021-11-01 PROCEDURE — 97110 THERAPEUTIC EXERCISES: CPT

## 2021-11-01 NOTE — PROGRESS NOTES
mode, 2 second ramp with 10/10 cycle time for improvement of strength as well as FMC/dexterity.          Ultrasound: ___ W/cm2 x ___ mins  Duty factor: __100%  __50%  __20% __10%  Head size:   MHz: __1mHz __2 mHz  __3mHz  Location:       Hot Pack:       Paraffin:       Cold Pack:                    GOALS   Time Frame for Long term goals : 6 weeks       Long term goal 1: Patient to state <40% impairment throughout daily tasks, as measured by the DASH. Continue []????????????Met  [x]???????????? Partially met  []?????????? ?? Not met   Long term goal 2: Patient to improve BUE strength grossly to 4/5 to improve independence. Continue        []????????????Met  [x]???????????? Partially met  []?????????? ?? Not met   Long term goal 3: Patient to state pain <2/10 at worst B shoulders to improve QOL. Continue []????????????Met  [x]???????????? Partially met  []?????????? ?? Not met   Long term goal 4: Patient to improve B FMC and dexterity, AEB ability to complete 9 hole peg test <33 seconds. Continue []????????????Met  [x]???????????? Partially met  []?????????? ?? Not met   Long term goal 5: Patient to improve B  >35#, 2 pt >3#, 3 pt > 7# and lateral >8#. Continue     Met B 2 pt []????????????Met  [x]???????????? Partially met  []?????????? ?? Not met   Time Frame for Short term goals: 4 weeks       Short term goal 1: Patient to be educated on and state compliance c HEP. Met [x]????????????Met  []???????????? Partially met  []?????????? ?? Not met   Short term goal 2: Patient to improve B FMC and dexterity AEB ability to complete 9 hole peg test <40 seconds. Met  R: 39.7 seconds  L: 34.3 [x]????????????Met  []???????????? Partially met  []?????????? ?? Not met   ADDITIONAL COMMENTS            EDUCATION  New Education provided to patient/family/caregiver:    []Yes:     [x]No (Continued review of prior education)   If yes Education Provided:     Method of Education:     [x]Discussion     []Demonstration    [] Written []Other  Evaluation of Patients Response to Education:         [x]Patient and or caregiver verbalized understanding  []Patient and or Caregiver Demonstrated without assistance   []Patient and or Caregiver Demonstrated with assistance  []Needs additional instruction to demonstrate understanding of education    ASSESSMENT  Patient tolerated todays treatment session:    [x] Good   []  Fair   []  Poor  Limitations/difficulties with treatment session due to:   []Pain     []Fatigue     []Other medical complications     []Other  Goal Assessment: [] No Change    [x]Improved      Therapists observations or comments: improved 9 hole peg score       Minutes Tracking:  Time In: 7555  Time Out: 1412  Minutes: 49  Timed Code Treatment Minutes: 45 Minutes      PLAN  [x]Continue with current plan of care  [][facility]  []IHold per patient request  [] Change Treatment plan:  [] Insurance hold  __ Other        Electronically signed by SHYLA Waggoner,  11/1/2021 1:59 PM

## 2021-11-01 NOTE — PROGRESS NOTES
Phone: [place]           Fax: [phone]                           Outpatient Physical Therapy                                                                            Daily Note    Patient: Sylvia Roberts : 1950  CSN #: [id number]   Referring Practitioner:  KASANDRA Reece    Referral Date : 21     Date: 10/29/2021    Diagnosis: Weakness, R52.1; Frequent falls, R29.6  Treatment Diagnosis: general weakness; difficulty walking    Onset Date: 21  PT Insurance Information: [employer] Medicare  Total # of Visits Approved: 18 Per Physician Order  Total # of Visits to Date: 11  No Show: 0  Canceled Appointment: 1      Pre-Treatment Pain:  0/10  Subjective: Doing well. No recent falls. Exercises:  Exercise 2: bike 10 mins 4.0  hills  Exercise 3: 4 laps with RW  Exercise 5: walking along island 4x no band  Exercise 6: Seated UBE 4/4  Exercise 8: FSU 6in 10x ea LE  Exercise 9: toe taps on steps 10x ea  Exercise 12: sit to stand 2x10 from chair with use of armrest, then hands on knees      Assessment  Assessment: Pt is progressing well. Strength is gradually improving. Performs standing ex SBA with no LOB noted. Will continue to progress as able. Activity Tolerance  Activity Tolerance: Patient Tolerated treatment well    Patient Education  Patient Education: continue  Pt verbalized/demonstrated good understanding:     [x] Yes         [] No, pt required further clarification. Post Treatment Pain:  0/10      Plan  Times per week: 3  Plan weeks: 6      Goals  (Total # of Visits to Date: 6)      Short term goals  Time Frame for Short term goals: 3 weeks  Short term goal 1: Pt to be instructed in home program.-met  Short term goal 2: Pt to complete TUG in less than 35.0 seconds indicating improved gait and stability. -MET  Short term goal 3: Patient to tolerate 45 min of ther ex/act with minimal rest breaks to improve endurance. -met    Long term goals  Time Frame for Long term goals : 6 weeks  Long term goal 1: Pt to report independence and compliance with home program.  Long term goal 2: Pt to complete TUG in less than 20.0 seconds indicating improved gait. -met (16.0 sec)  Long term goal 3: Pt to complete 6 sit to stands in 30 seconds with arms across chest indicating improved functional strength of LE's. -progressing  Long term goal 4: Pt to ambulate 6 mins with RW without sitting rest break indicating improved gait and endurance. -met (7min)    Minutes Tracking:  Time In: 1130  Time Out: 1213  Minutes: 43  Timed Code Treatment Minutes: [address] PT , DPT, CMPT      Date: 10/29/2021

## 2021-11-01 NOTE — PROGRESS NOTES
Phone: [place]           Fax: [phone]                           Outpatient Physical Therapy                                                                            Daily Note    Patient: Goran Blackwell : 1950  [facility] #: [id number]   Referring Practitioner:  KASANDRA Lizama    Referral Date : 21     Date: 2021    Diagnosis: Weakness, R52.1; Frequent falls, R29.6  Treatment Diagnosis: general weakness; difficulty walking    Onset Date: 21  PT Insurance Information: t Medicare  Total # of Visits Approved: 18 Per Physician Order  Total # of Visits to Date: 12  No Show: 0  Canceled Appointment: 1      Pre-Treatment Pain:  0/10  Subjective: States she been feeling pretty good. States she has a little ache in her Left ankle. Pt denies any recent falls    Exercises:  Exercise 2: bike 10 mins 4.0  hills  Exercise 3: 4 laps with RW  Exercise 5: walking along island 4x no band  Exercise 6: Seated UBE 4/4  Exercise 7: BTB rows/ext 20x ea--seated today  Exercise 8: FSU 6in 15x ea LE  Exercise 9: toe taps on steps 10x ea  Exercise 10: GTB hip ABD at counter 10x ea  Exercise 12: sit to stand 2x10 from chair with use of armrest, then hands on knees             Assessment  Assessment: Pt is feeling a little stronger. No recent falls reported. Pt continues to use walker for safety. HEP reviewed    Activity Tolerance  Activity Tolerance: Patient Tolerated treatment well    Patient Education  Patient Education: HEP as tolerated  Pt verbalized/demonstrated good understanding:     [x] Yes         [] No, pt required further clarification.        Post Treatment Pain:  0/10      Plan  Times per week: 3  Plan weeks: 6      Goals  (Total # of Visits to Date: 15)      Short term goals  Time Frame for Short term goals: 3 weeks  Short term goal 1: Pt to be instructed in home program.-met  Short term goal 2: Pt to complete TUG in less than 35.0 seconds indicating improved gait and stability. -MET  Short term goal 3: Patient to tolerate 45 min of ther ex/act with minimal rest breaks to improve endurance. -met    Long term goals  Time Frame for Long term goals : 6 weeks  Long term goal 1: Pt to report independence and compliance with home program.  Long term goal 2: Pt to complete TUG in less than 20.0 seconds indicating improved gait. -met (16.0 sec)  Long term goal 3: Pt to complete 6 sit to stands in 30 seconds with arms across chest indicating improved functional strength of LE's. -progressing  Long term goal 4: Pt to ambulate 6 mins with RW without sitting rest break indicating improved gait and endurance. -met (7min)    Minutes Tracking:  Time In: 9794  Time Out: 1500  Minutes: 45  Timed Code Treatment Minutes: 44 Minutes         James Vizcaino     Date: 11/1/2021

## 2021-11-03 ENCOUNTER — APPOINTMENT (OUTPATIENT)
Dept: OCCUPATIONAL THERAPY | Age: 71
End: 2021-11-03
Payer: MEDICARE

## 2021-11-03 ENCOUNTER — HOSPITAL ENCOUNTER (OUTPATIENT)
Dept: PHYSICAL THERAPY | Age: 71
Setting detail: THERAPIES SERIES
Discharge: HOME | End: 2021-11-03
Payer: MEDICARE

## 2021-11-03 PROCEDURE — 97110 THERAPEUTIC EXERCISES: CPT

## 2021-11-05 ENCOUNTER — HOSPITAL ENCOUNTER (OUTPATIENT)
Dept: OCCUPATIONAL THERAPY | Age: 71
Setting detail: THERAPIES SERIES
Discharge: HOME | End: 2021-11-05
Payer: MEDICARE

## 2021-11-05 ENCOUNTER — HOSPITAL ENCOUNTER (OUTPATIENT)
Dept: PHYSICAL THERAPY | Age: 71
Setting detail: THERAPIES SERIES
Discharge: HOME | End: 2021-11-05
Payer: MEDICARE

## 2021-11-05 PROCEDURE — 97110 THERAPEUTIC EXERCISES: CPT

## 2021-11-05 PROCEDURE — 97530 THERAPEUTIC ACTIVITIES: CPT

## 2021-11-05 PROCEDURE — 97014 ELECTRIC STIMULATION THERAPY: CPT

## 2021-11-05 NOTE — PROGRESS NOTES
Phone: [phone]                 [facility]    Fax: [phone]                       Outpatient Occupational Therapy                 DAILY TREATMENT NOTE    Date: 11/5/2021  Patients Name:  Camila Solorzano  YOB: 1950 (66 y.o.)  Gender:  female  MRN:  [id number]  [facility] #: [id number]  Medical Diagnosis: Weakness, R53.1; Falls, R29.6    Referring Practitioner: OLIVIA Lara CNP     INSURANCE  OT Insurance Information: Aetna Medicare - Advantage, Medicaid       Total # of Visits to Date: 13       PAIN  [x]No     []Yes      Location:   Pain Rating (0-10 pain scale):   Pain Description:     SUBJECTIVE    Patient presents c splint this date.                 Flow Sheet   Exercise /   Manual treatment Weight/  Level Reps/Time Comments   K-tape     R hand index and long for MC and P1 alignment to improve FM; poor results. Egg Red- L  Green - R x30 B  and pinches to improve strength   Duckbill Red egg x20  B hands for instrinsic strengthening    Flexbar orange x30 Twist and bend to improve UE strength   Hand Master Blue ball  Yellow band x20 B hands digit flexion and extension to improve strength    Velcro board     B hands to improve FM + strength    Digi flex     B hands to improve  strength   Resistive clothespins     B hand pinch to improve strength - retrieved and placed pom poms into container    Power web     MP flexion and extension to improve strength   Binder clips      B hands to open and place binder clips to edge of container. Max difficulty L hand noted, min R.     FMC/dexterity/acts x 15' Taking apart and putting together pop beads to improve FM as well as B hands strength. Min difficulty. Simulated med management; patient opened pill organizer as well as 2 pill bottles and s/u medications to improve [facility]. Patient then removed from organizer and placed back into containers. Min difficulty.     Retrieval and placement of Marbles into pop it B hands to improve FM skills c min difficulty c placement. Removal of marbles from pop it B hands to improve [address] and skills - min difficulty c L, mod difficulty c R.                                                                                                                 Modality Flow Sheet:   START STOP Tx Modality     10' Electrical Stim: 10 minutes B wrist/digits flexors/extensors - [place], recip mode, 2 second ramp with 10/10 cycle time for improvement of strength as well as FMC/dexterity.          Ultrasound: ___ W/cm2 x ___ mins  Duty factor: __100%  __50%  __20% __10%  Head size:   MHz: __1mHz __2 mHz  __3mHz  Location:       Hot Pack:       Paraffin:       Cold Pack:                    GOALS   Time Frame for Long term goals : 6 weeks       Long term goal 1: Patient to state <40% impairment throughout daily tasks, as measured by the DASH. Continue []?????????????Met  [x]????????????? Partially met  []??????????? ?? Not met   Long term goal 2: Patient to improve BUE strength grossly to 4/5 to improve independence. Continue        []?????????????Met  [x]????????????? Partially met  []??????????? ?? Not met   Long term goal 3: Patient to state pain <2/10 at worst B shoulders to improve QOL. Continue []?????????????Met  [x]????????????? Partially met  []??????????? ?? Not met   Long term goal 4: Patient to improve B FMC and dexterity, AEB ability to complete 9 hole peg test <33 seconds. Continue []?????????????Met  [x]????????????? Partially met  []??????????? ?? Not met   Long term goal 5: Patient to improve B  >35#, 2 pt >3#, 3 pt > 7# and lateral >8#. Continue     Met B 2 pt []?????????????Met  [x]????????????? Partially met  []??????????? ?? Not met   Time Frame for Short term goals: 4 weeks       Short term goal 1: Patient to be educated on and state compliance c HEP. Met [x]?????????????Met  []????????????? Partially met  []??????????? ?? Not met   Short term goal 2: Patient to improve B [address] and dexterity AEB ability to complete 9 hole peg test <40 seconds. Met   [x]?????????????Met  []????????????? Partially met  []??????????? ?? Not met   ADDITIONAL COMMENTS            EDUCATION  New Education provided to patient/family/caregiver:    []Yes:     [x]No (Continued review of prior education)   If yes Education Provided:     Method of Education:     [x]Discussion     []Demonstration    [] Written     []Other  Evaluation of Patients Response to Education:         [x]Patient and or caregiver verbalized understanding  []Patient and or Caregiver Demonstrated without assistance   []Patient and or Caregiver Demonstrated with assistance  []Needs additional instruction to demonstrate understanding of education    ASSESSMENT  Patient tolerated todays treatment session:    [x] Good   []  Fair   []  Poor  Limitations/difficulties with treatment session due to:   []Pain     []Fatigue     []Other medical complications     []Other  Goal Assessment: [] No Change    [x]Improved     Minutes Tracking:  Time In: 1050  Time Out: 1895  Minutes: 41  Timed Code Treatment Minutes: 39 Minutes      PLAN  [x]Continue with current plan of care  []Medical [facility]  []IHold per patient request  [] Change Treatment plan:  [] Insurance hold  __ Other        Electronically signed by SHYLA Harding,  11/5/2021 1:23 PM

## 2021-11-05 NOTE — PROGRESS NOTES
Phone: [facility]           Fax: [phone]                           Outpatient Physical Therapy                                                                            Daily Note    Patient: Valentino Radon : 1950  CSN #: [id number]   Referring Practitioner:  KASANDRA Ureña    Referral Date : 21     Date: 11/3/2021    Diagnosis: Weakness, R52.1; Frequent falls, R29.6  Treatment Diagnosis: general weakness; difficulty walking    Onset Date: 21  PT Insurance Information: t Medicare  Total # of Visits Approved: 18 Per Physician Order  Total # of Visits to Date: 13  No Show: 0  Canceled Appointment: 1      Pre-Treatment Pain:  0/10  Subjective: Pt with no new complaints. Not having any pain this date. No recent falls. Exercises:  Exercise 2: bike 10 mins 4.0  hills  Exercise 3: 4 laps with RW  Exercise 4: sink exercise   15x  Exercise 5: walking along island 4x no band  Exercise 8: FSU 6in 15x ea LE  Exercise 9: toe taps on steps 10x ea  Exercise 12: sit to stand 2x10 from chair with use of armrest, then hands on knees      Assessment  Assessment: Pt able to completed 8 sit to stands with arms across chest in 30 seconds this date. Progressing toward long term goals. Will continue. Activity Tolerance  Activity Tolerance: Patient Tolerated treatment well    Patient Education  Patient Education: progressing toward long term goals  Pt verbalized/demonstrated good understanding:     [x] Yes         [] No, pt required further clarification. Post Treatment Pain:  0/10      Plan  Times per week: 3  Plan weeks: 6      Goals  (Total # of Visits to Date: 15)      Short term goals  Time Frame for Short term goals: 3 weeks  Short term goal 1: Pt to be instructed in home program.-met  Short term goal 2: Pt to complete TUG in less than 35.0 seconds indicating improved gait and stability.  -MET  Short term goal 3: Patient to tolerate 45 min of ther ex/act with minimal rest breaks to improve endurance. -met    Long term goals  Time Frame for Long term goals : 6 weeks  Long term goal 1: Pt to report independence and compliance with home program.  Long term goal 2: Pt to complete TUG in less than 20.0 seconds indicating improved gait. -met (16.0 sec)  Long term goal 3: Pt to complete 6 sit to stands in 30 seconds with arms across chest indicating improved functional strength of LE's. -progressing  Long term goal 4: Pt to ambulate 6 mins with RW without sitting rest break indicating improved gait and endurance. -met (7min)    Minutes Tracking:  Time In: 1030  Time Out: 1116  Minutes: 46  Timed Code Treatment Minutes: [address] [place], PT , DPT, CMPT      Date: 11/3/2021

## 2021-11-08 ENCOUNTER — APPOINTMENT (OUTPATIENT)
Dept: OCCUPATIONAL THERAPY | Age: 71
End: 2021-11-08
Payer: MEDICARE

## 2021-11-08 ENCOUNTER — HOSPITAL ENCOUNTER (OUTPATIENT)
Dept: PHYSICAL THERAPY | Age: 71
Setting detail: THERAPIES SERIES
Discharge: HOME | End: 2021-11-08
Payer: MEDICARE

## 2021-11-08 PROCEDURE — 97110 THERAPEUTIC EXERCISES: CPT

## 2021-11-08 NOTE — PROGRESS NOTES
Phone: [facility]           Fax: [phone]                           Outpatient Physical Therapy                                                                            Daily Note    Patient: Goran Blackwell : 1950  [facility] #: [id number]   Referring Practitioner:  KASANDRA Lizama    Referral Date : 21     Date: 2021    Diagnosis: Weakness, R52.1; Frequent falls, R29.6  Treatment Diagnosis: general weakness; difficulty walking    Onset Date: 21  PT Insurance Information: t Medicare  Total # of Visits Approved: 18 Per Physician Order  Total # of Visits to Date: 14  No Show: 0  Canceled Appointment: 1      Pre-Treatment Pain:  0/10  Subjective: Pt with no new complaints this date. States legs felt pretty good following last session. Exercises:  Exercise 2: bike 10 mins 4.0  hills  Exercise 3: 4 laps with RW  Exercise 4: sink exercise   15x  Exercise 8: FSU 6in 15x ea LE  Exercise 9: toe taps on steps 10x ea  Exercise 12: sit to stand 2x10 from chair with use of armrest, then hands on knees      Assessment  Assessment: Pt progressing well. Strength of bilateral LE's is gradually improving. Continues to require CG/SBA during ex for safety. Will continue to progress as pt tolerates. Activity Tolerance  Activity Tolerance: Patient Tolerated treatment well    Patient Education  Patient Education: continue with HEP  Pt verbalized/demonstrated good understanding:     [x] Yes         [] No, pt required further clarification. Post Treatment Pain:  0/10      Plan  Times per week: 3  Plan weeks: 6      Goals  (Total # of Visits to Date: 15)      Short term goals  Time Frame for Short term goals: 3 weeks  Short term goal 1: Pt to be instructed in home program.-met  Short term goal 2: Pt to complete TUG in less than 35.0 seconds indicating improved gait and stability.  -MET  Short term goal 3: Patient to tolerate 45 min of ther ex/act with minimal rest breaks to improve endurance. -met    Long term goals  Time Frame for Long term goals : 6 weeks  Long term goal 1: Pt to report independence and compliance with home program.  Long term goal 2: Pt to complete TUG in less than 20.0 seconds indicating improved gait. -met (16.0 sec)  Long term goal 3: Pt to complete 6 sit to stands in 30 seconds with arms across chest indicating improved functional strength of LE's. -progressing  Long term goal 4: Pt to ambulate 6 mins with RW without sitting rest break indicating improved gait and endurance. -met (7min)    Minutes Tracking:  Time In: 1130  Time Out: 1214  Minutes: 44  Timed Code Treatment Minutes: 175 Nima Hernandez PT , DPT, CMPT      Date: 11/5/2021

## 2021-11-10 ENCOUNTER — HOSPITAL ENCOUNTER (OUTPATIENT)
Dept: PHYSICAL THERAPY | Age: 71
Setting detail: THERAPIES SERIES
Discharge: HOME | End: 2021-11-10
Payer: MEDICARE

## 2021-11-10 ENCOUNTER — HOSPITAL ENCOUNTER (OUTPATIENT)
Dept: OCCUPATIONAL THERAPY | Age: 71
Setting detail: THERAPIES SERIES
Discharge: HOME | End: 2021-11-10
Payer: MEDICARE

## 2021-11-10 PROCEDURE — 97110 THERAPEUTIC EXERCISES: CPT

## 2021-11-10 NOTE — PROGRESS NOTES
Phone: [facility]    Fax: [phone]                       Outpatient Occupational Therapy                 DAILY TREATMENT NOTE    Date: 11/10/2021  Patients Name:  Syed Bryant  YOB: 1950 (66 y.o.)  Gender:  female  MRN:  [id number]  [facility] #: [id number]  Medical Diagnosis: Weakness, R53.1; Falls, R29.6    Referring Practitioner: OLIVIA Penn - CNP     INSURANCE  OT Insurance Information: Aetna Medicare - Advantage, Medicaid       Total # of Visits to Date: 12       PAIN  []No     [x]Yes      Location: B shoulder, L > R  Pain Rating (0-10 pain scale):  7/10  Pain Description:     SUBJECTIVE   Patient had previously reported min pain c shoulders c pushing walker, however, states 7/10 this date. Patient states that L side is radiating up to neck.              Flow Sheet   Exercise /   Manual treatment Weight/  Level Reps/Time Comments   K-tape     R hand index and long for MC and P1 alignment to improve FM; poor results. Egg Red- L  Green - R x20-30 B  and pinches to improve strength   Duckbill    B hands for instrinsic strengthening    Flexbar   Twist and bend to improve UE strength   Hand Master   B hands digit flexion and extension to improve strength    Velcro board     B hands to improve FM + strength    Digi flex     B hands to improve  strength   Resistive clothespins x   8' B hand pinch to improve strength - completed red, green and blue    Power web     MP flexion and extension to improve strength   Binder clips      B hands to open and place binder clips to edge of container. Max difficulty L hand noted, min R.     FMC/dexterity/acts   Taking apart and putting together pop beads to improve FM as well as B hands strength. Min difficulty.     Simulated med management; patient opened pill organizer as well as 2 pill bottles and s/u medications to improve [address]. Patient then removed from organizer and placed back into containers.  Yenny Winters difficulty.     Retrieval and placement of Marbles into pop it B hands to improve FM skills c min difficulty c placement. Removal of marbles from pop it B hands to improve [facility] and skills - min difficulty c L, mod difficulty c R.       Pulleys x x3' each B shoulders c flexion and abduction for muscle warm up    T-band Green x10  B shoulder flexion/extension, IR/ER and abduciton                                                                                          Modality Flow Sheet:   START STOP Tx Modality     Electrical Stim: 10 minutes B wrist/digits flexors/extensors - [place], recip mode, 2 second ramp with 10/10 cycle time for improvement of strength as well as FMC/dexterity.          Ultrasound: ___ W/cm2 x ___ mins  Duty factor: __100%  __50%  __20% __10%  Head size:   MHz: __1mHz __2 mHz  __3mHz  Location:       Hot Pack:       Paraffin:       Cold Pack:                    GOALS   Time Frame for Long term goals : 6 weeks       Long term goal 1: Patient to state <40% impairment throughout daily tasks, as measured by the DASH. Continue []??????????????Met  [x]?????????????? Partially met  []???????????? ?? Not met   Long term goal 2: Patient to improve BUE strength grossly to 4/5 to improve independence. Continue        []??????????????Met  [x]?????????????? Partially met  []???????????? ?? Not met   Long term goal 3: Patient to state pain <2/10 at worst B shoulders to improve QOL. Continue []??????????????Met  [x]?????????????? Partially met  []???????????? ?? Not met   Long term goal 4: Patient to improve B FMC and dexterity, AEB ability to complete 9 hole peg test <33 seconds. Continue []??????????????Met  [x]?????????????? Partially met  []???????????? ?? Not met   Long term goal 5: Patient to improve B  >35#, 2 pt >3#, 3 pt > 7# and lateral >8#. Continue - see below   Met B 2 pt, B 3 pt and R lateral []??????????????Met  [x]?????????????? Partially met  []???????????? ?? Not met   Time Frame for Short term goals: 4 weeks       Short term goal 1: Patient to be educated on and state compliance c HEP. Met [x]??????????????Met  []?????????????? Partially met  []???????????? ?? Not met   Short term goal 2: Patient to improve B FMC and dexterity AEB ability to complete 9 hole peg test <40 seconds. Met    [x]??????????????Met  []?????????????? Partially met  []???????????? ?? Not met   ADDITIONAL COMMENTS  OTR adjusted splint - added padding to dorsal MCP jt and replaced straps.           /PINCH STRENGTH  (measured in #) RIGHT LEFT    26 26   2 pt pinch 5 5   3 pt pinch 7 7   Key/lateral pinch 8 7         EDUCATION  New Education provided to patient/family/caregiver:    []Yes:     [x]No (Continued review of prior education)   If yes Education Provided:     Method of Education:     [x]Discussion     []Demonstration    [] Written     []Other  Evaluation of Patients Response to Education:         [x]Patient and or caregiver verbalized understanding  []Patient and or Caregiver Demonstrated without assistance   []Patient and or Caregiver Demonstrated with assistance  []Needs additional instruction to demonstrate understanding of education    ASSESSMENT  Patient tolerated todays treatment session:    [x] Good   []  Fair   []  Poor  Limitations/difficulties with treatment session due to:   []Pain     []Fatigue     []Other medical complications     []Other  Goal Assessment: [] No Change    [x]Improved      Therapists observations or comments: improved hand strength      Minutes Tracking:  Time In: 8247  Time Out: 6810  Minutes: 46  Timed Code Treatment Minutes: 43 Minutes    PLAN  [x]Continue with current plan of care  []Medical [facility]  []IHold per patient request  [] Change Treatment plan:  [] Insurance hold  __ Other        Electronically signed by SHYLA Moralez,  11/10/2021 4:58 PM

## 2021-11-10 NOTE — PROGRESS NOTES
Phone: [facility]           Fax: [phone]                           Outpatient Physical Therapy                                                                            Daily Note    Patient: Levern Koyanagi : 1950  CSN #: [id number]   Referring Practitioner:  KASANDRA Gallegos    Referral Date : 21     Date: 2021    Diagnosis: Weakness, R52.1; Frequent falls, R29.6  Treatment Diagnosis: general weakness; difficulty walking    Onset Date: 21  PT Insurance Information: Aetna Medicare  Total # of Visits Approved: 18 Per Physician Order  Total # of Visits to Date: 15  No Show: 0  Canceled Appointment: 1      Pre-Treatment Pain:  0/10  Subjective: Pt with no new complaints. Doing well. Exercises:  Exercise 2: bike 10 mins 4.0  hills  Exercise 3: 4 laps with RW  Exercise 4: sink exercise   15x  Exercise 5: walking along island 4x no band  Exercise 6: Seated UBE 4/4  Exercise 12: sit to stand 2x10 from chair with use of armrest, then hands on knees      Assessment  Assessment: Doing well with ex. Cotninues to require UE assist for balance. Will continue. Activity Tolerance  Activity Tolerance: Patient Tolerated treatment well    Patient Education  Patient Education: continue  Pt verbalized/demonstrated good understanding:     [x] Yes         [] No, pt required further clarification. Post Treatment Pain:  0/10      Plan  Times per week: 3  Plan weeks: 6      Goals  (Total # of Visits to Date: 13)      Short term goals  Time Frame for Short term goals: 3 weeks  Short term goal 1: Pt to be instructed in home program.-met  Short term goal 2: Pt to complete TUG in less than 35.0 seconds indicating improved gait and stability. -MET  Short term goal 3: Patient to tolerate 45 min of ther ex/act with minimal rest breaks to improve endurance. -met    Long term goals  Time Frame for Long term goals : 6 weeks  Long term goal 1: Pt to report independence and compliance with home program.  Long term goal 2: Pt to complete TUG in less than 20.0 seconds indicating improved gait. -met (16.0 sec)  Long term goal 3: Pt to complete 6 sit to stands in 30 seconds with arms across chest indicating improved functional strength of LE's. -progressing  Long term goal 4: Pt to ambulate 6 mins with RW without sitting rest break indicating improved gait and endurance. -met (7min)    Minutes Tracking:  Time In: 1230  Time Out: 1312  Minutes: 42  Timed Code Treatment Minutes: JILLIAN Blankenship 72, PT , DPT, CMPT      Date: 11/8/2021

## 2021-11-11 NOTE — PROGRESS NOTES
Phone: [facility]           Fax: [phone]                           Outpatient Physical Therapy                                                                            Daily Note    Patient: Camila Solorzano : 1950  CSN #: [id number]   Referring Practitioner:  KASANDRA Rangel    Referral Date : 21     Date: 11/10/2021    Diagnosis: Weakness, R52.1; Frequent falls, R29.6  Treatment Diagnosis: general weakness; difficulty walking    Onset Date: 21  PT Insurance Information: Aetna Medicare  Total # of Visits Approved: 18 Per Physician Order  Total # of Visits to Date: 16  No Show: 0  Canceled Appointment: 1      Pre-Treatment Pain:  2/10  Subjective: Pt states doing well. Has OT after todays 1st visits. States she tried to go up stairs in town the other day with her RW; lost balance and almost fell but there was a man standing close who saw and helped her. Exercises:  Exercise 2: bike 10 mins 6.0  hills  Exercise 3: 4 laps with RW  Exercise 5: walking along island 4x no band  Exercise 8: FSU 6in 15x ea LE  Exercise 9: toe taps on steps 10x ea  Exercise 12: sit to stand 2x10 from chair with use of armrest, then hands on knees      Assessment  Assessment: Pt continues to require min to mod A bilateral UE's in order to ascend 6 inch steps. Will continue to progress as pt tolerates. Activity Tolerance  Activity Tolerance: Patient Tolerated treatment well    Patient Education  Patient Education: continue  Pt verbalized/demonstrated good understanding:     [x] Yes         [] No, pt required further clarification.        Post Treatment Pain:  2/10      Plan  Times per week: 3  Plan weeks: 6      Goals  (Total # of Visits to Date: 12)      Short term goals  Time Frame for Short term goals: 3 weeks  Short term goal 1: Pt to be instructed in home program.-met  Short term goal 2: Pt to complete TUG in less than 35.0 seconds indicating improved gait and stability. -MET  Short term goal 3: Patient to tolerate 45 min of ther ex/act with minimal rest breaks to improve endurance. -met    Long term goals  Time Frame for Long term goals : 6 weeks  Long term goal 1: Pt to report independence and compliance with home program.  Long term goal 2: Pt to complete TUG in less than 20.0 seconds indicating improved gait. -met (16.0 sec)  Long term goal 3: Pt to complete 6 sit to stands in 30 seconds with arms across chest indicating improved functional strength of LE's. -progressing  Long term goal 4: Pt to ambulate 6 mins with RW without sitting rest break indicating improved gait and endurance. -met (7min)    Minutes Tracking:  Time In: 1003  Time Out: 1045  Minutes: 42  Timed Code Treatment Minutes: 175 Nima Hernandez PT , DPT, CMPT      Date: 11/10/2021

## 2021-11-12 ENCOUNTER — HOSPITAL ENCOUNTER (OUTPATIENT)
Dept: OCCUPATIONAL THERAPY | Age: 71
Setting detail: THERAPIES SERIES
Discharge: HOME | End: 2021-11-12
Payer: MEDICARE

## 2021-11-12 ENCOUNTER — HOSPITAL ENCOUNTER (OUTPATIENT)
Dept: PHYSICAL THERAPY | Age: 71
Setting detail: THERAPIES SERIES
Discharge: HOME | End: 2021-11-12
Payer: MEDICARE

## 2021-11-12 PROCEDURE — 97110 THERAPEUTIC EXERCISES: CPT

## 2021-11-12 PROCEDURE — 97530 THERAPEUTIC ACTIVITIES: CPT

## 2021-11-12 NOTE — PROGRESS NOTES
Phone: [facility]    Fax: [phone]                       Outpatient Occupational Therapy                                                                        Updated Plan of Care    Patients Name:  Marva Miguel  YOB: 1950 (66 y.o.)  Gender:  female  MRN:  [id number]  [facility] #: [id number]  Medical Diagnosis: Weakness, R53.1; Falls, R29.6    Referring Practitioner: OLIVIA Fry CNP    __________________________________________________    Plan of Care dates of services to include:  11/10/2021 to 12/10/21    Treatment Plan:     Frequency: 2 times/week    Duration: 4 weeks    Evaluations      Modalities  [x] Evaluation and Treatment    [x] Cold/Hot Pack    [x] Re-Evaluations     [x] Electrical Stimulation   [] Neurobehavioral Status Exam   [x] Ultrasound/ Phono  [] Other      [x] HEP          [x] Paraffin Bath         [x] Whirlpool/Fluido         [] Other:_______________    Procedures  [x] Activities of Daily Living     [x] Therapeutic Activites    [] Cognitive Skills Development   [x] Therapeutic Exercises   [x] Manual Therapy Technique(s)    [] Wheelchair Assessment/ Training  [] Neuromuscular Re-education   [] Debridement/ Dressing  [] Orthotic/Splint Fitting and Training   [] Other: (Specify)______________  [] Checkout for Orthotic/Prosthertic Use                          Goals:              GOALS   Time Frame for Long term goals : 6 weeks       Long term goal 1: Patient to state <40% impairment throughout daily tasks, as measured by the DASH. Continue []????????????????Met  [x]???????????????? Partially met  []?????????????? ?? Not met   Long term goal 2: Patient to improve BUE strength grossly to 4/5 to improve independence. Continue  4- to 4/5         []????????????????Met  [x]???????????????? Partially met  []?????????????? ?? Not met   Long term goal 3: Patient to state pain <2/10 at worst B shoulders to improve QOL.  Continue 7/10 this date

## 2021-11-12 NOTE — PROGRESS NOTES
Phone: [place]    Fax: [phone]                       Outpatient Occupational Therapy                 DAILY TREATMENT NOTE    Date: 11/12/2021  Patients Name:  Hung Thompson  YOB: 1950 (66 y.o.)  Gender:  female  MRN:  [id number]  [facility] #: [id number]  Medical Diagnosis: Weakness, R53.1; Falls, R29.6    Referring Practitioner: OLIVIA Reina CNP     INSURANCE  OT Insurance Information: [employer] Medicare - Advantage, Medicaid       Total # of Visits to Date: 16       PAIN  []No     [x]Yes      Location: B shoulders  Pain Rating (0-10 pain scale): 2/10  Pain Description:     SUBJECTIVE   Patient states that she feels the stretches and exercises really helped her shoulders. She took a walk that night c minimal pain c pushing walker. Reviewed HEP.           Flow Sheet   Exercise /   Manual treatment Weight/  Level Reps/Time Comments   K-tape   R hand index and long for MC and P1 alignment to improve FM; poor results. Egg   B  and pinches to improve strength   Duckbill    B hands for instrinsic strengthening    Flexbar   Twist and bend to improve UE strength   Hand Master   B hands digit flexion and extension to improve strength    Velcro board   B hands to improve FM + strength    Digi flex   B hands to improve  strength   Resistive clothespins   B hand pinch to improve strength - completed red, green and blue    Power web     MP flexion and extension to improve strength   Binder clips      B hands to open and place binder clips to edge of container.  Max difficulty L hand noted, min R.     FMC/dexterity/acts x  12' 9 hole peg - see below    Handwriting - slight improvements noted    Pulleys   B shoulders c flexion and abduction for muscle warm up    T-band Green x10  Reviewed HEP and educated on proper techniques; B shoulder flexion/extension, IR/ER and abduciton                                                                                          Modality Flow Sheet:   START STOP Tx Modality       Electrical Stim: 10 minutes B wrist/digits flexors/extensors - Spanish, recip mode, 2 second ramp with 10/10 cycle time for improvement of strength as well as FMC/dexterity.          Ultrasound: ___ W/cm2 x ___ mins  Duty factor: __100%  __50%  __20% __10%  Head size:   MHz: __1mHz __2 mHz  __3mHz  Location:       Hot Pack:       Paraffin:       Cold Pack:                    GOALS   Time Frame for Long term goals : 6 weeks       Long term goal 1: Patient to state <40% impairment throughout daily tasks, as measured by the DASH. 53% []???????????????Met  [x]??????????????? Partially met  []????????????? ?? Not met   Long term goal 2: Patient to improve BUE strength grossly to 4/5 to improve independence. Continue as HEP  4- to 4/5         []???????????????Met  [x]??????????????? Partially met  []????????????? ?? Not met   Long term goal 3: Patient to state pain <2/10 at worst B shoulders to improve QOL. Continue c HEP to lessen pain; 2/10 at worst. []???????????????Met  [x]??????????????? Partially met  []????????????? ?? Not met   Long term goal 4: Patient to improve B FMC and dexterity, AEB ability to complete 9 hole peg test <33 seconds. Continue as HEP  R: 38 seconds  L: 32 seconds - Met []???????????????Met  [x]??????????????? Partially met  []????????????? ?? Not met   Long term goal 5: Patient to improve B  >35#, 2 pt >3#, 3 pt > 7# and lateral >8#. Continue as HEP - see below   Met B 2 pt, B 3 pt and R lateral []???????????????Met  [x]??????????????? Partially met  []????????????? ?? Not met   Time Frame for Short term goals: 4 weeks       Short term goal 1: Patient to be educated on and state compliance c HEP. Met [x]???????????????Met  []??????????????? Partially met  []????????????? ?? Not met   Short term goal 2: Patient to improve B FMC and dexterity AEB ability to complete 9 hole peg test <40 seconds. Met    [x]???????????????Met  []??????????????? Partially met  []????????????? ?? Not met   ADDITIONAL COMMENTS              /PINCH STRENGTH  (measured in #) RIGHT LEFT    31 28   2 pt pinch 5 5   3 pt pinch 7 7   Key/lateral pinch 8 7      9 hole peg RIGHT LEFT   1st trial 39 36   2nd trial 34 28   3rd trial 41 32   Average in seconds [address] Education provided to patient/family/caregiver:    [x]Yes:     []No (Continued review of prior education)   If yes Education Provided:  D/c recommendations, HEP    Method of Education:     [x]Discussion     [x]Demonstration    [] Written     []Other  Evaluation of Patients Response to Education:         [x]Patient and or caregiver verbalized understanding  []Patient and or Caregiver Demonstrated without assistance   []Patient and or Caregiver Demonstrated with assistance  []Needs additional instruction to demonstrate understanding of education    ASSESSMENT  Patient tolerated todays treatment session:    [x] Good   []  Fair   []  Poor  Limitations/difficulties with treatment session due to:   []Pain     []Fatigue     []Other medical complications     []Other  Goal Assessment: [] No Change    [x]Improved    Therapists observations or comments: Patient has approached max potential at this time. Demonstrated consistent progress towards identified goals, patient to cont c HEP.       Minutes Tracking:  Time In: 4589  Time Out: 6109  Minutes: 43  Timed Code Treatment Minutes: [address]  []Continue with current plan of care  [][facility]  [][facility] per patient request  [] Change Treatment plan:  [] Insurance hold  _x_ Other: contact patient in 2-3 weeks, then d/c to HEP        Electronically signed by SHYLA Phillip,  11/12/2021 10:03 AM

## 2021-11-18 NOTE — DISCHARGE SUMMARY
[facility]           Phone: [phone]             Outpatient Physical Therapy  Fax: [phone]                                           Date: 2021  Patient: Louis Lopez : 1950 CSN #: [id number]   Referring Practitioner:  KASANDRA Mackenzie Referral Date:  21       [] Plan of Care   [x] Updated Plan of Care / [id number]    Dates of Service to Include: 2021 to 21    Diagnosis:  Weakness, R52.1; Frequent falls, R29.6    Rehab (Treatment) Diagnosis:  general weakness; difficulty walking             Onset Date:  21    Attendance  Total # of Visits to Date: 17 No Show: 0 Canceled Appointment: 1    Assessment  Assessment: Pt has completed 17 PT sessions for strength and balance. TUG on 2 attempts with RW: 18.76 seconds and 16.75 seconds. Able to complete 8 sit to stands in 30 seconds with arms across chest. Pt is currently able to ambulate >7 mins in clinic without rest breaks. We will now discharge with all PT goals met. Goals  Short term goals  Time Frame for Short term goals: 3 weeks  Short term goal 1: Pt to be instructed in home program.-met  Short term goal 2: Pt to complete TUG in less than 35.0 seconds indicating improved gait and stability. -MET  Short term goal 3: Patient to tolerate 45 min of ther ex/act with minimal rest breaks to improve endurance. -met  Long term goals  Time Frame for Long term goals : 6 weeks  Long term goal 1: Pt to report independence and compliance with home program. - met  Long term goal 2: Pt to complete TUG in less than 20.0 seconds indicating improved gait. -met  Long term goal 3: Pt to complete 6 sit to stands in 30 seconds with arms across chest indicating improved functional strength of LE's. - met  Long term goal 4: Pt to ambulate 6 mins with RW without sitting rest break indicating improved gait and endurance. -met

## 2021-11-18 NOTE — PROGRESS NOTES
Phone: [place]           Fax: [phone]                           Outpatient Physical Therapy                                                                            Daily Note    Patient: Doreen Castleman : 1950  CSN #: [id number]   Referring Practitioner:  KASANDRA Shultz    Referral Date : 21     Date: 2021    Diagnosis: Weakness, R52.1; Frequent falls, R29.6  Treatment Diagnosis: general weakness; difficulty walking    Onset Date: 21  PT Insurance Information: t Medicare  Total # of Visits Approved: 18 Per Physician Order  Total # of Visits to Date: 17  No Show: 0  Canceled Appointment: 1      Pre-Treatment Pain:  0/10  Subjective: Pt states today is last day of PT. Believes doing well and will continue with strengthening ex at home. Exercises:  Exercise 2: bike 12 mins 6.0  hills  Exercise 3: 4 laps with RW  Exercise 8: FSU 6in 15x ea LE  Exercise 12: sit to stand 2x10 from chair with use of armrest, then hands on knees      Assessment  Assessment: Pt has completed 17 PT sessions for strength and balance. TUG on 2 attempts with RW: 18.76 seconds and 16.75 seconds. Able to complete 8 sit to stands in 30 seconds with arms across chest. Pt is currently able to ambulate >7 mins in clinic without rest breaks. We will now discharge with all PT goals met. Activity Tolerance  Activity Tolerance: Patient Tolerated treatment well    Patient Education  Patient Education: continue HEP and discharge  Pt verbalized/demonstrated good understanding:     [x] Yes         [] No, pt required further clarification.        Post Treatment Pain:  0/10      Plan  Times per week: 3  Plan weeks: 6      Goals  (Total # of Visits to Date: 16)      Short term goals  Time Frame for Short term goals: 3 weeks  Short term goal 1: Pt to be instructed in home program.-met  Short term goal 2: Pt to complete TUG in less than 35.0 seconds indicating improved gait and stability. -MET  Short term goal 3: Patient to tolerate 45 min of ther ex/act with minimal rest breaks to improve endurance. -met    Long term goals  Time Frame for Long term goals : 6 weeks  Long term goal 1: Pt to report independence and compliance with home program. - met  Long term goal 2: Pt to complete TUG in less than 20.0 seconds indicating improved gait. -met  Long term goal 3: Pt to complete 6 sit to stands in 30 seconds with arms across chest indicating improved functional strength of LE's. - met  Long term goal 4: Pt to ambulate 6 mins with RW without sitting rest break indicating improved gait and endurance. -met    Minutes Tracking:  Time In: 1000  Time Out: 1040  Minutes: 40  Timed Code Treatment Minutes: [address], PT , DPT, CMPT      Date: 11/12/2021

## 2021-12-01 DIAGNOSIS — M81.0 POSTMENOPAUSAL OSTEOPOROSIS: ICD-10-CM

## 2021-12-01 RX ORDER — ACETAMINOPHEN 325 MG/1
650 TABLET ORAL
Status: CANCELLED | OUTPATIENT
Start: 2021-12-01

## 2021-12-01 RX ORDER — ALBUTEROL SULFATE 90 UG/1
4 AEROSOL, METERED RESPIRATORY (INHALATION) PRN
Status: CANCELLED | OUTPATIENT
Start: 2021-12-01

## 2021-12-01 RX ORDER — EPINEPHRINE 1 MG/ML
0.3 INJECTION, SOLUTION, CONCENTRATE INTRAVENOUS PRN
Status: CANCELLED | OUTPATIENT
Start: 2021-12-01

## 2021-12-01 RX ORDER — SODIUM CHLORIDE 9 MG/ML
INJECTION, SOLUTION INTRAVENOUS CONTINUOUS
Status: CANCELLED | OUTPATIENT
Start: 2021-12-01

## 2021-12-01 RX ORDER — ONDANSETRON 2 MG/ML
8 INJECTION INTRAMUSCULAR; INTRAVENOUS
Status: CANCELLED | OUTPATIENT
Start: 2021-12-01

## 2021-12-01 RX ORDER — DIPHENHYDRAMINE HYDROCHLORIDE 50 MG/ML
50 INJECTION INTRAMUSCULAR; INTRAVENOUS
Status: CANCELLED | OUTPATIENT
Start: 2021-12-01

## 2021-12-02 ENCOUNTER — HOSPITAL ENCOUNTER (OUTPATIENT)
Dept: INFUSION THERAPY | Age: 71
Discharge: HOME | End: 2021-12-02
Payer: MEDICARE

## 2021-12-02 VITALS
RESPIRATION RATE: 20 BRPM | HEART RATE: 56 BPM | SYSTOLIC BLOOD PRESSURE: 124 MMHG | DIASTOLIC BLOOD PRESSURE: 68 MMHG | TEMPERATURE: 98.1 F

## 2021-12-02 DIAGNOSIS — M81.0 POSTMENOPAUSAL OSTEOPOROSIS: Primary | ICD-10-CM

## 2021-12-02 PROCEDURE — 6360000002 HC RX W HCPCS: Performed by: NURSE PRACTITIONER

## 2021-12-02 PROCEDURE — 96372 THER/PROPH/DIAG INJ SC/IM: CPT

## 2021-12-02 RX ORDER — ALBUTEROL SULFATE 90 UG/1
4 AEROSOL, METERED RESPIRATORY (INHALATION) PRN
Status: CANCELLED | OUTPATIENT
Start: 2022-06-02

## 2021-12-02 RX ORDER — ACETAMINOPHEN 325 MG/1
650 TABLET ORAL
Status: CANCELLED | OUTPATIENT
Start: 2022-06-02

## 2021-12-02 RX ORDER — DIPHENHYDRAMINE HYDROCHLORIDE 50 MG/ML
50 INJECTION INTRAMUSCULAR; INTRAVENOUS
Status: CANCELLED | OUTPATIENT
Start: 2022-06-02

## 2021-12-02 RX ORDER — ONDANSETRON 2 MG/ML
8 INJECTION INTRAMUSCULAR; INTRAVENOUS
Status: CANCELLED | OUTPATIENT
Start: 2022-06-02

## 2021-12-02 RX ORDER — SODIUM CHLORIDE 9 MG/ML
INJECTION, SOLUTION INTRAVENOUS CONTINUOUS
Status: CANCELLED | OUTPATIENT
Start: 2022-06-02

## 2021-12-02 RX ORDER — EPINEPHRINE 1 MG/ML
0.3 INJECTION, SOLUTION, CONCENTRATE INTRAVENOUS PRN
Status: CANCELLED | OUTPATIENT
Start: 2022-06-02

## 2021-12-02 RX ADMIN — DENOSUMAB 60 MG: 60 INJECTION SUBCUTANEOUS at 13:08

## 2021-12-17 ENCOUNTER — HOSPITAL ENCOUNTER (OUTPATIENT)
Age: 71
Discharge: HOME | End: 2021-12-17
Payer: MEDICARE

## 2021-12-17 DIAGNOSIS — E11.42 DIABETIC POLYNEUROPATHY ASSOCIATED WITH TYPE 2 DIABETES MELLITUS (HCC): ICD-10-CM

## 2021-12-17 DIAGNOSIS — I10 ESSENTIAL HYPERTENSION: ICD-10-CM

## 2021-12-17 DIAGNOSIS — E78.2 MIXED HYPERLIPIDEMIA: ICD-10-CM

## 2021-12-17 DIAGNOSIS — E78.5 HYPERLIPIDEMIA, UNSPECIFIED HYPERLIPIDEMIA TYPE: ICD-10-CM

## 2021-12-17 LAB
ALBUMIN SERPL-MCNC: 3.8 G/DL (ref 3.5–5.2)
ALBUMIN/GLOBULIN RATIO: 1.4 (ref 1–2.5)
ALP BLD-CCNC: 78 U/L (ref 35–104)
ALT SERPL-CCNC: 21 U/L (ref 5–33)
ANION GAP SERPL CALCULATED.3IONS-SCNC: 10 MMOL/L (ref 9–17)
AST SERPL-CCNC: 22 U/L
BILIRUB SERPL-MCNC: 0.36 MG/DL (ref 0.3–1.2)
BUN BLDV-MCNC: 14 MG/DL (ref 8–23)
BUN/CREAT BLD: 36 (ref 9–20)
CALCIUM SERPL-MCNC: 9 MG/DL (ref 8.6–10.4)
CHLORIDE BLD-SCNC: 95 MMOL/L (ref 98–107)
CHOLESTEROL/HDL RATIO: 2.1
CHOLESTEROL: 111 MG/DL
CO2: 27 MMOL/L (ref 20–31)
CREAT SERPL-MCNC: 0.39 MG/DL (ref 0.5–0.9)
GFR AFRICAN AMERICAN: >60 ML/MIN
GFR NON-AFRICAN AMERICAN: >60 ML/MIN
GFR SERPL CREATININE-BSD FRML MDRD: ABNORMAL ML/MIN/{1.73_M2}
GFR SERPL CREATININE-BSD FRML MDRD: ABNORMAL ML/MIN/{1.73_M2}
GLUCOSE BLD-MCNC: 111 MG/DL (ref 70–99)
HCT VFR BLD CALC: 38.6 % (ref 36.3–47.1)
HDLC SERPL-MCNC: 52 MG/DL
HEMOGLOBIN: 12.4 G/DL (ref 11.9–15.1)
LDL CHOLESTEROL: 42 MG/DL (ref 0–130)
MCH RBC QN AUTO: 29.2 PG (ref 25.2–33.5)
MCHC RBC AUTO-ENTMCNC: 32.1 G/DL (ref 28.4–34.8)
MCV RBC AUTO: 91 FL (ref 82.6–102.9)
NRBC AUTOMATED: 0 PER 100 WBC
PDW BLD-RTO: 14.1 % (ref 11.8–14.4)
PLATELET # BLD: 225 K/UL (ref 138–453)
PMV BLD AUTO: 10.4 FL (ref 8.1–13.5)
POTASSIUM SERPL-SCNC: 4.1 MMOL/L (ref 3.7–5.3)
RBC # BLD: 4.24 M/UL (ref 3.95–5.11)
SODIUM BLD-SCNC: 132 MMOL/L (ref 135–144)
TOTAL PROTEIN: 6.5 G/DL (ref 6.4–8.3)
TRIGL SERPL-MCNC: 85 MG/DL
VLDLC SERPL CALC-MCNC: NORMAL MG/DL (ref 1–30)
WBC # BLD: 6.1 K/UL (ref 3.5–11.3)

## 2021-12-17 PROCEDURE — 80053 COMPREHEN METABOLIC PANEL: CPT

## 2021-12-17 PROCEDURE — 80061 LIPID PANEL: CPT

## 2021-12-17 PROCEDURE — 85027 COMPLETE CBC AUTOMATED: CPT

## 2021-12-17 PROCEDURE — 36415 COLL VENOUS BLD VENIPUNCTURE: CPT

## 2021-12-17 PROCEDURE — 83036 HEMOGLOBIN GLYCOSYLATED A1C: CPT

## 2021-12-18 LAB
ESTIMATED AVERAGE GLUCOSE: 126 MG/DL
HBA1C MFR BLD: 6 % (ref 4–6)

## 2021-12-20 ENCOUNTER — HOSPITAL ENCOUNTER (OUTPATIENT)
Age: 71
Setting detail: SPECIMEN
Discharge: HOME | End: 2021-12-20
Payer: MEDICARE

## 2021-12-20 DIAGNOSIS — E11.42 DIABETIC POLYNEUROPATHY ASSOCIATED WITH TYPE 2 DIABETES MELLITUS (HCC): ICD-10-CM

## 2021-12-20 LAB
CREATININE URINE: 50.7 MG/DL (ref 28–217)
MICROALBUMIN/CREAT 24H UR: <12 MG/L
MICROALBUMIN/CREAT UR-RTO: NORMAL MCG/MG CREAT

## 2021-12-20 PROCEDURE — 82043 UR ALBUMIN QUANTITATIVE: CPT

## 2021-12-20 PROCEDURE — 82570 ASSAY OF URINE CREATININE: CPT

## 2022-03-14 NOTE — DISCHARGE SUMMARY
[address]  Occupational Therapy Discharge Note    Date: 3/14/2022      Patient: Vannesa Pruett  : 1950  MRN: [id number]    Referring Practitioner: OLIVIA Jones CNP   Medical Diagnosis: Weakness, R53.1; Falls, R29.6  Rehab Diagnosis/ICD-10#s:Weakness, M62.81  Insurance: OT Insurance Information: [employer] Medicare - Advantage, Medicaid  Total # of Visits to Date: 17   Onset Date:   Gradually     Total visits attended: 16  Cancels/No shows: 1  Date of initial visit: 21           Date of final visit: 21  CSN #: [id number]      Subjective:  Pain:  [x] Yes  [] No  Location: B shoulders  Pain Rating: (0-10 scale) 2/10  Pain altered Tx:  [x] No  [] Yes  Action:  Comments:             GOALS   Time Frame for Long term goals : 6 weeks       Long term goal 1: Patient to state <40% impairment throughout daily tasks, as measured by the DASH. 53% []????????????????Met  [x]???????????????? Partially met  []?????????????? ?? Not met   Long term goal 2: Patient to improve BUE strength grossly to 4/5 to improve independence. Continue as HEP  4- to 4/5         []????????????????Met  [x]???????????????? Partially met  []?????????????? ?? Not met   Long term goal 3: Patient to state pain <2/10 at worst B shoulders to improve QOL. Continue c HEP to lessen pain; 2/10 at worst. []????????????????Met  [x]???????????????? Partially met  []?????????????? ?? Not met   Long term goal 4: Patient to improve B FMC and dexterity, AEB ability to complete 9 hole peg test <33 seconds. Continue as HEP  R: 38 seconds  L: 32 seconds - Met []????????????????Met  [x]???????????????? Partially met  []?????????????? ?? Not met   Long term goal 5: Patient to improve B  >35#, 2 pt >3#, 3 pt > 7# and lateral >8#. Continue as HEP - see below   Met B 2 pt, B 3 pt and R lateral []????????????????Met  [x]???????????????? Partially met  []?????????????? ?? Not met   Time Frame for Short term goals: 4 weeks       Short term goal 1: Patient to be educated on and state compliance c HEP. Met [x]????????????????Met  []???????????????? Partially met  []?????????????? ?? Not met   Short term goal 2: Patient to improve B FMC and dexterity AEB ability to complete 9 hole peg test <40 seconds. Met    [x]????????????????Met  []???????????????? Partially met  []?????????????? ?? Not met   ADDITIONAL COMMENTS              /PINCH STRENGTH  (measured in #) RIGHT LEFT    31 28   2 pt pinch 5 5   3 pt pinch 7 7   Key/lateral pinch 8 7      9 hole peg RIGHT LEFT   1st trial 39 36   2nd trial 34 28   3rd trial 41 32   Average in seconds 38 32             Treatment to Date:  [x] Therapeutic Exercise    [x] Modalities:  [x] Therapeutic Activity    [] Ultrasound  [x] Electrical Stimulation  [x] Ortho refit/check             [] Massage   [] Fluidotherapy  [x] Neuromuscular Re-education [] Cold/hotpack [] Iontophoresis: 4 mg/mL  [x] Instruction in Home Exercise Program                     Dexamethasone Sodium  [x] Manual Therapy             Phosphate 40-80 mAmin          [] Paraffin        [] Other:    Discharge Status:     [] Pt recovered from conditions. Treatment goals were met. [x] Pt received maximum benefit. No further therapy indicated at this time. [x] Pt to continue exercise/home instructions independently. [] Therapy interrupted due to:    [] Pt has 2 or more no shows/cancels, is discontinued per our policy. [] Pt has completed prescribed number of treatment sessions. [] Other:         Electronically signed by SHYLA Paez,  3/14/2022 5:41 PM    If you have any questions or concerns, please don't hesitate to call.   Thank you for your referral.

## 2022-03-31 PROBLEM — E11.42 DIABETIC POLYNEUROPATHY ASSOCIATED WITH TYPE 2 DIABETES MELLITUS (HCC): Status: ACTIVE | Noted: 2022-03-31

## 2022-04-27 ENCOUNTER — HOSPITAL ENCOUNTER (OUTPATIENT)
Age: 72
Discharge: HOME | End: 2022-04-27
Payer: MEDICARE

## 2022-04-27 DIAGNOSIS — R30.0 DYSURIA: ICD-10-CM

## 2022-04-27 DIAGNOSIS — Z87.440 HISTORY OF UTI: ICD-10-CM

## 2022-04-27 LAB
-: NORMAL
BILIRUBIN URINE: NEGATIVE
COLOR: YELLOW
EPITHELIAL CELLS UA: NORMAL /HPF (ref 0–25)
GLUCOSE URINE: NEGATIVE
KETONES, URINE: NEGATIVE
LEUKOCYTE ESTERASE, URINE: NEGATIVE
NITRITE, URINE: NEGATIVE
PH UA: 6 (ref 5–9)
PROTEIN UA: NEGATIVE
RBC UA: NORMAL /HPF (ref 0–2)
SPECIFIC GRAVITY UA: 1.01 (ref 1.01–1.02)
TURBIDITY: CLEAR
URINE HGB: NEGATIVE
UROBILINOGEN, URINE: NORMAL
WBC UA: NORMAL /HPF (ref 0–5)

## 2022-04-27 PROCEDURE — 81001 URINALYSIS AUTO W/SCOPE: CPT

## 2022-04-27 PROCEDURE — 87077 CULTURE AEROBIC IDENTIFY: CPT

## 2022-04-27 PROCEDURE — 87086 URINE CULTURE/COLONY COUNT: CPT

## 2022-04-27 PROCEDURE — 87186 SC STD MICRODIL/AGAR DIL: CPT

## 2022-04-29 LAB
CULTURE: ABNORMAL
SPECIMEN DESCRIPTION: ABNORMAL

## 2022-06-02 ENCOUNTER — HOSPITAL ENCOUNTER (OUTPATIENT)
Dept: INFUSION THERAPY | Age: 72
Discharge: HOME | End: 2022-06-02
Payer: MEDICARE

## 2022-06-02 VITALS
RESPIRATION RATE: 20 BRPM | TEMPERATURE: 97.3 F | HEART RATE: 74 BPM | SYSTOLIC BLOOD PRESSURE: 152 MMHG | DIASTOLIC BLOOD PRESSURE: 76 MMHG

## 2022-06-02 DIAGNOSIS — M81.0 POSTMENOPAUSAL OSTEOPOROSIS: Primary | ICD-10-CM

## 2022-06-02 PROCEDURE — 96372 THER/PROPH/DIAG INJ SC/IM: CPT

## 2022-06-02 PROCEDURE — 6360000002 HC RX W HCPCS: Performed by: NURSE PRACTITIONER

## 2022-06-02 RX ORDER — SODIUM CHLORIDE 9 MG/ML
INJECTION, SOLUTION INTRAVENOUS CONTINUOUS
OUTPATIENT
Start: 2022-12-01

## 2022-06-02 RX ORDER — ONDANSETRON 2 MG/ML
8 INJECTION INTRAMUSCULAR; INTRAVENOUS
OUTPATIENT
Start: 2022-12-01

## 2022-06-02 RX ORDER — ACETAMINOPHEN 325 MG/1
650 TABLET ORAL
OUTPATIENT
Start: 2022-12-01

## 2022-06-02 RX ORDER — ALBUTEROL SULFATE 90 UG/1
4 AEROSOL, METERED RESPIRATORY (INHALATION) PRN
OUTPATIENT
Start: 2022-12-01

## 2022-06-02 RX ORDER — DIPHENHYDRAMINE HYDROCHLORIDE 50 MG/ML
50 INJECTION INTRAMUSCULAR; INTRAVENOUS
OUTPATIENT
Start: 2022-12-01

## 2022-06-02 RX ORDER — EPINEPHRINE 1 MG/ML
0.3 INJECTION, SOLUTION, CONCENTRATE INTRAVENOUS PRN
OUTPATIENT
Start: 2022-12-01

## 2022-06-02 RX ORDER — PHENOL 1.4 %
1 AEROSOL, SPRAY (ML) MUCOUS MEMBRANE DAILY
COMMUNITY

## 2022-06-02 RX ADMIN — DENOSUMAB 60 MG: 60 INJECTION SUBCUTANEOUS at 08:50

## 2022-06-21 ENCOUNTER — HOSPITAL ENCOUNTER (OUTPATIENT)
Age: 72
Discharge: HOME | End: 2022-06-21
Payer: MEDICARE

## 2022-06-21 DIAGNOSIS — E11.628 TYPE 2 DIABETES MELLITUS WITH OTHER SKIN COMPLICATION, WITHOUT LONG-TERM CURRENT USE OF INSULIN (HCC): ICD-10-CM

## 2022-06-21 DIAGNOSIS — E11.42 DIABETIC POLYNEUROPATHY ASSOCIATED WITH TYPE 2 DIABETES MELLITUS (HCC): ICD-10-CM

## 2022-06-21 LAB
ALBUMIN SERPL-MCNC: 4 G/DL (ref 3.5–5.2)
ALBUMIN/GLOBULIN RATIO: 1.6 (ref 1–2.5)
ALP BLD-CCNC: 76 U/L (ref 35–104)
ALT SERPL-CCNC: 23 U/L (ref 5–33)
ANION GAP SERPL CALCULATED.3IONS-SCNC: 12 MMOL/L (ref 9–17)
AST SERPL-CCNC: 28 U/L
BILIRUB SERPL-MCNC: 0.41 MG/DL (ref 0.3–1.2)
BUN BLDV-MCNC: 19 MG/DL (ref 8–23)
BUN/CREAT BLD: 37 (ref 9–20)
CALCIUM SERPL-MCNC: 8.6 MG/DL (ref 8.6–10.4)
CHLORIDE BLD-SCNC: 95 MMOL/L (ref 98–107)
CO2: 25 MMOL/L (ref 20–31)
CREAT SERPL-MCNC: 0.51 MG/DL (ref 0.5–0.9)
GFR AFRICAN AMERICAN: >60 ML/MIN
GFR NON-AFRICAN AMERICAN: >60 ML/MIN
GFR SERPL CREATININE-BSD FRML MDRD: ABNORMAL ML/MIN/{1.73_M2}
GFR SERPL CREATININE-BSD FRML MDRD: ABNORMAL ML/MIN/{1.73_M2}
GLUCOSE BLD-MCNC: 114 MG/DL (ref 70–99)
POTASSIUM SERPL-SCNC: 4 MMOL/L (ref 3.7–5.3)
SODIUM BLD-SCNC: 132 MMOL/L (ref 135–144)
TOTAL PROTEIN: 6.5 G/DL (ref 6.4–8.3)
TSH SERPL DL<=0.05 MIU/L-ACNC: 1.96 UIU/ML (ref 0.3–5)

## 2022-06-21 PROCEDURE — 80053 COMPREHEN METABOLIC PANEL: CPT

## 2022-06-21 PROCEDURE — 36415 COLL VENOUS BLD VENIPUNCTURE: CPT

## 2022-06-21 PROCEDURE — 83036 HEMOGLOBIN GLYCOSYLATED A1C: CPT

## 2022-06-21 PROCEDURE — 84443 ASSAY THYROID STIM HORMONE: CPT

## 2022-06-22 LAB
ESTIMATED AVERAGE GLUCOSE: 137 MG/DL
HBA1C MFR BLD: 6.4 % (ref 4–6)

## 2022-06-22 NOTE — PROGRESS NOTES
Dr. Erick Cee spoke with pt and marcelle. Discharge instructions given to pt and marcelle. Discharge Criteria    Inpatients must meet Criteria 1 through 7. All other patients are either YES or N/A. If a NO is chosen then Anesthesia or Surgeon must be notified. 1.  Minimum 30 minutes after last dose of sedative medication, minimum 120 minutes after last dose of reversal agent. Yes      2. Systolic BP stable within 20 mmHg for 30 minutes & systolic BP between 90 & 740 or within 10 mmHg of baseline. Yes      3. Pulse between 60 and 100 or within 10 bpm of baseline. Yes      4. Spontaneous respiratory rate >/= 10 per minute. Yes      5. SaO2 >/= 95 or  >/= baseline. Yes      6. Able to cough and swallow or return to baseline function. Yes      7. Alert and oriented or return to baseline mental status. Yes      8. Demonstrates controlled, coordinated movements, ambulates with steady gait, or return to baseline activity function. Yes      9. Minimal or no pain or nausea, or at a level tolerable and acceptable to patient. Yes      10. Takes and retains oral fluids as allowed. Yes      11. Procedural / perioperative site stable. Minimal or no bleeding. Yes          12. If GI endoscopy procedure, minimal or no abdominal distention or passing flatus. Yes      13. Written discharge instructions and emergency telephone number provided. Yes      14. Accompanied by a responsible adult.     Yes
Patient expels large amount tap water and brown liquid stool. Gown changed x2, socks changed. Patient instructs nurse to throw soiled socks away.
Tap water enema administered. Patient tolerates 500ml of tap water. Brown, liquid stool observed in the enema tube and in the anal leakage on the way to the bathroom. Additional 500ml tap water attempted.
oral

## 2022-07-27 ENCOUNTER — HOSPITAL ENCOUNTER (OUTPATIENT)
Dept: PHYSICAL THERAPY | Age: 72
Setting detail: THERAPIES SERIES
Discharge: HOME | End: 2022-07-27
Payer: MEDICARE

## 2022-07-27 PROCEDURE — 97161 PT EVAL LOW COMPLEX 20 MIN: CPT

## 2022-07-27 NOTE — PROGRESS NOTES
Phone: [address]          Fax: [phone]                      Outpatient Physical Therapy                                                                      Evaluation  Date: 2022  Patient: Tr Mail  : 1950  [facility] #: [id number]    Referring Physician: Jm Gutierrez, *     Medical Diagnosis: Diabetic polyneuropathy, E11.42; Frequent falls, R29.6, Weakness, R53.1    Treatment Diagnosis: Difficulty walking  PT Insurance Information: [facility] Medicare      Total # of Visits to Date: 1  No Show: 0  Canceled Appointment: 0     Subjective  Subjective: Patient reports she doesn't go anywhere without her walker. She reports difficulty with stair negotiation. She can't get her foot up the step sometimes and has trouble getting her legs in bed. She has an aide 4 days/week whe helps with housecleaning and putting on her bra and socks. She's stumbled and lost her balance a lot recently but is not sure why. Denies falls. Pain in L lower back and L hip 5/10 on average. Reports arthritis in B hands.   Additional Pertinent Hx: HTN, arthritis, DM, depression, hx of neck surgery     Observations:   General Observations  Description: TUseconds with FWW and wide SHAD; 5x sit/stand: 20sec with B armrests; pt stands with lumbar hyperlordosis and anterior pelvic tilt and leans heavily on walker causing pain in R shoulder      Objective    Strength       Strength LUE  L Shoulder Flexion: 3+/5  L Shoulder ABduction: 3+/5  L Shoulder Internal Rotation: 4-/5  L Shoulder External Rotation: 3+/5  L Elbow Flexion: 4-/5  L Elbow Extension: 4-/5  Strength LLE  L Hip Flexion: 3+/5  L Hip ABduction: 3+/5  L Hip ADduction: 4-/5  L Knee Flexion: 4/5  L Knee Extension: 4-/5     Special Tests:     Strength RLE  R Hip Flexion: 3+/5  R Hip ABduction: 3+/5  R Hip ADduction: 4-/5  R Knee Flexion: 4/5  R Knee Extension: 4-/5  Strength LLE  L Hip Flexion: 3+/5  L Hip ABduction: 3+/5  L Hip ADduction: 4-/5  L Knee Flexion: 4/5  L Knee Extension: 4-/5  Strength RUE  R Shoulder Flexion: 3+/5  R Shoulder ABduction: 3+/5  R Shoulder Internal Rotation: 4-/5  R Shoulder External Rotation: 3+/5  R Elbow Flexion: 4-/5  R Elbow Extension: 4-/5  Strength LUE  L Shoulder Flexion: 3+/5  L Shoulder ABduction: 3+/5  L Shoulder Internal Rotation: 4-/5  L Shoulder External Rotation: 3+/5  L Elbow Flexion: 4-/5  L Elbow Extension: 4-/5      Functional Outcome Measures  Sitting Balance: Steady, safe  Arises: Able, uses arms to help  Attempts to Arise: Able to arise, one attempt  Immediate Standing Balance (First 5 Seconds): Steady but uses walker or other support  Standing Balance: Steady but wide stance, uses cane or other support  Nudged: Staggers, grabs, catches self  Eyes Closed: Unsteady  Turned 360 Degrees: Steadiness: Unsteady (grabs, staggers)  Turned 360 Degrees: Continuity of Steps: Continuous  Sitting Down: Uses arms or not a smooth motion  Initiation of Gait: No hesitancy  Step Height: R Swing Foot: Right foot complete clears floor  Step Length: R Swing Foot: Does not pass left stance foot with step  Step Height: L Swing Foot: Left foot complete clears floor  Step Length: L Swing Foot: Does not pass right stance foot with step  Step Symmetry: Right and left step appear equal  Step Continuity: Steps appear continuous  Path: Mild/moderate deviation or uses walking aid  Trunk: Marked sway or uses walking aid  Walking Time: Heels apart  Gait Score: 6  Tinetti Total Score: 15      Assessment  Assessment: Patient is 67year old female with dx of weakness and diabetic polyneuropathy who presents with increased L lower back and hip pain 5/10 on average and increased LOB's/difficulty with stair negotiation. TUseconds with FWW and wide SHAD; 5x sit/stand: 20sec with B armrests; pt stands with lumbar hyperlordosis and anterior pelvic tilt and leans heavily on walker causing pain in R shoulder.  Patient with decreased core strength: 3+/5 grossly and decreased B UE/LE strength about 3+ to 4-/5 grossly. Patient to benefit from physical therapy to improve functional strength and endurance, improve gait and decrease pain to return to PLOF and reduce fall risk. Therapy Prognosis: Good        Decision Making: Low Complexity    Patient Education  PT eval, POC   Pt verbalized/demonstrated good understanding:     [X] Yes         [] No, pt required further clarification. Goals  Short Term Goals  Time Frame for Short term goals: 3 weeks  Short term goal 1: Patient to be instructed in initial HEP for general UE/LE and core strengthening. Short term goal 2: Patient to be instructed in general functional strengthening to decrease pain and improve mobility. Short term goal 3: Initiate manual techniques/modalities prn to decrease L hip and R shoulder pain and improve mobility. Long Term Goals  Time Frame for Long term goals : 6 weeks  Long term goal 1: Patient to be independent and compliant with HEP. Long term goal 2: Patient to have improved B UE strength >/=4/5 grossly for improved postural control to decrease stress on shoulders with ambulation. Long term goal 3: Patient to have improved core and B LE strength >/=4/5 grossly for improved spinal stability and decreased low back and hip pain. Long term goal 4: Patient to be able to complete 8 sit/stands in </=30 seconds with no UE assistance to improve functional strength. Long term goal 5: Patient to report >/=75% improvement in symptoms for improved QOL.         Minutes Tracking:  Time In: 1404  Time Out: [address]  Minutes: 42  Timed Code Treatment Minutes: [address] [facility]. 271 Kevin, PT, DPT    7/27/2022

## 2022-07-27 NOTE — PLAN OF CARE
[facility]           Phone: [phone]             Outpatient Physical Therapy  Fax: [phone]                                           Date: 2022  Patient: Jose Daniel Darden : 1950 [facility] #: [id number]   Referring Physician: Criselda Bumpers, *      [x] Plan of Care   [] Updated Plan of Care    Dates of Service to Include: 2022 to 22    Diagnosis:  Diabetic polyneuropathy, E11.42; Frequent falls, R29.6, Weakness, R53.1    Rehab (Treatment) Diagnosis:  Difficulty walking             Onset Date:       Attendance  Total # of Visits to Date: 1 No Show: 0 Canceled Appointment: 0    Assessment  Assessment: Patient is 67year old female with dx of weakness and diabetic polyneuropathy who presents with increased L lower back and hip pain 5/10 on average and increased LOB's/difficulty with stair negotiation. TUseconds with FWW and wide SHAD; 5x sit/stand: 20sec with B armrests; pt stands with lumbar hyperlordosis and anterior pelvic tilt and leans heavily on walker causing pain in R shoulder. Patient with decreased core strength: 3+/5 grossly and decreased B UE/LE strength about 3+ to 4-/5 grossly. Patient to benefit from physical therapy to improve functional strength and endurance, improve gait and decrease pain to return to PLOF and reduce fall risk. Goals  Short Term Goals  Time Frame for Short term goals: 3 weeks  Short term goal 1: Patient to be instructed in initial HEP for general UE/LE and core strengthening. Short term goal 2: Patient to be instructed in general functional strengthening to decrease pain and improve mobility. Short term goal 3: Initiate manual techniques/modalities prn to decrease L hip and R shoulder pain and improve mobility. Long Term Goals  Time Frame for Long term goals : 6 weeks  Long term goal 1: Patient to be independent and compliant with HEP.   Long term goal 2: Patient to have improved B UE strength >/=4/5 grossly for improved postural control to decrease stress on shoulders with ambulation. Long term goal 3: Patient to have improved core and B LE strength >/=4/5 grossly for improved spinal stability and decreased low back and hip pain. Long term goal 4: Patient to be able to complete 8 sit/stands in </=30 seconds with no UE assistance to improve functional strength. Long term goal 5: Patient to report >/=75% improvement in symptoms for improved QOL.      Prognosis  Therapy Prognosis: Good    Treatment Plan   Plan Frequency: 2  Plan weeks: 6  [x] HP/CP      [x] Electrical Stim   [x] Therapeutic Exercise      [x] Gait Training  [] Aquatics   [x] Ultrasound         [x] Patient Education/HEP   [x] Manual Therapy  [] Traction    [x] Neuro-brenda        [x] Soft Tissue Mobs            [] Home TENS  [] Iontophoresis    [] Orthotic casting/fitting      [] Dry Needling             Electronically signed by: Seun Barboza PT, DPT    Date: 7/27/2022      ______________________________________ Date: 7/27/2022   Physician Signature

## 2022-08-03 ENCOUNTER — HOSPITAL ENCOUNTER (OUTPATIENT)
Dept: PHYSICAL THERAPY | Age: 72
Setting detail: THERAPIES SERIES
Discharge: HOME | End: 2022-08-03
Payer: MEDICARE

## 2022-08-03 PROCEDURE — 97110 THERAPEUTIC EXERCISES: CPT

## 2022-08-03 NOTE — PROGRESS NOTES
mobility.-met/cont  Short term goal 3: Initiate manual techniques/modalities prn to decrease L hip and R shoulder pain and improve mobility. Long Term Goals  Time Frame for Long term goals : 6 weeks  Long term goal 1: Patient to be independent and compliant with HEP. Long term goal 2: Patient to have improved B UE strength >/=4/5 grossly for improved postural control to decrease stress on shoulders with ambulation. Long term goal 3: Patient to have improved core and B LE strength >/=4/5 grossly for improved spinal stability and decreased low back and hip pain. Long term goal 4: Patient to be able to complete 8 sit/stands in </=30 seconds with no UE assistance to improve functional strength. Long term goal 5: Patient to report >/=75% improvement in symptoms for improved QOL.     Minutes Tracking:  Time In: 4580  Time Out: [address]  Minutes: 46  Timed Code Treatment Minutes: [address] [facility], PT, DPT     Date: 8/3/2022

## 2022-08-05 ENCOUNTER — HOSPITAL ENCOUNTER (OUTPATIENT)
Dept: PHYSICAL THERAPY | Age: 72
Setting detail: THERAPIES SERIES
Discharge: HOME | End: 2022-08-05
Payer: MEDICARE

## 2022-08-05 PROCEDURE — 97110 THERAPEUTIC EXERCISES: CPT

## 2022-08-05 NOTE — PROGRESS NOTES
Phone: [facility]           Fax: [phone]                           Outpatient Physical Therapy                                                                            Daily Note    Patient: Bety Wall : 1950  [facility] #: [id number]   Referring Physician: Levi Blank, *    Date: 2022    Diagnosis: Diabetic polyneuropathy, E11.42; Frequent falls, R29.6, Weakness, R53.1  Treatment Diagnosis: Difficulty walking    PT Insurance Information: [employer]  Total # of Visits Approved: 12 Per Physician Order  Total # of Visits to Date: 3  No Show: 0  Canceled Appointment: 0      Pre-Treatment Pain:  0/10  Subjective: Patient is feeling well overall today. Exercises:  Exercise 1: HEP: sink exercises  Exercise 2: Scifit x8min L2.0  Exercise 3: 6in FSU/LSU x10 ea LE with CGA for safety  Exercise 4: Sit/stands x10 no hands  Exercise 5: Sink exercises x10  Exercise 6: Pulleys x5min  Exercise 7: 2# bicep curls, punches 20x ea      Assessment  Assessment: Added UE strengthening with free weights to improve B shoulder stability and strength. Patient able to tolerate full exercise program with minimal rest breaks today. Will continue to progress. Activity Tolerance  Activity Tolerance: Patient tolerated treatment well    Patient Education  Exercise technique   Pt verbalized/demonstrated good understanding:     [x] Yes         [] No, pt required further clarification.        Post Treatment Pain:  0/10      Plan  Plan Frequency: 2  Plan weeks: 6       Goals  (Total # of Visits to Date: 3)      Short Term Goals  Time Frame for Short term goals: 3 weeks  Short term goal 1: Patient to be instructed in initial HEP for general UE/LE and core strengthening.-met/cont  Short term goal 2: Patient to be instructed in general functional strengthening to decrease pain and improve mobility.-met/cont  Short term goal 3: Initiate manual techniques/modalities prn to decrease L hip and R shoulder pain and improve mobility. Long Term Goals  Time Frame for Long term goals : 6 weeks  Long term goal 1: Patient to be independent and compliant with HEP. Long term goal 2: Patient to have improved B UE strength >/=4/5 grossly for improved postural control to decrease stress on shoulders with ambulation. Long term goal 3: Patient to have improved core and B LE strength >/=4/5 grossly for improved spinal stability and decreased low back and hip pain. Long term goal 4: Patient to be able to complete 8 sit/stands in </=30 seconds with no UE assistance to improve functional strength. Long term goal 5: Patient to report >/=75% improvement in symptoms for improved QOL.     Minutes Tracking:  Time In: 0792  Time Out: [address]  Minutes: 44  Timed Code Treatment Minutes: 111 [facility], PT, DPT     Date: 8/5/2022

## 2022-08-10 ENCOUNTER — HOSPITAL ENCOUNTER (OUTPATIENT)
Dept: PHYSICAL THERAPY | Age: 72
Setting detail: THERAPIES SERIES
Discharge: HOME | End: 2022-08-10
Payer: MEDICARE

## 2022-08-10 PROCEDURE — 97110 THERAPEUTIC EXERCISES: CPT

## 2022-08-11 NOTE — PROGRESS NOTES
Phone: [phone]                 [facility]           Fax: [phone]                           Outpatient Physical Therapy                                                                            Daily Note    Patient: Lexis Goode : 1950  CSN #: [id number]   Referring Physician: Pam Harper, *    Date: 8/10/2022       Treatment Diagnosis: Difficulty walking       Total # of Visits Approved: 12 Per Physician Order  Total # of Visits to Date: 4  No Show: 0  Canceled Appointment: 0    Pre-Treatment Pain:  0/10  Subjective: Pt denies current pain at rest.  Pt reports shes making a little improvements she feels. Exercises:  Exercise 1: HEP: sink exercises  Exercise 2: Scifit x8min L2.0  Exercise 3: 6in FSU/LSU x10 ea LE with CGA for safety  Exercise 4: Sit/stands x10 no hands  Exercise 6: Pulleys x5min    Assessment  Assessment: Pt able to complete 9 sit<>stands in 30 seconds today with mixture of one and Teodoro UE throughout reps. Activity Tolerance  Activity Tolerance: Patient tolerated treatment well    Patient Education  Patient Education: Exercise rationale. Pt verbalized/demonstrated good understanding:     [x] Yes         [] No, pt required further clarification. Post Treatment Pain:  0/10    Plan  Plan Frequency: 2  Plan weeks: 6     Goals  (Total # of Visits to Date: 4)      Short Term Goals  Time Frame for Short term goals: 3 weeks  Short term goal 1: Patient to be instructed in initial HEP for general UE/LE and core strengthening.-met/cont  Short term goal 2: Patient to be instructed in general functional strengthening to decrease pain and improve mobility.-met/cont  Short term goal 3: Initiate manual techniques/modalities prn to decrease L hip and R shoulder pain and improve mobility. Long Term Goals  Time Frame for Long term goals : 6 weeks  Long term goal 1: Patient to be independent and compliant with HEP.   Long term goal 2: Patient to have improved B UE strength >/=4/5 grossly for improved postural control to decrease stress on shoulders with ambulation. Long term goal 3: Patient to have improved core and B LE strength >/=4/5 grossly for improved spinal stability and decreased low back and hip pain. Long term goal 4: Patient to be able to complete 8 sit/stands in </=30 seconds with no UE assistance to improve functional strength. Long term goal 5: Patient to report >/=75% improvement in symptoms for improved QOL.     Minutes Tracking:  Time In: 1703  Time Out: 1750  Minutes: 47  Timed Code Treatment Minutes: [place], [place]     Date: 8/10/2022

## 2022-08-17 ENCOUNTER — HOSPITAL ENCOUNTER (OUTPATIENT)
Dept: PHYSICAL THERAPY | Age: 72
Setting detail: THERAPIES SERIES
Discharge: HOME | End: 2022-08-17
Payer: MEDICARE

## 2022-08-17 PROCEDURE — 97110 THERAPEUTIC EXERCISES: CPT

## 2022-08-18 ENCOUNTER — HOSPITAL ENCOUNTER (OUTPATIENT)
Dept: GENERAL RADIOLOGY | Age: 72
Discharge: HOME | End: 2022-08-20
Payer: MEDICARE

## 2022-08-18 DIAGNOSIS — R13.10 DYSPHAGIA, UNSPECIFIED TYPE: ICD-10-CM

## 2022-08-18 PROCEDURE — A4641 RADIOPHARM DX AGENT NOC: HCPCS | Performed by: NURSE PRACTITIONER

## 2022-08-18 PROCEDURE — 6360000004 HC RX CONTRAST MEDICATION: Performed by: NURSE PRACTITIONER

## 2022-08-18 PROCEDURE — 74220 X-RAY XM ESOPHAGUS 1CNTRST: CPT

## 2022-08-18 RX ADMIN — BARIUM SULFATE 355 ML: 0.6 SUSPENSION ORAL at 08:37

## 2022-08-18 NOTE — PROGRESS NOTES
Phone: [phone]                 [facility]           Fax: [phone]                           Outpatient Physical Therapy                                                                            Daily Note    Patient: Marva Miguel : 1950  CSN #: [id number]   Referring Physician: Marciano Cano, *    Date: 2022     Treatment Diagnosis: Difficulty walking     Total # of Visits Approved: 12 Per Physician Order  Total # of Visits to Date: 5  No Show: 0  Canceled Appointment: 0    Pre-Treatment Pain:  2/10  Subjective: Pt reports she isnt feeling to bad today. Pt states she got a upper back brace and it hurt her LB more so she isnt sure she is using it right. Pt rates current pain a 2/10. Exercises:  Exercise 1: HEP: sink exercises  Exercise 2: Scifit x8min L2.0  Exercise 3: 6in FSU/LSU x10 ea LE with CGA for safety  Exercise 4: Sit/stands x10 no hands  Exercise 6: Pulleys x5min  Exercise 7: 2# bicep curls, punches 20x ea    Assessment  Assessment: Continued working on endurance based exercises. Pt with slightly better walker management during longer amb. Activity Tolerance  Activity Tolerance: Patient tolerated treatment well    Patient Education  Patient Education: Exercise rationale. Pt verbalized/demonstrated good understanding:     [x] Yes         [] No, pt required further clarification. Post Treatment Pain:  2/10    Plan  Plan Frequency: 2  Plan weeks: 6     Goals  (Total # of Visits to Date: 5)      Short Term Goals  Time Frame for Short term goals: 3 weeks  Short term goal 1: Patient to be instructed in initial HEP for general UE/LE and core strengthening.-met/cont  Short term goal 2: Patient to be instructed in general functional strengthening to decrease pain and improve mobility.-met/cont  Short term goal 3: Initiate manual techniques/modalities prn to decrease L hip and R shoulder pain and improve mobility.     Long Term Goals  Time Frame for Long term goals : 6 weeks  Long term goal 1: Patient to be independent and compliant with HEP. Long term goal 2: Patient to have improved B UE strength >/=4/5 grossly for improved postural control to decrease stress on shoulders with ambulation. Long term goal 3: Patient to have improved core and B LE strength >/=4/5 grossly for improved spinal stability and decreased low back and hip pain. Long term goal 4: Patient to be able to complete 8 sit/stands in </=30 seconds with no UE assistance to improve functional strength. Long term goal 5: Patient to report >/=75% improvement in symptoms for improved QOL.     Minutes Tracking:  Time In: 1246  Time Out: 1600  Minutes: 44  Timed Code Treatment Minutes: [address] [place], [place]     Date: 8/17/2022

## 2022-08-19 ENCOUNTER — HOSPITAL ENCOUNTER (OUTPATIENT)
Dept: PHYSICAL THERAPY | Age: 72
Setting detail: THERAPIES SERIES
Discharge: HOME | End: 2022-08-19
Payer: MEDICARE

## 2022-08-19 PROCEDURE — 97110 THERAPEUTIC EXERCISES: CPT

## 2022-08-22 NOTE — PROGRESS NOTES
Phone: [facility]           Fax: [phone]                           Outpatient Physical Therapy                                                                            Daily Note    Patient: Miri Freitas : 1950  CSN #: [id number]   Referring Physician: Queta Figueroa, *    Date: 2022     Treatment Diagnosis: Difficulty walking     Total # of Visits Approved: 12 Per Physician Order  Total # of Visits to Date: 6  No Show: 0  Canceled Appointment: 0    Pre-Treatment Pain:  2/10  Subjective: Pt states she went to the Dr and they would like her to have a stress test and a scope. Pt rates current pain a /10. Exercises:  Exercise 1: HEP: sink exercises  Exercise 2: Scifit x8min L2.0  Exercise 3: 6in FSU/LSU x10 ea LE with CGA for safety  Exercise 4: Sit/stands x10 no hands  Exercise 6: Pulleys x5min  Exercise 8: UBE 2/2    Assessment  Assessment: Added UE UBE to increase overall endurance program.  Pt continues to amb with forward flexed posture divine with increased fatigue. Activity Tolerance  Activity Tolerance: Patient tolerated treatment well    Patient Education  Patient Education: Exercise rationale. Pt verbalized/demonstrated good understanding:     [x] Yes         [] No, pt required further clarification. Post Treatment Pain:  210    Plan  Plan Frequency: 2  Plan weeks: 6     Goals  (Total # of Visits to Date: 6)      Short Term Goals  Time Frame for Short term goals: 3 weeks  Short term goal 1: Patient to be instructed in initial HEP for general UE/LE and core strengthening.-met/cont  Short term goal 2: Patient to be instructed in general functional strengthening to decrease pain and improve mobility.-met/cont  Short term goal 3: Initiate manual techniques/modalities prn to decrease L hip and R shoulder pain and improve mobility.     Long Term Goals  Time Frame for Long term goals : 6 weeks  Long term goal 1: Patient to be independent and compliant with HEP. Long term goal 2: Patient to have improved B UE strength >/=4/5 grossly for improved postural control to decrease stress on shoulders with ambulation. Long term goal 3: Patient to have improved core and B LE strength >/=4/5 grossly for improved spinal stability and decreased low back and hip pain. Long term goal 4: Patient to be able to complete 8 sit/stands in </=30 seconds with no UE assistance to improve functional strength. Long term goal 5: Patient to report >/=75% improvement in symptoms for improved QOL.     Minutes Tracking:  Time In: 4435  Time Out: 1530  Minutes: 44  Timed Code Treatment Minutes: [address] [place]     Date: 8/19/2022

## 2022-08-24 ENCOUNTER — APPOINTMENT (OUTPATIENT)
Dept: PHYSICAL THERAPY | Age: 72
End: 2022-08-24
Payer: MEDICARE

## 2022-08-26 ENCOUNTER — HOSPITAL ENCOUNTER (OUTPATIENT)
Dept: PHYSICAL THERAPY | Age: 72
Setting detail: THERAPIES SERIES
Discharge: HOME | End: 2022-08-26
Payer: MEDICARE

## 2022-08-26 PROCEDURE — 97110 THERAPEUTIC EXERCISES: CPT

## 2022-08-26 NOTE — PROGRESS NOTES
Phone: [phone]                 [facility]           Fax: [phone]                           Outpatient Physical Therapy                                                                            Daily Note    Patient: Miri Freitas : 1950  CSN #: [id number]   Referring Physician: Queta Figueroa, *    Date: 2022     Treatment Diagnosis: Difficulty walking     Total # of Visits Approved: 12 Per Physician Order  Total # of Visits to Date: 7  No Show: 0    Pre-Treatment Pain:  2/10  Subjective: Pt states her shoulders are feeling better, legs feeling a little better but back is really hurting. Pt rates current pain a 2/10. Exercises:  Exercise 2: Scifit x8min L2.0  Exercise 3: 6in FSU/LSU x10 ea LE with CGA for safety  Exercise 4: Sit/stands x10 no hands  Exercise 6: Pulleys x5min  Exercise 8: UBE 3/3    Assessment  Assessment: Pt amb 500' in 6 mins today using RW prior to needing a rest break. Slight dificulty noted with sit<>stand with no UE assist.    Activity Tolerance  Activity Tolerance: Patient tolerated treatment well    Patient Education  Patient Education: Exercise rationale. Pt verbalized/demonstrated good understanding:     [x] Yes         [] No, pt required further clarification. Post Treatment Pain:  2/10    Plan  Plan Frequency: 2  Plan weeks: 6     Goals  (Total # of Visits to Date: 7)      Short Term Goals  Time Frame for Short term goals: 3 weeks  Short term goal 1: Patient to be instructed in initial HEP for general UE/LE and core strengthening.-met/cont  Short term goal 2: Patient to be instructed in general functional strengthening to decrease pain and improve mobility.-met/cont  Short term goal 3: Initiate manual techniques/modalities prn to decrease L hip and R shoulder pain and improve mobility. Long Term Goals  Time Frame for Long term goals : 6 weeks  Long term goal 1: Patient to be independent and compliant with HEP.   Long term goal 2: Patient to have improved B UE strength >/=4/5 grossly for improved postural control to decrease stress on shoulders with ambulation. Long term goal 3: Patient to have improved core and B LE strength >/=4/5 grossly for improved spinal stability and decreased low back and hip pain. Long term goal 4: Patient to be able to complete 8 sit/stands in </=30 seconds with no UE assistance to improve functional strength. Long term goal 5: Patient to report >/=75% improvement in symptoms for improved QOL.     Minutes Tracking:  Time In: 0450  Time Out: 1529  Minutes: 48  Timed Code Treatment Minutes: [address] [place], [place]     Date: 8/26/2022 8

## 2022-08-30 ENCOUNTER — HOSPITAL ENCOUNTER (OUTPATIENT)
Dept: NON INVASIVE DIAGNOSTICS | Age: 72
Discharge: HOME | End: 2022-08-30
Payer: MEDICARE

## 2022-08-30 DIAGNOSIS — I20.0 UNSTABLE ANGINA (HCC): ICD-10-CM

## 2022-08-30 DIAGNOSIS — R00.1 BRADYCARDIA: ICD-10-CM

## 2022-08-30 PROCEDURE — A9500 TC99M SESTAMIBI: HCPCS | Performed by: NURSE PRACTITIONER

## 2022-08-30 PROCEDURE — 3430000000 HC RX DIAGNOSTIC RADIOPHARMACEUTICAL: Performed by: NURSE PRACTITIONER

## 2022-08-30 PROCEDURE — 93017 CV STRESS TEST TRACING ONLY: CPT

## 2022-08-30 PROCEDURE — 6360000002 HC RX W HCPCS: Performed by: FAMILY MEDICINE

## 2022-08-30 RX ADMIN — REGADENOSON 0.4 MG: 0.08 INJECTION, SOLUTION INTRAVENOUS at 11:19

## 2022-08-30 RX ADMIN — Medication 30 MILLICURIE: at 11:05

## 2022-08-31 ENCOUNTER — HOSPITAL ENCOUNTER (OUTPATIENT)
Dept: NON INVASIVE DIAGNOSTICS | Age: 72
Discharge: HOME | End: 2022-08-31
Payer: MEDICARE

## 2022-08-31 ENCOUNTER — HOSPITAL ENCOUNTER (OUTPATIENT)
Dept: NON INVASIVE DIAGNOSTICS | Age: 72
Discharge: HOME | End: 2022-08-31

## 2022-08-31 ENCOUNTER — APPOINTMENT (OUTPATIENT)
Dept: PHYSICAL THERAPY | Age: 72
End: 2022-08-31
Payer: MEDICARE

## 2022-08-31 DIAGNOSIS — R00.1 BRADYCARDIA: ICD-10-CM

## 2022-08-31 PROCEDURE — 78452 HT MUSCLE IMAGE SPECT MULT: CPT

## 2022-08-31 PROCEDURE — A9500 TC99M SESTAMIBI: HCPCS | Performed by: NURSE PRACTITIONER

## 2022-08-31 PROCEDURE — 93243 EXT ECG>48HR<7D SCAN A/R: CPT

## 2022-08-31 PROCEDURE — 93242 EXT ECG>48HR<7D RECORDING: CPT

## 2022-08-31 PROCEDURE — 3430000000 HC RX DIAGNOSTIC RADIOPHARMACEUTICAL: Performed by: NURSE PRACTITIONER

## 2022-08-31 RX ADMIN — Medication 30 MILLICURIE: at 15:01

## 2022-08-31 NOTE — PROCEDURES
[address] [facility], [address] [facility]                              CARDIAC STRESS TEST    PATIENT NAME: Echo Gallardo                     :        1950  MED REC NO:   [id number]                              ROOM:  ACCOUNT NO:   [de-identified]                           ADMIT DATE: 2022  PROVIDER:     Tori Sotomayor MD    CARDIOVASCULAR DIAGNOSTIC DEPARTMENT    DATE OF STUDY:  2022    ORDERING PROVIDER:  KASANDRA Tejada    PRIMARY CARE PROVIDER:  KASANDRA Tejada    INTERPRETING PHYSICIAN:  Tori Sotomayor MD    PHARMACOLOGIC MYOCARDIAL PERFUSION STRESS TESTING    Stress/rest single isotope SPECT imaging with exercise stress and gated  SPECT imaging. INDICATIONS:  Assessment of a cardiac cause of bradycardia. CLINICAL HISTORY:  The patient is a 67-year-old woman with no known  coronary artery disease. Previous cardiac history includes stress test, cardiac catheterization. Other previous history includes chest pain, palpitations dyspnea,  lightheadedness, diabetes mellitus, arthritis, caffeine, hypertension,  family history of coronary artery disease in mother under age 61. Symptoms just prior to testing include shortness of breath. Relevant medications:  Amlodipine (Norvasc). PROCEDURE:  The heart rate was 79 at baseline and niko to 110 beats per  minute during the regadenoson infusion. The rest blood pressure was  124/62 mm/Hg and decreased to 100/60 mm/Hg. The patient did complain of  shortness of breath following infusion. Pharmacologic stress testing was performed with regadenoson at a dose of  0.4 mg. Additionally, low level exercise using hand compressions was  performed along with vasodilator infusion. MYOCARDIAL PERFUSION IMAGING:  Imaging was performed at rest 30-45  minutes following the injection of 30 mCi of sestamibi.   Approximately  10 seconds after [employer] injection, the patient was injected with 30  mCi of sestamibi. Gating post-stress tomographic imaging was performed  30-45 minutes after stress. STRESS ECG RESULTS:  The resting electrocardiogram demonstrated normal  sinus rhythm without significant ST-segment abnormalities that may  impair accurate ECG detection of stress induced cardiac ischemia. During vasodilator infusion and during recovery, the patient developed:    No significant ST segment changes suggestive of myocardial ischemia with  occasional premature atrial contractions (PACs) and occasional premature  ventricular contractions (PVCs). NUCLEAR IMAGING RESULTS:  The overall quality of the study is fair. No  significant attenuation artifact was seen. There is no evidence of  abnormal lung uptake. Additionally, the right ventricle appears normal.  The left ventricular cavity is noted to be normal in size on the stress  images. There is no evidence of transient ischemic dilatation (TID) of  the left ventricle. Gated SPECT imaging reveals normal myocardial thickening and wall motion  with a calculated left ventricular ejection fraction of 67%. The rest images demonstrate homogeneous tracer distribution throughout  the myocardium. On stress imaging, a small/moderate perfusion abnormality of mild  intensity in the apical region(s), which may be due to artifact. IMPRESSION:  1. Mildly abnormal myocardial perfusion study. There is a  small/moderate perfusion defect of mild intensity in the apical  region(s) during stress imaging, which is most consistent with ischemia,  but may be due to artifact. 2.  Global left ventricular systolic function was normal with an EF of  67% without regional wall motion abnormalities. 3.  No significant electrocardiographic evidence of myocardial ischemia  during EKG monitoring without significant associated arrhythmias.     Overall, these results are most consistent with a low/intermediate risk  for significant coronary artery disease. Depending on the patient's symptoms and level of clinical suspicion,  aggressive medical management versus additional testing by coronary  angiography may be indicated. The sensitivity for detecting ischemia on this test may have been  reduced due to the patient being on a calcium channel blocker.       Audelia Celaya MD    D: 08/31/2022 16:52:21       T: 08/31/2022 16:53:32     [email or web address]  Job#: [id number]     Doc#: Unknown    CC:  KASANDRA Jean Baptiste

## 2022-09-02 ENCOUNTER — HOSPITAL ENCOUNTER (OUTPATIENT)
Dept: PHYSICAL THERAPY | Age: 72
Setting detail: THERAPIES SERIES
Discharge: HOME | End: 2022-09-02
Payer: MEDICARE

## 2022-09-02 PROCEDURE — 97110 THERAPEUTIC EXERCISES: CPT

## 2022-09-02 NOTE — PROGRESS NOTES
with HEP. Long term goal 2: Patient to have improved B UE strength >/=4/5 grossly for improved postural control to decrease stress on shoulders with ambulation. Long term goal 3: Patient to have improved core and B LE strength >/=4/5 grossly for improved spinal stability and decreased low back and hip pain. Long term goal 4: Patient to be able to complete 8 sit/stands in </=30 seconds with no UE assistance to improve functional strength. Long term goal 5: Patient to report >/=75% improvement in symptoms for improved QOL.     Minutes Tracking:  Time In: 6896  Time Out: 5330  Minutes: 43  Timed Code Treatment Minutes: [address] [place], [place]     Date: 9/2/2022

## 2022-09-07 ENCOUNTER — HOSPITAL ENCOUNTER (OUTPATIENT)
Dept: PHYSICAL THERAPY | Age: 72
Setting detail: THERAPIES SERIES
Discharge: HOME | End: 2022-09-07
Payer: MEDICARE

## 2022-09-07 PROCEDURE — 97110 THERAPEUTIC EXERCISES: CPT

## 2022-09-07 NOTE — PROGRESS NOTES
Goals  Time Frame for Long term goals : 6 weeks  Long term goal 1: Patient to be independent and compliant with HEP. Long term goal 2: Patient to have improved B UE strength >/=4/5 grossly for improved postural control to decrease stress on shoulders with ambulation. Long term goal 3: Patient to have improved core and B LE strength >/=4/5 grossly for improved spinal stability and decreased low back and hip pain. Long term goal 4: Patient to be able to complete 8 sit/stands in </=30 seconds with no UE assistance to improve functional strength. Long term goal 5: Patient to report >/=75% improvement in symptoms for improved QOL.     Minutes Tracking:  Time In: 2535  Time Out: 2795  Minutes: 42  Timed Code Treatment Minutes: [address] [place], [place]     Date: 9/7/2022

## 2022-09-08 ENCOUNTER — TELEPHONE (OUTPATIENT)
Dept: CARDIOLOGY | Age: 72
End: 2022-09-08

## 2022-09-08 ENCOUNTER — HOSPITAL ENCOUNTER (OUTPATIENT)
Age: 72
Discharge: HOME | End: 2022-09-08
Payer: MEDICARE

## 2022-09-08 ENCOUNTER — OFFICE VISIT (OUTPATIENT)
Dept: CARDIOLOGY | Age: 72
End: 2022-09-08
Payer: MEDICARE

## 2022-09-08 VITALS
DIASTOLIC BLOOD PRESSURE: 83 MMHG | WEIGHT: 193.2 LBS | SYSTOLIC BLOOD PRESSURE: 146 MMHG | BODY MASS INDEX: 36.47 KG/M2 | HEART RATE: 73 BPM | RESPIRATION RATE: 18 BRPM | HEIGHT: 61 IN | OXYGEN SATURATION: 98 %

## 2022-09-08 DIAGNOSIS — R94.39 ABNORMAL STRESS TEST: ICD-10-CM

## 2022-09-08 DIAGNOSIS — I49.3 PVC (PREMATURE VENTRICULAR CONTRACTION): ICD-10-CM

## 2022-09-08 DIAGNOSIS — R00.2 PALPITATION: ICD-10-CM

## 2022-09-08 DIAGNOSIS — I10 ESSENTIAL HYPERTENSION: ICD-10-CM

## 2022-09-08 DIAGNOSIS — R94.31 ABNORMAL ECG: ICD-10-CM

## 2022-09-08 DIAGNOSIS — E78.2 MIXED HYPERLIPIDEMIA: ICD-10-CM

## 2022-09-08 DIAGNOSIS — R06.02 SOB (SHORTNESS OF BREATH): ICD-10-CM

## 2022-09-08 LAB
ANION GAP SERPL CALCULATED.3IONS-SCNC: 13 MMOL/L (ref 9–17)
BUN BLDV-MCNC: 19 MG/DL (ref 8–23)
BUN/CREAT BLD: 42 (ref 9–20)
CALCIUM SERPL-MCNC: 8.8 MG/DL (ref 8.6–10.4)
CHLORIDE BLD-SCNC: 93 MMOL/L (ref 98–107)
CO2: 26 MMOL/L (ref 20–31)
CREAT SERPL-MCNC: 0.45 MG/DL (ref 0.5–0.9)
GFR AFRICAN AMERICAN: >60 ML/MIN
GFR NON-AFRICAN AMERICAN: >60 ML/MIN
GFR SERPL CREATININE-BSD FRML MDRD: ABNORMAL ML/MIN/{1.73_M2}
GFR SERPL CREATININE-BSD FRML MDRD: ABNORMAL ML/MIN/{1.73_M2}
GLUCOSE BLD-MCNC: 112 MG/DL (ref 70–99)
HCT VFR BLD CALC: 40.5 % (ref 36.3–47.1)
HEMOGLOBIN: 12.8 G/DL (ref 11.9–15.1)
MCH RBC QN AUTO: 29.2 PG (ref 25.2–33.5)
MCHC RBC AUTO-ENTMCNC: 31.6 G/DL (ref 28.4–34.8)
MCV RBC AUTO: 92.3 FL (ref 82.6–102.9)
NRBC AUTOMATED: 0 PER 100 WBC
PDW BLD-RTO: 14.6 % (ref 11.8–14.4)
PLATELET # BLD: 272 K/UL (ref 138–453)
PMV BLD AUTO: 10.5 FL (ref 8.1–13.5)
POTASSIUM SERPL-SCNC: 4.2 MMOL/L (ref 3.7–5.3)
RBC # BLD: 4.39 M/UL (ref 3.95–5.11)
SODIUM BLD-SCNC: 132 MMOL/L (ref 135–144)
WBC # BLD: 8.6 K/UL (ref 3.5–11.3)

## 2022-09-08 PROCEDURE — 99214 OFFICE O/P EST MOD 30 MIN: CPT | Performed by: PHYSICIAN ASSISTANT

## 2022-09-08 PROCEDURE — 80048 BASIC METABOLIC PNL TOTAL CA: CPT

## 2022-09-08 PROCEDURE — 85027 COMPLETE CBC AUTOMATED: CPT

## 2022-09-08 PROCEDURE — 1123F ACP DISCUSS/DSCN MKR DOCD: CPT | Performed by: PHYSICIAN ASSISTANT

## 2022-09-08 PROCEDURE — 36415 COLL VENOUS BLD VENIPUNCTURE: CPT

## 2022-09-08 PROCEDURE — 93000 ELECTROCARDIOGRAM COMPLETE: CPT | Performed by: PHYSICIAN ASSISTANT

## 2022-09-08 RX ORDER — METOPROLOL SUCCINATE 25 MG/1
25 TABLET, EXTENDED RELEASE ORAL DAILY
Qty: 30 TABLET | Refills: 3 | Status: SHIPPED | OUTPATIENT
Start: 2022-09-08 | End: 2022-09-30

## 2022-09-08 NOTE — PROGRESS NOTES
Patient: Emely Soto  : 1950  Date of Visit: 2022    REASON FOR VISIT / CONSULTATION: Follow-up (Hx: DM, HTN, Pt is here for abn stress. Ordered by Khadra Landis for palps, heart burn and pain in her chest. Palps, heart racing. Some dizziness, no falls or near falls. Denies: CP)    History of Present Illness:         Dear Donato Chapin, APRN - CNP    I had the pleasure of seeing Emely Soto in my office today. Ms. Florence Guerin is a 67 y.o. female who recently underwent a cardiovascular stress test because of increased shortness of breath which shown evidence of reversible ischemia. She also was feeling heart palpitations that have been occurring about a couple weeks ago. This can last a couple minutes in duration. She can hear it in her ears at times. Some episodes are more strong than other. She had gastric bypass in . She also reported a significant increase in shortness of breath with exertion leading to her recent stress test and echo. denies any previous heart related issues. She does have history of hypertensions and hyperlipidemia. Her mother had heart conditions that started in her 52's. She never was a smoker. She had her stress test done on 2020 that was abnormal myocardial perfusion study. 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 She also had an Echo done on 2020 that showed an EF of 60% with mildly increased left ventricular wall thickness with a normal left ventricular cavity size and also evidence of mild diastolic dysfunction is seen. Heart cath done on 2020 showed No significant coronary artery disease. Normal left ventricular end diastolic pressure (LVEDP). Ms. Florence Guerin is here today for an abnormal stress.  Her symptoms have gradually gotten worse over the past 6 months. She reports he has been having worsening shortness of breath that comes and go. It was persistent for 2 weeks. The shortness of breath occurs at rest and with exertions. She also reports palpitations that worsen when she breaths heavy. The palpitations last minutes at a time. Resting and breathing out of her mouth improves her palpitations. She reports some chest pain on her left side and across her left side of her chest. This occurred when her sob and palpitations worsened 2 months ago. The chest pain lasted several minutes, she was sitting during this episode. No radiation of the chest pain. She did have some nausea and took Zofran, which resolved her nausea. She states she has not had any further chest pain. She reports dizziness and lightheadedness multiple times per day, lasting minutes at a time. It occurs when sitting and when sitting to standing. She uses a walker. No falls or near falls. She denied any abdominal pain, bleeding problems, problems with her medications or any other concerns at this time. EKG done in office today shows frequent PVC's. She just turned her CAM monitor in Wednesday. Exercise Tolerance: Ms. Kassandra Wade reports that she has a fairly good exercise tolerance. She did go to physical therapy and walked to our office from there with her walker, she reports she walks with PT and she is now having worsening shortness of breath. From the lobby to our office she had shortness of breath.   PAST MEDICAL HISTORY:        Past Medical History:   Diagnosis Date    Anemia     Arthritis     Depression     Diabetes mellitus ([facility] [address]     History of cardiac cath 05/21/2020    [id number]     Hypertension     Neck fracture ([facility] [address] 2016    Obesity (BMI 30-39.9)     Splenic artery aneurysm (HCC)     small less than 1.5 cm, stable    Type II or unspecified type diabetes mellitus without mention of complication, not stated as uncontrolled     Ventral incisional hernia CURRENT ALLERGIES: Ibuprofen and Pepto-bismol [bismuth subsalicylate] REVIEW OF SYSTEMS: 14 systems were reviewed. Pertinent positives and negatives as above, all else negative. Past Surgical History:   Procedure Laterality Date    APPENDECTOMY      CATARACT REMOVAL WITH IMPLANT Left 12/12/2018    per Dr. Mora Rising Right 01/07/2019    Dr. Villanueva [place]  03/24/2016    C 2- C 7    CHOLECYSTECTOMY      COLONOSCOPY  2014    COLONOSCOPY  01/04/2021    COLONOSCOPY N/A 1/4/2021    COLORECTAL CANCER SCREENING, NOT HIGH RISK performed by Ana Paula Moffett DO at [place] [address]  02/05/2021    COLONOSCOPY N/A 2/5/2021    COLORECTAL CANCER SCREENING, HIGH RISK performed by Ana Paula Moffett DO at [address]    INTRACAPSULAR CATARACT EXTRACTION Left 12/12/2018    EYE CATARACT EMULSIFICATION IOL IMPLANT performed by Carmen Rachel DO at [address] Right 1/7/2019    EYE CATARACT EMULSIFICATION IOL IMPLANT performed by Carmen Rachel DO at [address] OFFICE/OUTPT [facility] Right 6/15/2018    TOE HAMMER REPAIR, RIGHT 5TH DIGIT DEROTATIONAL SKINPLASTY/ARTHOPLASTY performed by Darshana Garrett DPM at [address] [facility] 11/26/2019    EGD DILATION SAVORY performed by Gabe Bond MD at [facility]  11/26/2019    -bx(normal)dilation,evidence of gastric bypass with very small gastric remnant    VENTRAL HERNIA REPAIR N/A 8/19/2021    Exploratory lap, lysis of adhesions, primary repair ventral hernia.  performed by Ana Paula Moffett DO at [address] History:  Social History     Tobacco Use    Smoking status: Never    Smokeless tobacco: Never   Vaping Use    Vaping Use: Never used   Substance Use Topics    Alcohol use: No    Drug use: No        CURRENT MEDICATIONS:        Outpatient Medications Marked as Taking for the 9/8/22 encounter (Office Visit) with Mouna Camejo PA-C   Medication Sig Dispense Refill    metoprolol succinate (TOPROL XL) 25 MG extended release tablet Take 1 tablet by mouth daily 30 tablet 3    Lancets (ONETOUCH DELICA PLUS [id number]) MISC USE TWICE DAILY AS DIRECTED TO CHECK BLOOD SUGAR 100 each 1    omeprazole (PRILOSEC) 40 MG delayed release capsule Take 1 capsule by mouth every morning (before breakfast) 90 capsule 1    gabapentin (NEURONTIN) 300 MG capsule Take 1 capsule by mouth in the morning and 1 capsule at noon and 1 capsule before bedtime. Do all this for 90 days. 270 capsule 0    sertraline (ZOLOFT) 100 MG tablet Take 1 tablet by mouth once daily 90 tablet 0    amLODIPine (NORVASC) 5 MG tablet Take 1 tablet by mouth daily 90 tablet 1    metFORMIN (GLUCOPHAGE) 1000 MG tablet Take 0.5 tablets by mouth 2 times daily (with meals) 180 tablet 2    calcium carbonate 600 MG TABS tablet Take 1 tablet by mouth daily      blood glucose test strips (ONETOUCH VERIO) strip TEST BLOOD SUGAR TWICE DAILY AS DIRECTED; ONE TOUCH VERIO METER DX:E11.9 100 strip 2    oxybutynin (DITROPAN) 5 MG tablet TAKE 1 TABLET BY MOUTH THREE TIMES DAILY 270 tablet 0    triamcinolone (KENALOG) 0.025 % cream Apply topically 2 times daily.  1 each 1    atorvastatin (LIPITOR) 10 MG tablet Take 1 tablet by mouth daily 90 tablet 1    Blood Glucose Monitoring Suppl (ONETOUCH VERIO) w/Device KIT TEST BLOOD SUGAR ONCE DAILY AS DIRECTED; ONE TOUCH VERIO METER DX:E11.9 1 kit 0    triamterene-hydroCHLOROthiazide (DYAZIDE) 37.5-25 MG per capsule Take 1 capsule by mouth daily TAKE 1 CAPSULE ONCE DAILY  AS NEEDED 90 capsule 1    Handicap Placard MISC by Does not apply route 1 each 0    polyethylene glycol (MIRALAX) 17 g PACK packet Take 17 g by mouth daily       vitamin D3 (CHOLECALCIFEROL) 10 MCG (400 UNIT) TABS tablet Take 800 Units by mouth daily      Blood Glucose Monitoring Suppl (ACCU-CHEK BRUNO) ANDREA Use once daily. Dx-E11.9 non-insulin dependant 1 Device 0    acetaminophen (TYLENOL) 325 MG tablet Take 650 mg by mouth every 6 hours as needed for Pain (arthritis)      b complex vitamins capsule Take 1 capsule by mouth daily         FAMILY HISTORY: family history includes Alcohol Abuse in her father; Cancer in her mother; Diabetes in her mother; Heart Attack in her mother; High Blood Pressure in her mother; High Cholesterol in her mother. Physical Examination:     BP (!) 146/83 (Site: Right Upper Arm, Position: Sitting, Cuff Size: Medium Adult)   Pulse 73   Resp 18   Ht 5' 0.98\" (1.549 m)   Wt 193 lb 3.2 oz (87.6 kg)   LMP  (LMP Unknown)   SpO2 98%   BMI 36.52 kg/m²  Body mass index is 36.52 kg/m². Constitutional: She appeared oriented to person and place. She appears well-developed and well-nourished. In no acute distress. HEENT: Normocephalic and atraumatic. No JVD present. Carotid bruit is not present. No mass and no thyromegaly present. No lymphadenopathy noted. Cardiovascular: Normal rate, regularly irregular rhythm, normal heart sounds. Exam reveals no gallop and no friction rubs. No murmur was heard. Pulmonary/Chest: Effort normal and breath sounds normal. No respiratory distress. She has no wheezes, rhonchi or rales. Abdominal: Soft, non-tender. She exhibits no organomegaly, mass or bruit. Extremities: Trace. No cyanosis or clubbing. 2+ radial and carotid pulses. Distal extremity pulses: 2+ bilaterally. Compression stockings in place. Neurological: Alertness and orientation as per Constitutional exam. No evidence of gross cranial nerve deficit. Coordination appeared normal.   Skin: Skin is warm and dry. There is no rash or diaphoresis. Psychiatric: She has a normal mood and affect.  Her speech is normal and behavior is normal.      MOST RECENT LABS ON RECORD:   Lab Results   Component Value Date    WBC 8.6 09/08/2022    HGB 12.8 09/08/2022    HCT 40.5 09/08/2022     09/08/2022    CHOL 111 12/17/2021    TRIG 85 12/17/2021    HDL 52 12/17/2021    ALT 23 06/21/2022    AST 28 06/21/2022     (L) 09/08/2022    K 4.2 09/08/2022    CL 93 (L) 09/08/2022    CREATININE 0.45 (L) 09/08/2022    BUN 19 09/08/2022    CO2 26 09/08/2022    TSH 1.96 06/21/2022    INR 0.9 03/22/2016    GLUF 163 (H) 03/19/2018    LABA1C 6.4 (H) 06/21/2022    LABMICR Can not be calculated 12/20/2021       ASSESSMENT:        1. Abnormal stress test    2. SOB (shortness of breath)    3. Palpitation    4. Essential hypertension    5. Mixed hyperlipidemia    6. Abnormal ECG    7. PVC (premature ventricular contraction)         PLAN:      Abnormal Stress Test:   For these reasons, I recommended that the patient consider undergoing a cardiac catheterization with coronary angiography to assess their coronary anatomy and facilitate better treatment recommendations. I discussed the risks, benefits, and alternatives to the procedure including the risk of bleeding, contrast induced allergy and/or kidney damage, arrythmia, stroke, heart attack, death, or the need for further procedures. I also discussed the fact that although treatment with simple medical management is a potential treatment option in place of cardiac catheterization, I expressed my opinion that cardiac catheterization in order to define their coronary anatomy and rule out severe 3 vessel or left main coronary artery disease would significant help guide the most appropriate treatment strategy ranging from no treatment, to medications, stents, to even coronary bypass surgery. The patient verbalized understanding of the risks benefits and alternatives and stated that they would like to undergo the procedure. We will plan to schedule the procedure here at [facility] on 9/15/2022.   Risk factors: family history, dyslipidemia, hypertension, post-menopausal     Medical Management for suspected Coronary artery disease and myocardial ischemia:  Antiplatelet Agent: Contraindicated due to history of intolerance/allergy. Beta Blocker: START Metoprolol succinate (Toprol XL) 25 mg daily. I also discussed the potential side effects of this medication including lightheadedness and dizziness and instructed them to stop the medication of this occurs and call our office if this occurs. Anti-anginal medications: Continue amlodipine (Norvasc) 5 mg once daily. Cholesterol Reduction Therapy: Continue Atorvastatin (Lipitor) 10 mg daily    Additional Testing List: I took the liberty of ordering a BMP for today to assess their potassium and renal function. I told them that they could get their lab work performed at the location of their choosing, unfortunately, if the lab work was not performed at a [facility]) facility I could not guarantee my ability to follow up with them on their results. and  I took the liberty of ordering a CBC. I told them that they could get their lab work performed at the location of their choosing, unfortunately, if the lab work was not performed at a [facility]) facility I could not guarantee my ability to follow up with them on their results. Shortness of breath with mild exertion:  Refer to heart cath as above. Recurrent intermittent palpitations: Worsening over the past couple of months  Beta Blocker: START Metoprolol succinate (Toprol XL) 25 mg daily. Calcium Channel Blocker: Continue amlodipine (Norvasc) 5 mg once daily. Not indicated at this time. CAM monitor turned in on Wednesday, will call with results. Essential Hypertension: Controlled  ACE Inibitor/ARB: Continue Triamterene-HCTZ (Dyazide) 37.5-25 mg daily. Calcium Channel Blocker: Continue amlodipine (Norvasc) 10 mg once daily. Hyperlipidemia: Mixed, LDL done on 10/7/2020 was 53 mg/dL  Cholesterol Reduction Therapy: Continue Atorvastatin (Lipitor) 10 mg daily.       Abnormal ECG: History of suspected false positive but questionable inferior and anterolateral MI  Abnormal stress test.  Refer for heart cath as above. In the meantime, I encouraged Ms. Vincent to continue to take her other medications. FOLLOW UP:   I told Ms. Vincent to call my office if she had any problems, but otherwise I asked her to Return in about 3 weeks (around 9/29/2022). However, depending on the results of her heart catheterization we will reschedule a follow up appointment if needed. Otherwise, I would be happy to see her sooner should the need arise. Sincerely,  Saul Monroy PA-C  [facility] Cardiology Specialist    [address] [place] [address], [address] [place] [address]  Phone: [phone], Fax: [phone]     I believe that the risk of significant morbidity and mortality related to the patient's current medical conditions are: Intermediate. Approximately 35 minutes were spent during prep work, discussion and exam of the patient, and follow up documentation and all of their questions were answered. The documentation recorded by the scribe, accurately and completely reflects the services I personally performed and the decisions made by me.  Saul Monroy PA-C September 8, 2022

## 2022-09-08 NOTE — TELEPHONE ENCOUNTER
----- Message from Carolyn Amanda PA-C sent at 9/8/2022  2:05 PM EDT -----  Please notify patient that their lab results are normal.   Please continue current treatment and follow up.

## 2022-09-08 NOTE — PATIENT INSTRUCTIONS
SURVEY:    You may be receiving a survey from [employer] regarding your visit today. Please complete the survey to enable us to provide the highest quality of care to you and your family. If you cannot score us a very good on any question, please call the office to discuss how we could have made your experience a very good one. Thank you.

## 2022-09-09 ENCOUNTER — HOSPITAL ENCOUNTER (OUTPATIENT)
Dept: PHYSICAL THERAPY | Age: 72
Setting detail: THERAPIES SERIES
Discharge: HOME | End: 2022-09-09
Payer: MEDICARE

## 2022-09-09 PROCEDURE — 97110 THERAPEUTIC EXERCISES: CPT

## 2022-09-12 NOTE — PROGRESS NOTES
Phone: [facility]           Fax: [phone]                           Outpatient Physical Therapy                                                                            Daily Note    Patient: Valentino Radon : 1950  [facility] #: [id number]   Referring Physician: Taj Monday, *    Date: 2022       Treatment Diagnosis: Difficulty walking    PT Insurance Information: [employer] Medicare  Total # of Visits Approved: 12 Per Physician Order  Total # of Visits to Date: 10  No Show: 0  Canceled Appointment: 0    Pre-Treatment Pain:  2/10  Subjective: Pt states she currently isnt feleing to bad. Pt feels she is making improvements but just not there yet. Exercises:  Exercise 1: HEP: sink exercises  Exercise 2: Scifit x8min L2.0  Exercise 3: 6in FSU/LSU x10 ea LE with CGA for safety  Exercise 4: Sit/stands x10 no hands  Exercise 7: 2# bicep curls, punches 20x ea  Exercise 8: UBE 3/3  Exercise 9: 6 mins walk  Exercise 10: Cane flex/ chest press 15x ea    Assessment  Assessment: Pt able to amb 450 feet in 6 mins today prior to rest break. Will continue to work on overall endurance as Pt tolerates. Activity Tolerance  Activity Tolerance: Patient tolerated treatment well    Patient Education  Patient Education: Exercise rationale. Pt verbalized/demonstrated good understanding:     [x] Yes         [] No, pt required further clarification.      Post Treatment Pain:  2/10    Plan  Plan Frequency: 2  Plan weeks: 6     Goals  (Total # of Visits to Date: 8)      Short Term Goals  Time Frame for Short term goals: 3 weeks  Short term goal 1: Patient to be instructed in initial HEP for general UE/LE and core strengthening.-met/cont  Short term goal 2: Patient to be instructed in general functional strengthening to decrease pain and improve mobility.-met/cont  Short term goal 3: Initiate manual techniques/modalities prn to decrease L hip and R shoulder pain and improve mobility. Long Term Goals  Time Frame for Long term goals : 6 weeks  Long term goal 1: Patient to be independent and compliant with HEP. Long term goal 2: Patient to have improved B UE strength >/=4/5 grossly for improved postural control to decrease stress on shoulders with ambulation. Long term goal 3: Patient to have improved core and B LE strength >/=4/5 grossly for improved spinal stability and decreased low back and hip pain. Long term goal 4: Patient to be able to complete 8 sit/stands in </=30 seconds with no UE assistance to improve functional strength. Long term goal 5: Patient to report >/=75% improvement in symptoms for improved QOL.     Minutes Tracking:  Time In: 4998  Time Out: 1315  Minutes: 44  Timed Code Treatment Minutes: [address] [place], [place]     Date: 9/9/2022

## 2022-09-14 ENCOUNTER — HOSPITAL ENCOUNTER (OUTPATIENT)
Dept: PHYSICAL THERAPY | Age: 72
Setting detail: THERAPIES SERIES
Discharge: HOME | End: 2022-09-14
Payer: MEDICARE

## 2022-09-14 PROCEDURE — 97110 THERAPEUTIC EXERCISES: CPT

## 2022-09-14 NOTE — PROGRESS NOTES
Phone: [facility]           Fax: [phone]                           Outpatient Physical Therapy                                                                            Daily Note    Patient: Syed Bryant : 1950  CSN #: [id number]   Referring Physician: Ton Rivera, *    Date: 2022    Diagnosis: Diabetic polyneuropathy, E11.42; Frequent falls, R29.6, Weakness, R53.1  Treatment Diagnosis: Difficulty walking    PT Insurance Information: [employer]na Medicare  Total # of Visits Approved: 12 Per Physician Order  Total # of Visits to Date: 11  No Show: 0  Canceled Appointment: 0      Pre-Treatment Pain:  2/10  Subjective: Pt with 2/10 pain. States she is feeling better overall. Exercises:  Exercise 2: Scifit x8min L2.0  Exercise 3: 6in FSU/LSU x10 ea LE with CGA for safety  Exercise 4: Sit/stands x10 no hands  Exercise 5: Sink exercises x10  Exercise 6: Pulleys x5min  Exercise 7: 2# bicep curls, punches 20x ea  Exercise 8: UBE 4/4  Exercise 9: 6 mins walk  Exercise 10: Cane flex/ chest press 15x ea  Exercise 11: LAE/ Rows (seated BTB) 15x ea         Assessment  Assessment: Functional exercise completed to improve strength and endurance Walking distance before resting has improved. Performed 10 step up on ea LE before needeing a rest. HEP reviewed and stressed. Activity Tolerance  Activity Tolerance: Patient tolerated treatment well    Patient Education  Patient Education: HEP  Pt verbalized/demonstrated good understanding:     [x] Yes         [] No, pt required further clarification.        Post Treatment Pain:  2/10      Plan  Plan Frequency: 2  Plan weeks: 6       Goals  (Total # of Visits to Date: 6)      Short Term Goals  Time Frame for Short term goals: 3 weeks  Short term goal 1: Patient to be instructed in initial HEP for general UE/LE and core strengthening.-met/cont  Short term goal 2: Patient to be instructed in general functional

## 2022-09-15 ENCOUNTER — HOSPITAL ENCOUNTER (OUTPATIENT)
Dept: CARDIAC CATH/INVASIVE PROCEDURES | Age: 72
Discharge: HOME | End: 2022-09-15
Attending: FAMILY MEDICINE | Admitting: FAMILY MEDICINE
Payer: MEDICARE

## 2022-09-15 ENCOUNTER — APPOINTMENT (OUTPATIENT)
Dept: PHYSICAL THERAPY | Age: 72
End: 2022-09-15
Payer: MEDICARE

## 2022-09-15 VITALS
SYSTOLIC BLOOD PRESSURE: 132 MMHG | DIASTOLIC BLOOD PRESSURE: 45 MMHG | TEMPERATURE: 97 F | OXYGEN SATURATION: 95 % | HEART RATE: 72 BPM | RESPIRATION RATE: 16 BRPM

## 2022-09-15 DIAGNOSIS — R94.39 ABNORMAL STRESS TEST: ICD-10-CM

## 2022-09-15 DIAGNOSIS — I49.3 PVC (PREMATURE VENTRICULAR CONTRACTION): ICD-10-CM

## 2022-09-15 DIAGNOSIS — R94.31 ABNORMAL ECG: ICD-10-CM

## 2022-09-15 DIAGNOSIS — R00.2 PALPITATION: ICD-10-CM

## 2022-09-15 DIAGNOSIS — E78.2 MIXED HYPERLIPIDEMIA: ICD-10-CM

## 2022-09-15 DIAGNOSIS — I10 ESSENTIAL HYPERTENSION: ICD-10-CM

## 2022-09-15 DIAGNOSIS — R06.02 SOB (SHORTNESS OF BREATH): ICD-10-CM

## 2022-09-15 PROBLEM — Z01.810 PREOPERATIVE CARDIOVASCULAR EXAMINATION: Status: ACTIVE | Noted: 2022-09-15

## 2022-09-15 LAB — GLUCOSE BLD-MCNC: 104 MG/DL (ref 74–100)

## 2022-09-15 PROCEDURE — 93458 L HRT ARTERY/VENTRICLE ANGIO: CPT

## 2022-09-15 PROCEDURE — 82947 ASSAY GLUCOSE BLOOD QUANT: CPT

## 2022-09-15 PROCEDURE — C1887 CATHETER, GUIDING: HCPCS

## 2022-09-15 PROCEDURE — C1894 INTRO/SHEATH, NON-LASER: HCPCS

## 2022-09-15 PROCEDURE — 2709999900 HC NON-CHARGEABLE SUPPLY

## 2022-09-15 PROCEDURE — C1769 GUIDE WIRE: HCPCS

## 2022-09-15 PROCEDURE — 6360000002 HC RX W HCPCS

## 2022-09-15 PROCEDURE — 2500000003 HC RX 250 WO HCPCS

## 2022-09-15 PROCEDURE — 93458 L HRT ARTERY/VENTRICLE ANGIO: CPT | Performed by: FAMILY MEDICINE

## 2022-09-15 PROCEDURE — 6360000004 HC RX CONTRAST MEDICATION: Performed by: FAMILY MEDICINE

## 2022-09-15 PROCEDURE — 99152 MOD SED SAME PHYS/QHP 5/>YRS: CPT

## 2022-09-15 RX ORDER — SODIUM CHLORIDE 0.9 % (FLUSH) 0.9 %
5-40 SYRINGE (ML) INJECTION EVERY 12 HOURS SCHEDULED
Status: DISCONTINUED | OUTPATIENT
Start: 2022-09-15 | End: 2022-09-15 | Stop reason: HOSPADM

## 2022-09-15 RX ORDER — DIPHENHYDRAMINE HCL 50 MG
50 CAPSULE ORAL ONCE
Status: DISCONTINUED | OUTPATIENT
Start: 2022-09-15 | End: 2022-09-15 | Stop reason: HOSPADM

## 2022-09-15 RX ORDER — ACETAMINOPHEN 325 MG/1
650 TABLET ORAL EVERY 4 HOURS PRN
Status: DISCONTINUED | OUTPATIENT
Start: 2022-09-15 | End: 2022-09-15 | Stop reason: HOSPADM

## 2022-09-15 RX ORDER — SODIUM CHLORIDE 0.9 % (FLUSH) 0.9 %
5-40 SYRINGE (ML) INJECTION PRN
Status: DISCONTINUED | OUTPATIENT
Start: 2022-09-15 | End: 2022-09-15 | Stop reason: HOSPADM

## 2022-09-15 RX ORDER — SODIUM CHLORIDE 9 MG/ML
INJECTION, SOLUTION INTRAVENOUS PRN
Status: DISCONTINUED | OUTPATIENT
Start: 2022-09-15 | End: 2022-09-15 | Stop reason: HOSPADM

## 2022-09-15 RX ORDER — SODIUM CHLORIDE 9 MG/ML
INJECTION, SOLUTION INTRAVENOUS CONTINUOUS
Status: DISCONTINUED | OUTPATIENT
Start: 2022-09-15 | End: 2022-09-15 | Stop reason: HOSPADM

## 2022-09-15 RX ORDER — NITROGLYCERIN 0.4 MG/1
0.4 TABLET SUBLINGUAL EVERY 5 MIN PRN
Status: DISCONTINUED | OUTPATIENT
Start: 2022-09-15 | End: 2022-09-15 | Stop reason: HOSPADM

## 2022-09-15 RX ADMIN — IOPAMIDOL 60 ML: 755 INJECTION, SOLUTION INTRAVENOUS at 14:23

## 2022-09-15 NOTE — PROGRESS NOTES
Writer assisted patient to bathroom, all belongings returned, patient getting dressed for discharge soon

## 2022-09-15 NOTE — OP NOTE
-  Coronary Angiography Brief Post Operative Note:    Mild coronary artery disease without any significant focal stenosis. Normal left ventricular end diastolic pressure (LVEDP). involving a few 10-20% stenosis in the left anterior descending coronary artery. Normal left ventricular end diastolic pressure. Continue standard risk factor modification as clinically indicated and consider alternative etiologies of the patients symptoms.      Electronically signed by Blanka Strauss MD on 9/15/2022 at 2:14 PM

## 2022-09-15 NOTE — DISCHARGE INSTRUCTIONS
after the test to flush the x-ray dye from your system. Return to your normal diet. No alcoholic beverages for 24 hours after the procedure. Physical Activity   The sedative will make you sleepy. Rest until the effects have worn off. Nausea and vomiting from the sedative is normal and usually does not last long. Ask your doctor when you will be able to return to work. Do not drive, operate machinery, do anything that requires attention to detail, or sign important papers for at least 24 hours or until your doctor says it is safe. Avoid heavy lifting, physically demanding activities, and sexual activity for 2-3 days. Lift nothing over 10 pounds (a gallon of milk is 8 pounds). Do not sit for long periods of time. Try to change positions frequently. Medications   Resume taking your normal medicines as advised. Use acetaminophen (Tylenol) for pain relief. (Avoid anti-inflammatory drugs such as- ibuprofen (Advil, Motrin), naproxen sodium (Aleve), Excedrin for a few days)  If you had to stop taking these medications before the procedure, ask your doctor when you can resume taking them:   Anti-inflammatory drugs   Blood thinners, such as warfarin (Coumadin)   If you are taking medicines, follow these general guidelines:   Take your medicine as directed. Do not change the amount or the schedule. Do not stop taking them without talking to your doctor. Do not share them. Know the side effects and report any to your doctor. Some drugs can be dangerous when mixed. Talk to a doctor or pharmacist if you are taking more than one drug. This includes over-the-counter medicine and herb or dietary supplements. Plan ahead for refills so you don't run out. Follow-up   The test results are available right after the procedure. At that point, the doctor will discuss the findings and suggest appropriate treatment options. In some cases, the results can indicate an immediate need for surgery.  Schedule a follow-up appointment as directed by your doctor. Call Your Doctor If Any of the Following Occurs   Signs of infection- including fever and chills   Redness, swelling, increasing pain, feels warm to touch, red streak forming from site, or any discharge from the procedure site.    Call 911 If Any of the Following Occurs   Drooping facial muscles   Changes in vision or speech   Difficulty walking or using your limbs   Change in sensation, including numbness, feeling cold, or change in color   Extreme sweating, nausea or vomiting   Dizziness or lightheadedness   Chest pain   Rapid, irregular heartbeat   Palpitations   Cough, shortness of breath, or difficulty breathing   Weakness or fainting   If you think you have an emergency, CALL 911

## 2022-09-20 ENCOUNTER — TELEPHONE (OUTPATIENT)
Dept: CARDIOLOGY | Age: 72
End: 2022-09-20

## 2022-09-20 NOTE — TELEPHONE ENCOUNTER
----- Message from Yahaira Quarles PA-C sent at 9/20/2022 10:21 AM EDT -----  [facility] call [facility] office and get results?     Thanks!    ----- Message -----  From: Yahaira Quarles PA-C  Sent: 9/12/2022  12:00 AM EDT  To: Yahaira Quarles PA-C    CAM results

## 2022-09-21 ENCOUNTER — HOSPITAL ENCOUNTER (OUTPATIENT)
Dept: PHYSICAL THERAPY | Age: 72
Setting detail: THERAPIES SERIES
Discharge: HOME | End: 2022-09-21
Payer: MEDICARE

## 2022-09-21 PROCEDURE — 97110 THERAPEUTIC EXERCISES: CPT

## 2022-09-22 NOTE — PROGRESS NOTES
Phone: [facility]           Fax: [phone]                           Outpatient Physical Therapy                                                                            Daily Note    Patient: Camila Solorzano : 1950  CSN #: [id number]   Referring Physician: Radha Huynh, *    Date: 2022     Treatment Diagnosis: Difficulty walking    PT Insurance Information: [employer] Medicare  Total # of Visits Approved: 12 Per Physician Order  Total # of Visits to Date: 12  No Show: 0  Canceled Appointment: 0    Pre-Treatment Pain:  2/10  Subjective: Pt states she had a heart cath done the other day and so far all feels okay. Pt reports shes still having some pain in her back but feels endurance is improving. Pt rates current pain a 2/10. Exercises:  Exercise 1: HEP: sink exercises  Exercise 2: Scifit x8min L2.0  Exercise 3: 6in FSU/LSU x10 ea LE with CGA for safety  Exercise 4: Sit/stands x10 no hands  Exercise 6: Pulleys x5min  Exercise 8: UBE 4/4  Exercise 9: 6 mins walk  Exercise 11: LAE/ Rows (seated BTB) 15x ea    Assessment  Assessment: Pt able to amb 700' prior to needing a rest break today with RW. Endurance continues to slowly advance. Activity Tolerance  Activity Tolerance: Patient tolerated treatment well    Patient Education  Patient Education: HEP  Pt verbalized/demonstrated good understanding:     [x] Yes         [] No, pt required further clarification.      Post Treatment Pain:  2/10    Plan  Plan Frequency: 2  Plan weeks: 6     Goals  (Total # of Visits to Date: 15)      Short Term Goals  Time Frame for Short term goals: 3 weeks  Short term goal 1: Patient to be instructed in initial HEP for general UE/LE and core strengthening.-met/cont  Short term goal 2: Patient to be instructed in general functional strengthening to decrease pain and improve mobility.-met/cont  Short term goal 3: Initiate manual techniques/modalities prn to decrease L hip and R shoulder pain and improve mobility. Long Term Goals  Time Frame for Long term goals : 6 weeks  Long term goal 1: Patient to be independent and compliant with HEP. Long term goal 2: Patient to have improved B UE strength >/=4/5 grossly for improved postural control to decrease stress on shoulders with ambulation. Long term goal 3: Patient to have improved core and B LE strength >/=4/5 grossly for improved spinal stability and decreased low back and hip pain. Long term goal 4: Patient to be able to complete 8 sit/stands in </=30 seconds with no UE assistance to improve functional strength. Long term goal 5: Patient to report >/=75% improvement in symptoms for improved QOL.     Minutes Tracking:  Time In: 4870  Time Out: 1600  Minutes: 43  Timed Code Treatment Minutes: Stephanie Boone [address] [place] [address]     Date: 9/21/2022

## 2022-09-23 ENCOUNTER — HOSPITAL ENCOUNTER (OUTPATIENT)
Dept: PHYSICAL THERAPY | Age: 72
Setting detail: THERAPIES SERIES
Discharge: HOME | End: 2022-09-23
Payer: MEDICARE

## 2022-09-23 PROCEDURE — 97110 THERAPEUTIC EXERCISES: CPT

## 2022-09-23 PROCEDURE — G0283 ELEC STIM OTHER THAN WOUND: HCPCS

## 2022-09-23 NOTE — PLAN OF CARE
[facility]           Phone: [phone]             Outpatient Physical Therapy  Fax: [phone]                                           Date: 2022  Patient: David Cuevas : 1950 CSN #: [id number]   Referring Physician: Elaine Castaneda, *      [] Plan of Care   [x] Updated Plan of Care    Dates of Service to Include: 2022 to 22    Diagnosis:  Diabetic polyneuropathy, E11.42; Frequent falls, R29.6, Weakness, R53.1    Rehab (Treatment) Diagnosis:  Difficulty walking             Onset Date:       Attendance  Total # of Visits to Date: 15 No Show: 0 Canceled Appointment: 0    Assessment  Assessment: Patient has attended 9 PT visits for general weakness and difficulty walking and met short term goals for initiating HEP and general strengthening exercises and made good progress toward improved endurance, balance and functional strength but continues to walk with fwd flexed posture and c/o fatigue with longer distance ambulation. Patient to benefit from continued physical therapy to meet remaining LTG's and return to PLOF. Goals  Short Term Goals  Time Frame for Short term goals: 3 weeks  Short term goal 1: Patient to be instructed in initial HEP for general UE/LE and core strengthening.-met/cont  Short term goal 2: Patient to be instructed in general functional strengthening to decrease pain and improve mobility.-met/cont  Short term goal 3: Initiate manual techniques/modalities prn to decrease L hip and R shoulder pain and improve mobility. Long Term Goals  Time Frame for Long term goals : 6 weeks  Long term goal 1: Patient to be independent and compliant with HEP. Long term goal 2: Patient to have improved B UE strength >/=4/5 grossly for improved postural control to decrease stress on shoulders with ambulation.   Long term goal 3: Patient to have improved core and B LE strength >/=4/5 grossly for improved spinal stability and decreased low back and hip pain. Long term goal 4: Patient to be able to complete 8 sit/stands in </=30 seconds with no UE assistance to improve functional strength. Long term goal 5: Patient to report >/=75% improvement in symptoms for improved QOL.      Prognosis  Therapy Prognosis: Good    Treatment Plan   Plan Frequency: 2  Plan weeks: 6  [x] HP/CP      [x] Electrical Stim   [x] Therapeutic Exercise      [x] Gait Training  [] Aquatics   [x] Ultrasound         [x] Patient Education/HEP   [x] Manual Therapy  [] Traction    [x] Neuro-brenda        [x] Soft Tissue Mobs            [] Home TENS  [] Iontophoresis    [] Orthotic casting/fitting      [] Dry Needling             Electronically signed by: Adonay Lord PT, DPT    Date: 9/7/2022      ______________________________________ Date: 9/7/2022   Physician Signature

## 2022-09-23 NOTE — PROGRESS NOTES
1.  Primary Open Angle Glaucoma OU Severe-  Continue with current treatment plan. Discussed importance of compliance. Will continue to monitor for stability or progression. 2.  Pseudophakia OU - IOLs stable. Monitor for changes in vision. 3. Return for an appointment in 4 months for pressure check. with Dr. Kristi Villegas Phone: [facility]           Fax: [phone]                           Outpatient Physical Therapy                                                                            Daily Note    Patient: Camila Solorzano : 1950  CSN #: [id number]   Referring Physician: Radha Huynh, *    Date: 2022    Diagnosis: Diabetic polyneuropathy, E11.42; Frequent falls, R29.6, Weakness, R53.1       PT Insurance Information: [employer] Medicare  Total # of Visits Approved: 24 Per Physician Order  Total # of Visits to Date: 13  No Show: 0  Canceled Appointment: 0      Pre-Treatment Pain:  6/10  Subjective: Patient reports B arm pain /10 today cause she's been using her arms a lot today. Exercises:  Exercise 1: HEP: sink exercises  Exercise 2: Scifit x10min L2.5  Exercise 3: 6in FSU/LSU x10 ea LE with CGA for safety  Exercise 4: Sit/stands x10 no hands  Exercise 6: Pulleys x5min  Exercise 9: 6 mins walk  Exercise 11: LAE/ Rows (seated BTB) 15x ea      Modalities:   IFC and HP to B shoulders x15 min to decrease pain    Assessment  Assessment: Patient with better control noted during step ups today. Initiated IFC with HP to B shoulders to decrease pain today. Pt reporting relief following. Will continue. Activity Tolerance  Activity Tolerance: Patient tolerated treatment well    Patient Education  Exercise technique   Pt verbalized/demonstrated good understanding:     [x] Yes         [] No, pt required further clarification.        Post Treatment Pain:  4/10      Plan  Plan Frequency: 2  Plan weeks: 6       Goals  (Total # of Visits to Date: 15)      Short Term Goals  Time Frame for Short term goals: 3 weeks  Short term goal 1: Patient to be instructed in initial HEP for general UE/LE and core strengthening.-met/cont  Short term goal 2: Patient to be instructed in general functional strengthening to decrease pain and improve mobility.-met/cont  Short term goal 3: Initiate manual techniques/modalities prn to decrease L hip and R shoulder pain and improve mobility.-met/cont    Long Term Goals  Time Frame for Long term goals : 6 weeks  Long term goal 1: Patient to be independent and compliant with HEP. Long term goal 2: Patient to have improved B UE strength >/=4/5 grossly for improved postural control to decrease stress on shoulders with ambulation. Long term goal 3: Patient to have improved core and B LE strength >/=4/5 grossly for improved spinal stability and decreased low back and hip pain. Long term goal 4: Patient to be able to complete 8 sit/stands in </=30 seconds with no UE assistance to improve functional strength. Long term goal 5: Patient to report >/=75% improvement in symptoms for improved QOL.     Minutes Tracking:  Time In: [address] [place]  Time Out: 1625  Minutes: 56  Timed Code Treatment Minutes: [facility], PT, DPT     Date: 9/23/2022

## 2022-09-28 ENCOUNTER — HOSPITAL ENCOUNTER (OUTPATIENT)
Dept: PHYSICAL THERAPY | Age: 72
Setting detail: THERAPIES SERIES
Discharge: HOME | End: 2022-09-28
Payer: MEDICARE

## 2022-09-28 PROCEDURE — 97110 THERAPEUTIC EXERCISES: CPT

## 2022-09-28 PROCEDURE — G0283 ELEC STIM OTHER THAN WOUND: HCPCS

## 2022-09-28 NOTE — PROGRESS NOTES
Phone: [facility]           Fax: [phone]                           Outpatient Physical Therapy                                                                            Daily Note    Patient: David Cuevas : 1950  CSN #: [id number]   Referring Physician: Elaine Castaneda, *    Date: 2022    Diagnosis: Diabetic polyneuropathy, E11.42; Frequent falls, R29.6, Weakness, R53.1  Treatment Diagnosis: Difficulty walking    PT Insurance Information: [employer] Medicare  Total # of Visits Approved: 24 Per Physician Order  Total # of Visits to Date: 14  No Show: 0  Canceled Appointment: 0      Pre-Treatment Pain:  10  Subjective: Patient reports B arm pain 7/10 today but had relief from pain for two days following estim last session. Exercises:  Exercise 1: HEP: sink exercises  Exercise 2: Scifit x10min L2.5  Exercise 3: 6in FSU/LSU x10 ea LE with CGA for safety  Exercise 4: Sit/stands x10 no hands  Exercise 6: Pulleys x5min  Exercise 7: 2# bicep curls, punches 20x ea  Exercise 9: 6 mins walk  Exercise 11: LAE/ Rows (seated BTB) 15x ea      Modalities:   IFC and HP to B shoulders x15 min to decrease pain    Assessment  Assessment: Focused on B shoulder strengthening today within patient tolerance to improve scapular stability and decrease shoulder pain. IFC and HP to B shoulders following session. Will continue. Activity Tolerance  Activity Tolerance: Patient tolerated treatment well    Patient Education  Exercise technique   Pt verbalized/demonstrated good understanding:     [x] Yes         [] No, pt required further clarification.        Post Treatment Pain:  10      Plan  Plan Frequency: 2  Plan weeks: 6       Goals  (Total # of Visits to Date: 15)      Short Term Goals  Time Frame for Short term goals: 3 weeks  Short term goal 1: Patient to be instructed in initial HEP for general UE/LE and core strengthening.-met/cont  Short term goal 2: Patient to be instructed in general functional strengthening to decrease pain and improve mobility.-met/cont  Short term goal 3: Initiate manual techniques/modalities prn to decrease L hip and R shoulder pain and improve mobility.-met/cont    Long Term Goals  Time Frame for Long term goals : 6 weeks  Long term goal 1: Patient to be independent and compliant with HEP. Long term goal 2: Patient to have improved B UE strength >/=4/5 grossly for improved postural control to decrease stress on shoulders with ambulation. Long term goal 3: Patient to have improved core and B LE strength >/=4/5 grossly for improved spinal stability and decreased low back and hip pain. Long term goal 4: Patient to be able to complete 8 sit/stands in </=30 seconds with no UE assistance to improve functional strength. Long term goal 5: Patient to report >/=75% improvement in symptoms for improved QOL.     Minutes Tracking:  Time In: 1590  Time Out: [address]  Minutes: 59  Timed Code Treatment Minutes: [address], PT, DPT     Date: 9/28/2022

## 2022-09-30 ENCOUNTER — HOSPITAL ENCOUNTER (OUTPATIENT)
Dept: PHYSICAL THERAPY | Age: 72
Setting detail: THERAPIES SERIES
Discharge: HOME | End: 2022-09-30
Payer: MEDICARE

## 2022-09-30 ENCOUNTER — OFFICE VISIT (OUTPATIENT)
Dept: CARDIOLOGY | Age: 72
End: 2022-09-30
Payer: MEDICARE

## 2022-09-30 VITALS
RESPIRATION RATE: 20 BRPM | HEIGHT: 61 IN | OXYGEN SATURATION: 97 % | WEIGHT: 192 LBS | SYSTOLIC BLOOD PRESSURE: 102 MMHG | HEART RATE: 86 BPM | BODY MASS INDEX: 36.25 KG/M2 | DIASTOLIC BLOOD PRESSURE: 65 MMHG

## 2022-09-30 DIAGNOSIS — I10 ESSENTIAL HYPERTENSION: ICD-10-CM

## 2022-09-30 DIAGNOSIS — R00.2 HEART PALPITATIONS: ICD-10-CM

## 2022-09-30 DIAGNOSIS — I25.10 MILD CAD: ICD-10-CM

## 2022-09-30 DIAGNOSIS — R94.31 ABNORMAL EKG: ICD-10-CM

## 2022-09-30 DIAGNOSIS — E78.2 MIXED HYPERLIPIDEMIA: ICD-10-CM

## 2022-09-30 PROCEDURE — 1123F ACP DISCUSS/DSCN MKR DOCD: CPT | Performed by: PHYSICIAN ASSISTANT

## 2022-09-30 PROCEDURE — 99214 OFFICE O/P EST MOD 30 MIN: CPT | Performed by: PHYSICIAN ASSISTANT

## 2022-09-30 PROCEDURE — 97110 THERAPEUTIC EXERCISES: CPT

## 2022-09-30 PROCEDURE — G0283 ELEC STIM OTHER THAN WOUND: HCPCS

## 2022-09-30 RX ORDER — METOPROLOL SUCCINATE 25 MG/1
12.5 TABLET, EXTENDED RELEASE ORAL DAILY
Qty: 90 TABLET | Refills: 3 | Status: SHIPPED | OUTPATIENT
Start: 2022-09-30 | End: 2024-09-19

## 2022-09-30 NOTE — PROGRESS NOTES
Bria Quarles am scribing for and in the presence of [place], [place]. Patient: Jose Daniel Darden  : 1950  Date of Visit: 2022    REASON FOR VISIT / CONSULTATION: Follow-up (HX: HTN, HLD,PVC. Pt states she is doing well. Denies: CP, Lightheaded/ dizziness, SOB. C/o: Palpitations. )    History of Present Illness:         Dear Callie Salcido, APRN - CNP    I had the pleasure of seeing Jose Daniel Darden in my office today. Ms. Aly Sharp is a 67 y.o. female who recently underwent a cardiovascular stress test because of increased shortness of breath which shown evidence of reversible ischemia. She also was feeling heart palpitations that have been occurring about a couple weeks ago. This can last a couple minutes in duration. She can hear it in her ears at times. Some episodes are more strong than other. She had gastric bypass in . She also reported a significant increase in shortness of breath with exertion leading to her recent stress test and echo. denies any previous heart related issues. She does have history of hypertensions and hyperlipidemia. Her mother had heart conditions that started in her 52's. She never was a smoker. She had her stress test done on 2020 that was abnormal myocardial perfusion study. 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 She also had an Echo done on 2020 that showed an EF of 60% with mildly increased left ventricular wall thickness with a normal left ventricular cavity size and also evidence of mild diastolic dysfunction is seen. Heart cath done on 2020 showed No significant coronary artery disease. Normal left ventricular end diastolic pressure (LVEDP).     Stress test done on 2022:  Mildly abnormal myocardial perfusion study.  There is a small/moderate perfusion defect of mild intensity in the apical region(s) during stress imaging, which is most consistent with ischemia, but may be due to artifact. EF was 67%. Overall, these results are most consistent with a low/intermediate risk for significant coronary artery disease. Heart Cath done on 9/15/2022: Mild coronary artery disease without any significant focal stenosis. Normal left ventricular end diastolic pressure (LVEDP). involving a few 10-20% stenosis in the left anterior descending coronary artery. Normal left ventricular end diastolic pressure. Continue standard risk factor modification as clinically indicated and consider alternative etiologies of the patients symptoms. Ms. Gunner Johnson is here today for follow up after her above heart cath. She does report today doing well. She does still have some heart palpitations however they have remained mild. She was started on Metoprolol at last visit due to her abnormal stress test and also due to her having these heart palpitations. While on this medication she does report her heart palpitations improving, however it did make her a little sleepy so she did stop taking this. She states she would like to go back on the medication because her palpitations were better controlled. She denied any chest pain, pressure or tightness. No lightheaded or dizziness or significant shortness of breath. She uses a walker to ambulate. No falls or near falls. She denied any abdominal pain, bleeding problems, problems with her medications or any other concerns at this time. Bleeding Risks: Ms. Gunner Johnson denies any current or recent bleeding problems including a history of a GI bleed, ulcers, recent or upcoming surgeries, blood in her stool or black tarry stools or blood in her urine.     PAST MEDICAL HISTORY:        Past Medical History:   Diagnosis Date    Anemia     Arthritis     Depression     Diabetes mellitus ([facility] [address]     History of cardiac cath 05/21/2020    [id number]     Hypertension     Neck fracture ([address] 2016    Obesity (BMI 30-39. 9)     S/P cardiac cath 09/15/2022    [employer] [facility]/Dr. Valderrama/Right Radial    Splenic artery aneurysm (HCC)     small less than 1.5 cm, stable    Type II or unspecified type diabetes mellitus without mention of complication, not stated as uncontrolled     Ventral incisional hernia            CURRENT ALLERGIES: Ibuprofen and Pepto-bismol [bismuth subsalicylate] REVIEW OF SYSTEMS: 14 systems were reviewed. Pertinent positives and negatives as above, all else negative.      Past Surgical History:   Procedure Laterality Date    APPENDECTOMY      CATARACT REMOVAL WITH IMPLANT Left 12/12/2018    per Dr. Khadra Gonzalez Right 01/07/2019    Dr. Willow Cohen  03/24/2016    C 2- C 7    CHOLECYSTECTOMY      COLONOSCOPY  2014    COLONOSCOPY  01/04/2021    COLONOSCOPY N/A 1/4/2021    COLORECTAL CANCER SCREENING, NOT HIGH RISK performed by Farrah Kelley DO at [place] [address]  02/05/2021    COLONOSCOPY N/A 2/5/2021    COLORECTAL CANCER SCREENING, HIGH RISK performed by Farrah Kelley DO at [address] [facility]    INTRACAPSULAR CATARACT EXTRACTION Left 12/12/2018    EYE CATARACT EMULSIFICATION IOL IMPLANT performed by Fletcher Joel DO at [address] [place] [address] Right 1/7/2019    EYE CATARACT EMULSIFICATION IOL IMPLANT performed by Fletcher Joel DO at [address] [facility] OFFICE/OUTPT [facility] Right 6/15/2018    TOE HAMMER REPAIR, RIGHT 5TH DIGIT DEROTATIONAL SKINPLASTY/ARTHOPLASTY performed by Bev Rascon DPM at [facility] 11/26/2019    EGD DILATION SAVORY performed by Filippo Waggoner MD at [facility]  11/26/2019    -bx(normal)dilation,evidence of gastric bypass with very small gastric remnant    VENTRAL HERNIA REPAIR N/A 8/19/2021    Exploratory lap, lysis of adhesions, primary repair ventral hernia. performed by Ana Paula Moffett DO at [address] History:  Social History     Tobacco Use    Smoking status: Never    Smokeless tobacco: Never   Vaping Use    Vaping Use: Never used   Substance Use Topics    Alcohol use: No    Drug use: No        CURRENT MEDICATIONS:        Outpatient Medications Marked as Taking for the 9/30/22 encounter (Office Visit) with Carolyn Amanda PA-C   Medication Sig Dispense Refill    ondansetron (ZOFRAN) 4 MG tablet Take 1 tablet by mouth every 8 hours as needed for Nausea or Vomiting 15 tablet 0    atorvastatin (LIPITOR) 10 MG tablet Take 1 tablet by mouth once daily 90 tablet 0    Lancets (ONETOUCH DELICA PLUS GEBMOM28R) MISC USE TWICE DAILY AS DIRECTED TO CHECK BLOOD SUGAR 100 each 1    omeprazole (PRILOSEC) 40 MG delayed release capsule Take 1 capsule by mouth every morning (before breakfast) 90 capsule 1    gabapentin (NEURONTIN) 300 MG capsule Take 1 capsule by mouth in the morning and 1 capsule at noon and 1 capsule before bedtime. Do all this for 90 days. 270 capsule 0    sertraline (ZOLOFT) 100 MG tablet Take 1 tablet by mouth once daily 90 tablet 0    amLODIPine (NORVASC) 5 MG tablet Take 1 tablet by mouth daily 90 tablet 1    metFORMIN (GLUCOPHAGE) 1000 MG tablet Take 0.5 tablets by mouth 2 times daily (with meals) 180 tablet 2    calcium carbonate 600 MG TABS tablet Take 1 tablet by mouth daily      blood glucose test strips (ONETOUCH VERIO) strip TEST BLOOD SUGAR TWICE DAILY AS DIRECTED; ONE TOUCH VERIO METER DX:E11.9 100 strip 2    oxybutynin (DITROPAN) 5 MG tablet TAKE 1 TABLET BY MOUTH THREE TIMES DAILY 270 tablet 0    triamcinolone (KENALOG) 0.025 % cream Apply topically 2 times daily.  1 each 1    Blood Glucose Monitoring Suppl (ONETOUCH VERIO) w/Device KIT TEST BLOOD SUGAR ONCE DAILY AS DIRECTED; ONE TOUCH VERIO METER DX:E11.9 1 kit 0    triamterene-hydroCHLOROthiazide (DYAZIDE) 37.5-25 MG per capsule Take 1 capsule by mouth daily TAKE 1 CAPSULE ONCE DAILY  AS NEEDED (Patient taking differently: Take 1 capsule by mouth daily) 90 capsule 1    Handicap Placard MISC by Does not apply route 1 each 0    polyethylene glycol (MIRALAX) 17 g PACK packet Take 17 g by mouth daily       vitamin D3 (CHOLECALCIFEROL) 10 MCG (400 UNIT) TABS tablet Take 800 Units by mouth daily      Blood Glucose Monitoring Suppl (ACCU-CHEK BRUNO) ANDREA Use once daily. Dx-E11.9 non-insulin dependant 1 Device 0    acetaminophen (TYLENOL) 325 MG tablet Take 650 mg by mouth every 6 hours as needed for Pain (arthritis)      b complex vitamins capsule Take 1 capsule by mouth daily         FAMILY HISTORY: family history includes Alcohol Abuse in her father; Cancer in her mother; Diabetes in her mother; Heart Attack in her mother; High Blood Pressure in her mother; High Cholesterol in her mother. Physical Examination:     /65 (Site: Left Upper Arm, Position: Sitting, Cuff Size: Large Adult)   Pulse 86   Resp 20   Ht 5' 1\" (1.549 m)   Wt 192 lb (87.1 kg)   LMP  (LMP Unknown)   SpO2 97%   BMI 36.28 kg/m²  Body mass index is 36.28 kg/m². Constitutional: She appeared oriented to person and place. She appears well-developed and well-nourished. In no acute distress. HEENT: Normocephalic and atraumatic. No JVD present. Carotid bruit is not present. No mass and no thyromegaly present. No lymphadenopathy noted. Cardiovascular: Normal rate, regularly irregular rhythm, normal heart sounds. Exam reveals no gallop and no friction rubs. No murmur was heard. Pulmonary/Chest: Effort normal and breath sounds normal. No respiratory distress. She has no wheezes, rhonchi or rales. Abdominal: Soft, non-tender. She exhibits no organomegaly, mass or bruit. Extremities: Trace. No cyanosis or clubbing. 2+ radial and carotid pulses.  Distal extremity pulses: 2+ bilaterally. Compression stockings in place. Neurological: Alertness and orientation as per Constitutional exam. No evidence of gross cranial nerve deficit. Coordination appeared normal.   Skin: Skin is warm and dry. There is no rash or diaphoresis. Psychiatric: She has a normal mood and affect. Her speech is normal and behavior is normal.      MOST RECENT LABS ON RECORD:   Lab Results   Component Value Date    WBC 8.6 09/08/2022    HGB 12.8 09/08/2022    HCT 40.5 09/08/2022     09/08/2022    CHOL 111 12/17/2021    TRIG 85 12/17/2021    HDL 52 12/17/2021    ALT 23 06/21/2022    AST 28 06/21/2022     (L) 09/08/2022    K 4.2 09/08/2022    CL 93 (L) 09/08/2022    CREATININE 0.45 (L) 09/08/2022    BUN 19 09/08/2022    CO2 26 09/08/2022    TSH 1.96 06/21/2022    INR 0.9 03/22/2016    GLUF 163 (H) 03/19/2018    LABA1C 6.4 (H) 06/21/2022    LABMICR Can not be calculated 12/20/2021       ASSESSMENT:        1. Mild CAD    2. Heart palpitations    3. Essential hypertension    4. Mixed hyperlipidemia    5. Abnormal EKG      PLAN:        Mild CAD: S/p Heart Cath done on 9/15/2022 showed mild CAD with no intervention needed at that time. Beta Blocker: RESTART Metoprolol succinate (Toprol XL) 25 mg 1/2 tab daily. I also discussed the potential side effects of this medication including lightheadedness and dizziness and instructed them to stop the medication of this occurs and call our office if this occurs. I am ok with her only doing half tablet at this time. This did improve her heart palpitations however a full tablet did make her sleepy. Anti-anginal medications: Continue amlodipine (Norvasc) 5 mg once daily. Cholesterol Reduction Therapy: Continue Atorvastatin (Lipitor) 10 mg nightly. Additional counseling: I advised them to call our office or go to the emergency room if they developed worsening or persistent chest pain or increased shortness of breath as this could be life threatening.     Recurrent intermittent palpitations: Worsening over the past couple of months  was improved on a beta blocker. Beta Blocker: RESTART Metoprolol succinate (Toprol XL) 25 mg 1/2 tab daily. I also discussed the potential side effects of this medication including lightheadedness and dizziness and instructed them to stop the medication of this occurs and call our office if this occurs. Calcium Channel Blocker: Continue amlodipine (Norvasc) 5 mg once daily. Essential Hypertension: Controlled  ACE Inibitor/ARB: Continue Triamterene-HCTZ (Dyazide) 37.5-25 mg daily. Calcium Channel Blocker: Continue amlodipine (Norvasc) 10 mg once daily. Hyperlipidemia: Mixed, LDL done on 12/17/2021 was 42 mg/dL  Cholesterol Reduction Therapy: Continue Atorvastatin (Lipitor) 10 mg daily. History of Abnormal EKG: History of suspected false positive but questionable inferior and anterolateral MI  Will continue to monitor. Mild CAD on recent heart cath. Asymptomatic at this time. In the meantime, I encouraged Ms. Vincent to continue to take her other medications. FOLLOW UP:   I told Ms. Vincent to call my office if she had any problems, but otherwise I asked her to Return in about 1 year (around 9/30/2023). However, depending on the results of her heart catheterization we will reschedule a follow up appointment if needed. Otherwise, I would be happy to see her sooner should the need arise. Sincerely,  Xander Salmon PA-C  [facility]    [address] [facility], [address] [place] [address]  Phone: [phone], Fax: [phone]     I believe that the risk of significant morbidity and mortality related to the patient's current medical conditions are: Intermediate. Approximately 35 minutes were spent during prep work, discussion and exam of the patient, and follow up documentation and all of their questions were answered.     The documentation recorded by the scribe, accurately and completely reflects the services I personally performed and the decisions made by me.  Amina Quiles PA-C September 30, 2022

## 2022-09-30 NOTE — PATIENT INSTRUCTIONS
SURVEY:    You may be receiving a survey from [employer] regarding your visit today. Please complete the survey to enable us to provide the highest quality of care to you and your family. If you cannot score us a very good on any question, please call the office to discuss how we could have made your experience a very good one. Thank you.

## 2022-10-04 NOTE — PROGRESS NOTES
Phone: [facility]           Fax: [phone]                           Outpatient Physical Therapy                                                                            Daily Note    Patient: Frances Garcia : 1950  [facility] #: [id number]   Referring Physician: Chiqui Morales, *    Date: 2022     Treatment Diagnosis: Difficulty walking    PT Insurance Information: [employer] Medicare  Total # of Visits Approved: 24 Per Physician Order  Total # of Visits to Date: 15  No Show: 0  Canceled Appointment: 0    Pre-Treatment Pain:  /10  Subjective: Pt reports her arms are feeling alot better. Pt rates current pain a /10. Exercises:  Exercise 1: HEP: sink exercises  Exercise 2: Scifit x10min L2.5  Exercise 3: 6in FSU/LSU x10 ea LE with CGA for safety  Exercise 4: Sit/stands x10 no hands  Exercise 6: Pulleys x5min  Exercise 8: UBE 4/4  Exercise 9: 6 mins walk  Exercise 11: LAE/ Rows (seated BTB) 15x ea    Modalities:   IFC with HP 15 mins to Teodoro shoulders for discomfort. Assessment  Assessment: Pt amb 625' in 6 mins today prior to rest break today,  Pt endurance slowly improving. Will continue. Activity Tolerance  Activity Tolerance: Patient tolerated treatment well    Patient Education  Patient Education: HEP  Pt verbalized/demonstrated good understanding:     [x] Yes         [] No, pt required further clarification.      Post Treatment Pain:  10    Plan  Plan Frequency: 2  Plan weeks: 6     Goals  (Total # of Visits to Date: 13)      Short Term Goals  Time Frame for Short Term Goals: 3 weeks  Short Term Goal 1: Patient to be instructed in initial HEP for general UE/LE and core strengthening.-met/cont  Short Term Goal 2: Patient to be instructed in general functional strengthening to decrease pain and improve mobility.-met/cont  Short Term Goal 3: Initiate manual techniques/modalities prn to decrease L hip and R shoulder pain and improve mobility.-met/cont    Long Term Goals  Time Frame for Long Term Goals : 6 weeks  Long Term Goal 1: Patient to be independent and compliant with HEP. Long Term Goal 2: Patient to have improved B UE strength >/=4/5 grossly for improved postural control to decrease stress on shoulders with ambulation. Long Term Goal 3: Patient to have improved core and B LE strength >/=4/5 grossly for improved spinal stability and decreased low back and hip pain. Long Term Goal 4: Patient to be able to complete 8 sit/stands in </=30 seconds with no UE assistance to improve functional strength. Long Term Goal 5: Patient to report >/=75% improvement in symptoms for improved QOL.     Minutes Tracking:  Time In: 1401  Time Out: 1500  Minutes: 59  Timed Code Treatment Minutes: [facility], [place]     Date: 9/30/2022

## 2022-10-05 ENCOUNTER — HOSPITAL ENCOUNTER (OUTPATIENT)
Dept: PHYSICAL THERAPY | Age: 72
Setting detail: THERAPIES SERIES
Discharge: HOME | End: 2022-10-05
Payer: MEDICARE

## 2022-10-05 PROCEDURE — 97110 THERAPEUTIC EXERCISES: CPT

## 2022-10-05 NOTE — PROGRESS NOTES
Phone: [facility]           Fax: [phone]                           Outpatient Physical Therapy                                                                            Daily Note    Patient: Frances Garcia : 1950  [facility] #: [id number]   Referring Physician: Chiqui Morales, *    Date: 10/5/2022     Treatment Diagnosis: Difficulty walking    PT Insurance Information: [employer]na Medicare  Total # of Visits Approved: 24 Per Physician Order  Total # of Visits to Date: 16  No Show: 0  Canceled Appointment: 0    Pre-Treatment Pain:  5/10  Subjective: Pt states shes having some hand pain and L hip pain today but not to bad just sore feeling. Pt rates current pain a 5/10 in L hip. Exercises:  Exercise 2: Scifit x10min L2.5  Exercise 4: Sit/stands x10 no hands  Exercise 8: UBE 4/4  Exercise 9: 6 mins walk  Exercise 10: Cane flex/ chest press 15x ea  Exercise 11: LAE/ Rows (seated BTB) 15x ea    Modalities:   IFC with HP 15 mins in sitting to Teodoro shoulders. Assessment  Assessment: Pt able to complete 9 sit to stands in 30 seconds today with no UE assist.  Endurance slowly progressing. Activity Tolerance  Activity Tolerance: Patient tolerated treatment well    Patient Education  Patient Education: HEP  Pt verbalized/demonstrated good understanding:     [x] Yes         [] No, pt required further clarification.      Post Treatment Pain:  4/10    Plan  Plan Frequency: 2  Plan weeks: 6     Goals  (Total # of Visits to Date: 12)      Short Term Goals  Time Frame for Short Term Goals: 3 weeks  Short Term Goal 1: Patient to be instructed in initial HEP for general UE/LE and core strengthening.-met/cont  Short Term Goal 2: Patient to be instructed in general functional strengthening to decrease pain and improve mobility.-met/cont  Short Term Goal 3: Initiate manual techniques/modalities prn to decrease L hip and R shoulder pain and improve mobility.-met/cont    Long Term Goals  Time Frame for Long Term Goals : 6 weeks  Long Term Goal 1: Patient to be independent and compliant with HEP. Long Term Goal 2: Patient to have improved B UE strength >/=4/5 grossly for improved postural control to decrease stress on shoulders with ambulation. Long Term Goal 3: Patient to have improved core and B LE strength >/=4/5 grossly for improved spinal stability and decreased low back and hip pain. Long Term Goal 4: Patient to be able to complete 8 sit/stands in </=30 seconds with no UE assistance to improve functional strength. Long Term Goal 5: Patient to report >/=75% improvement in symptoms for improved QOL.     Minutes Tracking:  Time In: 7279  Time Out: [address] [place]   Minutes: 58  Timed Code Treatment Minutes: [address]     Date: 10/5/2022

## 2022-10-07 ENCOUNTER — HOSPITAL ENCOUNTER (OUTPATIENT)
Dept: PHYSICAL THERAPY | Age: 72
Setting detail: THERAPIES SERIES
Discharge: HOME | End: 2022-10-07
Payer: MEDICARE

## 2022-10-07 PROCEDURE — 97110 THERAPEUTIC EXERCISES: CPT

## 2022-10-07 PROCEDURE — G0283 ELEC STIM OTHER THAN WOUND: HCPCS

## 2022-10-07 NOTE — PROGRESS NOTES
Phone: [facility]           Fax: [phone]                           Outpatient Physical Therapy                                                                            Daily Note    Patient: Maliha Franco : 1950  [facility] #: [id number]   Referring Physician: Susan Patricio, *    Date: 10/7/2022     Treatment Diagnosis: Difficulty walking    PT Insurance Information: A[employer]na Medicare  Total # of Visits Approved: 24 Per Physician Order  Total # of Visits to Date: 17  No Show: 0  Canceled Appointment: 0    Pre-Treatment Pain:  /10  Subjective: Pt reports shes feeling a little stiff/ tired today but thinks its d/t the weather. Pt rates current pain a /10. Exercises:  Exercise 1: HEP: sink exercises  Exercise 2: Scifit x10min L2.5  Exercise 3: 6in FSU/LSU x10 ea LE with CGA for safety  Exercise 4: Sit/stands x10 no hands  Exercise 8: UBE 4/4  Exercise 9: 6 mins walk  Exercise 10: Cane flex/ chest press 15x ea  Exercise 11: LAE/ Rows (seated BTB) 15x ea    Modalities:    HP 15 mins premod for Teodoro shoulder discomfort. Assessment  Assessment: Pt reports 50-60% overall improvement since begining therapy in regards to endurance and strength and feels like she has made improvements since begining therapy. Activity Tolerance  Activity Tolerance: Patient tolerated treatment well    Patient Education  Patient Education: HEP  Pt verbalized/demonstrated good understanding:     [x] Yes         [] No, pt required further clarification.      Post Treatment Pain:  /10    Plan  Plan Frequency: 2  Plan weeks: 6     Goals  (Total # of Visits to Date: 16)      Short Term Goals  Time Frame for Short Term Goals: 3 weeks  Short Term Goal 1: Patient to be instructed in initial HEP for general UE/LE and core strengthening.-met/cont  Short Term Goal 2: Patient to be instructed in general functional strengthening to decrease pain and improve mobility.-met/cont  Short Term Goal 3: Initiate manual techniques/modalities prn to decrease L hip and R shoulder pain and improve mobility.-met/cont    Long Term Goals  Time Frame for Long Term Goals : 6 weeks  Long Term Goal 1: Patient to be independent and compliant with HEP. Long Term Goal 2: Patient to have improved B UE strength >/=4/5 grossly for improved postural control to decrease stress on shoulders with ambulation. Long Term Goal 3: Patient to have improved core and B LE strength >/=4/5 grossly for improved spinal stability and decreased low back and hip pain. Long Term Goal 4: Patient to be able to complete 8 sit/stands in </=30 seconds with no UE assistance to improve functional strength. Long Term Goal 5: Patient to report >/=75% improvement in symptoms for improved QOL.     Minutes Tracking:  Time In: 1272  Time Out: 1330  Minutes: 58  Timed Code Treatment Minutes: [place] [address] [place] [address]     Date: 10/7/2022

## 2022-10-11 ENCOUNTER — HOSPITAL ENCOUNTER (OUTPATIENT)
Dept: WOMENS IMAGING | Age: 72
Discharge: HOME | End: 2022-10-13
Payer: MEDICARE

## 2022-10-11 DIAGNOSIS — Z12.31 SCREENING MAMMOGRAM FOR HIGH-RISK PATIENT: ICD-10-CM

## 2022-10-11 DIAGNOSIS — M81.0 OSTEOPOROSIS WITHOUT CURRENT PATHOLOGICAL FRACTURE, UNSPECIFIED OSTEOPOROSIS TYPE: ICD-10-CM

## 2022-10-11 PROCEDURE — 77063 BREAST TOMOSYNTHESIS BI: CPT

## 2022-10-11 PROCEDURE — 77080 DXA BONE DENSITY AXIAL: CPT

## 2022-10-11 NOTE — RESULT ENCOUNTER NOTE
Dexa scan shows osteopenia, pre osteoporosis. She needs to start a calcium supplement of atleast 1200mg of calcium and 1000 units of Vit D3 daily. Also encouraged weight bearing exercises like walking, jogging, weight lifting, aerobics, yoga to help promote bone strength. We will repeat the DEXA scan in 2 years depending on her risks. High risk for hip fx; recommend starting fosamax 70mg po weekly for tx to increase bone density. Thank you.

## 2022-10-15 PROBLEM — Z01.810 PREOPERATIVE CARDIOVASCULAR EXAMINATION: Status: RESOLVED | Noted: 2022-09-15 | Resolved: 2022-10-15

## 2022-10-17 ENCOUNTER — HOSPITAL ENCOUNTER (OUTPATIENT)
Dept: PHYSICAL THERAPY | Age: 72
Setting detail: THERAPIES SERIES
Discharge: HOME | End: 2022-10-17
Payer: MEDICARE

## 2022-10-17 PROCEDURE — 97110 THERAPEUTIC EXERCISES: CPT

## 2022-10-17 PROCEDURE — G0283 ELEC STIM OTHER THAN WOUND: HCPCS

## 2022-10-17 NOTE — PROGRESS NOTES
Phone: [facility]           Fax: [phone]                           Outpatient Physical Therapy                                                                            Daily Note    Patient: Lisa Rosen : 1950  CSN #: [id number]   Referring Physician: Bharti So, *    Date: 10/17/2022       Treatment Diagnosis: Difficulty walking    PT Insurance Information: [employer] Medicare  Total # of Visits Approved: 24 Per Physician Order  Total # of Visits to Date: 25  No Show: 0  Canceled Appointment: 0      Pre-Treatment Pain:  1/10  Subjective: Pt states she's not feeling too bad so far. Just some usual soreness and aching in shoulders    Exercises:  Exercise 1: HEP: sink exercises  Exercise 2: Scifit x10min L2.5  Exercise 3: 6in FSU/LSU x10 ea LE with CGA for safety  Exercise 4: Sit/stands x10 no hands  Exercise 8: UBE 4/4  Exercise 10: Cane flex/ chest press 15x ea      Modalities:     MHP and premod to bilaterally shoulders post tx to decrease pain/soreness. Assessment  Assessment: tolerated upper and LE strengthening well to progress towards her goals. Fair tolerance overall, 2 seated RBs taken d/t fatigue. ended tx with Premod to bilat shoulders to decrease pain. WIll continue as tolerated. Activity Tolerance  Activity Tolerance: Patient tolerated treatment well    Patient Education  Ex technique, rationale  Pt verbalized/demonstrated good understanding:     [x] Yes         [] No, pt required further clarification.        Post Treatment Pain:  1/10      Plan  Plan Frequency: 2  Plan weeks: 6       Goals  (Total # of Visits to Date: 25)      Short Term Goals  Time Frame for Short Term Goals: 3 weeks  Short Term Goal 1: Patient to be instructed in initial HEP for general UE/LE and core strengthening.-met/cont  Short Term Goal 2: Patient to be instructed in general functional strengthening to decrease pain and improve mobility.-met/cont  Short Term Goal 3: Initiate manual techniques/modalities prn to decrease L hip and R shoulder pain and improve mobility.-met/cont    Long Term Goals  Time Frame for Long Term Goals : 6 weeks  Long Term Goal 1: Patient to be independent and compliant with HEP. Long Term Goal 2: Patient to have improved B UE strength >/=4/5 grossly for improved postural control to decrease stress on shoulders with ambulation. Long Term Goal 3: Patient to have improved core and B LE strength >/=4/5 grossly for improved spinal stability and decreased low back and hip pain. Long Term Goal 4: Patient to be able to complete 8 sit/stands in </=30 seconds with no UE assistance to improve functional strength. Long Term Goal 5: Patient to report >/=75% improvement in symptoms for improved QOL.     Minutes Tracking:  Time In: 3348  Time Out: [address] [place] [address]  Minutes: [address] [employer] Behzad, ALEXANDR     Date: 10/17/2022

## 2022-10-19 ENCOUNTER — HOSPITAL ENCOUNTER (OUTPATIENT)
Dept: PHYSICAL THERAPY | Age: 72
Setting detail: THERAPIES SERIES
Discharge: HOME | End: 2022-10-19
Payer: MEDICARE

## 2022-10-19 PROCEDURE — 97110 THERAPEUTIC EXERCISES: CPT

## 2022-10-19 PROCEDURE — G0283 ELEC STIM OTHER THAN WOUND: HCPCS

## 2022-10-19 NOTE — PROGRESS NOTES
Phone: [facility]           Fax: [phone]                           Outpatient Physical Therapy                                                                            Daily Note    Patient: Christian Deleon : 1950  CSN #: [id number]   Referring Physician: Luis Alfredo Mccall, *    Date: 10/19/2022       Treatment Diagnosis: Difficulty walking    PT Insurance Information: [employer] Medicare  Total # of Visits Approved: 24 Per Physician Order  Total # of Visits to Date: 23  No Show: 0  Canceled Appointment: 0      Pre-Treatment Pain:  5/10  Subjective: Pt reports pain in R shoulder  5/10 today. She states she has to leave here at 12. Exercises:  Exercise 1: HEP: sink exercises  Exercise 2: Scifit x10min L2.5  Exercise 3: 6in FSU/LSU x10 ea LE with CGA for safety -- only 5 LSU today d/t increased fatigue  Exercise 8: UBE 4/4 - 5 min today  Exercise 10: Cane flex/ chest press 15x ea- seated today  Exercise 11: LAE/ Rows (seated BTB) 15x ea      Modalities:   Premod x15 min seated post tx to decrease pain    Assessment  Assessment: tolerated 5 reps on each LE with side step up before increased fatigue and slight buckle in RLE. Continued with Premod on Bilat shoulders to decrease pain. WIll progress as tolerated. Activity Tolerance  Activity Tolerance: Patient tolerated treatment well    Patient Education  Ex technique  Pt verbalized/demonstrated good understanding:     [x] Yes         [] No, pt required further clarification.        Post Treatment Pain:  5/10      Plan  Plan Frequency: 2  Plan weeks: 6       Goals  (Total # of Visits to Date: 23)      Short Term Goals  Time Frame for Short Term Goals: 3 weeks  Short Term Goal 1: Patient to be instructed in initial HEP for general UE/LE and core strengthening.-met/cont  Short Term Goal 2: Patient to be instructed in general functional strengthening to decrease pain and improve mobility.-met/cont  Short Term Goal 3: Initiate manual techniques/modalities prn to decrease L hip and R shoulder pain and improve mobility.-met/cont    Long Term Goals  Time Frame for Long Term Goals : 6 weeks  Long Term Goal 1: Patient to be independent and compliant with HEP. Long Term Goal 2: Patient to have improved B UE strength >/=4/5 grossly for improved postural control to decrease stress on shoulders with ambulation. Long Term Goal 3: Patient to have improved core and B LE strength >/=4/5 grossly for improved spinal stability and decreased low back and hip pain. Long Term Goal 4: Patient to be able to complete 8 sit/stands in </=30 seconds with no UE assistance to improve functional strength. Long Term Goal 5: Patient to report >/=75% improvement in symptoms for improved QOL.     Minutes Tracking:  Time In: 1115  Time Out: 7788  Minutes: 1220 Aristeo Jacobo PTA     Date: 10/19/2022

## 2022-10-24 ENCOUNTER — HOSPITAL ENCOUNTER (OUTPATIENT)
Dept: PHYSICAL THERAPY | Age: 72
Setting detail: THERAPIES SERIES
Discharge: HOME | End: 2022-10-24
Payer: MEDICARE

## 2022-10-24 PROCEDURE — 97110 THERAPEUTIC EXERCISES: CPT

## 2022-10-24 NOTE — PROGRESS NOTES
Phone: [facility]           Fax: [phone]                           Outpatient Physical Therapy                                                                            Daily Note    Patient: Pauline Dubon : 1950  CSN #: [id number]   Referring Physician: Jatin Serrato, *    Date: 10/24/2022       Treatment Diagnosis: Difficulty walking    PT Insurance Information: [employer]na Medicare  Total # of Visits Approved: 24 Per Physician Order  Total # of Visits to Date: 20  No Show: 0  Canceled Appointment: 0      Pre-Treatment Pain:  2/10  Subjective: Pt reports some soreness in R deltoid region. She states she felt \"lopsided\" when walking in the store over the weekend. Exercises:  Exercise 1: HEP: sink exercises  Exercise 2: Scifit x10min L2.5  Exercise 3: 4in FSU/LSU x10 ea LE  Exercise 4: Sit/stands x10 no hands  Exercise 7: 3# bicep/hammer curls x15 ea, punches 20x  Exercise 12: standing paint roller x10 B;  wall washes shld height CW/CCW x10 each shld    Modalities:     Moist heat to B shoulders post tx to decrease soreness  Assessment  Assessment: Modified step up (Fwd/lat) to a 4 inch to reduce UE compensations with better carryover. Also added UE wall washes/paint roller to increase UE endurance with fair tolreance. MHP to Bilat shoulders post tx to decrease sorenss/pain. WIll continue. Activity Tolerance  Activity Tolerance: Patient tolerated treatment well    Patient Education  Ex technique, rationale, progression  Pt verbalized/demonstrated good understanding:     [x] Yes         [] No, pt required further clarification.        Post Treatment Pain: 0 /10      Plan  Plan Frequency: 2  Plan weeks: 6       Goals  (Total # of Visits to Date: 21)      Short Term Goals  Time Frame for Short Term Goals: 3 weeks  Short Term Goal 1: Patient to be instructed in initial HEP for general UE/LE and core strengthening.-met/cont  Short Term Goal 2: Patient to be instructed in general functional strengthening to decrease pain and improve mobility.-met/cont  Short Term Goal 3: Initiate manual techniques/modalities prn to decrease L hip and R shoulder pain and improve mobility.-met/cont    Long Term Goals  Time Frame for Long Term Goals : 6 weeks  Long Term Goal 1: Patient to be independent and compliant with HEP. Long Term Goal 2: Patient to have improved B UE strength >/=4/5 grossly for improved postural control to decrease stress on shoulders with ambulation. Long Term Goal 3: Patient to have improved core and B LE strength >/=4/5 grossly for improved spinal stability and decreased low back and hip pain. Long Term Goal 4: Patient to be able to complete 8 sit/stands in </=30 seconds with no UE assistance to improve functional strength. Long Term Goal 5: Patient to report >/=75% improvement in symptoms for improved QOL.     Minutes Tracking:  Time In: 8681  Time Out: 7819  Minutes: 58  Timed Code Treatment Minutes: 3 [place], [place]     Date: 10/24/2022

## 2022-10-26 ENCOUNTER — HOSPITAL ENCOUNTER (OUTPATIENT)
Dept: PHYSICAL THERAPY | Age: 72
Setting detail: THERAPIES SERIES
Discharge: HOME | End: 2022-10-26
Payer: MEDICARE

## 2022-10-26 PROCEDURE — 97110 THERAPEUTIC EXERCISES: CPT

## 2022-10-26 NOTE — PROGRESS NOTES
Phone: [facility]           Fax: [phone]                           Outpatient Physical Therapy                                                                            Daily Note    Patient: Andrade Richmond : 1950  CSN #: [id number]   Referring Physician: Reyna Figueroa, *    Date: 10/26/2022    Diagnosis: Diabetic polyneuropathy, E11.42; Frequent falls, R29.6, Weakness, R53.1  Treatment Diagnosis: Difficulty walking    PT Insurance Information: A[employer]na Medicare  Total # of Visits Approved: 39 Per Physician Order  Total # of Visits to Date:   No Show: 0  Canceled Appointment: 0      Pre-Treatment Pain:  2/10  Subjective: Patient reports soreness in B shoulders and the heat packs are helping. Requests a snack d/t feeling lightheaded. Exercises:  Exercise 1: HEP: sink exercises  Exercise 2: Scifit x10min L2.5  Exercise 4: Sit/stands x10 no hands  Exercise 7: 3# bicep/hammer curls x15 ea, punches 20x  Exercise 11: LAE/ Rows (seated BTB) 15x ea  Exercise 12: standing paint roller x10 B;  wall washes shld height CW/CCW x10 each shld    Modalities:   HP's to B shoulders in sitting x15 min to decrease soreness    Assessment  Assessment: Pt has attended 21 PT visits for difficulty walking and general weakness and has met goals for independence with HEP, improved exercise tolerance and improved functional strength and able to complete 8 sit/stands in 22 seconds. Pt making steady progress toward goals for improved core and B UE/LE strength but continues to have B shoulder pain with certain activities and would benefit from continued physical therapy 2x/wk for 4-6 more weeks to meet remaining LTG's and return to PLOF. Activity Tolerance  Activity Tolerance: Patient tolerated treatment well    Patient Education  Exercise technique, UPOC   Pt verbalized/demonstrated good understanding:     [x] Yes         [] No, pt required further clarification.        Post Treatment Pain:  1/10      Plan  Plan Frequency: 2  Plan weeks: 6       Goals  (Total # of Visits to Date: 24)      Short Term Goals  Time Frame for Short Term Goals: 3 weeks  Short Term Goal 1: Patient to be instructed in initial HEP for general UE/LE and core strengthening.-met/cont  Short Term Goal 2: Patient to be instructed in general functional strengthening to decrease pain and improve mobility.-met/cont  Short Term Goal 3: Initiate manual techniques/modalities prn to decrease L hip and R shoulder pain and improve mobility.-met/cont    Long Term Goals  Time Frame for Long Term Goals : 6 weeks  Long Term Goal 1: Patient to be independent and compliant with HEP. Long Term Goal 2: Patient to have improved B UE strength >/=4/5 grossly for improved postural control to decrease stress on shoulders with ambulation. Long Term Goal 3: Patient to have improved core and B LE strength >/=4/5 grossly for improved spinal stability and decreased low back and hip pain. Long Term Goal 4: Patient to be able to complete 8 sit/stands in </=30 seconds with no UE assistance to improve functional strength. -met (8 in 22sec with no hands)  Long Term Goal 5: Patient to report >/=75% improvement in symptoms for improved QOL.     Minutes Tracking:  Time In: 1430  Time Out: [address]  Minutes: 55  Timed Code Treatment Minutes: 1782 [facility] D[facility]     Date: 10/26/2022

## 2022-10-26 NOTE — PLAN OF CARE
[facility]           Phone: [phone]             Outpatient Physical Therapy  Fax: [phone]                                           Date: 10/26/2022  Patient: Maliha Franco : 1950 CSN #: [id number]   Referring Physician: Susan Patricio, *      [] Plan of Care   [x] Updated Plan of Care    Dates of Service to Include: 10/26/2022 to 22    Diagnosis:  Diabetic polyneuropathy, E11.42; Frequent falls, R29.6, Weakness, R53.1    Rehab (Treatment) Diagnosis:  Difficulty walking             Onset Date:       Attendance  Total # of Visits to Date: 24 No Show: 0 Canceled Appointment: 0    Assessment  Assessment: Pt has attended 21 PT visits for difficulty walking and general weakness and has met goals for independence with HEP, improved exercise tolerance and improved functional strength and able to complete 8 sit/stands in 22 seconds. Pt making steady progress toward goals for improved core and B UE/LE strength but continues to have B shoulder pain with certain activities and would benefit from continued physical therapy 2x/wk for 4-6 more weeks to meet remaining LTG's and return to PLOF. Goals  Short Term Goals  Time Frame for Short Term Goals: 3 weeks  Short Term Goal 1: Patient to be instructed in initial HEP for general UE/LE and core strengthening.-met/cont  Short Term Goal 2: Patient to be instructed in general functional strengthening to decrease pain and improve mobility.-met/cont  Short Term Goal 3: Initiate manual techniques/modalities prn to decrease L hip and R shoulder pain and improve mobility.-met/cont  Long Term Goals  Time Frame for Long Term Goals : 6 weeks  Long Term Goal 1: Patient to be independent and compliant with HEP. Long Term Goal 2: Patient to have improved B UE strength >/=4/5 grossly for improved postural control to decrease stress on shoulders with ambulation.   Long Term Goal 3: Patient to have improved core and B LE strength >/=4/5 grossly for improved spinal stability and decreased low back and hip pain. Long Term Goal 4: Patient to be able to complete 8 sit/stands in </=30 seconds with no UE assistance to improve functional strength. -met (8 in 22sec with no hands)  Long Term Goal 5: Patient to report >/=75% improvement in symptoms for improved QOL.      Prognosis  Therapy Prognosis: Good    Treatment Plan   Plan Frequency: 2  Plan weeks: 6  [x] HP/CP      [x] Electrical Stim   [x] Therapeutic Exercise      [x] Gait Training  [] Aquatics   [x] Ultrasound         [x] Patient Education/HEP   [x] Manual Therapy  [] Traction    [x] Neuro-brenda        [x] Soft Tissue Mobs            [] Home TENS  [] Iontophoresis    [] Orthotic casting/fitting      [] Dry Needling             Electronically signed by: Елена Fernandez PT, DPT    Date: 10/26/2022      ______________________________________ Date: 10/26/2022   Physician Signature

## 2022-10-31 ENCOUNTER — HOSPITAL ENCOUNTER (OUTPATIENT)
Dept: PHYSICAL THERAPY | Age: 72
Setting detail: THERAPIES SERIES
Discharge: HOME | End: 2022-10-31
Payer: MEDICARE

## 2022-10-31 PROCEDURE — 97110 THERAPEUTIC EXERCISES: CPT

## 2022-11-01 NOTE — PROGRESS NOTES
Phone: [facility]           Fax: [phone]                           Outpatient Physical Therapy                                                                            Daily Note    Patient: Maliha Franco : 1950  [facility] #: [id number]   Referring Physician: Susan Patricio, *    Date: 10/31/2022     Treatment Diagnosis: Difficulty walking    PT Insurance Information: A[employer]na Medicare  Total # of Visits Approved: 39 Per Physician Order  Total # of Visits to Date: 22  No Show: 0  Canceled Appointment: 0    Pre-Treatment Pain:  0/10  Subjective: Pt states she is doing good today, was able to use [place] over the weekend which she hasnt been able to for awhile. Pt states she is sore but no pain rating. Exercises:  Exercise 1: HEP: sink exercises  Exercise 2: Scifit x10min L2.5  Exercise 4: Sit/stands x10 no hands  Exercise 7: 3# bicep/hammer curls x15 ea, punches 20x  Exercise 8: UBE 4/4 - 5 min today  Exercise 11: LAE/ Rows (seated BTB) 15x ea  Exercise 12: standing paint roller x10 B;  wall washes shld height CW/CCW x10 each shld  Modalities:  MHP 15 mins in sitting to Teodoro shoulders. Assessment  Assessment: Pt Teodoro shoulder flexion AROM approx 60% full AROM today. Better tolerance to shoulder ROM in sitting over standing. Activity Tolerance  Activity Tolerance: Patient tolerated treatment well    Patient Education  Patient Education: HEP  Pt verbalized/demonstrated good understanding:     [x] Yes         [] No, pt required further clarification.      Post Treatment Pain:  0/10    Plan  Plan Frequency: 2  Plan weeks: 6     Goals  (Total # of Visits to Date: 25)      Short Term Goals  Time Frame for Short Term Goals: 3 weeks  Short Term Goal 1: Patient to be instructed in initial HEP for general UE/LE and core strengthening.-met/cont  Short Term Goal 2: Patient to be instructed in general functional strengthening to decrease pain and improve mobility.-met/cont  Short Term Goal 3: Initiate manual techniques/modalities prn to decrease L hip and R shoulder pain and improve mobility.-met/cont    Long Term Goals  Time Frame for Long Term Goals : 6 weeks  Long Term Goal 1: Patient to be independent and compliant with HEP. Long Term Goal 2: Patient to have improved B UE strength >/=4/5 grossly for improved postural control to decrease stress on shoulders with ambulation. Long Term Goal 3: Patient to have improved core and B LE strength >/=4/5 grossly for improved spinal stability and decreased low back and hip pain. Long Term Goal 4: Patient to be able to complete 8 sit/stands in </=30 seconds with no UE assistance to improve functional strength. -met (8 in 22sec with no hands)  Long Term Goal 5: Patient to report >/=75% improvement in symptoms for improved QOL.     Minutes Tracking:  Time In: 4395  Time Out: 6549  Minutes: 59  Timed Code Treatment Minutes: Tk Marinelli     Date: 10/31/2022

## 2022-11-02 ENCOUNTER — HOSPITAL ENCOUNTER (OUTPATIENT)
Dept: PHYSICAL THERAPY | Age: 72
Setting detail: THERAPIES SERIES
Discharge: HOME | End: 2022-11-02
Payer: MEDICARE

## 2022-11-02 PROCEDURE — 97110 THERAPEUTIC EXERCISES: CPT

## 2022-11-03 NOTE — PROGRESS NOTES
Phone: [facility]           Fax: [phone]                           Outpatient Physical Therapy                                                                            Daily Note    Patient: Savanna Jiménez : 1950  [facility] #: [id number]   Referring Physician: Siddhartha Knutson, *    Date: 2022     Treatment Diagnosis: Difficulty walking    PT Insurance Information: Aetna Medicare  Total # of Visits Approved: 39 Per Physician Order  Total # of Visits to Date: 23  No Show: 0  Canceled Appointment: 0    Pre-Treatment Pain:  0/10  Subjective: Pt reports she isnt really having much pain today. Pt states shes been doing more at home and feels improvements. Exercises:  Exercise 2: Scifit x10min L2.5  Exercise 3: 6in FSU/LSU x10 ea LE  Exercise 4: Sit/stands x10 no hands  Exercise 7: 3# bicep/hammer curls x15 ea, punches 20x  Exercise 8: UBE 4/4 - 5 min today  Exercise 9: 6 mins walk  Exercise 10: Cane flex/ chest press 15x ea- seated today 2#  Exercise 11: LAE/ Rows (seated BTB) 15x ea    Modalities:   HP 15 mins to Teodoro shoulders for discomfort. Assessment  Assessment: Decresased rest breaks needed today. Shoulder elevation progressed to 2# bar with fair tolerance. Will continue to progress as tolerable. Activity Tolerance  Activity Tolerance: Patient tolerated treatment well    Patient Education  Patient Education: HEP  Pt verbalized/demonstrated good understanding:     [x] Yes         [] No, pt required further clarification.      Post Treatment Pain:  0/10    Plan  Plan Frequency: 2  Plan weeks: 6     Goals  (Total # of Visits to Date: 21)      Short Term Goals  Time Frame for Short Term Goals: 3 weeks  Short Term Goal 1: Patient to be instructed in initial HEP for general UE/LE and core strengthening.-met/cont  Short Term Goal 2: Patient to be instructed in general functional strengthening to decrease pain and improve mobility.-met/cont  Short Term Goal 3: Initiate manual techniques/modalities prn to decrease L hip and R shoulder pain and improve mobility.-met/cont    Long Term Goals  Time Frame for Long Term Goals : 6 weeks  Long Term Goal 1: Patient to be independent and compliant with HEP. Long Term Goal 2: Patient to have improved B UE strength >/=4/5 grossly for improved postural control to decrease stress on shoulders with ambulation. Long Term Goal 3: Patient to have improved core and B LE strength >/=4/5 grossly for improved spinal stability and decreased low back and hip pain. Long Term Goal 4: Patient to be able to complete 8 sit/stands in </=30 seconds with no UE assistance to improve functional strength. -met (8 in 22sec with no hands)  Long Term Goal 5: Patient to report >/=75% improvement in symptoms for improved QOL.     Minutes Tracking:  Time In: 6180  Time Out: 1529  Minutes: 58  Timed Code Treatment Minutes: [address] [place], [place]     Date: 11/2/2022

## 2022-11-08 ENCOUNTER — HOSPITAL ENCOUNTER (OUTPATIENT)
Dept: PHYSICAL THERAPY | Age: 72
Setting detail: THERAPIES SERIES
Discharge: HOME | End: 2022-11-08
Payer: MEDICARE

## 2022-11-08 PROCEDURE — 97110 THERAPEUTIC EXERCISES: CPT

## 2022-11-08 NOTE — PROGRESS NOTES
Phone: [facility]           Fax: [phone]                           Outpatient Physical Therapy                                                                            Daily Note    Patient: Paz Nichols : 1950  CSN #: [id number]   Referring Physician: Niranjan Hubbard, *    Date: 2022    Diagnosis: Diabetic polyneuropathy, E11.42; Frequent falls, R29.6, Weakness, R53.1  Treatment Diagnosis: Difficulty walking    PT Insurance Information: Aetna Medicare  Total # of Visits Approved: 39 Per Physician Order  Total # of Visits to Date: 24  No Show: 0  Canceled Appointment: 0      Pre-Treatment Pain:  0/10  Subjective: Patient reports she is feeling well today. Exercises:  Exercise 2: Scifit x10min L2.5  Exercise 3: 6in FSU/LSU x10 ea LE  Exercise 4: Sit/stands x15 no hands  Exercise 7: 3# bicep/hammer curls x15 ea, punches 20x  Exercise 9: 6 mins walk  Exercise 10: Cane flex/ chest press 15x ea- seated today 2#  Exercise 11: LAE/ Rows (seated BTB) 15x ea    Assessment  Assessment: Pt continues to rely heavily on UE's during lateral step ups d/t B LE weakness. Pt denies need for HP's today. Will continue. Activity Tolerance  Activity Tolerance: Patient tolerated treatment well    Patient Education  Exercise technique   Pt verbalized/demonstrated good understanding:     [x] Yes         [] No, pt required further clarification.        Post Treatment Pain:  0/10      Plan  Plan Frequency: 2  Plan weeks: 6       Goals  (Total # of Visits to Date: 25)      Short Term Goals  Time Frame for Short Term Goals: 3 weeks  Short Term Goal 1: Patient to be instructed in initial HEP for general UE/LE and core strengthening.-met/cont  Short Term Goal 2: Patient to be instructed in general functional strengthening to decrease pain and improve mobility.-met/cont  Short Term Goal 3: Initiate manual techniques/modalities prn to decrease L hip and R shoulder pain and improve mobility.-met/cont    Long Term Goals  Time Frame for Long Term Goals : 6 weeks  Long Term Goal 1: Patient to be independent and compliant with HEP. Long Term Goal 2: Patient to have improved B UE strength >/=4/5 grossly for improved postural control to decrease stress on shoulders with ambulation. Long Term Goal 3: Patient to have improved core and B LE strength >/=4/5 grossly for improved spinal stability and decreased low back and hip pain. Long Term Goal 4: Patient to be able to complete 8 sit/stands in </=30 seconds with no UE assistance to improve functional strength. -met (8 in 22sec with no hands)  Long Term Goal 5: Patient to report >/=75% improvement in symptoms for improved QOL.     Minutes Tracking:  Time In: 0257  Time Out: 364 [facility]  Minutes: 48  Timed Code Treatment Minutes: HUMPHREY Leon, DPT     Date: 11/8/2022

## 2022-11-10 ENCOUNTER — HOSPITAL ENCOUNTER (OUTPATIENT)
Dept: PHYSICAL THERAPY | Age: 72
Setting detail: THERAPIES SERIES
Discharge: HOME | End: 2022-11-10
Payer: MEDICARE

## 2022-11-10 PROCEDURE — 97110 THERAPEUTIC EXERCISES: CPT

## 2022-11-10 NOTE — PROGRESS NOTES
Phone: [facility]           Fax: [phone]                           Outpatient Physical Therapy                                                                            Daily Note    Patient: Yvette Rosado : 1950  CSN #: [id number]   Referring Physician: Fausto Crow, *    Date: 11/10/2022    Diagnosis: Diabetic polyneuropathy, E11.42; Frequent falls, R29.6, Weakness, R53.1  Treatment Diagnosis: Difficulty walking    PT Insurance Information: [employer] Medicare  Total # of Visits Approved: 39 Per Physician Order  Total # of Visits to Date: 25  No Show: 0  Canceled Appointment: 0      Pre-Treatment Pain:  0/10  Subjective: Patient with no complaints today. L lower back pain noted with laying down. Exercises:  Exercise 2: Scifit x10min L2.5  Exercise 3: 6in FSU/LSU x10 ea LE  Exercise 4: Sit/stands x15, 3# weight  Exercise 7: 3# bicep/hammer curls x15 ea, punches 20x  Exercise 10: Cane flex/ chest press 20x ea- seated today 2#  Exercise 11: LAE/ Rows (seated BTB) 20x ea    Assessment  Assessment: Added 3# weight to sit/stands today to progress functional strength and increased reps of UE exercise. Pt with 4/5 B shoulder strength all planes grossly with no increase in pain, meeting LTG. Pt with B hip flex strength 4-/5 grossly; all other major joints and planes 4/5 grossly partially meeting LTG. Will continue to progress as tolerated. Activity Tolerance  Activity Tolerance: Patient tolerated treatment well    Patient Education  Exercise technique, progression toward strength goals   Pt verbalized/demonstrated good understanding:     [x] Yes         [] No, pt required further clarification.        Post Treatment Pain:  0/10      Plan  Plan Frequency: 2  Plan weeks: 6       Goals  (Total # of Visits to Date: 22)      Short Term Goals  Time Frame for Short Term Goals: 3 weeks  Short Term Goal 1: Patient to be instructed in initial HEP for general UE/LE and core strengthening.-met/cont  Short Term Goal 2: Patient to be instructed in general functional strengthening to decrease pain and improve mobility.-met/cont  Short Term Goal 3: Initiate manual techniques/modalities prn to decrease L hip and R shoulder pain and improve mobility.-met/cont    Long Term Goals  Time Frame for Long Term Goals : 6 weeks  Long Term Goal 1: Patient to be independent and compliant with HEP. Long Term Goal 2: Patient to have improved B UE strength >/=4/5 grossly for improved postural control to decrease stress on shoulders with ambulation. -met  Long Term Goal 3: Patient to have improved core and B LE strength >/=4/5 grossly for improved spinal stability and decreased low back and hip pain. -partially met (hip flex: 4-/5; all else 4/5 grossly)  Long Term Goal 4: Patient to be able to complete 8 sit/stands in </=30 seconds with no UE assistance to improve functional strength. -met (8 in 22sec with no hands)  Long Term Goal 5: Patient to report >/=75% improvement in symptoms for improved QOL.     Minutes Tracking:  Time In: 2346  Time Out: 1500  Minutes: 45  Timed Code Treatment Minutes: [address] [facility], PT, DPT     Date: 11/10/2022

## 2022-11-15 ENCOUNTER — HOSPITAL ENCOUNTER (OUTPATIENT)
Dept: PHYSICAL THERAPY | Age: 72
Setting detail: THERAPIES SERIES
Discharge: HOME | End: 2022-11-15
Payer: MEDICARE

## 2022-11-15 PROCEDURE — 97110 THERAPEUTIC EXERCISES: CPT

## 2022-11-15 NOTE — PROGRESS NOTES
Phone: [facility]           Fax: [phone]                           Outpatient Physical Therapy                                                                            Daily Note    Patient: Andrade Richmond : 1950  CSN #: [id number]   Referring Physician: Reyna Figueroa, *    Date: 11/15/2022    Diagnosis: Diabetic polyneuropathy, E11.42; Frequent falls, R29.6, Weakness, R53.1  Treatment Diagnosis: Difficulty walking    PT Insurance Information: [employer] Medicare  Total # of Visits Approved: 39 Per Physician Order  Total # of Visits to Date: 32  No Show: 0  Canceled Appointment: 0      Pre-Treatment Pain:  2/10  Subjective: Pt reports L shoulder blade pain today but lower back pain has resolved for now. Exercises:  Exercise 2: Scifit x10min L3.0  Exercise 3: 6in FSU/LSU x10 ea LE  Exercise 4: Sit/stands x15, 3# weight  Exercise 7: 3# bicep/hammer curls x15 ea, punches 20x  Exercise 10: Cane flex/ chest press 20x ea- seated today 3#  Exercise 11: LAE/ Rows (seated BTB) 20x ea    Assessment  Assessment: Increased resistance of seated bar ROM exercises for B shoulders with good tolerance from patient. Will re-assess next visit to determine POC moving forward. Activity Tolerance  Activity Tolerance: Patient tolerated treatment well    Patient Education  Exercise technique   Pt verbalized/demonstrated good understanding:     [x] Yes         [] No, pt required further clarification.        Post Treatment Pain:  2/10      Plan  Plan Frequency: 2  Plan weeks: 6       Goals  (Total # of Visits to Date: 32)      Short Term Goals  Time Frame for Short Term Goals: 3 weeks  Short Term Goal 1: Patient to be instructed in initial HEP for general UE/LE and core strengthening.-met/cont  Short Term Goal 2: Patient to be instructed in general functional strengthening to decrease pain and improve mobility.-met/cont  Short Term Goal 3: Initiate manual techniques/modalities prn to decrease L hip and R shoulder pain and improve mobility.-met/cont    Long Term Goals  Time Frame for Long Term Goals : 6 weeks  Long Term Goal 1: Patient to be independent and compliant with HEP. Long Term Goal 2: Patient to have improved B UE strength >/=4/5 grossly for improved postural control to decrease stress on shoulders with ambulation. -met  Long Term Goal 3: Patient to have improved core and B LE strength >/=4/5 grossly for improved spinal stability and decreased low back and hip pain. -partially met (hip flex: 4-/5; all else 4/5 grossly)  Long Term Goal 4: Patient to be able to complete 8 sit/stands in </=30 seconds with no UE assistance to improve functional strength. -met (8 in 22sec with no hands)  Long Term Goal 5: Patient to report >/=75% improvement in symptoms for improved QOL.     Minutes Tracking:  Time In: 1411  Time Out: 1502  Minutes: 51  Timed Code Treatment Minutes: Sherin [address], PT, DPT     Date: 11/15/2022

## 2022-11-17 ENCOUNTER — HOSPITAL ENCOUNTER (OUTPATIENT)
Dept: PHYSICAL THERAPY | Age: 72
Setting detail: THERAPIES SERIES
Discharge: HOME | End: 2022-11-17
Payer: MEDICARE

## 2022-11-17 ENCOUNTER — TELEPHONE (OUTPATIENT)
Dept: CARDIOLOGY | Age: 72
End: 2022-11-17

## 2022-11-17 PROCEDURE — 97110 THERAPEUTIC EXERCISES: CPT

## 2022-11-17 NOTE — PROGRESS NOTES
Phone: [facility]           Fax: [phone]                           Outpatient Physical Therapy                                                                            Daily Note    Patient: Emil Richard : 1950  CSN #: [id number]   Referring Physician: Richard Mckeon, *    Date: 2022    Diagnosis: Diabetic polyneuropathy, E11.42; Frequent falls, R29.6, Weakness, R53.1  Treatment Diagnosis: Difficulty walking    PT Insurance Information: A[employer]na Medicare  Total # of Visits Approved: 40 Per Physician Order  Total # of Visits to Date: 32  No Show: 0  Canceled Appointment: 0      Pre-Treatment Pain:  0/10  Subjective: Patient reports she's feels pretty good today, just a little light headed. Exercises:  Exercise 2: Scifit x10min L3.5  Exercise 3: 6in FSU/LSU x10 ea LE  Exercise 4: Sit/stands x15, 3# weight  Exercise 5: OTB 3 way hip 10x ea  Exercise 7: 3# bicep/hammer curls x15 ea, punches 20x  Exercise 9: 6 mins walk  Exercise 10: Cane flex/ chest press 20x ea- seated today 3#  Exercise 11: LAE/ Rows (seated BTB) 20x ea    Assessment  Assessment: Patient has attended 32 PT visits for general weakness and difficulty walking and has met goals for initiating HEP and improved functional strength with sit/stands and for improved B shoulder strength but continues to demo B hip weakness and struggles with endurance with walking. Added YTB 3 way hip exercise to progress hip strength today. Pt to benefit from continued physical therapy 2x/wk for 4 more weeks to meet remaining LTG's and return to PLOF. Activity Tolerance  Activity Tolerance: Patient tolerated treatment well    Patient Education  Exercise technique and progression   Pt verbalized/demonstrated good understanding:     [x] Yes         [] No, pt required further clarification.        Post Treatment Pain:  0/10      Plan  Plan Frequency: 2  Plan weeks: 6       Goals  (Total # of Visits to Date: 27)      Short Term Goals  Time Frame for Short Term Goals: 3 weeks  Short Term Goal 1: Patient to be instructed in initial HEP for general UE/LE and core strengthening.-met/cont  Short Term Goal 2: Patient to be instructed in general functional strengthening to decrease pain and improve mobility.-met/cont  Short Term Goal 3: Initiate manual techniques/modalities prn to decrease L hip and R shoulder pain and improve mobility.-met/cont    Long Term Goals  Time Frame for Long Term Goals : 6 weeks  Long Term Goal 1: Patient to be independent and compliant with HEP. Long Term Goal 2: Patient to have improved B UE strength >/=4/5 grossly for improved postural control to decrease stress on shoulders with ambulation. -met  Long Term Goal 3: Patient to have improved core and B LE strength >/=4/5 grossly for improved spinal stability and decreased low back and hip pain. -partially met (hip flex: 4-/5; all else 4/5 grossly)  Long Term Goal 4: Patient to be able to complete 8 sit/stands in </=30 seconds with no UE assistance to improve functional strength. -met (8 in 22sec with no hands)  Long Term Goal 5: Patient to report >/=75% improvement in symptoms for improved QOL. -progressing    Minutes Tracking:  Time In: [address]  Time Out: [address] [address]  Minutes: 46  Timed Code Treatment Minutes: [address], PT, DPT     Date: 11/17/2022

## 2022-11-17 NOTE — TELEPHONE ENCOUNTER
Patient called and states the support hose she wears goes above her knee and really cuts into her skin. She wants to know if she can wear calf height support hose and if you can write her a script for this?

## 2022-11-17 NOTE — PLAN OF CARE
[facility]           Phone: [phone]             Outpatient Physical Therapy  Fax: [phone]                                           Date: 2022  Patient: Murphy Baldwin : 1950 CSN #: [id number]   Referring Physician: Sean Gonzalez, *      [] Plan of Care   [x] Updated Plan of Care    Dates of Service to Include: 2022 to 22    Diagnosis:  Diabetic polyneuropathy, E11.42; Frequent falls, R29.6, Weakness, R53.1    Rehab (Treatment) Diagnosis:  Difficulty walking             Onset Date:       Attendance  Total # of Visits to Date: 32 No Show: 0 Canceled Appointment: 0    Assessment  Assessment: Patient has attended 32 PT visits for general weakness and difficulty walking and has met goals for initiating HEP and improved functional strength with sit/stands and for improved B shoulder strength but continues to demo B hip weakness and struggles with endurance with walking. Added YTB 3 way hip exercise to progress hip strength today. Pt to benefit from continued physical therapy 2x/wk for 4 more weeks to meet remaining LTG's and return to PLOF. Goals  Short Term Goals  Time Frame for Short Term Goals: 3 weeks  Short Term Goal 1: Patient to be instructed in initial HEP for general UE/LE and core strengthening.-met/cont  Short Term Goal 2: Patient to be instructed in general functional strengthening to decrease pain and improve mobility.-met/cont  Short Term Goal 3: Initiate manual techniques/modalities prn to decrease L hip and R shoulder pain and improve mobility.-met/cont  Long Term Goals  Time Frame for Long Term Goals : 6 weeks  Long Term Goal 1: Patient to be independent and compliant with HEP. Long Term Goal 2: Patient to have improved B UE strength >/=4/5 grossly for improved postural control to decrease stress on shoulders with ambulation. -met  Long Term Goal 3: Patient to have improved core and B LE strength >/=4/5 grossly for improved spinal stability and decreased low back and hip pain. -partially met (hip flex: 4-/5; all else 4/5 grossly)  Long Term Goal 4: Patient to be able to complete 8 sit/stands in </=30 seconds with no UE assistance to improve functional strength. -met (8 in 22sec with no hands)  Long Term Goal 5: Patient to report >/=75% improvement in symptoms for improved QOL. -progressing     Prognosis  Therapy Prognosis: Good    Treatment Plan   Plan Frequency: 2  Plan weeks: 6  [x] HP/CP      [x] Electrical Stim   [x] Therapeutic Exercise      [x] Gait Training  [] Aquatics   [x] Ultrasound         [x] Patient Education/HEP   [x] Manual Therapy  [] Traction    [x] Neuro-brenda        [x] Soft Tissue Mobs            [] Home TENS  [] Iontophoresis    [] Orthotic casting/fitting      [] Dry Needling             Electronically signed by: Holden Mendez PT, DPT    Date: 11/17/2022      ______________________________________ Date: 11/17/2022   Physician Signature

## 2022-11-17 NOTE — TELEPHONE ENCOUNTER
Please let patient know that I typically tell people to just fold down the stockings to be knee height and if it causes stricture, roll down a bit more, then a bit more, then pull back up, over time as the swelling goes down the strictures should become less of a problem. No prescription is necessary or covered for knee high or calf height but if she cannot wear knee high calf is better than nothing. Thanks.

## 2022-11-29 ENCOUNTER — HOSPITAL ENCOUNTER (OUTPATIENT)
Dept: PHYSICAL THERAPY | Age: 72
Setting detail: THERAPIES SERIES
Discharge: HOME | End: 2022-11-29
Payer: MEDICARE

## 2022-11-29 NOTE — PROGRESS NOTES
[facility]  Inpatient/Observation/Outpatient Rehabilitation    Date: 2022  Patient Name: Parul Meek       [] Inpatient Acute/Observation       [x]  Outpatient  : 1950       [x] Pt no showed for scheduled appointment    [] Pt refused/declined therapy at this time due to:           [] Pt cancelled due to:  [] No Reason Given   [] Sick/ill   [] Other: Called and left voicemail reminding patient of next appointment Thursday, Dec 1st at 2:45pm.     Therapist/Assistant will attempt to see this patient, at our earliest opportunity.        Karely Casper, PT, DPT Date: 2022

## 2022-12-01 ENCOUNTER — HOSPITAL ENCOUNTER (OUTPATIENT)
Dept: INFUSION THERAPY | Age: 72
Discharge: HOME | End: 2022-12-01
Payer: MEDICARE

## 2022-12-01 DIAGNOSIS — M81.0 POSTMENOPAUSAL OSTEOPOROSIS: Primary | ICD-10-CM

## 2022-12-01 PROCEDURE — 96372 THER/PROPH/DIAG INJ SC/IM: CPT

## 2022-12-01 PROCEDURE — 6360000002 HC RX W HCPCS: Performed by: NURSE PRACTITIONER

## 2022-12-01 RX ORDER — DIPHENHYDRAMINE HYDROCHLORIDE 50 MG/ML
50 INJECTION INTRAMUSCULAR; INTRAVENOUS
OUTPATIENT
Start: 2023-06-01

## 2022-12-01 RX ORDER — ONDANSETRON 2 MG/ML
8 INJECTION INTRAMUSCULAR; INTRAVENOUS
OUTPATIENT
Start: 2023-06-01

## 2022-12-01 RX ORDER — ACETAMINOPHEN 325 MG/1
650 TABLET ORAL
OUTPATIENT
Start: 2023-06-01

## 2022-12-01 RX ORDER — ALBUTEROL SULFATE 90 UG/1
4 AEROSOL, METERED RESPIRATORY (INHALATION) PRN
OUTPATIENT
Start: 2023-06-01

## 2022-12-01 RX ORDER — EPINEPHRINE 1 MG/ML
0.3 INJECTION, SOLUTION, CONCENTRATE INTRAVENOUS PRN
OUTPATIENT
Start: 2023-06-01

## 2022-12-01 RX ORDER — SODIUM CHLORIDE 9 MG/ML
INJECTION, SOLUTION INTRAVENOUS CONTINUOUS
OUTPATIENT
Start: 2023-06-01

## 2022-12-01 RX ADMIN — DENOSUMAB 60 MG: 60 INJECTION SUBCUTANEOUS at 10:26

## 2022-12-01 NOTE — DISCHARGE INSTRUCTIONS
Outpatient Discharge Instructions for Prolia Injections    [address] [facility] [address]   [phone]      You are advised to carry out the following Instructions:    Diet:  As prescribed by your Physician. Activity:  As prescribed by your Physician. Care of injection site:  If the injection site becomes red,sore,swollen,painful, has drainage,or you develop a fever,notify your Physician. Other:    If you develop hives,rash,itching or trouble breathing, go to the nearest Emergency Room. These could be a sign of allergic reaction. Continue to take your calcium and vitamin D.  Let your dentist know that you are taking Prolia. Let your doctor know if you have any unusual jaw or leg bone pain. Take good care of your teeth,see a dentist often. Injection is every 6 months.       Follow up appointment:          ANY PROBLEMS OR CONCERNS NOTIFY YOUR PHYSICIAN   OR    GO THE NEAREST EMERGENCY ROOM

## 2022-12-06 ENCOUNTER — HOSPITAL ENCOUNTER (OUTPATIENT)
Dept: PHYSICAL THERAPY | Age: 72
Setting detail: THERAPIES SERIES
Discharge: HOME | End: 2022-12-06
Payer: MEDICARE

## 2022-12-06 PROCEDURE — 97110 THERAPEUTIC EXERCISES: CPT

## 2022-12-07 NOTE — PROGRESS NOTES
Phone: [facility]           Fax: [phone]                           Outpatient Physical Therapy                                                                            Daily Note    Patient: Dereje Zhong : 1950  CSN #: [id number]   Referring Physician: Lance Brumfield, *    Date: 2022       Treatment Diagnosis: Difficulty walking    PT Insurance Information: [employer] Medicare  Total # of Visits Approved: 40 Per Physician Order  Total # of Visits to Date: 29  No Show: 1  Canceled Appointment: 0    Pre-Treatment Pain:  0/10  Subjective: Pt states she missed a few visits d/t her sister having the flu but she stayed healthy. Pt denies current pain at rest, just a little tired. Exercises:  Exercise 2: Scifit x10min L3.5  Exercise 3: 6in FSU/LSU x10 ea LE  Exercise 4: Sit/stands x15, 3# weight  Exercise 7: 3# bicep/hammer curls x15 ea, punches 20x  Exercise 8: UBE 4/4 - 5 min today  Exercise 9: 6 mins walk  Exercise 10: Cane flex/ chest press 20x ea- seated today 3#  Exercise 11: LAE/ Rows (seated BTB) 20x ea    Modalities:  MHP 15 mins to Teodoro shoulder in sitting. Assessment  Assessment: Pt able to amb for 6 mins today prior to rest break and complete forward and sideways step ups which displays greater endurance. Will continue to progress endurance and balance exercises. Activity Tolerance  Activity Tolerance: Patient tolerated treatment well    Patient Education  Patient Education: HEP  Pt verbalized/demonstrated good understanding:     [x] Yes         [] No, pt required further clarification.      Post Treatment Pain:  0/10    Plan  Plan Frequency: 2  Plan weeks: 6     Goals  (Total # of Visits to Date: 29)      Short Term Goals  Time Frame for Short Term Goals: 3 weeks  Short Term Goal 1: Patient to be instructed in initial HEP for general UE/LE and core strengthening.-met/cont  Short Term Goal 2: Patient to be instructed in general functional strengthening to decrease pain and improve mobility.-met/cont  Short Term Goal 3: Initiate manual techniques/modalities prn to decrease L hip and R shoulder pain and improve mobility.-met/cont    Long Term Goals  Time Frame for Long Term Goals : 6 weeks  Long Term Goal 1: Patient to be independent and compliant with HEP. Long Term Goal 2: Patient to have improved B UE strength >/=4/5 grossly for improved postural control to decrease stress on shoulders with ambulation. -met  Long Term Goal 3: Patient to have improved core and B LE strength >/=4/5 grossly for improved spinal stability and decreased low back and hip pain. -partially met (hip flex: 4-/5; all else 4/5 grossly)  Long Term Goal 4: Patient to be able to complete 8 sit/stands in </=30 seconds with no UE assistance to improve functional strength. -met (8 in 22sec with no hands)  Long Term Goal 5: Patient to report >/=75% improvement in symptoms for improved QOL. -progressing    Minutes Tracking:  Time In: [facility]  Time Out: 8916  Minutes: 59  Timed Code Treatment Minutes: [address] [place], [place]     Date: 12/6/2022

## 2022-12-08 ENCOUNTER — HOSPITAL ENCOUNTER (OUTPATIENT)
Dept: PHYSICAL THERAPY | Age: 72
Setting detail: THERAPIES SERIES
Discharge: HOME | End: 2022-12-08
Payer: MEDICARE

## 2022-12-08 PROCEDURE — 97110 THERAPEUTIC EXERCISES: CPT

## 2022-12-08 NOTE — PROGRESS NOTES
Phone: [facility]           Fax: [phone]                           Outpatient Physical Therapy                                                                            Daily Note    Patient: Angela Man : 1950  CSN #: [id number]   Referring Physician: Sweta Pimentel, *    Date: 2022    Diagnosis: Diabetic polyneuropathy, E11.42; Frequent falls, R29.6, Weakness, R53.1  Treatment Diagnosis: Difficulty walking    PT Insurance Information: A[employer]na Medicare  Total # of Visits Approved: 40 Per Physician Order  Total # of Visits to Date: 34  No Show: 1  Canceled Appointment: 0      Pre-Treatment Pain:  0/10  Subjective: Pt states she is feeling better. Denies pain at this time. Exercises:  Exercise 1: HEP: sink exercises  Exercise 2: Scifit x10min L3.5  Exercise 3: 6in FSU/LSU x10 ea LE  Exercise 4: Sit/stands x15, 3# weight  Exercise 5: OTB 3 way hip 10x ea  Exercise 6: Pulleys x5min  Exercise 7: 3# bicep/hammer curls x15 ea, punches 20x  Exercise 8: UBE 4/4 - 5 min today  Exercise 11: LAE/ Rows (seated BTB) 20x ea       Assessment  Assessment: Pt able to amb for 6 mins today prior to rest break and complete forward and sideways step ups which displays greater endurance. Will continue to progress endurance and balance exercises. Activity Tolerance  Activity Tolerance: Patient tolerated treatment well    Patient Education  Patient Education: HEP  Pt verbalized/demonstrated good understanding:     [x] Yes         [] No, pt required further clarification.        Post Treatment Pain:  0/10      Plan  Plan Frequency: 2  Plan weeks: 6       Goals  (Total # of Visits to Date: 34)      Short Term Goals  Time Frame for Short Term Goals: 3 weeks  Short Term Goal 1: Patient to be instructed in initial HEP for general UE/LE and core strengthening.-met/cont  Short Term Goal 2: Patient to be instructed in general functional strengthening to decrease pain and improve mobility.-met/cont  Short Term Goal 3: Initiate manual techniques/modalities prn to decrease L hip and R shoulder pain and improve mobility.-met/cont    Long Term Goals  Time Frame for Long Term Goals : 6 weeks  Long Term Goal 1: Patient to be independent and compliant with HEP. Long Term Goal 2: Patient to have improved B UE strength >/=4/5 grossly for improved postural control to decrease stress on shoulders with ambulation. -met  Long Term Goal 3: Patient to have improved core and B LE strength >/=4/5 grossly for improved spinal stability and decreased low back and hip pain. -partially met (hip flex: 4-/5; all else 4/5 grossly)  Long Term Goal 4: Patient to be able to complete 8 sit/stands in </=30 seconds with no UE assistance to improve functional strength. -met (8 in 22sec with no hands)  Long Term Goal 5: Patient to report >/=75% improvement in symptoms for improved QOL. -progressing    Minutes Tracking:  Time In: 6535  Time Out: 8826  Minutes: 47  Timed Code Treatment Minutes: [address] [facility] [place]     Date: 12/8/2022

## 2022-12-13 ENCOUNTER — HOSPITAL ENCOUNTER (OUTPATIENT)
Dept: PHYSICAL THERAPY | Age: 72
Setting detail: THERAPIES SERIES
Discharge: HOME | End: 2022-12-13
Payer: MEDICARE

## 2022-12-13 PROCEDURE — 97110 THERAPEUTIC EXERCISES: CPT

## 2022-12-13 NOTE — PROGRESS NOTES
Phone: [facility]           Fax: [phone]                           Outpatient Physical Therapy                                                                            Daily Note    Patient: Angela Man : 1950  CSN #: [id number]   Referring Physician: Sweta Pimentel, *    Date: 2022       Treatment Diagnosis: Difficulty walking    PT Insurance Information: A[employer]na Medicare  Total # of Visits Approved: 40 Per Physician Order  Total # of Visits to Date: 30  No Show: 1  Canceled Appointment: 0    Pre-Treatment Pain:  0/10  Subjective: Pt reports she pulled up to a stop sign the other day and she was able to turn her head and look for traffic which she hadnt been able to for awhile. Pt denies current pain at rest.    Exercises:  Exercise 1: HEP: sink exercises  Exercise 2: Scifit x10min L3.5  Exercise 3: 6in FSU/LSU x10 ea LE  Exercise 4: Sit/stands x15, 3# weight  Exercise 6: Pulleys x5min  Exercise 8: UBE 4/4 - 5 min today  Exercise 9: 6 mins walk  Exercise 10: Cane flex/ chest press 20x ea- seated today 3#  Exercise 11: LAE/ Rows (seated BTB) 20x ea    Assessment  Assessment: Pt reports 55-60% overall improvement today since begining therapy with better ability noted to move UE/ enter and exit car and move neck. Will continue. Activity Tolerance  Activity Tolerance: Patient tolerated treatment well    Patient Education  Patient Education: HEP  Pt verbalized/demonstrated good understanding:     [x] Yes         [] No, pt required further clarification.      Post Treatment Pain:  0/10    Plan  Plan Frequency: 2  Plan weeks: 6     Goals  (Total # of Visits to Date: 27)      Short Term Goals  Time Frame for Short Term Goals: 3 weeks  Short Term Goal 1: Patient to be instructed in initial HEP for general UE/LE and core strengthening.-met/cont  Short Term Goal 2: Patient to be instructed in general functional strengthening to decrease pain and improve mobility.-met/cont  Short Term Goal 3: Initiate manual techniques/modalities prn to decrease L hip and R shoulder pain and improve mobility.-met/cont    Long Term Goals  Time Frame for Long Term Goals : 6 weeks  Long Term Goal 1: Patient to be independent and compliant with HEP. Long Term Goal 2: Patient to have improved B UE strength >/=4/5 grossly for improved postural control to decrease stress on shoulders with ambulation. -met  Long Term Goal 3: Patient to have improved core and B LE strength >/=4/5 grossly for improved spinal stability and decreased low back and hip pain. -partially met (hip flex: 4-/5; all else 4/5 grossly)  Long Term Goal 4: Patient to be able to complete 8 sit/stands in </=30 seconds with no UE assistance to improve functional strength. -met (8 in 22sec with no hands)  Long Term Goal 5: Patient to report >/=75% improvement in symptoms for improved QOL. -progressing    Minutes Tracking:  Time In: 7180  Time Out: 1401  Minutes: 44  Timed Code Treatment Minutes: [address] [place], [place]     Date: 12/13/2022

## 2022-12-15 ENCOUNTER — HOSPITAL ENCOUNTER (OUTPATIENT)
Dept: PHYSICAL THERAPY | Age: 72
Setting detail: THERAPIES SERIES
Discharge: HOME | End: 2022-12-15
Payer: MEDICARE

## 2022-12-15 PROCEDURE — 97110 THERAPEUTIC EXERCISES: CPT

## 2022-12-16 NOTE — PROGRESS NOTES
Phone: [facility]           Fax: [phone]                           Outpatient Physical Therapy                                                                            Daily Note    Patient: Dereje Zhong : 1950  CSN #: [id number]   Referring Physician: Lance Brumfield, *    Date: 12/15/2022     Treatment Diagnosis: Difficulty walking    PT Insurance Information: A[employer] Medicare  Total # of Visits Approved: 40 Per Physician Order  Total # of Visits to Date: 32  No Show: 1  Canceled Appointment: 0    Pre-Treatment Pain:  0/10  Subjective: Pt reports shes noticing improvements and slowly getting better. Pt denies current pain. Exercises:  Exercise 2: Scifit x10min L3.5  Exercise 3: 6in FSU/LSU x10 ea LE  Exercise 4: Sit/stands x15, 3# weight  Exercise 6: Pulleys x5min  Exercise 8: UBE 4/4 - 5 min today  Exercise 9: 6 mins walk  Exercise 10: Cane flex/ chest press 20x ea- seated today 3#    Assessment  Assessment: Pt amb 525' today prior to rest break needed. Contiued working on endurance based exercises with fair tolerance noted. Activity Tolerance  Activity Tolerance: Patient tolerated treatment well    Patient Education  Patient Education: HEP  Pt verbalized/demonstrated good understanding:     [x] Yes         [] No, pt required further clarification.      Post Treatment Pain:  0/10    Plan  Plan Frequency: 2  Plan weeks: 6     Goals  (Total # of Visits to Date: 32)      Short Term Goals  Time Frame for Short Term Goals: 3 weeks  Short Term Goal 1: Patient to be instructed in initial HEP for general UE/LE and core strengthening.-met/cont  Short Term Goal 2: Patient to be instructed in general functional strengthening to decrease pain and improve mobility.-met/cont  Short Term Goal 3: Initiate manual techniques/modalities prn to decrease L hip and R shoulder pain and improve mobility.-met/cont    Long Term Goals  Time Frame for Long Term Goals : 6 weeks  Long Term Goal 1: Patient to be independent and compliant with HEP. Long Term Goal 2: Patient to have improved B UE strength >/=4/5 grossly for improved postural control to decrease stress on shoulders with ambulation. -met  Long Term Goal 3: Patient to have improved core and B LE strength >/=4/5 grossly for improved spinal stability and decreased low back and hip pain. -partially met (hip flex: 4-/5; all else 4/5 grossly)  Long Term Goal 4: Patient to be able to complete 8 sit/stands in </=30 seconds with no UE assistance to improve functional strength. -met (8 in 22sec with no hands)  Long Term Goal 5: Patient to report >/=75% improvement in symptoms for improved QOL. -progressing    Minutes Tracking:  Time In: 8511  Time Out: 8023  Minutes: 45  Timed Code Treatment Minutes: [address] [place]     Date: 12/15/2022

## 2022-12-20 ENCOUNTER — HOSPITAL ENCOUNTER (OUTPATIENT)
Dept: PHYSICAL THERAPY | Age: 72
Setting detail: THERAPIES SERIES
Discharge: HOME | End: 2022-12-20
Payer: MEDICARE

## 2022-12-20 PROCEDURE — 97110 THERAPEUTIC EXERCISES: CPT

## 2022-12-21 NOTE — PROGRESS NOTES
Phone: [facility]           Fax: [phone]                           Outpatient Physical Therapy                                                                            Daily Note    Patient: Elin Melgoza : 1950  [facility] #: [id number]   Referring Physician: Jeovanny Perez, *    Date: 2022     Treatment Diagnosis: Difficulty walking    PT Insurance Information: A[employer]na Medicare  Total # of Visits Approved: 40 Per Physician Order  Total # of Visits to Date: 28  No Show: 1  Canceled Appointment: 0    Pre-Treatment Pain:  0/10  Subjective: Pt states she feels like shes getting better and noticing improvmenets throughout normal ADLs. Pt denies pain at rest.    Exercises:  Exercise 1: HEP: sink exercises  Exercise 2: Scifit x10min L3.5  Exercise 3: 6in FSU/LSU x10 ea LE  Exercise 4: Sit/stands x15, 3# weight  Exercise 8: UBE 4/4 - 5 min today  Exercise 9: 6 mins walk  Exercise 11: LAE/ Rows (seated BTB) 20x ea    Assessment  Assessment: Pt able to amb 525' prior to rest break needed and completes 8 sit<>stands in 22 seconds with UE assist.  Pt report 55-60% overall improvements since begining therapy in regard to endurance and strength and also notices increased CROM helping with her driving. Pt would like to cancel last session d/t family coming in and be placed on a 2 week hold to continue with HEP. Will d/c after 2 weeks if not heard from. Activity Tolerance  Activity Tolerance: Patient tolerated treatment well    Patient Education  Patient Education: Reviewed HEP  Pt verbalized/demonstrated good understanding:     [x] Yes         [] No, pt required further clarification.      Post Treatment Pain:  0/10    Plan  Plan Frequency: 2  Plan weeks: 6     Goals  (Total # of Visits to Date: 28)      Short Term Goals  Time Frame for Short Term Goals: 3 weeks  Short Term Goal 1: Patient to be instructed in initial HEP for general UE/LE and core strengthening.-met/cont  Short Term Goal 2: Patient to be instructed in general functional strengthening to decrease pain and improve mobility.-met/cont  Short Term Goal 3: Initiate manual techniques/modalities prn to decrease L hip and R shoulder pain and improve mobility.-met/cont    Long Term Goals  Time Frame for Long Term Goals : 6 weeks  Long Term Goal 1: Patient to be independent and compliant with HEP. Long Term Goal 2: Patient to have improved B UE strength >/=4/5 grossly for improved postural control to decrease stress on shoulders with ambulation. -met  Long Term Goal 3: Patient to have improved core and B LE strength >/=4/5 grossly for improved spinal stability and decreased low back and hip pain. -partially met (hip flex: 4-/5; all else 4/5 grossly)  Long Term Goal 4: Patient to be able to complete 8 sit/stands in </=30 seconds with no UE assistance to improve functional strength. -met (8 in 22sec with no hands)  Long Term Goal 5: Patient to report >/=75% improvement in symptoms for improved QOL. -progressing    Minutes Tracking:  Time In: 1401  Time Out: 6438  Minutes: 44  Timed Code Treatment Minutes: Tk [place]     Date: 12/20/2022

## 2023-01-04 ENCOUNTER — HOSPITAL ENCOUNTER (OUTPATIENT)
Dept: PHYSICAL THERAPY | Age: 73
Setting detail: THERAPIES SERIES
Discharge: HOME | End: 2023-01-04
Payer: MEDICARE

## 2023-01-04 ENCOUNTER — HOSPITAL ENCOUNTER (OUTPATIENT)
Age: 73
Discharge: HOME | End: 2023-01-04
Payer: MEDICARE

## 2023-01-04 DIAGNOSIS — E11.628 TYPE 2 DIABETES MELLITUS WITH OTHER SKIN COMPLICATION, WITHOUT LONG-TERM CURRENT USE OF INSULIN (HCC): ICD-10-CM

## 2023-01-04 DIAGNOSIS — Z11.59 NEED FOR HEPATITIS C SCREENING TEST: ICD-10-CM

## 2023-01-04 LAB
ALBUMIN SERPL-MCNC: 3.9 G/DL (ref 3.5–5.2)
ALBUMIN/GLOBULIN RATIO: 1.5 (ref 1–2.5)
ALP BLD-CCNC: 72 U/L (ref 35–104)
ALT SERPL-CCNC: 47 U/L (ref 5–33)
ANION GAP SERPL CALCULATED.3IONS-SCNC: 10 MMOL/L (ref 9–17)
AST SERPL-CCNC: 46 U/L
BILIRUB SERPL-MCNC: 0.7 MG/DL (ref 0.3–1.2)
BUN BLDV-MCNC: 24 MG/DL (ref 8–23)
BUN/CREAT BLD: 55 (ref 9–20)
CALCIUM SERPL-MCNC: 9.3 MG/DL (ref 8.6–10.4)
CHLORIDE BLD-SCNC: 100 MMOL/L (ref 98–107)
CHOLESTEROL/HDL RATIO: 2
CHOLESTEROL: 113 MG/DL
CO2: 26 MMOL/L (ref 20–31)
CREAT SERPL-MCNC: 0.44 MG/DL (ref 0.5–0.9)
CREATININE URINE: 71.3 MG/DL (ref 28–217)
ESTIMATED AVERAGE GLUCOSE: 131 MG/DL
GFR SERPL CREATININE-BSD FRML MDRD: >60 ML/MIN/1.73M2
GLUCOSE BLD-MCNC: 142 MG/DL (ref 70–99)
HBA1C MFR BLD: 6.2 % (ref 4–6)
HCT VFR BLD CALC: 38.1 % (ref 36.3–47.1)
HDLC SERPL-MCNC: 56 MG/DL
HEMOGLOBIN: 12.3 G/DL (ref 11.9–15.1)
HEPATITIS C ANTIBODY: NONREACTIVE
LDL CHOLESTEROL: 43 MG/DL (ref 0–130)
MCH RBC QN AUTO: 30.8 PG (ref 25.2–33.5)
MCHC RBC AUTO-ENTMCNC: 32.3 G/DL (ref 28.4–34.8)
MCV RBC AUTO: 95.3 FL (ref 82.6–102.9)
MICROALBUMIN/CREAT 24H UR: <12 MG/L
MICROALBUMIN/CREAT UR-RTO: NORMAL MCG/MG CREAT
NRBC AUTOMATED: 0 PER 100 WBC
PDW BLD-RTO: 14 % (ref 11.8–14.4)
PLATELET # BLD: 212 K/UL (ref 138–453)
PMV BLD AUTO: 10.5 FL (ref 8.1–13.5)
POTASSIUM SERPL-SCNC: 4.2 MMOL/L (ref 3.7–5.3)
RBC # BLD: 4 M/UL (ref 3.95–5.11)
SODIUM BLD-SCNC: 136 MMOL/L (ref 135–144)
TOTAL PROTEIN: 6.5 G/DL (ref 6.4–8.3)
TRIGL SERPL-MCNC: 70 MG/DL
WBC # BLD: 6.5 K/UL (ref 3.5–11.3)

## 2023-01-04 PROCEDURE — 36415 COLL VENOUS BLD VENIPUNCTURE: CPT

## 2023-01-04 PROCEDURE — 80053 COMPREHEN METABOLIC PANEL: CPT

## 2023-01-04 PROCEDURE — 82043 UR ALBUMIN QUANTITATIVE: CPT

## 2023-01-04 PROCEDURE — 97110 THERAPEUTIC EXERCISES: CPT

## 2023-01-04 PROCEDURE — 82570 ASSAY OF URINE CREATININE: CPT

## 2023-01-04 PROCEDURE — 80061 LIPID PANEL: CPT

## 2023-01-04 PROCEDURE — 86803 HEPATITIS C AB TEST: CPT

## 2023-01-04 PROCEDURE — 83036 HEMOGLOBIN GLYCOSYLATED A1C: CPT

## 2023-01-04 PROCEDURE — 85027 COMPLETE CBC AUTOMATED: CPT

## 2023-01-04 NOTE — PLAN OF CARE
[facility]           Phone: [phone]             Outpatient Physical Therapy  Fax: [phone]                                           Date: 2023  Patient: Lexis Goode : 1950 CSN #: [id number]   Referring Physician: Pam Harper, *      [] Plan of Care   [x] Updated Plan of Care    Dates of Service to Include: 2023 to 02/15/23    Diagnosis:  Diabetic polyneuropathy, E11.42; Frequent falls, R29.6, Weakness, R53.1    Rehab (Treatment) Diagnosis:  Difficulty walking             Onset Date:       Attendance  Total # of Visits to Date: 35 No Show: 1 Canceled Appointment: 0    Assessment  Assessment: Patient returns for further therapy after a 2 week hold d/t continued complaints of weakness. Pt's B LE hip flexion strength remains 4-/5 and ambulation remains limited to a 7 minute walk before B UE fatigue with walker use requires seated rest break. Added B hand strengthening to improve endurance with walker use. Patient to benefit from continued physical therapy 2x/wk for 6 more weeks to improve strength and endurance and return to PLOF. Goals  Short Term Goals  Time Frame for Short Term Goals: 3 weeks  Short Term Goal 1: Patient to be instructed in initial HEP for general UE/LE and core strengthening.-met/cont  Short Term Goal 2: Patient to be instructed in general functional strengthening to decrease pain and improve mobility.-met/cont  Short Term Goal 3: Initiate manual techniques/modalities prn to decrease L hip and R shoulder pain and improve mobility.-met/cont  Long Term Goals  Time Frame for Long Term Goals : 6 weeks  Long Term Goal 1: Patient to be independent and compliant with HEP. Long Term Goal 2: Patient to have improved B UE strength >/=4/5 grossly for improved postural control to decrease stress on shoulders with ambulation. -met  Long Term Goal 3: Patient to have improved core and B LE strength >/=4/5 grossly for improved spinal stability and decreased low back and hip pain. -partially met (hip flex: 4-/5; all else 4/5 grossly)  Long Term Goal 4: Patient to be able to complete 8 sit/stands in </=30 seconds with no UE assistance to improve functional strength. -met (8 in 22sec with no hands)  Long Term Goal 5: Patient to report >/=75% improvement in symptoms for improved QOL. -progressing     Prognosis  Therapy Prognosis: Good    Treatment Plan   Plan Frequency: 2  Plan weeks: 6  [x] HP/CP      [x] Electrical Stim   [x] Therapeutic Exercise      [x] Gait Training  [] Aquatics   [x] Ultrasound         [x] Patient Education/HEP   [x] Manual Therapy  [] Traction    [x] Neuro-brenda        [x] Soft Tissue Mobs            [] Home TENS  [] Iontophoresis    [] Orthotic casting/fitting      [] Dry Needling  [] Blood Flow Restriction             Electronically signed by: Amanda Estrada PT, DPT    Date: 1/4/2023      ______________________________________ Date: 1/4/2023   Physician Signature

## 2023-01-04 NOTE — PROGRESS NOTES
Phone: [facility]           Fax: [phone]                           Outpatient Physical Therapy                                                                            Daily Note    Patient: Yunior Villela : 1950  CSN #: [id number]   Referring Physician: Kayla Mustafa, *    Date: 2023    Diagnosis: Diabetic polyneuropathy, E11.42; Frequent falls, R29.6, Weakness, R53.1  Treatment Diagnosis: Difficulty walking    PT Insurance Information: A[employer]na Medicare  Total # of Visits Approved: 39 Per Physician Order  Total # of Visits to Date: 35  No Show: 1  Canceled Appointment: 0      Pre-Treatment Pain:  0/10  Subjective: Patient states she feels a little weaker since stopping therapy and needs to come back for some more. Exercises:  Exercise 2: Scifit x10min L3.5  Exercise 4: Sit/stands x15, 3# weight  Exercise 7: 3# bicep/hammer curls x15 ea, punches 20x  Exercise 9: 4 laps, in 7min, 45seconds walk  Exercise 10: Cane flex/ chest press 20x ea- seated today 3#  Exercise 11: LAE/ Rows (seated BTB) 20x ea  Exercise 12: Red flexbar 15x ea way    Assessment  Assessment: Patient returns for further therapy after a 2 week hold d/t continued complaints of weakness. Pt's B LE hip flexion strength remains 4-/5 and ambulation remains limited to a 7 minute walk before B UE fatigue with walker use requires seated rest break. Added B hand strengthening to improve endurance with walker use. Patient to benefit from continued physical therapy 2x/wk for 6 more weeks to improve strength and endurance and return to PLOF. Activity Tolerance  Activity Tolerance: Patient tolerated treatment well    Patient Education  Exercise technique   Pt verbalized/demonstrated good understanding:     [x] Yes         [] No, pt required further clarification.        Post Treatment Pain:  0/10      Plan  Plan Frequency: 2  Plan weeks: 6       Goals  (Total # of Visits to Date: 35)      Short Term Goals  Time Frame for Short Term Goals: 3 weeks  Short Term Goal 1: Patient to be instructed in initial HEP for general UE/LE and core strengthening.-met/cont  Short Term Goal 2: Patient to be instructed in general functional strengthening to decrease pain and improve mobility.-met/cont  Short Term Goal 3: Initiate manual techniques/modalities prn to decrease L hip and R shoulder pain and improve mobility.-met/cont    Long Term Goals  Time Frame for Long Term Goals : 6 weeks  Long Term Goal 1: Patient to be independent and compliant with HEP. Long Term Goal 2: Patient to have improved B UE strength >/=4/5 grossly for improved postural control to decrease stress on shoulders with ambulation. -met  Long Term Goal 3: Patient to have improved core and B LE strength >/=4/5 grossly for improved spinal stability and decreased low back and hip pain. -partially met (hip flex: 4-/5; all else 4/5 grossly)  Long Term Goal 4: Patient to be able to complete 8 sit/stands in </=30 seconds with no UE assistance to improve functional strength. -met (8 in 22sec with no hands)  Long Term Goal 5: Patient to report >/=75% improvement in symptoms for improved QOL. -progressing    Minutes Tracking:  Time In: [address] [facility]  Time Out: [place] 10  Minutes: 45  Timed Code Treatment Minutes: [address] [facility], PT, DPT     Date: 1/4/2023

## 2023-01-05 ENCOUNTER — HOSPITAL ENCOUNTER (OUTPATIENT)
Dept: PHYSICAL THERAPY | Age: 73
Setting detail: THERAPIES SERIES
Discharge: HOME | End: 2023-01-05
Payer: MEDICARE

## 2023-01-05 PROBLEM — I20.89 OTHER FORMS OF ANGINA PECTORIS: Status: ACTIVE | Noted: 2023-01-05

## 2023-01-05 PROBLEM — I20.8 OTHER FORMS OF ANGINA PECTORIS (HCC): Status: ACTIVE | Noted: 2023-01-05

## 2023-01-05 PROCEDURE — 97110 THERAPEUTIC EXERCISES: CPT

## 2023-01-05 PROCEDURE — 97116 GAIT TRAINING THERAPY: CPT

## 2023-01-05 NOTE — PROGRESS NOTES
Phone: [facility]           Fax: [phone]                           Outpatient Physical Therapy                                                                            Daily Note    Patient: Lea Cardoza : 1950  CSN #: [id number]   Referring Physician: Brigido Lazar, *    Date: 2023    Diagnosis: Diabetic polyneuropathy, E11.42; Frequent falls, R29.6, Weakness, R53.1  Treatment Diagnosis: Difficulty walking    PT Insurance Information: Aetna Medicare  Total # of Visits Approved: 45 Per Physician Order  Total # of Visits to Date:   No Show: 1  Canceled Appointment: 0      Pre-Treatment Pain:  0/10  Subjective: Patient reports some soreness in the shoulders but otherwise feeling okay today. Exercises:  Exercise 2: Scifit x10min L4.0  Exercise 3: 6in FSU/LSU x10 ea LE  Exercise 4: Sit/stands x15, 3# weight  Exercise 7: 3# bicep/hammer curls x15 ea, punches 20x  Exercise 9: 4 laps, in 10min, walk with 3 standing rest breaks  Exercise 10: Cane flex/ chest press 15x ea- seated today 4#  Exercise 11: LAE/ Rows (seated BTB) 20x ea  Exercise 12: Red flexbar 15x ea way    Assessment  Assessment: Patient able to stand/walk for 10 minutes with FWW before seated rest break; endurance is improving. Gave patient BTB for home use. Will continue. Activity Tolerance  Activity Tolerance: Patient tolerated treatment well    Patient Education  Exercise technique   Pt verbalized/demonstrated good understanding:     [x] Yes         [] No, pt required further clarification.        Post Treatment Pain:  0/10      Plan  Plan Frequency: 2  Plan weeks: 6       Goals  (Total # of Visits to Date: 29)      Short Term Goals  Time Frame for Short Term Goals: 3 weeks  Short Term Goal 1: Patient to be instructed in initial HEP for general UE/LE and core strengthening.-met/cont  Short Term Goal 2: Patient to be instructed in general functional strengthening to decrease pain and improve mobility.-met/cont  Short Term Goal 3: Initiate manual techniques/modalities prn to decrease L hip and R shoulder pain and improve mobility.-met/cont    Long Term Goals  Time Frame for Long Term Goals : 6 weeks  Long Term Goal 1: Patient to be independent and compliant with HEP. Long Term Goal 2: Patient to have improved B UE strength >/=4/5 grossly for improved postural control to decrease stress on shoulders with ambulation. -met  Long Term Goal 3: Patient to have improved core and B LE strength >/=4/5 grossly for improved spinal stability and decreased low back and hip pain. -partially met (hip flex: 4-/5; all else 4/5 grossly)  Long Term Goal 4: Patient to be able to complete 8 sit/stands in </=30 seconds with no UE assistance to improve functional strength. -met (8 in 22sec with no hands)  Long Term Goal 5: Patient to report >/=75% improvement in symptoms for improved QOL. -progressing    Minutes Tracking:  Time In: 8848  Time Out: 1244  Minutes: 50  Timed Code Treatment Minutes: [address], PT, DPT     Date: 1/5/2023

## 2023-01-13 ENCOUNTER — HOSPITAL ENCOUNTER (OUTPATIENT)
Dept: PHYSICAL THERAPY | Age: 73
Setting detail: THERAPIES SERIES
Discharge: HOME | End: 2023-01-13
Payer: MEDICARE

## 2023-01-13 PROCEDURE — 97110 THERAPEUTIC EXERCISES: CPT

## 2023-01-13 NOTE — PROGRESS NOTES
Phone: [facility]           Fax: [phone]                           Outpatient Physical Therapy                                                                            Daily Note    Patient: Everett Hernandez : 1950  CSN #: [id number]   Referring Physician: Juliet Hollingsworth, *    Date: 2023     Treatment Diagnosis: Difficulty walking    PT Insurance Information: Aetna Medicare  Total # of Visits Approved: 39 Per Physician Order  Total # of Visits to Date: 28  No Show: 1  Canceled Appointment: 0    Pre-Treatment Pain:  3/10  Subjective: Pt states she can really notice the difference in  her activity level at home and continues to feel improvements. Pt rates current pain a 2-3/10 in her side and legs. Exercises:  Exercise 2: Scifit x10min L4.0  Exercise 3: 6in FSU/LSU x10 ea LE  Exercise 4: Sit/stands x15, 3# weight  Exercise 7: 3# bicep/hammer curls x15 ea, punches 20x  Exercise 9: 4 laps, in 10min, walk with 3 standing rest breaks  Exercise 10: Cane flex/ chest press 15x ea- seated today 4#    Assessment  Assessment: Pt reports approx 70-75% overall improvement since begining therapy in regards to overall endurance and strength. Will continue to progress as Pt tolerates. Activity Tolerance  Activity Tolerance: Patient tolerated treatment well    Patient Education  Patient Education: Reviewed HEP  Pt verbalized/demonstrated good understanding:     [x] Yes         [] No, pt required further clarification.      Post Treatment Pain:  2/10    Plan  Plan Frequency: 2  Plan weeks: 6     Goals  (Total # of Visits to Date: 28)      Short Term Goals  Time Frame for Short Term Goals: 3 weeks  Short Term Goal 1: Patient to be instructed in initial HEP for general UE/LE and core strengthening.-met/cont  Short Term Goal 2: Patient to be instructed in general functional strengthening to decrease pain and improve mobility.-met/cont  Short Term Goal 3: Initiate manual techniques/modalities prn to decrease L hip and R shoulder pain and improve mobility.-met/cont    Long Term Goals  Time Frame for Long Term Goals : 6 weeks  Long Term Goal 1: Patient to be independent and compliant with HEP. Long Term Goal 2: Patient to have improved B UE strength >/=4/5 grossly for improved postural control to decrease stress on shoulders with ambulation. -met  Long Term Goal 3: Patient to have improved core and B LE strength >/=4/5 grossly for improved spinal stability and decreased low back and hip pain. -partially met (hip flex: 4-/5; all else 4/5 grossly)  Long Term Goal 4: Patient to be able to complete 8 sit/stands in </=30 seconds with no UE assistance to improve functional strength. -met (8 in 22sec with no hands)  Long Term Goal 5: Patient to report >/=75% improvement in symptoms for improved QOL. -progressing    Minutes Tracking:  Time In: 2083  Time Out: 1401  Minutes: 44  Timed Code Treatment Minutes: Tk Marinelli     Date: 1/13/2023

## 2023-01-19 ENCOUNTER — HOSPITAL ENCOUNTER (OUTPATIENT)
Dept: PHYSICAL THERAPY | Age: 73
Setting detail: THERAPIES SERIES
Discharge: HOME | End: 2023-01-19
Payer: MEDICARE

## 2023-01-19 NOTE — PROGRESS NOTES
[facility]  Inpatient/Observation/Outpatient Rehabilitation    Date: 2023  Patient Name: Emily Merchant       [] Inpatient Acute/Observation       [x]  Outpatient  : 1950       [x] Pt no showed for scheduled appointment    [] Pt refused/declined therapy at this time due to:           [] Pt cancelled due to:  [] No Reason Given   [] Sick/ill   [x] Other: Called patient; she thought she had already cancelled remaining visits; wants to be placed on hold at this time. Therapist/Assistant will attempt to see this patient, at our earliest opportunity.        Philly Carcamo, PT, DPT Date: 2023

## 2023-01-24 ENCOUNTER — APPOINTMENT (OUTPATIENT)
Dept: PHYSICAL THERAPY | Age: 73
End: 2023-01-24
Payer: MEDICARE

## 2023-01-26 ENCOUNTER — APPOINTMENT (OUTPATIENT)
Dept: PHYSICAL THERAPY | Age: 73
End: 2023-01-26
Payer: MEDICARE

## 2023-01-31 ENCOUNTER — APPOINTMENT (OUTPATIENT)
Dept: PHYSICAL THERAPY | Age: 73
End: 2023-01-31
Payer: MEDICARE

## 2023-03-21 ENCOUNTER — OFFICE VISIT (OUTPATIENT)
Dept: CARDIOLOGY | Age: 73
End: 2023-03-21
Payer: MEDICARE

## 2023-03-21 VITALS
DIASTOLIC BLOOD PRESSURE: 64 MMHG | HEIGHT: 61 IN | SYSTOLIC BLOOD PRESSURE: 109 MMHG | HEART RATE: 79 BPM | WEIGHT: 202 LBS | BODY MASS INDEX: 38.14 KG/M2 | OXYGEN SATURATION: 95 % | RESPIRATION RATE: 18 BRPM

## 2023-03-21 DIAGNOSIS — E78.2 MIXED HYPERLIPIDEMIA: ICD-10-CM

## 2023-03-21 DIAGNOSIS — I25.10 MILD CAD: ICD-10-CM

## 2023-03-21 DIAGNOSIS — R94.31 ABNORMAL EKG: ICD-10-CM

## 2023-03-21 DIAGNOSIS — R00.2 HEART PALPITATIONS: ICD-10-CM

## 2023-03-21 DIAGNOSIS — I10 ESSENTIAL HYPERTENSION: ICD-10-CM

## 2023-03-21 DIAGNOSIS — Z01.818 PRE-OP EVALUATION: ICD-10-CM

## 2023-03-21 PROCEDURE — 99214 OFFICE O/P EST MOD 30 MIN: CPT | Performed by: PHYSICIAN ASSISTANT

## 2023-03-21 PROCEDURE — 3078F DIAST BP <80 MM HG: CPT | Performed by: PHYSICIAN ASSISTANT

## 2023-03-21 PROCEDURE — 1123F ACP DISCUSS/DSCN MKR DOCD: CPT | Performed by: PHYSICIAN ASSISTANT

## 2023-03-21 PROCEDURE — 93000 ELECTROCARDIOGRAM COMPLETE: CPT | Performed by: FAMILY MEDICINE

## 2023-03-21 PROCEDURE — 3074F SYST BP LT 130 MM HG: CPT | Performed by: PHYSICIAN ASSISTANT

## 2023-03-21 NOTE — PROGRESS NOTES
DO Marcie at [facility] OR    Social History:  Social History     Tobacco Use    Smoking status: Never    Smokeless tobacco: Never   Vaping Use    Vaping Use: Never used   Substance Use Topics    Alcohol use: No    Drug use: No        CURRENT MEDICATIONS:        Outpatient Medications Marked as Taking for the 3/21/23 encounter (Office Visit) with Carmela Moses PA-C   Medication Sig Dispense Refill    oxybutynin (DITROPAN) 5 MG tablet TAKE 1 TABLET BY MOUTH THREE TIMES DAILY 270 tablet 1    atorvastatin (LIPITOR) 10 MG tablet Take 1 tablet by mouth daily 90 tablet 1    gabapentin (NEURONTIN) 300 MG capsule TAKE 1 CAPSULE BY MOUTH IN THE MORNING AND 1 AT NOON AND 1 AT BEDTIME FOR 90 DAYS 270 capsule 1    sertraline (ZOLOFT) 100 MG tablet Take 1 tablet by mouth daily 90 tablet 1    triamcinolone (KENALOG) 0.025 % cream Apply topically 2 times daily. 1 each 1    amLODIPine (NORVASC) 5 MG tablet Take 1 tablet by mouth once daily 90 tablet 1    triamterene-hydroCHLOROthiazide (DYAZIDE) 37.5-25 MG per capsule TAKE 1 CAPSULE BY MOUTH ONCE DAILY AS NEEDED 90 capsule 1    metoprolol succinate (TOPROL XL) 25 MG extended release tablet Take 0.5 tablets by mouth daily 90 tablet 3    ondansetron (ZOFRAN) 4 MG tablet Take 1 tablet by mouth every 8 hours as needed for Nausea or Vomiting 15 tablet 0    Lancets (ONETOUCH DELICA PLUS AJSXTW24J) MISC USE TWICE DAILY AS DIRECTED TO CHECK BLOOD SUGAR 100 each 1    omeprazole (PRILOSEC) 40 MG delayed release capsule Take 1 capsule by mouth every morning (before breakfast) 90 capsule 1    metFORMIN (GLUCOPHAGE) 1000 MG tablet Take 0.5 tablets by mouth 2 times daily (with meals) 180 tablet 2    Prenatal Vit-Fe Fumarate-FA (PRENATAL VITAMINS PO) Take by mouth      calcium carbonate 600 MG TABS tablet Take 1 tablet by mouth daily      blood glucose test strips (ONETOUCH VERIO) strip TEST BLOOD SUGAR TWICE DAILY AS DIRECTED; ONE TOUCH VERIO METER DX:E11.9 100 strip 2    Blood Glucose Monitoring

## 2023-03-21 NOTE — PATIENT INSTRUCTIONS
SURVEY:    You may be receiving a survey from [employer] regarding your visit today. Please complete the survey to enable us to provide the highest quality of care to you and your family. If you cannot score us a very good on any question, please call the office to discuss how we could have made your experience a very good one. Thank you.

## 2023-03-30 ENCOUNTER — HOSPITAL ENCOUNTER (OUTPATIENT)
Age: 73
Discharge: HOME | End: 2023-03-30
Payer: MEDICARE

## 2023-03-30 LAB
ABSOLUTE EOS #: 0.18 K/UL (ref 0–0.44)
ABSOLUTE IMMATURE GRANULOCYTE: <0.03 K/UL (ref 0–0.3)
ABSOLUTE LYMPH #: 2.32 K/UL (ref 1.1–3.7)
ABSOLUTE MONO #: 0.46 K/UL (ref 0.1–1.2)
ANION GAP SERPL CALCULATED.3IONS-SCNC: 11 MMOL/L (ref 9–17)
BASOPHILS # BLD: 1 % (ref 0–2)
BASOPHILS ABSOLUTE: 0.05 K/UL (ref 0–0.2)
BUN SERPL-MCNC: 21 MG/DL (ref 8–23)
BUN/CREAT BLD: 46 (ref 9–20)
CALCIUM SERPL-MCNC: 9.1 MG/DL (ref 8.6–10.4)
CHLORIDE SERPL-SCNC: 96 MMOL/L (ref 98–107)
CO2 SERPL-SCNC: 26 MMOL/L (ref 20–31)
CREAT SERPL-MCNC: 0.46 MG/DL (ref 0.5–0.9)
EOSINOPHILS RELATIVE PERCENT: 3 % (ref 1–4)
GFR SERPL CREATININE-BSD FRML MDRD: >60 ML/MIN/1.73M2
GLUCOSE SERPL-MCNC: 126 MG/DL (ref 70–99)
HCT VFR BLD AUTO: 37.6 % (ref 36.3–47.1)
HGB BLD-MCNC: 12.4 G/DL (ref 11.9–15.1)
IMMATURE GRANULOCYTES: 0 %
LYMPHOCYTES # BLD: 32 % (ref 24–43)
MCH RBC QN AUTO: 30.9 PG (ref 25.2–33.5)
MCHC RBC AUTO-ENTMCNC: 33 G/DL (ref 28.4–34.8)
MCV RBC AUTO: 93.8 FL (ref 82.6–102.9)
MONOCYTES # BLD: 6 % (ref 3–12)
NRBC AUTOMATED: 0 PER 100 WBC
PDW BLD-RTO: 13.6 % (ref 11.8–14.4)
PLATELET # BLD AUTO: 258 K/UL (ref 138–453)
PMV BLD AUTO: 10.1 FL (ref 8.1–13.5)
POTASSIUM SERPL-SCNC: 4.6 MMOL/L (ref 3.7–5.3)
RBC # BLD: 4.01 M/UL (ref 3.95–5.11)
SEG NEUTROPHILS: 58 % (ref 36–65)
SEGMENTED NEUTROPHILS ABSOLUTE COUNT: 4.18 K/UL (ref 1.5–8.1)
SODIUM SERPL-SCNC: 133 MMOL/L (ref 135–144)
WBC # BLD AUTO: 7.2 K/UL (ref 3.5–11.3)

## 2023-03-30 PROCEDURE — 80048 BASIC METABOLIC PNL TOTAL CA: CPT

## 2023-03-30 PROCEDURE — 85025 COMPLETE CBC W/AUTO DIFF WBC: CPT

## 2023-03-30 PROCEDURE — 36415 COLL VENOUS BLD VENIPUNCTURE: CPT

## 2023-03-30 NOTE — PROGRESS NOTES
Patient instructed per phone interview on the pre-operative, intra-operative, and post-operative process, as well as NPO status. Patient will take Gabapentin, Amlodipine, and Omeprazole morning of surgery with sip of water only. Pre-operative instruction sheet reviewed as well as G skin prep instructions. Verbalizes understanding.

## 2023-03-31 ENCOUNTER — ANESTHESIA EVENT (OUTPATIENT)
Dept: OPERATING ROOM | Age: 73
End: 2023-03-31
Payer: MEDICARE

## 2023-04-03 ENCOUNTER — ANESTHESIA (OUTPATIENT)
Dept: OPERATING ROOM | Age: 73
End: 2023-04-03
Payer: MEDICARE

## 2023-04-03 ENCOUNTER — HOSPITAL ENCOUNTER (OUTPATIENT)
Age: 73
Setting detail: OUTPATIENT SURGERY
Discharge: HOME | End: 2023-04-03
Attending: ORTHOPAEDIC SURGERY | Admitting: ORTHOPAEDIC SURGERY
Payer: MEDICARE

## 2023-04-03 VITALS
WEIGHT: 198 LBS | SYSTOLIC BLOOD PRESSURE: 143 MMHG | OXYGEN SATURATION: 98 % | HEIGHT: 61 IN | TEMPERATURE: 98.5 F | BODY MASS INDEX: 37.38 KG/M2 | DIASTOLIC BLOOD PRESSURE: 72 MMHG | HEART RATE: 65 BPM | RESPIRATION RATE: 16 BRPM

## 2023-04-03 PROCEDURE — 2580000003 HC RX 258: Performed by: NURSE ANESTHETIST, CERTIFIED REGISTERED

## 2023-04-03 PROCEDURE — 3600000002 HC SURGERY LEVEL 2 BASE: Performed by: ORTHOPAEDIC SURGERY

## 2023-04-03 PROCEDURE — 7100000010 HC PHASE II RECOVERY - FIRST 15 MIN: Performed by: ORTHOPAEDIC SURGERY

## 2023-04-03 PROCEDURE — 6360000002 HC RX W HCPCS: Performed by: ORTHOPAEDIC SURGERY

## 2023-04-03 PROCEDURE — 3700000000 HC ANESTHESIA ATTENDED CARE: Performed by: ORTHOPAEDIC SURGERY

## 2023-04-03 PROCEDURE — 6370000000 HC RX 637 (ALT 250 FOR IP): Performed by: NURSE ANESTHETIST, CERTIFIED REGISTERED

## 2023-04-03 PROCEDURE — 3700000001 HC ADD 15 MINUTES (ANESTHESIA): Performed by: ORTHOPAEDIC SURGERY

## 2023-04-03 PROCEDURE — 2709999900 HC NON-CHARGEABLE SUPPLY: Performed by: ORTHOPAEDIC SURGERY

## 2023-04-03 PROCEDURE — 6360000002 HC RX W HCPCS: Performed by: NURSE ANESTHETIST, CERTIFIED REGISTERED

## 2023-04-03 PROCEDURE — 7100000011 HC PHASE II RECOVERY - ADDTL 15 MIN: Performed by: ORTHOPAEDIC SURGERY

## 2023-04-03 PROCEDURE — 3600000012 HC SURGERY LEVEL 2 ADDTL 15MIN: Performed by: ORTHOPAEDIC SURGERY

## 2023-04-03 PROCEDURE — 6360000002 HC RX W HCPCS

## 2023-04-03 PROCEDURE — 2500000003 HC RX 250 WO HCPCS: Performed by: NURSE ANESTHETIST, CERTIFIED REGISTERED

## 2023-04-03 RX ORDER — CEFAZOLIN SODIUM IN 0.9 % NACL 2 G/100 ML
2000 PLASTIC BAG, INJECTION (ML) INTRAVENOUS ONCE
Status: COMPLETED | OUTPATIENT
Start: 2023-04-03 | End: 2023-04-03

## 2023-04-03 RX ORDER — SODIUM CHLORIDE 0.9 % (FLUSH) 0.9 %
5-40 SYRINGE (ML) INJECTION PRN
Status: DISCONTINUED | OUTPATIENT
Start: 2023-04-03 | End: 2023-04-03 | Stop reason: HOSPADM

## 2023-04-03 RX ORDER — LIDOCAINE HYDROCHLORIDE 20 MG/ML
INJECTION, SOLUTION EPIDURAL; INFILTRATION; INTRACAUDAL; PERINEURAL PRN
Status: DISCONTINUED | OUTPATIENT
Start: 2023-04-03 | End: 2023-04-03 | Stop reason: SDUPTHER

## 2023-04-03 RX ORDER — SODIUM CHLORIDE 9 MG/ML
INJECTION, SOLUTION INTRAVENOUS PRN
Status: DISCONTINUED | OUTPATIENT
Start: 2023-04-03 | End: 2023-04-03 | Stop reason: HOSPADM

## 2023-04-03 RX ORDER — CEFAZOLIN SODIUM IN 0.9 % NACL 2 G/100 ML
PLASTIC BAG, INJECTION (ML) INTRAVENOUS
Status: COMPLETED
Start: 2023-04-03 | End: 2023-04-03

## 2023-04-03 RX ORDER — PROPOFOL 10 MG/ML
INJECTION, EMULSION INTRAVENOUS CONTINUOUS PRN
Status: DISCONTINUED | OUTPATIENT
Start: 2023-04-03 | End: 2023-04-03 | Stop reason: SDUPTHER

## 2023-04-03 RX ORDER — ROPIVACAINE HYDROCHLORIDE 5 MG/ML
INJECTION, SOLUTION EPIDURAL; INFILTRATION; PERINEURAL PRN
Status: DISCONTINUED | OUTPATIENT
Start: 2023-04-03 | End: 2023-04-03 | Stop reason: ALTCHOICE

## 2023-04-03 RX ORDER — ACETAMINOPHEN 325 MG/1
650 TABLET ORAL ONCE
Status: COMPLETED | OUTPATIENT
Start: 2023-04-03 | End: 2023-04-03

## 2023-04-03 RX ORDER — SODIUM CHLORIDE, SODIUM LACTATE, POTASSIUM CHLORIDE, CALCIUM CHLORIDE 600; 310; 30; 20 MG/100ML; MG/100ML; MG/100ML; MG/100ML
INJECTION, SOLUTION INTRAVENOUS CONTINUOUS
Status: DISCONTINUED | OUTPATIENT
Start: 2023-04-03 | End: 2023-04-03 | Stop reason: HOSPADM

## 2023-04-03 RX ORDER — SODIUM CHLORIDE 0.9 % (FLUSH) 0.9 %
5-40 SYRINGE (ML) INJECTION EVERY 12 HOURS SCHEDULED
Status: DISCONTINUED | OUTPATIENT
Start: 2023-04-03 | End: 2023-04-03 | Stop reason: HOSPADM

## 2023-04-03 RX ADMIN — Medication 2000 MG: at 08:26

## 2023-04-03 RX ADMIN — SODIUM CHLORIDE, POTASSIUM CHLORIDE, SODIUM LACTATE AND CALCIUM CHLORIDE: 600; 310; 30; 20 INJECTION, SOLUTION INTRAVENOUS at 07:54

## 2023-04-03 RX ADMIN — PROPOFOL 200 MCG/KG/MIN: 10 INJECTION, EMULSION INTRAVENOUS at 08:31

## 2023-04-03 RX ADMIN — LIDOCAINE HYDROCHLORIDE 5 ML: 20 INJECTION, SOLUTION EPIDURAL; INFILTRATION; INTRACAUDAL; PERINEURAL at 08:31

## 2023-04-03 RX ADMIN — ACETAMINOPHEN 650 MG: 325 TABLET ORAL at 07:50

## 2023-04-03 ASSESSMENT — PAIN SCALES - GENERAL
PAINLEVEL_OUTOF10: 0

## 2023-04-03 ASSESSMENT — ENCOUNTER SYMPTOMS: SHORTNESS OF BREATH: 0

## 2023-04-03 NOTE — OP NOTE
[address], [place] [address]                                OPERATIVE REPORT    PATIENT NAME: Kalia Sebastian                     :        1950  MED REC NO:   [id number]                              ROOM:  ACCOUNT NO:   [de-identified]                           ADMIT DATE: 2023  PROVIDER:     Danielle García    DATE OF PROCEDURE:  2023    PREOPERATIVE DIAGNOSIS:  Right carpal tunnel syndrome. POSTOPERATIVE DIAGNOSIS:  Right carpal tunnel syndrome. PROCEDURE PERFORMED:  Right carpal tunnel release. SURGEON:  Dr. Danielle García. ANESTHESIA:  Local MAC. DESCRIPTION OF PROCEDURE:  The patient was brought into the operating  room and placed in the supine position on the operating table. She was  placed under MAC sedation. She received Ancef. The right upper  extremity was prepped with ChloraPrep and draped in a sterile fashion. Skin was then anesthetized with 0.5% Naropin. A 2-cm mini classic  carpal tunnel approach was used in line with the fourth ray. This was  taken down through the skin and subcutaneous tissue. Transverse carpal  ligament was identified. This was sharply incised distally and then  blunt dissection was carried down to the wrist flexion crease and the  ligament was transected proximally with a pair of Metzenbaum scissors  under direct visualization. The median nerve was identified. It was  noted to be somewhat flattened. A partial _____ neurolysis was  performed. The wound was then irrigated out and the skin was then  closed with 4-0 undyed Monocryl subcuticular stitch followed by  whipstitch in the skin. Dressed with Xeroform gauze, fluffs, sterile  Webril, and a 3-inch ACE wrap. The patient tolerated the procedure well  and was transferred to Recovery in stable condition. Will be discharged  home. Return to the office in two weeks. EBL minimal    LUCAS GARCÍA    D:

## 2023-04-03 NOTE — BRIEF OP NOTE
Brief Postoperative Note      Patient: Luana Alvarado  YOB: 1950  MRN: [id number]    Date of Procedure: 4/3/2023    Pre-Op Diagnosis: CARPAL TUNNEL RELEASE RIGHT WRIST    Post-Op Diagnosis: Same       Procedure(s):  CARPAL TUNNEL RELEASE    Surgeon(s):  Ori Loco MD    Assistant:  * No surgical staff found *    Anesthesia: Monitor Anesthesia Care    Estimated Blood Loss (mL): Minimal    Complications: None    Specimens:   * No specimens in log *    Implants:  * No implants in log *      Drains: * No LDAs found *    Findings:     Electronically signed by Ciaran Norwood MD on 4/3/2023 at 8:52 AM

## 2023-04-03 NOTE — ANESTHESIA POSTPROCEDURE EVALUATION
Department of Anesthesiology  Postprocedure Note    Patient: Bev Cai  MRN: [id number]  YOB: 1950  Date of evaluation: 4/3/2023      Procedure Summary     Date: 04/03/23 Room / Location: [address] 04 / [facility]    Anesthesia Start: 5587 Anesthesia Stop: 7010    Procedure: CARPAL TUNNEL RELEASE (Right) Diagnosis:       Carpal tunnel syndrome on right      (CARPAL TUNNEL RELEASE RIGHT WRIST)    Surgeons: Anai Gayle MD Responsible Provider: OLIVIA Dominguez CRNA    Anesthesia Type: general, TIVA ASA Status: 3          Anesthesia Type: No value filed.     Thor Phase I: Thor Score: 10    Thor Phase II: Thor Score: 10      Anesthesia Post Evaluation    Patient location during evaluation: PACU  Patient participation: complete - patient participated  Level of consciousness: awake and alert  Pain score: 0  Airway patency: patent  Nausea & Vomiting: no nausea and no vomiting  Complications: no  Cardiovascular status: blood pressure returned to baseline  Respiratory status: acceptable and room air  Hydration status: stable  Multimodal analgesia pain management approach

## 2023-04-03 NOTE — ANESTHESIA PRE PROCEDURE
HIGH RISK performed by Zoila Mendiola DO at [address] [facility], ESOPHAGUS      GASTRIC BYPASS SURGERY  2001    HERNIA REPAIR      [address]    INTRACAPSULAR CATARACT EXTRACTION Left 12/12/2018    EYE CATARACT EMULSIFICATION IOL IMPLANT performed by Isacc Trotter DO at [address] Right 1/7/2019    EYE CATARACT EMULSIFICATION IOL IMPLANT performed by Isacc Trotter DO at [address] OFFICE/OUTPT [address] Right 6/15/2018    TOE HAMMER REPAIR, RIGHT 5TH DIGIT DEROTATIONAL SKINPLASTY/ARTHOPLASTY performed by Isabell Kumar DPM at [address] [facility] [address] 11/26/2019    EGD DILATION SAVORY performed by Christi Harding MD at [address] [facility]  11/26/2019    -bx(normal)dilation,evidence of gastric bypass with very small gastric remnant    VENTRAL HERNIA REPAIR N/A 8/19/2021    Exploratory lap, lysis of adhesions, primary repair ventral hernia. performed by Zoila Mendiola DO at [address] History:    Social History     Tobacco Use    Smoking status: Never    Smokeless tobacco: Never   Substance Use Topics    Alcohol use: No                                Counseling given: Not Answered      Vital Signs (Current):   Vitals:    04/03/23 0735   BP: (!) 149/88   Pulse: 75   Resp: 18   Temp: 36.4 °C (97.6 °F)   TempSrc: Temporal   SpO2: 95%   Weight: 198 lb (89.8 kg)   Height: 5' 1\" (1.549 m)                                              BP Readings from Last 3 Encounters:   04/03/23 (!) 149/88   03/21/23 109/64   02/27/23 114/72       NPO Status:                                                   Date of last liquid consumption: 04/02/23                        Date of last solid food consumption: 04/02/23    BMI:   Wt Readings from Last 3 Encounters:   04/03/23 198 lb (89.8 kg)   03/21/23 202 lb (91.6 kg)   02/27/23 196 lb (88.9 kg)     Body mass index is 37.41 kg/m².     CBC:   Lab

## 2023-04-03 NOTE — PROGRESS NOTES
Discharge instructions given to patient and patients sister. Both verbalize understanding and denies any questions at this time. Discharge Criteria    Inpatients must meet Criteria 1 through 7. All other patients are either YES or N/A. If a NO is chosen then Anesthesia or Surgeon must be notified. 1.  Minimum 30 minutes after last dose of sedative medication, minimum 120 minutes after last dose of reversal agent. Yes      2. Systolic BP stable within 20 mmHg for 30 minutes & systolic BP between 90 & 602 or within 10 mmHg of baseline. Yes      3. Pulse between 60 and 100 or within 10 bpm of baseline. Yes      4. Spontaneous respiratory rate >/= 10 per minute. Yes      5. SaO2 >/= 95 or  >/= baseline. Yes      6. Able to cough and swallow or return to baseline function. Yes      7. Alert and oriented or return to baseline mental status. Yes      8. Demonstrates controlled, coordinated movements, ambulates with steady gait, or return to baseline activity function. Yes      9. Minimal or no pain or nausea, or at a level tolerable and acceptable to patient. Yes      10. Takes and retains oral fluids as allowed. Yes      11. Procedural / perioperative site stable. Minimal or no bleeding. Yes          12. If GI endoscopy procedure, minimal or no abdominal distention or passing flatus. N/A      13. Written discharge instructions and emergency telephone number provided. Yes      14. Accompanied by a responsible adult.     Yes

## 2023-04-03 NOTE — DISCHARGE INSTRUCTIONS
SAME DAY SURGERY DISCHARGE INSTRUCTIONS   Do not drive or operate hazardous machinery for 24 hours. Do not make important personal or business decisions for 24 hours. Do not drink alcoholic beverages for 24 hours. Do not smoke tobacco products for 24 hours. Eat light foods (Jell-O, soups, etc....) and drink plenty of fluids (water, Sprite, etc...) up to 8 glasses per day, as you can tolerate. If your bandages become soaked with bright red blood, place another dressing pad over your bandages. (DO NOT remove original bandage.) Call your surgeon for further instructions. A small amount of bright red blood is to be expected. Limit your activities for 24 hours. Do not engage in heavy work until your surgeon gives you permission. 8.  Patient should not be left alone for 12-24 hours following surgical procedure. 9.  Wash hands before and after incision care. It is important to practice good personal hygiene during the post op period. 10. Report the following signs or any questions regarding your physical condition to your surgeon immediately:   Excessive swelling of, or around the wound area. Redness. Temperature of 100 degrees (F) or above. Excessive pain. 11. Call your surgeon [phone] for any questions regarding your surgery. 12.  Call for an appointment to see your surgeon in 2 weeks. SPECIAL INSTRUCTIONS AND MEDICATIONS:   No firm gripping, pushing, pulling, lifting more than several pounds for at least 2 weeks. Work on restoring full finger range of motion. Move fingers and wrist to improve circulation. Work on restoring full finger range of motion. At your 2 week follow up appointment you should be able to make a complete fist.     Use prescribed pain pill as directed by the doctor. You may use aspirin or Tylenol if you prefer. Keep your dressing on and dry unless instructed differently by your physician. Use ice as instructed.     Remove

## 2023-05-15 ENCOUNTER — HOSPITAL ENCOUNTER (OUTPATIENT)
Dept: CT IMAGING | Age: 73
Discharge: HOME | End: 2023-05-17
Payer: MEDICARE

## 2023-05-15 ENCOUNTER — HOSPITAL ENCOUNTER (OUTPATIENT)
Age: 73
Discharge: HOME | End: 2023-05-15
Payer: MEDICARE

## 2023-05-15 DIAGNOSIS — I72.8 ANEURYSM OF SPLENIC ARTERY (HCC): ICD-10-CM

## 2023-05-15 LAB
CREAT SERPL-MCNC: 0.68 MG/DL (ref 0.5–0.9)
GFR SERPL CREATININE-BSD FRML MDRD: >60 ML/MIN/1.73M2

## 2023-05-15 PROCEDURE — 36415 COLL VENOUS BLD VENIPUNCTURE: CPT

## 2023-05-15 PROCEDURE — 6360000004 HC RX CONTRAST MEDICATION: Performed by: NURSE PRACTITIONER

## 2023-05-15 PROCEDURE — 82565 ASSAY OF CREATININE: CPT

## 2023-05-15 PROCEDURE — 74174 CTA ABD&PLVS W/CONTRAST: CPT

## 2023-05-15 RX ADMIN — IOPAMIDOL 75 ML: 755 INJECTION, SOLUTION INTRAVENOUS at 13:17

## 2023-05-16 ENCOUNTER — HOSPITAL ENCOUNTER (OUTPATIENT)
Dept: PREADMISSION TESTING | Age: 73
Discharge: HOME | End: 2023-05-20
Payer: MEDICARE

## 2023-05-16 VITALS
SYSTOLIC BLOOD PRESSURE: 137 MMHG | HEIGHT: 61 IN | TEMPERATURE: 97.3 F | BODY MASS INDEX: 38.55 KG/M2 | HEART RATE: 63 BPM | WEIGHT: 204.2 LBS | RESPIRATION RATE: 16 BRPM | OXYGEN SATURATION: 98 % | DIASTOLIC BLOOD PRESSURE: 61 MMHG

## 2023-05-16 LAB
ABO + RH BLD: NORMAL
ARM BAND NUMBER: NORMAL
BLOOD GROUP ANTIBODIES SERPL: NEGATIVE
EXPIRATION DATE: NORMAL

## 2023-05-16 PROCEDURE — 86901 BLOOD TYPING SEROLOGIC RH(D): CPT

## 2023-05-16 PROCEDURE — 87641 MR-STAPH DNA AMP PROBE: CPT

## 2023-05-16 PROCEDURE — 86850 RBC ANTIBODY SCREEN: CPT

## 2023-05-16 PROCEDURE — 86900 BLOOD TYPING SEROLOGIC ABO: CPT

## 2023-05-16 PROCEDURE — 36415 COLL VENOUS BLD VENIPUNCTURE: CPT

## 2023-05-16 RX ORDER — CEFAZOLIN SODIUM 1 G/50ML
1000 SOLUTION INTRAVENOUS ONCE
Status: CANCELLED | OUTPATIENT
Start: 2023-05-16 | End: 2023-05-16

## 2023-05-16 RX ORDER — ACETAMINOPHEN 325 MG/1
650 TABLET ORAL ONCE
Status: CANCELLED | OUTPATIENT
Start: 2023-05-16 | End: 2023-05-16

## 2023-05-16 RX ORDER — CELECOXIB 200 MG/1
400 CAPSULE ORAL ONCE
Status: CANCELLED | OUTPATIENT
Start: 2023-05-16 | End: 2023-05-16

## 2023-05-16 RX ORDER — TRANEXAMIC ACID 650 MG/1
1300 TABLET ORAL ONCE
Status: CANCELLED | OUTPATIENT
Start: 2023-05-16 | End: 2023-05-16

## 2023-05-16 RX ORDER — SODIUM CHLORIDE, SODIUM LACTATE, POTASSIUM CHLORIDE, CALCIUM CHLORIDE 600; 310; 30; 20 MG/100ML; MG/100ML; MG/100ML; MG/100ML
INJECTION, SOLUTION INTRAVENOUS CONTINUOUS
Status: CANCELLED | OUTPATIENT
Start: 2023-05-16

## 2023-05-16 NOTE — PROGRESS NOTES
Patient instructed on the pre-operative, intra-operative, and post-operative process. Patient's surgical procedure and day of surgery confirmed. Patient instructed on NPO status. Medication instructions reviewed with patient, patient to stop taking vitamins and supplements one week prior to surgery and to take gabapentin, zoloft, atorvastatin, amlodipine, omeprazole and oxybutynin the morning of surgery. CHG pre-operative wash instructions reviewed with patient. Pre operative instruction sheet reviewed with patient per PAT phone interview. Patient voiced understanding and denies any questions at this time. Completed DNRCCA form printed and in chart for day of surgery, patient informed on reversal process for perioperative period. Cardiac clearance from Dr Katarzyna Martell in chart from 4/21/23 with note from Dipika Lozano PA-C 3/21/23 before carpal tunnel surgery with Dr Yamil Whelan. Patient scheduled for medical clearance on 5/25/23 with Bon[facility] Failing. Prehab appt scheduled with [facility] PT[facility]OT department for 6/5/23 1:15pm. Patient states she has been working with her  and [address] [place] [address] , Tyler Nichole to make post surgical arrangements in a skilled facility for PT/OT d/t lack of support at home. Patient escorted to lab to complete PAT orders.

## 2023-05-16 NOTE — PROGRESS NOTES
[facility]   Preadmission Testing    Name: Raymon Gates  : 1950  Patient Phone: [phone] (home)     Procedure RIGHT TOTAL KNEE ARTHROPLASTY   Date of Procedure: 23  Surgeon: DR GARCÍA    Ht:  5' 1\" (154.9 cm)  Wt: 204 lb 3.2 oz (92.6 kg)  Wt method: Stated; Actual    Allergies: Allergies   Allergen Reactions    Ibuprofen      Hx: Gastric Bypass Surgery        Peanut allergy: No         Vitals:    23 0913   BP: 137/61   Pulse: 63   Resp: 16   Temp: 97.3 °F (36.3 °C)   SpO2: 98%       No LMP recorded (lmp unknown). Patient is postmenopausal.    Do you take blood thinners? [] Yes    [x] No         Instructed to stop blood thinners prior to procedure? [] Yes    [] No      [x] N/A   Do you have sleep apnea? [] Yes    [x] No     Instructed to bring CPAP machine? [] Yes    [] No    [x] N/A   Do you have acid reflux ? [x] Yes    [] No  omeprazole tx   Do you have  hiatal hernia? [x] Yes    [] No    Do you ever experience motion sickness? [] Yes    [x] No     Have you had a respiratory infection or sore throat in last 4 weeks before surgery? [] Yes    [x] No     Do you have poorly controlled asthma or COPD? [] Yes    [x] No     Do you have a history of angina in the last month or symptomatic arrhythmia? [] Yes    [x] No     Do you have significant central nervous system disease? [] Yes    [x] No     Have you had an EKG, labs, or chest xray in last 12 months? If yes provide copies to anesthesia   [x] Yes    [] No       [x] Lab    [x] EKG    [] CXR     Have you had a stress test?     [x] Yes    [] No    When/where: 2022 [facility]    Was it normal?    [x] Yes    [] No   Do you or your family have a history of Malignant Hyperthermia?  [] Yes    [x] No     Patient instructed on:   [x] NPO Status   [x] Meds to Take Day of Surgery  [x] [address]  [x]No Jewelry/Contact Lenses/Dentures day of surgery   [x] Chlorhexidene             PAT Call/Visit Questions  Person Interviewed:

## 2023-05-17 LAB
MRSA, DNA, NASAL: NEGATIVE
SPECIMEN DESCRIPTION: NORMAL

## 2023-05-18 NOTE — RESULT ENCOUNTER NOTE
Please call pt and inform them their ct results show concerns for possible volvulus/low grade obstruction.  She needs to see Dr. Uziel Guevara d/t pain and findings for eval.

## 2023-06-02 ENCOUNTER — HOSPITAL ENCOUNTER (OUTPATIENT)
Dept: INFUSION THERAPY | Age: 73
Discharge: HOME | End: 2023-06-02
Payer: MEDICARE

## 2023-06-02 VITALS
DIASTOLIC BLOOD PRESSURE: 75 MMHG | RESPIRATION RATE: 18 BRPM | TEMPERATURE: 98.8 F | HEART RATE: 81 BPM | SYSTOLIC BLOOD PRESSURE: 159 MMHG

## 2023-06-02 DIAGNOSIS — M81.0 POSTMENOPAUSAL OSTEOPOROSIS: Primary | ICD-10-CM

## 2023-06-02 PROCEDURE — 6360000002 HC RX W HCPCS: Performed by: NURSE PRACTITIONER

## 2023-06-02 PROCEDURE — 96372 THER/PROPH/DIAG INJ SC/IM: CPT

## 2023-06-02 RX ORDER — M-VIT,TX,IRON,MINS/CALC/FOLIC 27MG-0.4MG
1 TABLET ORAL DAILY
COMMUNITY

## 2023-06-02 RX ADMIN — DENOSUMAB 60 MG: 60 INJECTION SUBCUTANEOUS at 13:11

## 2023-06-02 NOTE — DISCHARGE INSTRUCTIONS
Outpatient Discharge Instructions for Prolia Injections    [place] [id number] [address]   [phone]      You are advised to carry out the following Instructions:    Diet:  As prescribed by your Physician. Activity:  As prescribed by your Physician. Care of injection site:  If the injection site becomes red,sore,swollen,painful, has drainage,or you develop a fever,notify your Physician. Other:    If you develop hives,rash,itching or trouble breathing, go to the nearest Emergency Room. These could be a sign of allergic reaction. Continue to take your calcium and vitamin D.  Let your dentist know that you are taking Prolia. Let your doctor know if you have any unusual jaw or leg bone pain. Take good care of your teeth,see a dentist often. Injection is every 6 months.       Follow up appointment:          ANY PROBLEMS OR CONCERNS NOTIFY YOUR PHYSICIAN   OR    GO THE NEAREST EMERGENCY ROOM

## 2023-07-12 ENCOUNTER — OFFICE VISIT (OUTPATIENT)
Dept: SURGERY | Age: 73
End: 2023-07-12
Payer: MEDICARE

## 2023-07-12 DIAGNOSIS — K40.20 NON-RECURRENT BILATERAL INGUINAL HERNIA WITHOUT OBSTRUCTION OR GANGRENE: Primary | ICD-10-CM

## 2023-07-12 DIAGNOSIS — I72.8 SPLENIC ARTERY ANEURYSM (HCC): ICD-10-CM

## 2023-07-12 DIAGNOSIS — R10.10 UPPER ABDOMINAL PAIN: ICD-10-CM

## 2023-07-12 PROCEDURE — 1123F ACP DISCUSS/DSCN MKR DOCD: CPT | Performed by: SURGERY

## 2023-07-12 PROCEDURE — 99214 OFFICE O/P EST MOD 30 MIN: CPT | Performed by: SURGERY

## 2023-07-12 PROCEDURE — 3078F DIAST BP <80 MM HG: CPT | Performed by: SURGERY

## 2023-07-12 PROCEDURE — 3074F SYST BP LT 130 MM HG: CPT | Performed by: SURGERY

## 2023-07-12 NOTE — PROGRESS NOTES
7/12/2023 office visit    Ludy Gilbert presents with family to discuss CT from 5/15/2023/concerns/questions they wanted addressed from a surgical standpoint. Images reviewed, chart notes/testing/labs/other specialist notes reviewed. Vitals stable. No acute distress/no urgent issues. Abdominal exam is benign, well-intact prior ventral incisional hernia repair from 2-3 years ago. No obstructive symptoms. Discussion pertinent points today- the splenic artery aneurysm is small, stable, and no surgical intervention is needed - less than 2 cm, yearly CT recommended of the abdomen with IV contrast to monitor. Bilateral inguinal hernias are small and fat containing, asymptomatic. She had clinical recent symptoms of ileus/constipation/slow bowel movements and advised on bowel regimen fiber, miralax, stool softeners, hydration. Questions answered. CTA OF THE ABDOMEN AND PELVIS WITH CONTRAST     5/15/2023 1:16 pm:     TECHNIQUE:  CTA of the abdomen and pelvis was performed with the administration of  intravenous contrast. Multiplanar reformatted images are provided for review. MIP images are provided for review. Automated exposure control, iterative  reconstruction, and/or weight based adjustment of the mA/kV was utilized to  reduce the radiation dose to as low as reasonably achievable. COMPARISON:  CT scan of the abdomen and pelvis from 11/05/2020     HISTORY:  ORDERING SYSTEM PROVIDED HISTORY: Aneurysm of splenic artery (HCC)  TECHNOLOGIST PROVIDED HISTORY:  STAT Creatinine as needed:->Yes    CTA ABDOMEN:     Elongated abdominal aorta with moderate calcific plaque disease without  aneurysm or severe narrowing. Mild similar narrowing origins celiac, SMA,  and renal arteries. Small left peripherally calcified splenic artery aneurysm is unchanged, again  measuring approximately 9 mm. No additional aneurysm identified.         CTA PELVIS:     Unchanged elongated patent iliac arteries with mild calcific plaque

## 2023-07-14 ENCOUNTER — HOSPITAL ENCOUNTER (OUTPATIENT)
Age: 73
Discharge: HOME | End: 2023-07-14
Payer: MEDICARE

## 2023-07-14 DIAGNOSIS — I10 ESSENTIAL HYPERTENSION: ICD-10-CM

## 2023-07-14 DIAGNOSIS — E78.2 MIXED HYPERLIPIDEMIA: ICD-10-CM

## 2023-07-14 DIAGNOSIS — E11.628 TYPE 2 DIABETES MELLITUS WITH OTHER SKIN COMPLICATION, WITHOUT LONG-TERM CURRENT USE OF INSULIN (HCC): ICD-10-CM

## 2023-07-14 DIAGNOSIS — E11.42 DIABETIC POLYNEUROPATHY ASSOCIATED WITH TYPE 2 DIABETES MELLITUS (HCC): ICD-10-CM

## 2023-07-14 LAB
ALBUMIN SERPL-MCNC: 4.2 G/DL (ref 3.5–5.2)
ALBUMIN/GLOB SERPL: 1.4 {RATIO} (ref 1–2.5)
ALP SERPL-CCNC: 82 U/L (ref 35–104)
ALT SERPL-CCNC: 28 U/L (ref 5–33)
ANION GAP SERPL CALCULATED.3IONS-SCNC: 13 MMOL/L (ref 9–17)
AST SERPL-CCNC: 36 U/L
BILIRUB SERPL-MCNC: 0.5 MG/DL (ref 0.3–1.2)
BUN SERPL-MCNC: 14 MG/DL (ref 8–23)
BUN/CREAT SERPL: 28 (ref 9–20)
CALCIUM SERPL-MCNC: 9 MG/DL (ref 8.6–10.4)
CHLORIDE SERPL-SCNC: 87 MMOL/L (ref 98–107)
CO2 SERPL-SCNC: 27 MMOL/L (ref 20–31)
CREAT SERPL-MCNC: 0.5 MG/DL (ref 0.5–0.9)
GFR SERPL CREATININE-BSD FRML MDRD: >60 ML/MIN/1.73M2
GLUCOSE SERPL-MCNC: 111 MG/DL (ref 70–99)
POTASSIUM SERPL-SCNC: 4 MMOL/L (ref 3.7–5.3)
PROT SERPL-MCNC: 7.1 G/DL (ref 6.4–8.3)
SODIUM SERPL-SCNC: 127 MMOL/L (ref 135–144)

## 2023-07-14 PROCEDURE — 36415 COLL VENOUS BLD VENIPUNCTURE: CPT

## 2023-07-14 PROCEDURE — 83036 HEMOGLOBIN GLYCOSYLATED A1C: CPT

## 2023-07-14 PROCEDURE — 80053 COMPREHEN METABOLIC PANEL: CPT

## 2023-07-16 LAB
EST. AVERAGE GLUCOSE BLD GHB EST-MCNC: 128 MG/DL
HBA1C MFR BLD: 6.1 % (ref 4–6)

## 2023-07-20 ENCOUNTER — HOSPITAL ENCOUNTER (OUTPATIENT)
Dept: PHYSICAL THERAPY | Age: 73
Setting detail: THERAPIES SERIES
Discharge: HOME | End: 2023-07-20
Payer: MEDICARE

## 2023-07-20 ENCOUNTER — HOSPITAL ENCOUNTER (OUTPATIENT)
Dept: OCCUPATIONAL THERAPY | Age: 73
Setting detail: THERAPIES SERIES
Discharge: HOME | End: 2023-07-20
Payer: MEDICARE

## 2023-07-20 PROCEDURE — 97530 THERAPEUTIC ACTIVITIES: CPT

## 2023-07-20 PROCEDURE — 97161 PT EVAL LOW COMPLEX 20 MIN: CPT

## 2023-07-20 PROCEDURE — 97110 THERAPEUTIC EXERCISES: CPT

## 2023-07-20 PROCEDURE — 97165 OT EVAL LOW COMPLEX 30 MIN: CPT

## 2023-07-20 NOTE — PLAN OF CARE
fatigue to improve mobility. Long Term Goal 3: Patient to complete 8 sit/stands with hhax1 and no LOB from std chair to improve functional strength and muscular power. Long Term Goal 4: Patient to have improved Tinetti balance score >/=20/28 to decrease fall risk.      Prognosis  Therapy Prognosis: Good    Treatment Plan   Plan Frequency: 2  Plan weeks: 4  [x] HP/CP      [x] Electrical Stim   [x] Therapeutic Exercise      [x] Gait Training  [] Aquatics   [] Ultrasound         [x] Patient Education/HEP   [x] Manual Therapy  [] Traction    [x] Neuro-brenda        [x] Soft Tissue Mobs            [x] Therapeutic Activity  [] Iontophoresis    [] Orthotic casting/fitting      [] Dry Needling  [] Blood Flow Restriction             Electronically signed by: De Daly PT, DPT    Date: 7/20/2023      ______________________________________ Date: 7/20/2023   Physician Signature

## 2023-07-20 NOTE — PROGRESS NOTES
HEP for improved B LE strength and endurance. Short Term Goal 2: Patient to be instructed in functional strengthening and stair negotiation to improve mobility and decrease fall risk. Short Term Goal 3: Patient to tolerate 45 min of ther ex/act to improve endurance and functional strength. Long Term Goals  Time Frame for Long Term Goals : 6 weeks  Long Term Goal 1: Patient to be independent and compliant with HEP and walking program.  Long Term Goal 2: Patient to ascend/descend 5 steps with HRx1 with no LOB or fatigue to improve mobility. Long Term Goal 3: Patient to complete 8 sit/stands with hhax1 and no LOB from std chair to improve functional strength and muscular power. Long Term Goal 4: Patient to have improved Tinetti balance score >/=20/28 to decrease fall risk.         Minutes Tracking:  Time In: 1487  Time Out: 639 [facility], [address]  Minutes: 40  Timed Code Treatment Minutes: Chele Castillo PT, DPT    7/20/2023

## 2023-07-20 NOTE — PLAN OF CARE
[facility]           Fax: [phone]                           Outpatient Occupational Therapy                                                                            Initial Evaluation      Name:  Elio Paiz  Date: 7/20/2023  Referring Physician: Roque Moreno, *   Medical Diagnosis: Diabetic polyneuropathy, E11.42; Weakness, M62.81  Rehab Diagnosis/ICD-10#s:Weakness, M62.81  Insurance: OT Insurance Information: Carmelina Echols  Total # of Visits to Date: 1  Next  Appointment: 8/12/23  Chief Complaint: B hand and arm pain  [facility] #: [id number]  Onset of Injury/Condition: Onset Date: 07/14/23    Mechanism of Injury: Overuse, DM2       Precautions:   [x]None  [] Fall Risk  []WB Status  [] Pacemaker   []Other:            Involved Extremity:      [x] Left [x] Right  Dominant: [] Left [x]Right    Work Status:   [] Normal [] Restricted [] Off D/T Injury/Condition [x] Retired [] Unemployed [] Disabled []Other:  Critical Job/Daily Tasks:     Orthosis:     [] Currently has  [x] To be custom fabricated  []Planned for subsequent visit  Type: RRF Oval 8 for trigger finger      Subjective:  Patient presents this date with OT order for eval/tx. 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 Patient will be provided list of OT  rehab program in the surrounding area. Patient states pain and weakness throughout BUE. Patient states that she mostly experiencing pain in upper arm and numbness in hands with pushing shopping cart and extended use of walker. Patient also with triggering RRF. Patient reports recent CTR of R hand.      PMH: HTN, arthritis, DM2, depression      Pain: Intensity:   7/10 Location:  B upper arms   Pain Type: [] Constant [x] Intermittent   [  ] with pain meds at rest   [x] With movement/Resistive activity [] With Sedentary activity      *Note: Patient reports B hand numbness 10/10 with shoulder use and 7/10 with

## 2023-07-26 ENCOUNTER — HOSPITAL ENCOUNTER (OUTPATIENT)
Dept: PHYSICAL THERAPY | Age: 73
Setting detail: THERAPIES SERIES
Discharge: HOME | End: 2023-07-26
Payer: MEDICARE

## 2023-07-26 ENCOUNTER — HOSPITAL ENCOUNTER (OUTPATIENT)
Dept: OCCUPATIONAL THERAPY | Age: 73
Setting detail: THERAPIES SERIES
Discharge: HOME | End: 2023-07-26
Payer: MEDICARE

## 2023-07-26 PROCEDURE — 97110 THERAPEUTIC EXERCISES: CPT

## 2023-07-26 PROCEDURE — 97014 ELECTRIC STIMULATION THERAPY: CPT

## 2023-07-26 NOTE — PROGRESS NOTES
Phone: [phone]                 [facility]           Fax: [phone]                           Outpatient Physical Therapy                                                                            Daily Note    Patient: Cheryl Lezama : 1950  CSN #: [id number]   Referring Physician: Danette Hough, *  Date: 2023    Diagnosis: General weakness, R53.1  Treatment Diagnosis: Difficulty walking, general weakness    PT Insurance Information: [employer] Medicare  Total # of Visits Approved: 12 Per Physician Order  Total # of Visits to Date: 2  No Show: 0  Canceled Appointment: 0    23 Plan of Care/Recert Due    Pre-Treatment Pain:  0/10  Subjective: Patient reports compliance with HEP. Doing well so far. Exercises:  Exercise 1: HEP: seated B LE ther ex all major joints and planes progressing to 20reps, 2x/day  Exercise 2: Scifit x10 min, L1.0  Exercise 3: Sit/stands x10  Exercise 4: Seated B LE ther ex 2#, 2x10, yellow tband HS curls and hip abd  Exercise 5: Standing heel raises and marching at walker x10 ea    Assessment  Assessment: Initiated functional strengthening and endurance training within patient tolerance. Patient able to complete 10 sit/stands with occasional B UE support required. 2-3 seated rest breaks for fatigue. Will progress as tolerated. Activity Tolerance  Activity Tolerance: Patient tolerated treatment well, Patient limited by endurance, Patient limited by fatigue    Patient Education  Exercise technique and progression   Pt verbalized/demonstrated good understanding:     [x] Yes         [] No, pt required further clarification. Post Treatment Pain:  0/10      Plan  Plan Frequency: 2  Plan weeks: 4       Goals  (Total # of Visits to Date: 2)      Short Term Goals  Time Frame for Short Term Goals: 3 weeks  Short Term Goal 1: Patient to initiate HEP for improved B LE strength and endurance. -met  Short Term Goal 2: Patient to be instructed in functional

## 2023-07-28 ENCOUNTER — HOSPITAL ENCOUNTER (OUTPATIENT)
Dept: OCCUPATIONAL THERAPY | Age: 73
Setting detail: THERAPIES SERIES
Discharge: HOME | End: 2023-07-28
Payer: MEDICARE

## 2023-07-28 ENCOUNTER — HOSPITAL ENCOUNTER (OUTPATIENT)
Dept: PHYSICAL THERAPY | Age: 73
Setting detail: THERAPIES SERIES
Discharge: HOME | End: 2023-07-28
Payer: MEDICARE

## 2023-07-28 PROCEDURE — 97110 THERAPEUTIC EXERCISES: CPT

## 2023-07-28 PROCEDURE — 97014 ELECTRIC STIMULATION THERAPY: CPT

## 2023-07-28 NOTE — PROGRESS NOTES
Phone: [phone]                 [facility]           Fax: [phone]                           Outpatient Physical Therapy                                                                            Daily Note    Patient: Susan Silva : 1950  CSN #: [id number]   Referring Physician: Hema Mackenzie, *  Date: 2023       Treatment Diagnosis: Difficulty walking, general weakness    PT Insurance Information: A[employer]na Medicare  Total # of Visits Approved: 12 Per Physician Order  Total # of Visits to Date: 3  No Show: 0  Canceled Appointment: 0    23 Plan of Care/Recert Due    Pre-Treatment Pain:  0/10  Subjective: Pt just finished OT. She declines pain. States she's been completing HEP, \"or at least half of it. \"    Exercises:  Exercise 1: HEP: seated B LE ther ex all major joints and planes progressing to 20reps, 2x/day  Exercise 2: Scifit x10 min, L1.0  Exercise 3: Sit/stands x10  Exercise 4: Seated B LE ther ex 2#, 2x10, yellow tband HS curls and hip abd  Exercise 5: Standing heel raises and marching at walker x10 ea    Assessment  Assessment: Adjusted AD height as it was too high for pt. Pt reports improved comfort/ease of use after ajdustement. Continued general strengthening with RBs required d/t fatigue. WIll continue as tolerated. Activity Tolerance  Activity Tolerance: Patient tolerated treatment well, Patient limited by endurance, Patient limited by fatigue    Patient Education  Ex technique  Pt verbalized/demonstrated good understanding:     [x] Yes         [] No, pt required further clarification. Post Treatment Pain:  0/10      Plan  Plan Frequency: 2  Plan weeks: 4       Goals  (Total # of Visits to Date: 3)      Short Term Goals  Time Frame for Short Term Goals: 3 weeks  Short Term Goal 1: Patient to initiate HEP for improved B LE strength and endurance. -met  Short Term Goal 2: Patient to be instructed in functional strengthening and stair negotiation to

## 2023-08-02 ENCOUNTER — HOSPITAL ENCOUNTER (OUTPATIENT)
Dept: OCCUPATIONAL THERAPY | Age: 73
Setting detail: THERAPIES SERIES
Discharge: HOME | End: 2023-08-02
Payer: MEDICARE

## 2023-08-02 ENCOUNTER — HOSPITAL ENCOUNTER (OUTPATIENT)
Dept: PHYSICAL THERAPY | Age: 73
Setting detail: THERAPIES SERIES
Discharge: HOME | End: 2023-08-02
Payer: MEDICARE

## 2023-08-02 PROCEDURE — 97110 THERAPEUTIC EXERCISES: CPT

## 2023-08-02 PROCEDURE — 97014 ELECTRIC STIMULATION THERAPY: CPT

## 2023-08-02 NOTE — PROGRESS NOTES
Phone: [phone]                 [facility]           Fax: [phone]                           Outpatient Physical Therapy                                                                            Daily Note    Patient: Elio Paiz : 1950  [facility] #: [id number]   Referring Physician: Roque Moreno, *  Date: 2023    Diagnosis: General weakness, R53.1  Treatment Diagnosis: Difficulty walking, general weakness    PT Insurance Information: A[employer]na Medicare  Total # of Visits Approved: 12 Per Physician Order  Total # of Visits to Date: 4  No Show: 0  Canceled Appointment: 0    23 Plan of Care/Recert Due    Pre-Treatment Pain:  0/10  Subjective: Pt with no complaints today. Wants to try therapy ball rollouts to stretch her lower back. Exercises:  Exercise 1: HEP: seated B LE ther ex all major joints and planes progressing to 20reps, 2x/day  Exercise 2: Scifit x10 min, L1.0  Exercise 3: Sit/stands x15; no hha  Exercise 4: Seated B LE ther ex 2#, 2x10, yellow tband HS curls and hip abd  Exercise 5: Standing heel raises and marching at walker x10 ea  Exercise 6: Seated ball rollouts x10 ea way  Exercise 7: 4in fwd step ups x10 ea LE; alt 4in step taps x10, B HR    Assessment  Assessment: Added 4in step ups to progress functional B LE strength and endurance. Pt reports relief of low back pain with ball rollouts. Will continue. Activity Tolerance  Activity Tolerance: Patient tolerated treatment well, Patient limited by endurance    Patient Education  Exercise technique and progression   Pt verbalized/demonstrated good understanding:     [x] Yes         [] No, pt required further clarification. Post Treatment Pain:  0/10      Plan  Plan Frequency: 2  Plan weeks: 4       Goals  (Total # of Visits to Date: 4)      Short Term Goals  Time Frame for Short Term Goals: 3 weeks  Short Term Goal 1: Patient to initiate HEP for improved B LE strength and endurance. -met  Short Term Goal

## 2023-08-02 NOTE — PROGRESS NOTES
lataeral antebrachial cutaneous at homeostatic neuro- trigger points to. ..   ___No manipulation/static  ___Basic Manipulation  ___Pistoning Manipulation  ___Needle Rotation  ___Tenting                    GOALS   Time Frame for Long Term Goals : 4 weeks       Long Term Goal 1: Patient to state < 40% impairment throughout I/ADL, as measured by the DASH. continue []Met  [x]Partially met  []Not met   Long Term Goal 2: Patient to report <5/10 in terms of numbness and tingling thorughout BUE after functional use of shoulders s/p 10 minutes continuous activity. continue       []Met  [x]Partially met  []Not met   Long Term Goal 3: Patient to improve BUE strength grossly 4/5 throughout to improve strength for I/ADL. continue []Met  [x]Partially met  []Not met   Long Term Goal 4: Patient to state <4/10 pain BUE upper arm and hands throughout I/ADL engagement. Continue to monitor []Met  [x]Partially met  []Not met   Long Term Goal 5: Patient to improve B  >35#, 2 pt >3#, 3 pt > 7# and lateral >8#. continue []Met  [x]Partially met  []Not met   Time Frame for Short Term Goals: 3 weeks       Short Term Goal 1: Patient to be educated on HEP to improve BUE strength and decrease pain. MET, pt edu on BUE T band and free weight HEPs, no questions at this time, increase as tolerated []Met  [x]Partially met  []Not met   Short Term Goal 2: Patient to improve R  to 30# and L  to 30# to improve hand strength for I/ADL. Continue  R 34#MET  L 23# []Met  [x]Partially met  []Not met   Short Term Goal 3: OTR to custom kailee oval 8 splint for RRF trigger finger. MET, pt provided with oval 8 splint for RRF wear at all times. Pt edu on wear schedule and precautions.  G verbal return []Met  [x]Partially met  []Not met      []Met  []Partially met  []Not met      []Met  []Partially met  []Not met   [address] [address] Moran Education provided to patient/family/caregiver:    [x]Yes:     []No (Continued review of prior

## 2023-08-04 ENCOUNTER — APPOINTMENT (OUTPATIENT)
Dept: OCCUPATIONAL THERAPY | Age: 73
End: 2023-08-04
Payer: MEDICARE

## 2023-08-04 ENCOUNTER — APPOINTMENT (OUTPATIENT)
Dept: PHYSICAL THERAPY | Age: 73
End: 2023-08-04
Payer: MEDICARE

## 2023-08-07 ENCOUNTER — HOSPITAL ENCOUNTER (OUTPATIENT)
Age: 73
Discharge: HOME | End: 2023-08-07
Payer: MEDICARE

## 2023-08-07 DIAGNOSIS — E87.1 LOW SODIUM LEVELS: ICD-10-CM

## 2023-08-07 LAB
ANION GAP SERPL CALCULATED.3IONS-SCNC: 8 MMOL/L (ref 9–17)
BUN SERPL-MCNC: 18 MG/DL (ref 8–23)
BUN/CREAT SERPL: 36 (ref 9–20)
CALCIUM SERPL-MCNC: 8.8 MG/DL (ref 8.6–10.4)
CHLORIDE SERPL-SCNC: 96 MMOL/L (ref 98–107)
CO2 SERPL-SCNC: 30 MMOL/L (ref 20–31)
CREAT SERPL-MCNC: 0.5 MG/DL (ref 0.5–0.9)
GFR SERPL CREATININE-BSD FRML MDRD: >60 ML/MIN/1.73M2
GLUCOSE SERPL-MCNC: 108 MG/DL (ref 70–99)
POTASSIUM SERPL-SCNC: 4.5 MMOL/L (ref 3.7–5.3)
SODIUM SERPL-SCNC: 134 MMOL/L (ref 135–144)

## 2023-08-07 PROCEDURE — 36415 COLL VENOUS BLD VENIPUNCTURE: CPT

## 2023-08-07 PROCEDURE — 80048 BASIC METABOLIC PNL TOTAL CA: CPT

## 2023-08-09 ENCOUNTER — HOSPITAL ENCOUNTER (OUTPATIENT)
Dept: VASCULAR LAB | Age: 73
Discharge: HOME | End: 2023-08-11
Payer: MEDICARE

## 2023-08-09 ENCOUNTER — HOSPITAL ENCOUNTER (OUTPATIENT)
Age: 73
Discharge: HOME | End: 2023-08-09
Payer: MEDICARE

## 2023-08-09 ENCOUNTER — HOSPITAL ENCOUNTER (OUTPATIENT)
Dept: PHYSICAL THERAPY | Age: 73
Setting detail: THERAPIES SERIES
Discharge: HOME | End: 2023-08-09
Payer: MEDICARE

## 2023-08-09 ENCOUNTER — HOSPITAL ENCOUNTER (OUTPATIENT)
Dept: OCCUPATIONAL THERAPY | Age: 73
Setting detail: THERAPIES SERIES
Discharge: HOME | End: 2023-08-09
Payer: MEDICARE

## 2023-08-09 DIAGNOSIS — M79.89 LEG SWELLING: ICD-10-CM

## 2023-08-09 DIAGNOSIS — E87.1 LOW SODIUM LEVELS: ICD-10-CM

## 2023-08-09 LAB
ALBUMIN SERPL-MCNC: 3.7 G/DL (ref 3.5–5.2)
ALBUMIN/GLOB SERPL: 1.3 {RATIO} (ref 1–2.5)
ALP SERPL-CCNC: 91 U/L (ref 35–104)
ALT SERPL-CCNC: 58 U/L (ref 5–33)
ANION GAP SERPL CALCULATED.3IONS-SCNC: 10 MMOL/L (ref 9–17)
AST SERPL-CCNC: 76 U/L
BILIRUB SERPL-MCNC: 0.7 MG/DL (ref 0.3–1.2)
BUN SERPL-MCNC: 16 MG/DL (ref 8–23)
BUN/CREAT SERPL: 32 (ref 9–20)
CALCIUM SERPL-MCNC: 9.2 MG/DL (ref 8.6–10.4)
CHLORIDE SERPL-SCNC: 93 MMOL/L (ref 98–107)
CO2 SERPL-SCNC: 28 MMOL/L (ref 20–31)
CREAT SERPL-MCNC: 0.5 MG/DL (ref 0.5–0.9)
GFR SERPL CREATININE-BSD FRML MDRD: >60 ML/MIN/1.73M2
GLUCOSE SERPL-MCNC: 106 MG/DL (ref 70–99)
POTASSIUM SERPL-SCNC: 4.2 MMOL/L (ref 3.7–5.3)
PROT SERPL-MCNC: 6.5 G/DL (ref 6.4–8.3)
SODIUM SERPL-SCNC: 131 MMOL/L (ref 135–144)

## 2023-08-09 PROCEDURE — 97110 THERAPEUTIC EXERCISES: CPT

## 2023-08-09 PROCEDURE — 97014 ELECTRIC STIMULATION THERAPY: CPT

## 2023-08-09 PROCEDURE — 80053 COMPREHEN METABOLIC PANEL: CPT

## 2023-08-09 PROCEDURE — 36415 COLL VENOUS BLD VENIPUNCTURE: CPT

## 2023-08-09 PROCEDURE — 93971 EXTREMITY STUDY: CPT

## 2023-08-09 NOTE — PROGRESS NOTES
Term Goal 3: Patient to tolerate 45 min of ther ex/act to improve endurance and functional strength. -met    Long Term Goals  Time Frame for Long Term Goals : 6 weeks  Long Term Goal 1: Patient to be independent and compliant with HEP and walking program.  Long Term Goal 2: Patient to ascend/descend 5 steps with HRx1 with no LOB or fatigue to improve mobility. Long Term Goal 3: Patient to complete 8 sit/stands with hhax1 and no LOB from std chair to improve functional strength and muscular power. Long Term Goal 4: Patient to have improved Tinetti balance score >/=20/28 to decrease fall risk.     Minutes Tracking:  Time In: 3861  Time Out: [address]  Minutes: 39  Timed Code Treatment Minutes: [address] [facility] Rose Mary, PT, DPT     Date: 8/9/2023

## 2023-08-11 ENCOUNTER — HOSPITAL ENCOUNTER (OUTPATIENT)
Dept: OCCUPATIONAL THERAPY | Age: 73
Setting detail: THERAPIES SERIES
Discharge: HOME | End: 2023-08-11
Payer: MEDICARE

## 2023-08-11 ENCOUNTER — HOSPITAL ENCOUNTER (OUTPATIENT)
Dept: PHYSICAL THERAPY | Age: 73
Setting detail: THERAPIES SERIES
Discharge: HOME | End: 2023-08-11
Payer: MEDICARE

## 2023-08-11 NOTE — PROGRESS NOTES
[facility]  Inpatient/Observation/Outpatient Rehabilitation    Date: 2023  Patient Name: Aleah Fernandez       [] Inpatient Acute/Observation       [x]  Outpatient  : 1950         [] Pt no showed for scheduled appointment    [] Pt refused/declined therapy at this time due to:           [x] Pt cancelled due to:  [] No Reason Given   [] Sick/ill   [] Other:        Ronak Nicole OTR/L Date: 2023

## 2023-08-11 NOTE — PROGRESS NOTES
[facility]  Inpatient/Observation/Outpatient Rehabilitation    Date: 2023  Patient Name: Dl Hua       [] Inpatient Acute/Observation       [x]  Outpatient  : 1950      [] Pt cancelled due to:  [] No Reason Given   [x] Sick/ill   [] Other:    Therapist/Assistant will attempt to see this patient, at our earliest opportunity.        Brigette Hudson, PTA Date: 2023

## 2023-08-11 NOTE — RESULT ENCOUNTER NOTE
Please call pt and inform them their labwork results show that sodium levels are slightly lower than last. To limit just free water, sugar free gatorade encouraged. Also recommend Xray abdomen, flat and upright.

## 2023-08-14 ENCOUNTER — HOSPITAL ENCOUNTER (OUTPATIENT)
Dept: GENERAL RADIOLOGY | Age: 73
Discharge: HOME | End: 2023-08-16
Payer: MEDICARE

## 2023-08-14 ENCOUNTER — HOSPITAL ENCOUNTER (OUTPATIENT)
Age: 73
Discharge: HOME | End: 2023-08-16
Payer: MEDICARE

## 2023-08-14 ENCOUNTER — HOSPITAL ENCOUNTER (OUTPATIENT)
Age: 73
Discharge: HOME | End: 2023-08-14
Payer: MEDICARE

## 2023-08-14 DIAGNOSIS — M79.89 LEG SWELLING: ICD-10-CM

## 2023-08-14 DIAGNOSIS — E87.1 LOW SODIUM LEVELS: ICD-10-CM

## 2023-08-14 DIAGNOSIS — R11.0 NAUSEA: ICD-10-CM

## 2023-08-14 LAB
ANION GAP SERPL CALCULATED.3IONS-SCNC: 12 MMOL/L (ref 9–17)
BUN SERPL-MCNC: 14 MG/DL (ref 8–23)
BUN/CREAT SERPL: 23 (ref 9–20)
CALCIUM SERPL-MCNC: 8.9 MG/DL (ref 8.6–10.4)
CHLORIDE SERPL-SCNC: 98 MMOL/L (ref 98–107)
CO2 SERPL-SCNC: 25 MMOL/L (ref 20–31)
CREAT SERPL-MCNC: 0.6 MG/DL (ref 0.5–0.9)
GFR SERPL CREATININE-BSD FRML MDRD: >60 ML/MIN/1.73M2
GLUCOSE SERPL-MCNC: 112 MG/DL (ref 70–99)
POTASSIUM SERPL-SCNC: 4.3 MMOL/L (ref 3.7–5.3)
SODIUM SERPL-SCNC: 135 MMOL/L (ref 135–144)

## 2023-08-14 PROCEDURE — 74019 RADEX ABDOMEN 2 VIEWS: CPT

## 2023-08-14 PROCEDURE — 36415 COLL VENOUS BLD VENIPUNCTURE: CPT

## 2023-08-14 PROCEDURE — 80048 BASIC METABOLIC PNL TOTAL CA: CPT

## 2023-08-14 NOTE — RESULT ENCOUNTER NOTE
Please call pt and inform them their xray results are normal.  Continue current tx plan, call if any worsening leg swelling and keep f/u in 1 month

## 2023-08-15 VITALS
BODY MASS INDEX: 37 KG/M2 | HEIGHT: 61 IN | WEIGHT: 196 LBS | RESPIRATION RATE: 18 BRPM | SYSTOLIC BLOOD PRESSURE: 134 MMHG | DIASTOLIC BLOOD PRESSURE: 74 MMHG

## 2023-08-16 ENCOUNTER — HOSPITAL ENCOUNTER (OUTPATIENT)
Dept: PHYSICAL THERAPY | Age: 73
Setting detail: THERAPIES SERIES
Discharge: HOME | End: 2023-08-16
Payer: MEDICARE

## 2023-08-16 ENCOUNTER — HOSPITAL ENCOUNTER (OUTPATIENT)
Dept: OCCUPATIONAL THERAPY | Age: 73
Setting detail: THERAPIES SERIES
Discharge: HOME | End: 2023-08-16
Payer: MEDICARE

## 2023-08-16 PROCEDURE — 97110 THERAPEUTIC EXERCISES: CPT

## 2023-08-16 PROCEDURE — 97014 ELECTRIC STIMULATION THERAPY: CPT

## 2023-08-16 PROCEDURE — 97530 THERAPEUTIC ACTIVITIES: CPT

## 2023-08-16 NOTE — PROGRESS NOTES
Phone: [phone]                 [facility]           Fax: [phone]                           Outpatient Physical Therapy                                                                            Daily Note    Patient: Joe Colón : 1950  CSN #: [id number]   Referring Physician: Connie Pendleton, *  Date: 2023       Treatment Diagnosis: Difficulty walking, general weakness    PT Insurance Information: [employer] Medicare  Total # of Visits Approved: 12 Per Physician Order  Total # of Visits to Date: 6  No Show: 0  Canceled Appointment: 1    23 Plan of Care/Recert Due    Pre-Treatment Pain: 0 /10  Subjective: Pts OT appt ran over 8 min. Pt reports she's feeling good so far. She declines pain or concerns. Exercises:  Exercise 1: HEP: seated B LE ther ex all major joints and planes progressing to 20reps, 2x/day  Exercise 2: Scifit x10 min, L1.0  Exercise 3: Sit/stands x15; no hha  -- 10 today  Exercise 4: Seated B LE ther ex 2#, 2x10, blue tband HS curls and hip abd  Exercise 5: Standing heel raises, HS curl and marching at walker x10 ea  Exercise 7: 4in on LLE and 6 on RLE fwd step ups x10 ea LE; alt 4in step taps x10, B HR  Assessment  Assessment: Pt was able to complete 8 sit to stands without UE support, meeting LTG. Pt has difficulty with fwd step up and is unable to ascend /descend 5 steps d/t strength deficits. Pt completed 6in fwd step up on RLE and 4 inches on LLE with BUE support x10 each. Requires seated RBs throughout d/t general fatigue. Will continue. Activity Tolerance  Activity Tolerance: Patient tolerated treatment well, Patient limited by endurance    Patient Education  Ex technique, continue increased activity at home  Pt verbalized/demonstrated good understanding:     [x] Yes         [] No, pt required further clarification.        Post Treatment Pain:  0/10      Plan  Plan Frequency: 2  Plan weeks: 4       Goals  (Total # of Visits to Date: 6)

## 2023-08-18 ENCOUNTER — HOSPITAL ENCOUNTER (OUTPATIENT)
Dept: GENERAL RADIOLOGY | Age: 73
End: 2023-08-18
Payer: MEDICARE

## 2023-08-18 ENCOUNTER — HOSPITAL ENCOUNTER (OUTPATIENT)
Dept: PHYSICAL THERAPY | Age: 73
Setting detail: THERAPIES SERIES
Discharge: HOME | End: 2023-08-18
Payer: MEDICARE

## 2023-08-18 ENCOUNTER — HOSPITAL ENCOUNTER (OUTPATIENT)
Dept: OCCUPATIONAL THERAPY | Age: 73
Setting detail: THERAPIES SERIES
Discharge: HOME | End: 2023-08-18
Payer: MEDICARE

## 2023-08-18 ENCOUNTER — HOSPITAL ENCOUNTER (OUTPATIENT)
Age: 73
End: 2023-08-18
Payer: MEDICARE

## 2023-08-18 DIAGNOSIS — M54.50 ACUTE LOW BACK PAIN, UNSPECIFIED BACK PAIN LATERALITY, UNSPECIFIED WHETHER SCIATICA PRESENT: ICD-10-CM

## 2023-08-18 PROCEDURE — 97014 ELECTRIC STIMULATION THERAPY: CPT

## 2023-08-18 PROCEDURE — 97530 THERAPEUTIC ACTIVITIES: CPT

## 2023-08-18 PROCEDURE — 72100 X-RAY EXAM L-S SPINE 2/3 VWS: CPT

## 2023-08-18 PROCEDURE — 97110 THERAPEUTIC EXERCISES: CPT

## 2023-08-18 NOTE — PROGRESS NOTES
Phone: [phone]                 [facility]           Fax: [phone]                           Outpatient Physical Therapy                                                                            Daily Note    Patient: Chuck De La Paz : 1950  [facility] #: [id number]   Referring Physician: Dashawn Herring, *  Date: 2023       Treatment Diagnosis: Difficulty walking, general weakness    PT Insurance Information: A[employer]na Medicare  Total # of Visits Approved: 12 Per Physician Order  Total # of Visits to Date: 7  No Show: 0  Canceled Appointment: 1    23 Plan of Care/Recert Due    Pre-Treatment Pain: not stated   Subjective: Pt states she has to leave at 2:30 to get imaging done. States she's \"not feeling to bad\". Just finished OT. Exercises:  Exercise 1: HEP: seated B LE ther ex all major joints and planes progressing to 20reps, 2x/day  Exercise 2: Scifit x10 min, L1.0  Exercise 3: Sit/stands x15; no hha for 10 reps then 1 UE with last 5 reps  Exercise 4: Seated B LE ther ex 2#, 2x10, blue tband HS curls and hip abd -- no tband ex today  Exercise 5: Standing heel raises, HS curl and marching at walker x10 ea    Assessment  Assessment: Time limited d/t pt having to leave early to get xray done. Pt tolerated exercises fairly. She was able to take less rest breaks today. She requires cues for closer AD proximity with fair carryover but quickly reverts to forward flexed posture. Will continue as tolerated. Activity Tolerance  Activity Tolerance: Patient tolerated treatment well, Patient limited by endurance    Patient Education  Ex rationale, technique, closer AD proximity  Pt verbalized/demonstrated good understanding:     [x] Yes         [] No, pt required further clarification.        Post Treatment Pain: not stated       Plan  Plan Frequency: 2  Plan weeks: 4       Goals  (Total # of Visits to Date: 7)      Short Term Goals  Time Frame for Short Term Goals: 3 weeks  Short Term

## 2023-08-21 NOTE — RESULT ENCOUNTER NOTE
Please call pt and inform them their xray results show DDD. No acute process. DAHLIA marsh recommended. Can have her see PT if she would like.

## 2023-08-23 ENCOUNTER — HOSPITAL ENCOUNTER (OUTPATIENT)
Dept: PHYSICAL THERAPY | Age: 73
Setting detail: THERAPIES SERIES
Discharge: HOME | End: 2023-08-23
Payer: MEDICARE

## 2023-08-23 ENCOUNTER — HOSPITAL ENCOUNTER (OUTPATIENT)
Dept: OCCUPATIONAL THERAPY | Age: 73
Setting detail: THERAPIES SERIES
Discharge: HOME | End: 2023-08-23
Payer: MEDICARE

## 2023-08-23 NOTE — PROGRESS NOTES
[facility]  Inpatient/Observation/Outpatient Rehabilitation    Date: 2023  Patient Name: Joe Colón       [] Inpatient Acute/Observation       [x]  Outpatient  : 1950       [] Pt no showed for scheduled appointment    [] Pt refused/declined therapy at this time due to:           [] Pt cancelled due to:  [] No Reason Given   [x] Sick/ill   [] Other:          Shirley Gutierrez OTR/L Date: 2023

## 2023-08-23 NOTE — PROGRESS NOTES
Physical Therapy  [facility]  Inpatient/Observation/Outpatient Rehabilitation    Date: 2023  Patient Name: Mely Rao       [] Inpatient Acute/Observation       []  Outpatient  : 1950         [] Pt no showed for scheduled appointment    [] Pt refused/declined therapy at this time due to:           [x] Pt cancelled due to:  [] No Reason Given   [x] Sick/ill   [] Other:    Therapist/Assistant will attempt to see this patient, at our earliest opportunity.        Jacquelin Esposito Date: 2023

## 2023-08-24 ENCOUNTER — HOSPITAL ENCOUNTER (OUTPATIENT)
Dept: CT IMAGING | Age: 73
Discharge: HOME | End: 2023-08-26
Payer: MEDICARE

## 2023-08-24 ENCOUNTER — HOSPITAL ENCOUNTER (OUTPATIENT)
Dept: GENERAL RADIOLOGY | Age: 73
Discharge: HOME | End: 2023-08-26
Payer: MEDICARE

## 2023-08-24 ENCOUNTER — HOSPITAL ENCOUNTER (OUTPATIENT)
Age: 73
Discharge: HOME | End: 2023-08-26
Payer: MEDICARE

## 2023-08-24 ENCOUNTER — HOSPITAL ENCOUNTER (OUTPATIENT)
Age: 73
Discharge: HOME | End: 2023-08-24
Payer: MEDICARE

## 2023-08-24 DIAGNOSIS — R53.83 OTHER FATIGUE: ICD-10-CM

## 2023-08-24 DIAGNOSIS — R15.9 FULL INCONTINENCE OF FECES: ICD-10-CM

## 2023-08-24 DIAGNOSIS — R10.84 GENERALIZED ABDOMINAL PAIN: ICD-10-CM

## 2023-08-24 LAB
ALBUMIN SERPL-MCNC: 3.6 G/DL (ref 3.5–5.2)
ALBUMIN/GLOB SERPL: 1.5 {RATIO} (ref 1–2.5)
ALP SERPL-CCNC: 92 U/L (ref 35–104)
ALT SERPL-CCNC: 38 U/L (ref 5–33)
ANION GAP SERPL CALCULATED.3IONS-SCNC: 9 MMOL/L (ref 9–17)
AST SERPL-CCNC: 40 U/L
BASOPHILS # BLD: 0.04 K/UL (ref 0–0.2)
BASOPHILS NFR BLD: 1 % (ref 0–2)
BILIRUB SERPL-MCNC: 0.6 MG/DL (ref 0.3–1.2)
BILIRUB UR QL STRIP: NEGATIVE
BUN SERPL-MCNC: 15 MG/DL (ref 8–23)
BUN/CREAT SERPL: 25 (ref 9–20)
CALCIUM SERPL-MCNC: 8.4 MG/DL (ref 8.6–10.4)
CHLORIDE SERPL-SCNC: 100 MMOL/L (ref 98–107)
CLARITY UR: CLEAR
CO2 SERPL-SCNC: 28 MMOL/L (ref 20–31)
COLOR UR: YELLOW
CREAT SERPL-MCNC: 0.6 MG/DL (ref 0.5–0.9)
EOSINOPHIL # BLD: 0.17 K/UL (ref 0–0.44)
EOSINOPHILS RELATIVE PERCENT: 2 % (ref 1–4)
EPI CELLS #/AREA URNS HPF: NORMAL /HPF (ref 0–25)
ERYTHROCYTE [DISTWIDTH] IN BLOOD BY AUTOMATED COUNT: 14.2 % (ref 11.8–14.4)
GFR SERPL CREATININE-BSD FRML MDRD: >60 ML/MIN/1.73M2
GLUCOSE SERPL-MCNC: 119 MG/DL (ref 70–99)
GLUCOSE UR STRIP-MCNC: NEGATIVE MG/DL
HCT VFR BLD AUTO: 37.6 % (ref 36.3–47.1)
HGB BLD-MCNC: 12.6 G/DL (ref 11.9–15.1)
HGB UR QL STRIP.AUTO: NEGATIVE
IMM GRANULOCYTES # BLD AUTO: <0.03 K/UL (ref 0–0.3)
IMM GRANULOCYTES NFR BLD: 0 %
KETONES UR STRIP-MCNC: NEGATIVE MG/DL
LEUKOCYTE ESTERASE UR QL STRIP: NEGATIVE
LIPASE SERPL-CCNC: 17 U/L (ref 13–60)
LYMPHOCYTES NFR BLD: 2.87 K/UL (ref 1.1–3.7)
LYMPHOCYTES RELATIVE PERCENT: 36 % (ref 24–43)
MCH RBC QN AUTO: 31.3 PG (ref 25.2–33.5)
MCHC RBC AUTO-ENTMCNC: 33.5 G/DL (ref 28.4–34.8)
MCV RBC AUTO: 93.3 FL (ref 82.6–102.9)
MONOCYTES NFR BLD: 0.47 K/UL (ref 0.1–1.2)
MONOCYTES NFR BLD: 6 % (ref 3–12)
NEUTROPHILS NFR BLD: 55 % (ref 36–65)
NEUTS SEG NFR BLD: 4.48 K/UL (ref 1.5–8.1)
NITRITE UR QL STRIP: NEGATIVE
NRBC BLD-RTO: 0 PER 100 WBC
PH UR STRIP: 6 [PH] (ref 5–9)
PLATELET # BLD AUTO: 277 K/UL (ref 138–453)
PMV BLD AUTO: 9.5 FL (ref 8.1–13.5)
POTASSIUM SERPL-SCNC: 4.1 MMOL/L (ref 3.7–5.3)
PROT SERPL-MCNC: 6 G/DL (ref 6.4–8.3)
PROT UR STRIP-MCNC: NEGATIVE MG/DL
RBC # BLD AUTO: 4.03 M/UL (ref 3.95–5.11)
RBC #/AREA URNS HPF: NORMAL /HPF (ref 0–2)
SODIUM SERPL-SCNC: 137 MMOL/L (ref 135–144)
SP GR UR STRIP: 1.01 (ref 1.01–1.02)
UROBILINOGEN UR STRIP-ACNC: NORMAL EU/DL (ref 0–1)
WBC #/AREA URNS HPF: NORMAL /HPF (ref 0–5)
WBC OTHER # BLD: 8.1 K/UL (ref 3.5–11.3)

## 2023-08-24 PROCEDURE — 74177 CT ABD & PELVIS W/CONTRAST: CPT

## 2023-08-24 PROCEDURE — 6360000004 HC RX CONTRAST MEDICATION: Performed by: NURSE PRACTITIONER

## 2023-08-24 PROCEDURE — 36415 COLL VENOUS BLD VENIPUNCTURE: CPT

## 2023-08-24 PROCEDURE — 83690 ASSAY OF LIPASE: CPT

## 2023-08-24 PROCEDURE — 85025 COMPLETE CBC W/AUTO DIFF WBC: CPT

## 2023-08-24 PROCEDURE — 81001 URINALYSIS AUTO W/SCOPE: CPT

## 2023-08-24 PROCEDURE — 80053 COMPREHEN METABOLIC PANEL: CPT

## 2023-08-24 PROCEDURE — 87086 URINE CULTURE/COLONY COUNT: CPT

## 2023-08-24 PROCEDURE — 71046 X-RAY EXAM CHEST 2 VIEWS: CPT

## 2023-08-24 RX ADMIN — IOPAMIDOL 75 ML: 755 INJECTION, SOLUTION INTRAVENOUS at 15:54

## 2023-08-24 RX ADMIN — IOPAMIDOL 18 ML: 755 INJECTION, SOLUTION INTRAVENOUS at 15:54

## 2023-08-25 ENCOUNTER — APPOINTMENT (OUTPATIENT)
Dept: PHYSICAL THERAPY | Age: 73
End: 2023-08-25
Payer: MEDICARE

## 2023-08-25 ENCOUNTER — HOSPITAL ENCOUNTER (OUTPATIENT)
Dept: OCCUPATIONAL THERAPY | Age: 73
Setting detail: THERAPIES SERIES
Discharge: HOME | End: 2023-08-25
Payer: MEDICARE

## 2023-08-25 LAB
MICROORGANISM SPEC CULT: NORMAL
SPECIMEN DESCRIPTION: NORMAL

## 2023-08-25 NOTE — RESULT ENCOUNTER NOTE
Please call pt and inform them their ct results show now acute process causing nausea; persistent spinal stenosis noted.

## 2023-08-25 NOTE — PROGRESS NOTES
[facility]  Inpatient/Observation/Outpatient Rehabilitation    Date: 2023  Patient Name: Joe Colón       [] Inpatient Acute/Observation       [x]  Outpatient  : 1950         [] Pt no showed for scheduled appointment    [] Pt refused/declined therapy at this time due to:           [x] Pt cancelled due to:  [] No Reason Given   [x] Sick/ill   [] Other:        Shirley Gutierrez OTR/L Date: 2023

## 2023-08-28 ENCOUNTER — HOSPITAL ENCOUNTER (OUTPATIENT)
Age: 73
Setting detail: SPECIMEN
Discharge: HOME | End: 2023-08-28
Payer: MEDICARE

## 2023-08-28 DIAGNOSIS — R53.83 OTHER FATIGUE: ICD-10-CM

## 2023-08-28 DIAGNOSIS — R15.9 FULL INCONTINENCE OF FECES: ICD-10-CM

## 2023-08-28 DIAGNOSIS — R10.84 GENERALIZED ABDOMINAL PAIN: ICD-10-CM

## 2023-08-28 PROCEDURE — 87506 IADNA-DNA/RNA PROBE TQ 6-11: CPT

## 2023-08-29 LAB
CAMPYLOBACTER DNA SPEC NAA+PROBE: NORMAL
ETEC ELTA+ESTB GENES STL QL NAA+PROBE: NORMAL
P SHIGELLOIDES DNA STL QL NAA+PROBE: NORMAL
SALMONELLA DNA SPEC QL NAA+PROBE: NORMAL
SHIGA TOXIN STX GENE SPEC NAA+PROBE: NORMAL
SHIGELLA DNA SPEC QL NAA+PROBE: NORMAL
SPECIMEN DESCRIPTION: NORMAL
V CHOL+PARA RFBL+TRKH+TNAA STL QL NAA+PR: NORMAL
Y ENTERO RECN STL QL NAA+PROBE: NORMAL

## 2023-08-30 ENCOUNTER — HOSPITAL ENCOUNTER (OUTPATIENT)
Dept: OCCUPATIONAL THERAPY | Age: 73
Setting detail: THERAPIES SERIES
Discharge: HOME | End: 2023-08-30
Payer: MEDICARE

## 2023-08-30 ENCOUNTER — HOSPITAL ENCOUNTER (OUTPATIENT)
Dept: PHYSICAL THERAPY | Age: 73
Setting detail: THERAPIES SERIES
Discharge: HOME | End: 2023-08-30
Payer: MEDICARE

## 2023-08-30 NOTE — PLAN OF CARE
[facility]           Phone: [phone]             Outpatient Physical Therapy  Fax: [phone]                                           Date: 2023  Patient: Davide Lira : 1950 CSN #: [id number]   Referring Physician: Guevara Tovar, *      [] Plan of Care   [x] Updated Plan of Care    Dates of Service to Include: 2023 to 23    Diagnosis:  General weakness, R53.1; Low back pain, M54.50    Rehab (Treatment) Diagnosis:  Difficulty walking, general weakness             Onset Date:       Attendance  Total # of Visits to Date: 7 No Show: 0 Canceled Appointment: 1    Assessment  Assessment: Patient has attended 7 PT visits and doctor added script for low back pain in addition to general weakness and pt demo's fwd flexed posture during gait and decreased core strength 3+/5 grossly. Patient to benefit from continued PT for low back pain and general weakness in order to return to PLOF. Goals  Short Term Goals  Time Frame for Short Term Goals: 3 weeks  Short Term Goal 1: Patient to initiate HEP for improved B LE strength and endurance. -met  Short Term Goal 2: Patient to be instructed in functional strengthening and stair negotiation to improve mobility and decrease fall risk.-progressing  Short Term Goal 3: Patient to tolerate 45 min of ther ex/act to improve endurance and functional strength. -met  Short Term Goal 4: Added 23: Pt to be instructed in lumbar ROM and core strengthening to decrease back pain. Short Term Goal 5: Added 23: Initiate manual techniques/modalities prn to decrease back pain and improved mobility. Long Term Goals  Time Frame for Long Term Goals : 6 weeks  Long Term Goal 1: Patient to be independent and compliant with HEP and walking program.  Long Term Goal 2: Patient to ascend/descend 5 steps with HRx1 with no LOB or fatigue to improve mobility.  -

## 2023-09-01 ENCOUNTER — HOSPITAL ENCOUNTER (OUTPATIENT)
Dept: OCCUPATIONAL THERAPY | Age: 73
Setting detail: THERAPIES SERIES
Discharge: HOME | End: 2023-09-01
Payer: MEDICARE

## 2023-09-01 NOTE — PROGRESS NOTES
[facility]  Inpatient/Observation/Outpatient Rehabilitation    Date: 2023  Patient Name: Kacie Juarez       [] Inpatient Acute/Observation       [x]  Outpatient  : 1950     11/10/21  Plan of Care/Recert ends    [] Pt no showed for scheduled appointment    [] Pt refused/declined therapy at this time due to:           [x] Pt cancelled due to:  [] No Reason Given   [] Sick/ill   [x] Other: Pt daughter called to let reception know she found pt on the floor and she would not be here for today's appt.       KELLIE Velazquez Date: 2023

## 2023-09-06 ENCOUNTER — HOSPITAL ENCOUNTER (OUTPATIENT)
Dept: NUCLEAR MEDICINE | Age: 73
Discharge: HOME | End: 2023-09-08
Payer: MEDICARE

## 2023-09-06 ENCOUNTER — HOSPITAL ENCOUNTER (OUTPATIENT)
Age: 73
Discharge: HOME | End: 2023-09-06
Payer: MEDICARE

## 2023-09-06 ENCOUNTER — APPOINTMENT (OUTPATIENT)
Dept: OCCUPATIONAL THERAPY | Age: 73
End: 2023-09-06
Payer: MEDICARE

## 2023-09-06 ENCOUNTER — APPOINTMENT (OUTPATIENT)
Dept: PHYSICAL THERAPY | Age: 73
End: 2023-09-06
Payer: MEDICARE

## 2023-09-06 DIAGNOSIS — R11.0 CHRONIC NAUSEA: ICD-10-CM

## 2023-09-06 DIAGNOSIS — E87.1 LOW SODIUM LEVELS: ICD-10-CM

## 2023-09-06 DIAGNOSIS — E11.628 TYPE 2 DIABETES MELLITUS WITH OTHER SKIN COMPLICATION, WITHOUT LONG-TERM CURRENT USE OF INSULIN (HCC): ICD-10-CM

## 2023-09-06 LAB
ALBUMIN SERPL-MCNC: 3.6 G/DL (ref 3.5–5.2)
ALBUMIN/GLOB SERPL: 1.4 {RATIO} (ref 1–2.5)
ALP SERPL-CCNC: 115 U/L (ref 35–104)
ALT SERPL-CCNC: 107 U/L (ref 5–33)
ANION GAP SERPL CALCULATED.3IONS-SCNC: 10 MMOL/L (ref 9–17)
AST SERPL-CCNC: 67 U/L
BILIRUB SERPL-MCNC: 0.7 MG/DL (ref 0.3–1.2)
BUN SERPL-MCNC: 23 MG/DL (ref 8–23)
BUN/CREAT SERPL: 46 (ref 9–20)
CALCIUM SERPL-MCNC: 8.8 MG/DL (ref 8.6–10.4)
CHLORIDE SERPL-SCNC: 100 MMOL/L (ref 98–107)
CO2 SERPL-SCNC: 30 MMOL/L (ref 20–31)
CREAT SERPL-MCNC: 0.5 MG/DL (ref 0.5–0.9)
GFR SERPL CREATININE-BSD FRML MDRD: >60 ML/MIN/1.73M2
GLUCOSE SERPL-MCNC: 154 MG/DL (ref 70–99)
POTASSIUM SERPL-SCNC: 4.1 MMOL/L (ref 3.7–5.3)
PROT SERPL-MCNC: 6.1 G/DL (ref 6.4–8.3)
SODIUM SERPL-SCNC: 140 MMOL/L (ref 135–144)

## 2023-09-06 PROCEDURE — 36415 COLL VENOUS BLD VENIPUNCTURE: CPT

## 2023-09-06 PROCEDURE — 78264 GASTRIC EMPTYING IMG STUDY: CPT

## 2023-09-06 PROCEDURE — 80053 COMPREHEN METABOLIC PANEL: CPT

## 2023-09-06 PROCEDURE — A9541 TC99M SULFUR COLLOID: HCPCS | Performed by: NURSE PRACTITIONER

## 2023-09-06 PROCEDURE — 83036 HEMOGLOBIN GLYCOSYLATED A1C: CPT

## 2023-09-06 PROCEDURE — 3430000000 HC RX DIAGNOSTIC RADIOPHARMACEUTICAL: Performed by: NURSE PRACTITIONER

## 2023-09-06 RX ADMIN — Medication 1 MILLICURIE: at 08:45

## 2023-09-07 LAB
EST. AVERAGE GLUCOSE BLD GHB EST-MCNC: 117 MG/DL
HBA1C MFR BLD: 5.7 % (ref 4–6)

## 2023-09-08 ENCOUNTER — HOSPITAL ENCOUNTER (OUTPATIENT)
Dept: PHYSICAL THERAPY | Age: 73
Setting detail: THERAPIES SERIES
Discharge: HOME | End: 2023-09-08
Payer: MEDICARE

## 2023-09-08 ENCOUNTER — HOSPITAL ENCOUNTER (OUTPATIENT)
Dept: OCCUPATIONAL THERAPY | Age: 73
Setting detail: THERAPIES SERIES
Discharge: HOME | End: 2023-09-08
Payer: MEDICARE

## 2023-09-08 PROCEDURE — 97530 THERAPEUTIC ACTIVITIES: CPT

## 2023-09-08 PROCEDURE — 97110 THERAPEUTIC EXERCISES: CPT

## 2023-09-08 NOTE — PROGRESS NOTES
ambulation this date to progress endurance, she tolerated 80 ft with FWW and does require frequent cuing to maintain safe proximity to AD. Pt declined need for modalities d/t having a  appt. Will continue as tolerated. Activity Tolerance  Activity Tolerance: Patient limited by endurance    Patient Education  Ex technique, safety with AD, increased activity at home  Pt verbalized/demonstrated good understanding:     [x] Yes         [] No, pt required further clarification. Post Treatment Pain:  4/10      Plan  Plan Frequency: 2  Plan weeks: 4       Goals  (Total # of Visits to Date: 5)      Short Term Goals  Time Frame for Short Term Goals: 3 weeks  Short Term Goal 1: Patient to initiate HEP for improved B LE strength and endurance. -met  Short Term Goal 2: Patient to be instructed in functional strengthening and stair negotiation to improve mobility and decrease fall risk.-progressing  Short Term Goal 3: Patient to tolerate 45 min of ther ex/act to improve endurance and functional strength. -met  Short Term Goal 4: Added 8/30/23: Pt to be instructed in lumbar ROM and core strengthening to decrease back pain. Short Term Goal 5: Added 8/30/23: Initiate manual techniques/modalities prn to decrease back pain and improved mobility. Long Term Goals  Time Frame for Long Term Goals : 6 weeks  Long Term Goal 1: Patient to be independent and compliant with HEP and walking program.  Long Term Goal 2: Patient to ascend/descend 5 steps with HRx1 with no LOB or fatigue to improve mobility. - progressing  Long Term Goal 3: Patient to complete 8 sit/stands with hhax1 and no LOB from std chair to improve functional strength and muscular power. -met completed without UE support  Long Term Goal 4: Patient to have improved Tinetti balance score >/=20/28 to decrease fall risk. Long Term Goal 5: Added 8/30/23: Pt to have improved core and B hip strength >/=4/5 grossly for improved lumbar stability.   Long term goal 6: Added

## 2023-09-13 ENCOUNTER — HOSPITAL ENCOUNTER (OUTPATIENT)
Dept: PHYSICAL THERAPY | Age: 73
Setting detail: THERAPIES SERIES
Discharge: HOME | End: 2023-09-13
Payer: MEDICARE

## 2023-09-13 ENCOUNTER — HOSPITAL ENCOUNTER (OUTPATIENT)
Dept: OCCUPATIONAL THERAPY | Age: 73
Setting detail: THERAPIES SERIES
Discharge: HOME | End: 2023-09-13
Payer: MEDICARE

## 2023-09-13 PROCEDURE — 97110 THERAPEUTIC EXERCISES: CPT

## 2023-09-13 PROCEDURE — 97014 ELECTRIC STIMULATION THERAPY: CPT

## 2023-09-13 PROCEDURE — 97140 MANUAL THERAPY 1/> REGIONS: CPT

## 2023-09-13 NOTE — PROGRESS NOTES
Phone: [phone]                 \[facility]\""[facility]           Fax: [phone]                           Outpatient Physical Therapy                                                                            Daily Note    Patient: Susan Silva : 1950  [facility] #: [id number]   Referring Physician: Hema Mackenzie, *  Date: 2023       Treatment Diagnosis: Difficulty walking, general weakness; low back pain    PT Insurance Information: [employer] Medicare  Total # of Visits Approved: 20 Per Physician Order  Total # of Visits to Date: 10  No Show: 0  Canceled Appointment: 1    23 Plan of Care/Recert Due    Pre-Treatment Pain: 5.5 /10  Subjective: Pt had OT prior to PT then had to use bathroom. Pt reports her low back pain has increased since Monday from her hair appt. She states she still has increased pain but believes this was from her fall. Reports her pain before pain medication was 5.5/10. Exercises:  Exercise 1: HEP: seated B LE ther ex all major joints and planes progressing to 20reps, 2x/day  Exercise 3: Sit/stands x15; no hha for 10 reps then 1 UE with last 5 reps --- 2x10 times today with 1 to 2 BUE support  Exercise 4: Seated B LE ther ex 2#, 2x10 ball squeeze between knee , blue tband HS curls and hip abd -- no weights today  Exercise 6: Seated ball rollouts 10\"x10 ea way  Exercise 8: Standing ab iso's 3-5\" hold x10  Exercise 9: YTB retract and LAE 2x10  Exercise 10: Amb small loop 1x FWW- 80ft    Manual:  Soft Tissue Mobilizaton: Heated thermaprobe to B low back to decrease tightness/pain    Assessment  Assessment: Pt instructed on f/u with doctor d/t ongoing soreness/increased pain since fall that has also caused decrease in strength. Progressed core strengthening with standing tband exercises today. Heated thermaprobe completed to low back to decrease tone and pain. WIll continue as tolerated.     Activity Tolerance  Activity Tolerance: Patient limited by

## 2023-09-15 ENCOUNTER — HOSPITAL ENCOUNTER (OUTPATIENT)
Dept: OCCUPATIONAL THERAPY | Age: 73
Setting detail: THERAPIES SERIES
Discharge: HOME | End: 2023-09-15
Payer: MEDICARE

## 2023-09-15 ENCOUNTER — HOSPITAL ENCOUNTER (OUTPATIENT)
Dept: PHYSICAL THERAPY | Age: 73
Setting detail: THERAPIES SERIES
Discharge: HOME | End: 2023-09-15
Payer: MEDICARE

## 2023-09-15 NOTE — PROGRESS NOTES
[facility]  Inpatient/Observation/Outpatient Rehabilitation    Date: 9/15/2023  Patient Name: Adolph Rodríguez       [] Inpatient Acute/Observation       []  Outpatient  : 1950         [] Pt no showed for scheduled appointment    [] Pt refused/declined therapy at this time due to:           [x] Pt cancelled due to:  [] No Reason Given   [] Sick/ill   [] Other:    Patient is going to ty to get into the Dr. Lilliana Rivas. They changed her med and she is getting slightly dizzy at times. Therapist/Assistant will attempt to see this patient, at our earliest opportunity.        Asif Vinson Date: 9/15/2023

## 2023-09-20 ENCOUNTER — HOSPITAL ENCOUNTER (OUTPATIENT)
Dept: PHYSICAL THERAPY | Age: 73
Setting detail: THERAPIES SERIES
Discharge: HOME | End: 2023-09-20
Payer: MEDICARE

## 2023-09-20 PROCEDURE — 97110 THERAPEUTIC EXERCISES: CPT

## 2023-09-20 PROCEDURE — 97140 MANUAL THERAPY 1/> REGIONS: CPT

## 2023-09-20 NOTE — PROGRESS NOTES
Phone: [phone]                 [facility]           Fax: [phone]                           Outpatient Physical Therapy                                                                            Daily Note    Patient: Andreea Rubio : 1950  CSN #: [id number]   Referring Physician: Vanesa Novak, *  Date: 2023    Diagnosis: General weakness, R53.1; Low back pain, M54.50  Treatment Diagnosis: Difficulty walking, general weakness; low back pain    PT Insurance Information: [employer] Medicare  Total # of Visits Approved: 20 Per Physician Order  Total # of Visits to Date: 11  No Show: 0  Canceled Appointment: 1    23 Plan of Care/Recert Due    Pre-Treatment Pain:  5-6/10  Subjective: Pt reports 5-6/10 LBP today, stating it was worse over the weekend from not moving much. Pt states she has had an increase in dizziness since thursday. Exercises:  Exercise 1: HEP: seated B LE ther ex all major joints and planes progressing to 20reps, 2x/day  Exercise 3: Sit/stands x15; no hha for 10 reps then 1 UE with last 5 reps --- 2x10 times today with 1 to 2 BUE support  Exercise 4: Seated B LE ther ex 2#, 2x10 ball squeeze between knee , blue tband HS curls and hip abd -- no weights today  Exercise 5: Standing heel raises, HS curl and marching at walker x10 ea  Exercise 6: Seated ball rollouts 10\"x10 ea way  Exercise 8: Standing ab iso's 3-5\" hold x10  Exercise 9: YTB retract and LAE 2x10    Manual:  Soft Tissue Mobilizaton: Heated thermaprobe to B low back to decrease tightness/pain  Assessment  Assessment: Continue with heated thermaprobe to LB for pain and tightness, good relief. Pt limited with exercises this visit d/t increased dizziness, pt completed 90% of exercises seated.  Continue to progress    Activity Tolerance  Activity Tolerance: Patient limited by endurance    Patient Education     Pt verbalized/demonstrated good understanding:     [x] Yes         [] No, pt required further

## 2023-09-21 ENCOUNTER — HOSPITAL ENCOUNTER (OUTPATIENT)
Age: 73
Setting detail: SPECIMEN
Discharge: HOME | End: 2023-09-21

## 2023-09-21 DIAGNOSIS — R79.89 ELEVATED LFTS: ICD-10-CM

## 2023-09-21 LAB
ALBUMIN SERPL-MCNC: 3.2 G/DL (ref 3.5–5.2)
ALBUMIN/GLOB SERPL: 1.1 {RATIO} (ref 1–2.5)
ALP SERPL-CCNC: 103 U/L (ref 35–104)
ALT SERPL-CCNC: 42 U/L (ref 5–33)
ANION GAP SERPL CALCULATED.3IONS-SCNC: 12 MMOL/L (ref 9–17)
AST SERPL-CCNC: 52 U/L
BILIRUB SERPL-MCNC: 0.6 MG/DL (ref 0.3–1.2)
BUN SERPL-MCNC: 20 MG/DL (ref 8–23)
CALCIUM SERPL-MCNC: 8.4 MG/DL (ref 8.6–10.4)
CHLORIDE SERPL-SCNC: 96 MMOL/L (ref 98–107)
CO2 SERPL-SCNC: 29 MMOL/L (ref 20–31)
CREAT SERPL-MCNC: 0.6 MG/DL (ref 0.5–0.9)
GFR SERPL CREATININE-BSD FRML MDRD: >60 ML/MIN/1.73M2
GLUCOSE SERPL-MCNC: 106 MG/DL (ref 70–99)
POTASSIUM SERPL-SCNC: 3.9 MMOL/L (ref 3.7–5.3)
PROT SERPL-MCNC: 6.1 G/DL (ref 6.4–8.3)
SODIUM SERPL-SCNC: 137 MMOL/L (ref 135–144)

## 2023-09-22 ENCOUNTER — TRANSCRIBE ORDERS (OUTPATIENT)
Dept: ADMINISTRATIVE | Age: 73
End: 2023-09-22

## 2023-09-22 ENCOUNTER — HOSPITAL ENCOUNTER (OUTPATIENT)
Dept: PHYSICAL THERAPY | Age: 73
Setting detail: THERAPIES SERIES
Discharge: HOME | End: 2023-09-22
Payer: MEDICARE

## 2023-09-22 DIAGNOSIS — R00.2 PALPITATIONS: Primary | ICD-10-CM

## 2023-09-22 PROCEDURE — 97110 THERAPEUTIC EXERCISES: CPT

## 2023-09-22 PROCEDURE — 97140 MANUAL THERAPY 1/> REGIONS: CPT

## 2023-09-22 NOTE — RESULT ENCOUNTER NOTE
Please call pt and inform them their labwork results show improved LFT's, continue to stay away from tylenol, continue current meds and f/u

## 2023-09-22 NOTE — PROGRESS NOTES
Phone: [phone]                 [facility]           Fax: [phone]                           Outpatient Physical Therapy                                                                            Daily Note    Patient: Larry Snell : 1950  [facility] #: [id number]   Referring Physician: Fransisco Medrano, *  Date: 2023    Diagnosis: General weakness, R53.1; Low back pain, M54.50  Treatment Diagnosis: Difficulty walking, general weakness; low back pain    PT Insurance Information: [employer] Medicare  Total # of Visits Approved: 20 Per Physician Order  Total # of Visits to Date: 12  No Show: 0  Canceled Appointment: 1    23 Plan of Care/Recert Due    Pre-Treatment Pain:  4-5/10  Subjective: Pt reports 4-5/10 LBP today. Pt states she is doing better today than last tx. Pt states she only had 1 dizzy spell today. Exercises:  Exercise 1: HEP: seated B LE ther ex all major joints and planes progressing to 20reps, 2x/day  Exercise 2: Scifit x10 min, L 2.5  Exercise 3: Sit/stands x15; no hha for 10 reps then 1 UE with last 5 reps --- 2x10 times today with 1  to no UE support  Exercise 6: Seated ball rollouts 10\"x10 ea way  Exercise 10:  Amb large loop 1x FWW-    Tinetti Balance    Sitting Balance: Steady, safe  Arises: Able, uses arms to help  Attempts to Arise: Able to arise, one attempt  Immediate Standing Balance (First 5 Seconds): Steady but uses walker or other support  Standing Balance: Steady but wide stance, uses cane or other support  Nudged: Staggers, grabs, catches self  Eyes Closed: Unsteady  Turned 360 Degrees: Steadiness: Unsteady (grabs, staggers)  Turned 360 Degrees: Continuity of Steps: Discontinuous steps  Sitting Down: Uses arms or not a smooth motion  Balance Score: 8/16 Initiation of Gait: No hesitancy  Step Height: R Swing Foot: Right foot does not clear floor completely with step  Step Length: R Swing Foot: Passes left stance foot  Step Height: L Swing Foot: Left foot

## 2023-09-26 ENCOUNTER — HOSPITAL ENCOUNTER (OUTPATIENT)
Dept: PHYSICAL THERAPY | Age: 73
Setting detail: THERAPIES SERIES
Discharge: HOME | End: 2023-09-26
Payer: MEDICARE

## 2023-09-26 PROCEDURE — 97162 PT EVAL MOD COMPLEX 30 MIN: CPT

## 2023-09-29 ENCOUNTER — HOSPITAL ENCOUNTER (OUTPATIENT)
Age: 73
End: 2023-09-29
Payer: MEDICARE

## 2023-09-29 VITALS
WEIGHT: 187 LBS | SYSTOLIC BLOOD PRESSURE: 118 MMHG | BODY MASS INDEX: 35.3 KG/M2 | HEIGHT: 61 IN | DIASTOLIC BLOOD PRESSURE: 65 MMHG

## 2023-09-29 DIAGNOSIS — R00.2 PALPITATIONS: ICD-10-CM

## 2023-09-29 DIAGNOSIS — M79.89 LEG SWELLING: ICD-10-CM

## 2023-09-29 DIAGNOSIS — R60.0 LOCALIZED EDEMA: ICD-10-CM

## 2023-09-29 LAB
ECHO AO ROOT DIAM: 3.3 CM
ECHO AO ROOT INDEX: 1.79 CM/M2
ECHO AV ACCELERATION TIME: 92.55 MS
ECHO AV CUSP MM: 1.9 CM
ECHO AV MEAN GRADIENT: 6 MMHG
ECHO AV MEAN VELOCITY: 1.2 M/S
ECHO AV PEAK VELOCITY: 1.7 M/S
ECHO AV VTI: 43.8 CM
ECHO BSA: 1.91 M2
ECHO BSA: 1.91 M2
ECHO LA DIAMETER INDEX: 1.63 CM/M2
ECHO LA DIAMETER: 3 CM
ECHO LA MAJOR AXIS: 5.7 CM
ECHO LA TO AORTIC ROOT RATIO: 0.91
ECHO LA VOL 2C: 45 ML (ref 22–52)
ECHO LA VOL 4C: 29 ML (ref 22–52)
ECHO LA VOL BP: 36 ML (ref 22–52)
ECHO LA VOL/BSA BIPLANE: 20 ML/M2 (ref 16–34)
ECHO LA VOLUME AREA LENGTH: 39 ML
ECHO LA VOLUME INDEX A2C: 24 ML/M2 (ref 16–34)
ECHO LA VOLUME INDEX A4C: 16 ML/M2 (ref 16–34)
ECHO LA VOLUME INDEX AREA LENGTH: 21 ML/M2 (ref 16–34)
ECHO LV E' LATERAL VELOCITY: 11 CM/S
ECHO LV EDV A2C: 62 ML
ECHO LV EDV A4C: 43 ML
ECHO LV EDV BP: 52 ML (ref 56–104)
ECHO LV EDV INDEX A4C: 23 ML/M2
ECHO LV EDV INDEX BP: 28 ML/M2
ECHO LV EDV NDEX A2C: 34 ML/M2
ECHO LV EJECTION FRACTION BIPLANE: 54 % (ref 55–100)
ECHO LV ESV A2C: 27 ML
ECHO LV ESV A4C: 19 ML
ECHO LV ESV BP: 24 ML (ref 19–49)
ECHO LV ESV INDEX A2C: 15 ML/M2
ECHO LV ESV INDEX A4C: 10 ML/M2
ECHO LV ESV INDEX BP: 13 ML/M2
ECHO LV FRACTIONAL SHORTENING: 26 % (ref 28–44)
ECHO LV INTERNAL DIMENSION DIASTOLE INDEX: 1.85 CM/M2
ECHO LV INTERNAL DIMENSION DIASTOLIC: 3.4 CM (ref 3.9–5.3)
ECHO LV INTERNAL DIMENSION SYSTOLIC INDEX: 1.36 CM/M2
ECHO LV INTERNAL DIMENSION SYSTOLIC: 2.5 CM
ECHO LV IVSD: 1.2 CM (ref 0.6–0.9)
ECHO LV IVSS: 1.6 CM
ECHO LV MASS 2D: 138.8 G (ref 67–162)
ECHO LV MASS INDEX 2D: 75.4 G/M2 (ref 43–95)
ECHO LV POSTERIOR WALL DIASTOLIC: 1.3 CM (ref 0.6–0.9)
ECHO LV POSTERIOR WALL SYSTOLIC: 1.6 CM
ECHO LV RELATIVE WALL THICKNESS RATIO: 0.76
ECHO MV A VELOCITY: 1.36 M/S
ECHO MV E DECELERATION TIME (DT): 380.6 MS
ECHO MV E VELOCITY: 1.16 M/S
ECHO MV E/A RATIO: 0.85
ECHO MV E/E' LATERAL: 10.55
ECHO PV MAX VELOCITY: 1 M/S
ECHO TV REGURGITANT MAX VELOCITY: 2.1 M/S

## 2023-09-29 PROCEDURE — 93306 TTE W/DOPPLER COMPLETE: CPT | Performed by: FAMILY MEDICINE

## 2023-09-29 PROCEDURE — 93306 TTE W/DOPPLER COMPLETE: CPT

## 2023-09-29 PROCEDURE — 93243 EXT ECG>48HR<7D SCAN A/R: CPT

## 2023-10-02 NOTE — RESULT ENCOUNTER NOTE
Please call pt and inform them their cardiac echo results are stable. No changes from previous.   Continue current tx plan

## 2023-10-09 LAB — ECHO BSA: 1.91 M2

## 2023-11-14 ENCOUNTER — HOSPITAL ENCOUNTER (OUTPATIENT)
Dept: PHYSICAL THERAPY | Age: 73
Setting detail: THERAPIES SERIES
Discharge: HOME | End: 2023-11-14
Payer: MEDICARE

## 2023-11-14 PROCEDURE — 97140 MANUAL THERAPY 1/> REGIONS: CPT

## 2023-11-14 PROCEDURE — 97161 PT EVAL LOW COMPLEX 20 MIN: CPT

## 2023-11-14 NOTE — PROGRESS NOTES
Phone: [phone]                       [facility]          Fax: [phone]                      Outpatient Physical Therapy                                                             Lymphedema Evaluation  Date: 2023  Patient: Hayley Pineda  : 1950  CSN #: [id number]  Referring Physician: Referring Provider (secondary): OLIVIA Mcgrath CNP    Diagnosis: BLE lymphedema    Treatment Diagnosis: BLE lymphedema     Total # of Visits Approved: 10   Total # of Visits to Date: 1  Canceled Appointment: 0     Subjective  Subjective: Pt reports she started with BLE swelling a few months ago and it has progressively gotten worse. Pt denies pain in her legs but reports numbness. Pt is on a lasix once daily which has slightly helped her leg swelling. Pt denies any weeping, wounds or history of cellulitis.  Pt reports she wears compression socks daily  Additional Pertinent Hx: Arthritis, HTN, DM, depression    Lymph Assessment      Skin Integumentary:   Pitting Scale Area 1: 1+  Skin Texture: Dry  Turgor: Shiney/hard  Edema Rebound: Quick  Stemmer Sign: Positive    Signs of Constriction (if applicable):   Papilloma (benign tumor arising from an epithelial layer): No  Fibrotic Areas: No  Lymphorrhea: No    Stage of Lymphedema: Stage 2: Spontaneously Irreversible Stage  Description:      Lymphedema Classification:   Type: Secondary Lymphedema  Left: Moderate     Right: Moderate    Measurements: Area Measured: R LE, L LE (R LE 3\" above ankle 29cm, 6\" above 39.6: L LE 3\" above 31.3cm, 6\" above 41.7cm)      Right Measurements Left Measurements   R LE Pre Girth Measurement (cm)  5th Tuberosity (cm): 22.1  Lateral malleolus: 28.8  Calf (cm): 50.3  Mid Knee (cm): 50.5  Total Girth (cm): 151.7 L LE Pre Girth Measurement (cm)  5th Tuberosity (cm): 23.3  Lateral malleolus: 30.2  Calf (cm): 48.8  Mid Knee (cm): 49.7  Total Girth (cm): 152         Functional Outcomes  Lymphedema Life Impact Scale (LLIS) -

## 2023-11-14 NOTE — PLAN OF CARE
[facility]           Phone: [phone]             Outpatient Physical Therapy  Fax: [phone]                                           Date: 2023  Patient: Elio Paiz : 1950 [facility] #: [id number]   Referring Physician: Roque Moreno, *      [x] Plan of Care   [] Updated Plan of Care    Dates of Service to Include: 2023 to 12/15/23    Diagnosis:  BLE lymphedema    Rehab (Treatment) Diagnosis:  BLE lymphedema             Onset Date:       Attendance  Total # of Visits to Date: 1   Canceled Appointment: 0    Assessment  Assessment: Pt is a 75year old female that presents with BLE lymphedema that has progressively been getting wrose over the past few months. Pt will benefit from skilled PT for lymphedema management. Goals  Short Term Goals  Time Frame for Short Term Goals: 3 weeks  Short Term Goal 1: Pt will be educated on her POC and lymphedema management-met  Short Term Goal 2: Pt will intiate pump protocol in order to reduce BLE girth measurements  Short Term Goal 3: .  Short Term Goal 4: . Short Term Goal 5: .  Long Term Goals  Time Frame for Long Term Goals : 6 weeks  Long Term Goal 1: Pt will be safe and independent with her lymphedema management  Long Term Goal 2: Pt will demosntrate a 2-3 cm decrease in girth measurements in order to increase ambulation tolerance and reduce leg heaviness  Long Term Goal 3: Pt will be fitted for an at home pump in order to be independent with her lymphedema management  Long Term Goal 4: Pt will report 70% improvement in symptoms  Long Term Goal 5: . Brayden Number   Long term goal 6: .     Prognosis  Therapy Prognosis: Good    Treatment Plan   Plan Frequency: 2  Plan weeks: 6  [] HP/CP      [] Electrical Stim   [x] Therapeutic Exercise      [] Gait Training  [] Aquatics   [] Ultrasound         [x] Patient Education/HEP   [x] Manual Therapy  [] Traction    []

## 2023-11-21 ENCOUNTER — HOSPITAL ENCOUNTER (OUTPATIENT)
Dept: PHYSICAL THERAPY | Age: 73
Setting detail: THERAPIES SERIES
Discharge: HOME | End: 2023-11-21
Payer: MEDICARE

## 2023-11-21 PROCEDURE — 97110 THERAPEUTIC EXERCISES: CPT

## 2023-11-21 PROCEDURE — 97016 VASOPNEUMATIC DEVICE THERAPY: CPT

## 2023-11-21 PROCEDURE — 97140 MANUAL THERAPY 1/> REGIONS: CPT

## 2023-11-22 NOTE — PROGRESS NOTES
Phone: [address]          Fax: [phone]                      Outpatient Physical Therapy                                                             Lymphedema Treatment  Date: 2023  Patient: Basil Holstein  : 1950  [facility] #: [id number]  Referring Physician: Referring Provider (secondary): OLIVIA Hermosillo CNP    Diagnosis: BLE lymphedema    Treatment Diagnosis: BLE lymphedema  PT Insurance Information: [employer] Medicare  Total # of Visits Approved: 10   Total # of Visits to Date: 2  No Show: 0  Canceled Appointment: 0     Subjective  Subjective: Pt. arrives to therapy and stated LEs are a little more swollen today.   Additional Pertinent Hx: Arthritis, HTN, DM, depression    Skin Integumentary:   Pitting Scale Area 1: 1+  Skin Color: Pale  Skin Texture: Dry  Skin Condition/Temp: Cold  Turgor: Shiney/hard  Edema Rebound: Quick  Stemmer Sign: Positive    Signs of Constriction (if applicable):   Papilloma (benign tumor arising from an epithelial layer): No  Fibrotic Areas: No  Lymphorrhea: No    Stage of Lymphedema: Stage 2: Spontaneously Irreversible Stage  Description:      Lymphedema Classification:   Type: Secondary Lymphedema  Left: Moderate     Right: Moderate      Exercises:  Exercise 1: HEP pt educated on keeping her legs elevated and compressed at 30mmHg, perform daily exrecises including ankle pumps and reduce sodium and sugar intake    Manual:  Other: MLD to B LEs     Skin Integumentary  Skin Color: Pale  Skin Condition/Temp: Cold  Turgor: Shiney/hard  RLE Complete Decongestion Therapy  Scale: Stage 2  Lymph Drainage Pattern: Lower extremity  Compression Technique: Vaso-pneumatic pump  Force (mmHg): 35 mmHg  Duration: 60 minutes  LLE Complete Decongestion Therapy  Scale: Stage 2  Lymph Drainage Pattern: Lower extremity  Compression Technique: Vaso-pneumatic pump  Force (mmHg): 35 mmHg  Duration : 60 minutes  Other Decongestion Therapy  Force

## 2023-11-29 ENCOUNTER — HOSPITAL ENCOUNTER (OUTPATIENT)
Age: 73
Discharge: HOME | End: 2023-12-01

## 2023-11-29 ENCOUNTER — HOSPITAL ENCOUNTER (OUTPATIENT)
Dept: PHYSICAL THERAPY | Age: 73
Setting detail: THERAPIES SERIES
Discharge: HOME | End: 2023-11-29
Payer: MEDICARE

## 2023-11-29 PROCEDURE — 97016 VASOPNEUMATIC DEVICE THERAPY: CPT

## 2023-11-29 PROCEDURE — 97110 THERAPEUTIC EXERCISES: CPT

## 2023-11-29 PROCEDURE — 97140 MANUAL THERAPY 1/> REGIONS: CPT

## 2023-11-29 NOTE — PROGRESS NOTES
Phone: [address]          Fax: [phone]                      Outpatient Physical Therapy                                                             Lymphedema Treatment  Date: 2023  Patient: Tonya Valdez  : 1950  [facility] #: [id number]  Referring Physician: Referring Provider (secondary): OLIVIA Winchester CNP    Diagnosis: BLE lymphedema    Treatment Diagnosis: BLE lymphedema  PT Insurance Information: A[employer]na Medicare  Total # of Visits Approved: 10   Total # of Visits to Date: 3  No Show: 0  Canceled Appointment: 0     Subjective  Subjective: Pt states she feels ok today. Pt states her legs felt good after last tx, that she was able to walk better for a few hours before they started to swell again. Pt states she does have some swelling in her hips and abdomen. Additional Pertinent Hx: Arthritis, HTN, DM, depression    Lymph Assessment  Skin Integumentary:   Skin Integrity: Scars (comment)  Pitting Scale Area 1: 1+  Skin Color: Pale  Skin Texture: Hyperkeratosis  Skin Condition/Temp: Cool  Turgor: Shiney/hard  Edema Rebound: Quick    Signs of Constriction (if applicable):   Papilloma (benign tumor arising from an epithelial layer): No  Fibrotic Areas: No  Lymphorrhea: No    Stage of Lymphedema: Stage 2: Spontaneously Irreversible Stage  Description:      Lymphedema Classification:   Type: Secondary Lymphedema  Left: Moderate     Right: Moderate    Measurements: Area Measured: R LE, L LE (R LE: 3\" above 27 cm, 6\" above 34 cm.  L LE: 3\" above 31.6 cm, 6\" above 39.5 cm.)      Right Measurements Left Measurements   R LE Pre Girth Measurement (cm)  5th Tuberosity (cm): 23.2  Lateral malleolus: 30.4  Calf (cm): 48.7  Mid Knee (cm): 55.9  Thigh (cm): 61.3  Total Girth (cm): 219.5 L LE Pre Girth Measurement (cm)  5th Tuberosity (cm): 22.5  Lateral malleolus: 31.3  Calf (cm): 48  Mid Knee (cm): 49.5  Thigh (cm): 56  Total Girth (cm): 207.3

## 2023-12-01 ENCOUNTER — HOSPITAL ENCOUNTER (OUTPATIENT)
Dept: PHYSICAL THERAPY | Age: 73
Setting detail: THERAPIES SERIES
Discharge: HOME | End: 2023-12-01
Payer: MEDICARE

## 2023-12-01 ENCOUNTER — HOSPITAL ENCOUNTER (OUTPATIENT)
Dept: GENERAL RADIOLOGY | Age: 73
End: 2023-12-01
Payer: MEDICARE

## 2023-12-01 DIAGNOSIS — R13.10 DYSPHAGIA, UNSPECIFIED TYPE: ICD-10-CM

## 2023-12-01 DIAGNOSIS — M81.0 POSTMENOPAUSAL OSTEOPOROSIS: Primary | ICD-10-CM

## 2023-12-01 PROCEDURE — 74220 X-RAY XM ESOPHAGUS 1CNTRST: CPT

## 2023-12-01 PROCEDURE — 2500000003 HC RX 250 WO HCPCS: Performed by: NURSE PRACTITIONER

## 2023-12-01 PROCEDURE — 97140 MANUAL THERAPY 1/> REGIONS: CPT

## 2023-12-01 PROCEDURE — 97110 THERAPEUTIC EXERCISES: CPT

## 2023-12-01 PROCEDURE — 97016 VASOPNEUMATIC DEVICE THERAPY: CPT

## 2023-12-01 RX ORDER — ALBUTEROL SULFATE 90 UG/1
4 AEROSOL, METERED RESPIRATORY (INHALATION) PRN
OUTPATIENT
Start: 2023-12-01

## 2023-12-01 RX ORDER — ONDANSETRON 2 MG/ML
8 INJECTION INTRAMUSCULAR; INTRAVENOUS
OUTPATIENT
Start: 2023-12-01

## 2023-12-01 RX ORDER — DIPHENHYDRAMINE HYDROCHLORIDE 50 MG/ML
50 INJECTION INTRAMUSCULAR; INTRAVENOUS
OUTPATIENT
Start: 2023-12-01

## 2023-12-01 RX ORDER — SODIUM CHLORIDE 9 MG/ML
INJECTION, SOLUTION INTRAVENOUS CONTINUOUS
OUTPATIENT
Start: 2023-12-01

## 2023-12-01 RX ORDER — EPINEPHRINE 1 MG/ML
0.3 INJECTION, SOLUTION, CONCENTRATE INTRAVENOUS PRN
OUTPATIENT
Start: 2023-12-01

## 2023-12-01 RX ORDER — ACETAMINOPHEN 325 MG/1
650 TABLET ORAL
OUTPATIENT
Start: 2023-12-01

## 2023-12-01 RX ADMIN — BARIUM SULFATE 355 ML: 0.6 SUSPENSION ORAL at 10:09

## 2023-12-04 ENCOUNTER — HOSPITAL ENCOUNTER (OUTPATIENT)
Dept: INFUSION THERAPY | Age: 73
Discharge: HOME | End: 2023-12-04
Payer: MEDICARE

## 2023-12-04 ENCOUNTER — HOSPITAL ENCOUNTER (OUTPATIENT)
Dept: PHYSICAL THERAPY | Age: 73
Setting detail: THERAPIES SERIES
Discharge: HOME | End: 2023-12-04
Payer: MEDICARE

## 2023-12-04 VITALS
TEMPERATURE: 97.4 F | HEART RATE: 62 BPM | SYSTOLIC BLOOD PRESSURE: 117 MMHG | RESPIRATION RATE: 18 BRPM | DIASTOLIC BLOOD PRESSURE: 54 MMHG

## 2023-12-04 DIAGNOSIS — M81.0 POSTMENOPAUSAL OSTEOPOROSIS: Primary | ICD-10-CM

## 2023-12-04 PROCEDURE — 97016 VASOPNEUMATIC DEVICE THERAPY: CPT

## 2023-12-04 PROCEDURE — 97140 MANUAL THERAPY 1/> REGIONS: CPT

## 2023-12-04 PROCEDURE — 97110 THERAPEUTIC EXERCISES: CPT

## 2023-12-04 PROCEDURE — 96372 THER/PROPH/DIAG INJ SC/IM: CPT

## 2023-12-04 PROCEDURE — 6360000002 HC RX W HCPCS: Performed by: NURSE PRACTITIONER

## 2023-12-04 RX ORDER — ALBUTEROL SULFATE 90 UG/1
4 AEROSOL, METERED RESPIRATORY (INHALATION) PRN
OUTPATIENT
Start: 2024-06-02

## 2023-12-04 RX ORDER — ONDANSETRON 2 MG/ML
8 INJECTION INTRAMUSCULAR; INTRAVENOUS
OUTPATIENT
Start: 2024-06-02

## 2023-12-04 RX ORDER — EPINEPHRINE 1 MG/ML
0.3 INJECTION, SOLUTION, CONCENTRATE INTRAVENOUS PRN
OUTPATIENT
Start: 2024-06-02

## 2023-12-04 RX ORDER — ACETAMINOPHEN 325 MG/1
650 TABLET ORAL
OUTPATIENT
Start: 2024-06-02

## 2023-12-04 RX ORDER — DIPHENHYDRAMINE HYDROCHLORIDE 50 MG/ML
50 INJECTION INTRAMUSCULAR; INTRAVENOUS
OUTPATIENT
Start: 2024-06-02

## 2023-12-04 RX ORDER — FAMOTIDINE 10 MG/ML
20 INJECTION, SOLUTION INTRAVENOUS
OUTPATIENT
Start: 2024-06-02

## 2023-12-04 RX ORDER — SODIUM CHLORIDE 9 MG/ML
INJECTION, SOLUTION INTRAVENOUS CONTINUOUS
OUTPATIENT
Start: 2024-06-02

## 2023-12-04 RX ADMIN — DENOSUMAB 60 MG: 60 INJECTION SUBCUTANEOUS at 11:03

## 2023-12-04 NOTE — DISCHARGE INSTRUCTIONS
Outpatient Discharge Instructions for Prolia Injections    [address]  [address] [facility]   [phone]      You are advised to carry out the following Instructions:    Diet:  As prescribed by your Physician. Activity:  As prescribed by your Physician. Care of injection site:  If the injection site becomes red,sore,swollen,painful, has drainage,or you develop a fever,notify your Physician. Other:    If you develop hives,rash,itching or trouble breathing, go to the nearest Emergency Room. These could be a sign of allergic reaction. Continue to take your calcium and vitamin D.  Let your dentist know that you are taking Prolia. Let your doctor know if you have any unusual jaw or leg bone pain. Take good care of your teeth,see a dentist often. Injection is every 6 months.       Follow up appointment:          ANY PROBLEMS OR CONCERNS NOTIFY YOUR PHYSICIAN   OR    GO THE NEAREST EMERGENCY ROOM

## 2023-12-04 NOTE — PROGRESS NOTES
Phone: [address]          Fax: [phone]                      Outpatient Physical Therapy                                                             Lymphedema Treatment  Date: 2023  Patient: Di Palmer  : 1950  [facility] #: [id number]  Referring Physician: Referring Provider (secondary): OLIVIA Curiel CNP    Diagnosis: BLE lymphedema    Treatment Diagnosis: BLE lymphedema  PT Insurance Information: A[employer]na Medicare  Total # of Visits Approved: 10   Total # of Visits to Date: 5  No Show: 0  Canceled Appointment: 0     Subjective  Subjective: Pt states her legs feel bigger today, she has been running most of the morning.   Additional Pertinent Hx: Arthritis, HTN, DM, depression    Lymph Assessment  Skin Integumentary:   Skin Integrity: Scars (comment)  Pitting Scale Area 1: 1+  Skin Color: Pale  Skin Texture: Hyperkeratosis  Skin Condition/Temp: Cool  Turgor: Shiney/hard  Edema Rebound: Quick  Stemmer Sign: Positive    Signs of Constriction (if applicable):   Papilloma (benign tumor arising from an epithelial layer): No  Fibrotic Areas: No  Lymphorrhea: No    Stage of Lymphedema: Stage 2: Spontaneously Irreversible Stage  Description:      Lymphedema Classification:   Type: Secondary Lymphedema  Left: Moderate     Right: Moderate    Exercises:  Exercise 1: HEP pt educated on keeping her legs elevated and compressed at 30mmHg, perform daily exrecises including ankle pumps and reduce sodium and sugar intake    Manual:  Other: MLD to B LEs     Skin Integumentary  Skin Color: Pale  Skin Condition/Temp: Cool  Skin Integrity: Scars (comment)  Turgor: Shiney/hard  RLE Complete Decongestion Therapy  Scale: Stage 2  Lymph Drainage Pattern: Lower extremity  Compression Technique: Vaso-pneumatic pump  Force (mmHg): 45 mmHg  Duration: 60 minutes  LLE Complete Decongestion Therapy  Scale: Stage 2  Lymph Drainage Pattern: Lower extremity  Compression Technique:

## 2023-12-06 ENCOUNTER — HOSPITAL ENCOUNTER (OUTPATIENT)
Dept: PHYSICAL THERAPY | Age: 73
Setting detail: THERAPIES SERIES
Discharge: HOME | End: 2023-12-06
Payer: MEDICARE

## 2023-12-06 PROCEDURE — 97110 THERAPEUTIC EXERCISES: CPT

## 2023-12-06 PROCEDURE — 97140 MANUAL THERAPY 1/> REGIONS: CPT

## 2023-12-06 PROCEDURE — 97016 VASOPNEUMATIC DEVICE THERAPY: CPT

## 2023-12-06 NOTE — PROGRESS NOTES
Phone: [address]          Fax: [phone]                      Outpatient Physical Therapy                                                             Lymphedema Treatment  Date: 2023  Patient: Manda Salgado  : 1950  [facility] #: [id number]  Referring Physician: Referring Provider (secondary): OLIVIA Cruz CNP    Diagnosis: BLE lymphedema    Treatment Diagnosis: BLE lymphedema  PT Insurance Information: A[employer]na Medicare  Total # of Visits Approved: 10   Total # of Visits to Date: 6  No Show: 0  Canceled Appointment: 0     Subjective  Subjective: Pt states her legs are ok today, didn't wear her compression today but has been wearing them. Additional Pertinent Hx: Arthritis, HTN, DM, depression    Lymph Assessment  Skin Integumentary:   Skin Integrity: Scars (comment)  Pitting Scale Area 1: 1+  Skin Color: Hyperpigmentation, Pale  Skin Texture: Hyperkeratosis  Skin Condition/Temp: Cool  Turgor: Shiney/hard  Edema Rebound: Quick  Stemmer Sign: Positive    Signs of Constriction (if applicable):   Papilloma (benign tumor arising from an epithelial layer): No  Fibrotic Areas: No  Lymphorrhea: No    Stage of Lymphedema: Stage 2: Spontaneously Irreversible Stage  Description:      Lymphedema Classification:   Type: Secondary Lymphedema  Left: Moderate     Right: Moderate    Measurements: Area Measured: R LE, L LE (R LE: 3\" above 30.4 cm, 6\" above 37.8 cm.  L LE: 3\" above 29.9 cm, 6\" above 43 cm.)      Right Measurements Left Measurements   R LE Pre Girth Measurement (cm)  5th Tuberosity (cm): 23.3  Lateral malleolus: 28.8  Calf (cm): 49.5  Mid Knee (cm): 53  Thigh (cm): 57  Total Girth (cm): 211.6 L LE Pre Girth Measurement (cm)  5th Tuberosity (cm): 23.3  Lateral malleolus: 29.2  Calf (cm): 49.3  Mid Knee (cm): 50.3  Thigh (cm): 56.4  Total Girth (cm): 208.5       Exercises:  Exercise 1: HEP pt educated on keeping her legs elevated and compressed at 30mmHg,

## 2023-12-11 ENCOUNTER — HOSPITAL ENCOUNTER (OUTPATIENT)
Dept: PHYSICAL THERAPY | Age: 73
Setting detail: THERAPIES SERIES
Discharge: HOME | End: 2023-12-11
Payer: MEDICARE

## 2023-12-11 PROCEDURE — 97110 THERAPEUTIC EXERCISES: CPT

## 2023-12-11 PROCEDURE — 97016 VASOPNEUMATIC DEVICE THERAPY: CPT

## 2023-12-11 PROCEDURE — 97140 MANUAL THERAPY 1/> REGIONS: CPT

## 2023-12-11 NOTE — PROGRESS NOTES
Phone: [address]          Fax: [phone]                      Outpatient Physical Therapy                                                             Lymphedema Treatment  Date: 2023  Patient: Aly Ayon  : 1950  [facility] #: [id number]  Referring Physician: Referring Provider (secondary): OLIVIA Colmenares CNP    Diagnosis: BLE lymphedema    Treatment Diagnosis: BLE lymphedema  PT Insurance Information: Aetna Medicare  Total # of Visits Approved: 10   Total # of Visits to Date: 7  No Show: 0  Canceled Appointment: 0     Subjective  Subjective: Pt states she is doing good today, wearing her compression socks daily. Pt states she talked to her insurance  on friday and was discussing compression with her.   Additional Pertinent Hx: Arthritis, HTN, DM, depression    Lymph Assessment  Skin Integumentary:   Skin Integrity: Scars (comment)  Pitting Scale Area 1: 1+  Skin Color: Hyperpigmentation, Pink  Skin Texture: Hyperkeratosis  Skin Condition/Temp: Cool  Turgor: Shiney/hard  Edema Rebound: Quick  Stemmer Sign: Positive    Signs of Constriction (if applicable):   Papilloma (benign tumor arising from an epithelial layer): No  Fibrotic Areas: No  Lymphorrhea: No    Stage of Lymphedema: Stage 2: Spontaneously Irreversible Stage  Description:      Lymphedema Classification:   Type: Secondary Lymphedema  Left: Moderate     Right: Moderate    Exercises:  Exercise 1: HEP pt educated on keeping her legs elevated and compressed at 30mmHg, perform daily exrecises including ankle pumps and reduce sodium and sugar intake    Manual:  Other: MLD to B LEs     Skin Integumentary  Skin Color: Hyperpigmentation, Pink  Skin Condition/Temp: Cool  Skin Integrity: Scars (comment)  Turgor: Shiney/hard  RLE Complete Decongestion Therapy  Scale: Stage 2  Lymph Drainage Pattern: Lower extremity  Compression Technique: Vaso-pneumatic pump  Force (mmHg): 50

## 2023-12-12 ENCOUNTER — OFFICE VISIT (OUTPATIENT)
Dept: PRIMARY CARE CLINIC | Age: 73
End: 2023-12-12
Payer: MEDICARE

## 2023-12-12 VITALS
DIASTOLIC BLOOD PRESSURE: 70 MMHG | HEART RATE: 63 BPM | WEIGHT: 191.8 LBS | OXYGEN SATURATION: 95 % | BODY MASS INDEX: 36.21 KG/M2 | SYSTOLIC BLOOD PRESSURE: 118 MMHG | HEIGHT: 61 IN

## 2023-12-12 DIAGNOSIS — F33.42 RECURRENT MAJOR DEPRESSIVE DISORDER, IN FULL REMISSION (HCC): ICD-10-CM

## 2023-12-12 DIAGNOSIS — I10 ESSENTIAL HYPERTENSION: ICD-10-CM

## 2023-12-12 DIAGNOSIS — F41.9 ANXIETY AND DEPRESSION: ICD-10-CM

## 2023-12-12 DIAGNOSIS — E78.2 MIXED HYPERLIPIDEMIA: ICD-10-CM

## 2023-12-12 DIAGNOSIS — H93.8X2 SENSATION OF FULLNESS IN LEFT EAR: Primary | ICD-10-CM

## 2023-12-12 DIAGNOSIS — E11.69 TYPE 2 DIABETES MELLITUS WITH OTHER SPECIFIED COMPLICATION, WITHOUT LONG-TERM CURRENT USE OF INSULIN (HCC): ICD-10-CM

## 2023-12-12 DIAGNOSIS — M51.36 DDD (DEGENERATIVE DISC DISEASE), LUMBAR: ICD-10-CM

## 2023-12-12 DIAGNOSIS — F32.A ANXIETY AND DEPRESSION: ICD-10-CM

## 2023-12-12 PROCEDURE — 99215 OFFICE O/P EST HI 40 MIN: CPT | Performed by: FAMILY MEDICINE

## 2023-12-12 PROCEDURE — 3078F DIAST BP <80 MM HG: CPT | Performed by: FAMILY MEDICINE

## 2023-12-12 PROCEDURE — 3074F SYST BP LT 130 MM HG: CPT | Performed by: FAMILY MEDICINE

## 2023-12-12 PROCEDURE — 1123F ACP DISCUSS/DSCN MKR DOCD: CPT | Performed by: FAMILY MEDICINE

## 2023-12-12 PROCEDURE — 3044F HG A1C LEVEL LT 7.0%: CPT | Performed by: FAMILY MEDICINE

## 2023-12-12 RX ORDER — METOCLOPRAMIDE 5 MG/1
5 TABLET ORAL 4 TIMES DAILY
COMMUNITY

## 2023-12-12 NOTE — PROGRESS NOTES
[facility]      Patient:  Nora Beltrán 75 y.o. female     Date of Service: 12/12/23      Chief Complaint:   Chief Complaint   Patient presents with    New Patient         History of Present Illness     Has concerns of feelings of vertigo/ringing in the ears, fullness ongoing for the past several months. Does endorse some dizziness on occasion. No current reported changes in hearing loss. No recent head trauma, head injuries or falls. Has not had a formal hearing/inner ear evaluation. Not currently following up with ENT. No current reported focal neuro deficits. Chronically maintained on Lipitor 10 mg daily for hyperlipidemia, doing well without muscle aches or intolerances. History of type 2 diabetes most recent A1c 9/2023 5.7. No current hyper/hypoglycemic symptoms. Currently maintained on Januvia. Tolerating medication well without side effects or intolerances. Hypertension stable on medications, asymptomatic at this time. Taking Zoloft 100 mg daily for history anxiety/depression. Doing well on the   Medication with good symptomatic improvement. No current SI/HI    The patient also has a history significant for chronic back pain with degenerative disc disease. She was advised no NSAIDs in the past given history of gastric bypass. She did previously take Tylenol several times per day however was recommended to discontinue Tylenol given elevated LFTs. She currently does not follow with any backs specialist or spine specialist/pain management.  No current urine incontinence/retention, no saddle paresthesias she is interested in getting therapeutic corticosteroid injections of the back    Allergies:    Ibuprofen    Medication List:    Current Outpatient Medications   Medication Sig Dispense Refill    metoclopramide (REGLAN) 5 MG tablet Take 1 tablet by mouth 4 times daily      furosemide (LASIX) 40 MG tablet Take 1 tablet by mouth daily 90 tablet 0    gabapentin

## 2023-12-13 ENCOUNTER — HOSPITAL ENCOUNTER (OUTPATIENT)
Dept: PHYSICAL THERAPY | Age: 73
Setting detail: THERAPIES SERIES
Discharge: HOME | End: 2023-12-13
Payer: MEDICARE

## 2023-12-13 ENCOUNTER — TELEPHONE (OUTPATIENT)
Dept: PRIMARY CARE CLINIC | Age: 73
End: 2023-12-13

## 2023-12-13 DIAGNOSIS — M15.9 PRIMARY OSTEOARTHRITIS INVOLVING MULTIPLE JOINTS: ICD-10-CM

## 2023-12-13 DIAGNOSIS — M51.36 DDD (DEGENERATIVE DISC DISEASE), LUMBAR: Primary | ICD-10-CM

## 2023-12-13 DIAGNOSIS — R53.81 PHYSICAL DECONDITIONING: ICD-10-CM

## 2023-12-13 PROCEDURE — 97110 THERAPEUTIC EXERCISES: CPT

## 2023-12-13 PROCEDURE — 97140 MANUAL THERAPY 1/> REGIONS: CPT

## 2023-12-13 PROCEDURE — 97016 VASOPNEUMATIC DEVICE THERAPY: CPT

## 2023-12-13 NOTE — TELEPHONE ENCOUNTER
[facility] called and asked if patient can get home health order for evaluate and treat? [facility]-once complete, needs faxed to [phone]. If unable to send home order, call Cecilia Sahni or Tamara Norris at [facility] at [phone] opt.  1

## 2023-12-13 NOTE — PROGRESS NOTES
Phone: [address]          Fax: [phone]                      Outpatient Physical Therapy                                                             Lymphedema Treatment  Date: 2023  Patient: Elio Paiz  : 1950  [facility] #: [id number]  Referring Physician: Referring Provider (secondary): OLIVIA Zamarripa CNP    Diagnosis: BLE lymphedema    Treatment Diagnosis: BLE lymphedema  PT Insurance Information: Aetna Medicare  Total # of Visits Approved: 10   Total # of Visits to Date: 8  No Show: 0  Canceled Appointment: 0     Subjective  Subjective: Pt states she is doing good, compliant with compression and elevation daily.   Additional Pertinent Hx: Arthritis, HTN, DM, depression    Lymph Assessment  Skin Integumentary:   Skin Integrity: Scars (comment)  Pitting Scale Area 1: 1+  Skin Color: Hyperpigmentation, Pink  Skin Texture: Hyperkeratosis  Skin Condition/Temp: Cool  Turgor: Shiney/hard  Edema Rebound: Quick  Stemmer Sign: Positive    Signs of Constriction (if applicable):   Papilloma (benign tumor arising from an epithelial layer): No  Fibrotic Areas: No  Lymphorrhea: No    Stage of Lymphedema: Stage 2: Spontaneously Irreversible Stage  Description:      Lymphedema Classification:   Type: Secondary Lymphedema  Left: Moderate     Right: Moderate    Measurements: Area Measured: R LE, L LE      Right Measurements Left Measurements   R LE Pre Girth Measurement (cm)  5th Tuberosity (cm): 23.5  Lateral malleolus: 30.3  Calf (cm): 51.2  Mid Knee (cm): 54  Thigh (cm): 60  Total Girth (cm): 219 L LE Pre Girth Measurement (cm)  5th Tuberosity (cm): 24.4  Lateral malleolus: 29.4  Calf (cm): 47.2  Mid Knee (cm): 49  Thigh (cm): 53  Total Girth (cm): 203       Exercises:  Exercise 1: HEP pt educated on keeping her legs elevated and compressed at 30mmHg, perform daily exrecises including ankle pumps and reduce sodium and sugar intake    Manual:  Other: MLD to B

## 2023-12-28 ENCOUNTER — HOSPITAL ENCOUNTER (OUTPATIENT)
Dept: PHYSICAL THERAPY | Age: 73
Setting detail: THERAPIES SERIES
Discharge: HOME | End: 2023-12-28
Payer: MEDICARE

## 2023-12-28 ENCOUNTER — HOSPITAL ENCOUNTER (OUTPATIENT)
Age: 73
Discharge: HOME | End: 2023-12-28
Payer: MEDICARE

## 2023-12-28 DIAGNOSIS — E11.69 TYPE 2 DIABETES MELLITUS WITH OTHER SPECIFIED COMPLICATION, WITHOUT LONG-TERM CURRENT USE OF INSULIN (HCC): ICD-10-CM

## 2023-12-28 DIAGNOSIS — I10 ESSENTIAL HYPERTENSION: ICD-10-CM

## 2023-12-28 DIAGNOSIS — E78.2 MIXED HYPERLIPIDEMIA: ICD-10-CM

## 2023-12-28 DIAGNOSIS — F33.42 RECURRENT MAJOR DEPRESSIVE DISORDER, IN FULL REMISSION (HCC): ICD-10-CM

## 2023-12-28 LAB
ALBUMIN SERPL-MCNC: 3.6 G/DL (ref 3.5–5.2)
ALBUMIN/GLOB SERPL: 1.3 {RATIO} (ref 1–2.5)
ALP SERPL-CCNC: 115 U/L (ref 35–104)
ALT SERPL-CCNC: 75 U/L (ref 5–33)
ANION GAP SERPL CALCULATED.3IONS-SCNC: 11 MMOL/L (ref 9–17)
AST SERPL-CCNC: 96 U/L
BASOPHILS # BLD: 0.07 K/UL (ref 0–0.2)
BASOPHILS NFR BLD: 1 % (ref 0–2)
BILIRUB SERPL-MCNC: 0.7 MG/DL (ref 0.3–1.2)
BUN SERPL-MCNC: 21 MG/DL (ref 8–23)
BUN/CREAT SERPL: 42 (ref 9–20)
CALCIUM SERPL-MCNC: 8.5 MG/DL (ref 8.6–10.4)
CHLORIDE SERPL-SCNC: 99 MMOL/L (ref 98–107)
CO2 SERPL-SCNC: 29 MMOL/L (ref 20–31)
CREAT SERPL-MCNC: 0.5 MG/DL (ref 0.5–0.9)
EOSINOPHIL # BLD: 0.22 K/UL (ref 0–0.44)
EOSINOPHILS RELATIVE PERCENT: 3 % (ref 1–4)
ERYTHROCYTE [DISTWIDTH] IN BLOOD BY AUTOMATED COUNT: 13.9 % (ref 11.8–14.4)
EST. AVERAGE GLUCOSE BLD GHB EST-MCNC: 71 MG/DL
GFR SERPL CREATININE-BSD FRML MDRD: >60 ML/MIN/1.73M2
GLUCOSE SERPL-MCNC: 109 MG/DL (ref 70–99)
HBA1C MFR BLD: 4.1 % (ref 4–6)
HCT VFR BLD AUTO: 34.7 % (ref 36.3–47.1)
HGB BLD-MCNC: 11.2 G/DL (ref 11.9–15.1)
IMM GRANULOCYTES # BLD AUTO: 0 K/UL (ref 0–0.3)
IMM GRANULOCYTES NFR BLD: 0 %
LYMPHOCYTES NFR BLD: 2.44 K/UL (ref 1.1–3.7)
LYMPHOCYTES RELATIVE PERCENT: 33 % (ref 24–43)
MCH RBC QN AUTO: 32.2 PG (ref 25.2–33.5)
MCHC RBC AUTO-ENTMCNC: 32.3 G/DL (ref 28.4–34.8)
MCV RBC AUTO: 99.7 FL (ref 82.6–102.9)
MONOCYTES NFR BLD: 0.44 K/UL (ref 0.1–1.2)
MONOCYTES NFR BLD: 6 % (ref 3–12)
MORPHOLOGY: ABNORMAL
NEUTROPHILS NFR BLD: 57 % (ref 36–65)
NEUTS SEG NFR BLD: 4.23 K/UL (ref 1.5–8.1)
NRBC BLD-RTO: 0 PER 100 WBC
PLATELET # BLD AUTO: ABNORMAL K/UL (ref 138–453)
PLATELET, FLUORESCENCE: 188 K/UL (ref 138–453)
PLATELETS.RETICULATED NFR BLD AUTO: 4.9 % (ref 1.1–10.3)
POTASSIUM SERPL-SCNC: 4.2 MMOL/L (ref 3.7–5.3)
PROT SERPL-MCNC: 6.3 G/DL (ref 6.4–8.3)
RBC # BLD AUTO: 3.48 M/UL (ref 3.95–5.11)
SODIUM SERPL-SCNC: 139 MMOL/L (ref 135–144)
WBC OTHER # BLD: 7.4 K/UL (ref 3.5–11.3)

## 2023-12-28 PROCEDURE — 85025 COMPLETE CBC W/AUTO DIFF WBC: CPT

## 2023-12-28 PROCEDURE — 97016 VASOPNEUMATIC DEVICE THERAPY: CPT

## 2023-12-28 PROCEDURE — 36415 COLL VENOUS BLD VENIPUNCTURE: CPT

## 2023-12-28 PROCEDURE — 97140 MANUAL THERAPY 1/> REGIONS: CPT

## 2023-12-28 PROCEDURE — 97110 THERAPEUTIC EXERCISES: CPT

## 2023-12-28 PROCEDURE — 83036 HEMOGLOBIN GLYCOSYLATED A1C: CPT

## 2023-12-28 PROCEDURE — 80053 COMPREHEN METABOLIC PANEL: CPT

## 2023-12-28 NOTE — PROGRESS NOTES
Phone: [address]          Fax: [phone]                      Outpatient Physical Therapy                                                             Lymphedema Treatment  Date: 2023  Patient: Mireya Oliva  : 1950  [facility] #: [id number]  Referring Physician: Referring Provider (secondary): OLIVIA Liao CNP    Diagnosis: BLE lymphedema    Treatment Diagnosis: BLE lymphedema  PT Insurance Information: Aetna Medicare  Total # of Visits Approved: 19   Total # of Visits to Date: 10  No Show: 0  Canceled Appointment: 0     Subjective  Subjective: Pt states she has noticed she can get her legs in shower better. Pt states she is compliant with compression daily. Additional Pertinent Hx: Arthritis, HTN, DM, depression    Lymph Assessment  Skin Integumentary:   Skin Integrity: Scars (comment)  Pitting Scale Area 1: 0  Skin Color: Hyperpigmentation, Pink  Skin Texture: Hyperkeratosis  Skin Condition/Temp: Cool  Turgor: Shiney/hard  Edema Rebound: Quick  Stemmer Sign: Positive    Signs of Constriction (if applicable):   Papilloma (benign tumor arising from an epithelial layer): No  Fibrotic Areas: Yes  Lymphorrhea: No    Stage of Lymphedema: Stage 2: Spontaneously Irreversible Stage  Description:      Lymphedema Classification:   Type: Secondary Lymphedema  Left: Moderate     Right: Moderate    Measurements: Area Measured: R LE, L LE (R LE: 3\" above 25.8 cm, 6\" above 31.8 cm.  L LE: 3\" above 26.4 cm, 6\" above 35.4 cm.)      Right Measurements Left Measurements   R LE Pre Girth Measurement (cm)  5th Tuberosity (cm): 22.1  Lateral malleolus: 27.5  Calf (cm): 48.6  Mid Knee (cm): 50  Thigh (cm): 56.4  Total Girth (cm): 204.6 L LE Pre Girth Measurement (cm)  5th Tuberosity (cm): 22.3  Lateral malleolus: 30.2  Calf (cm): 45  Mid Knee (cm): 46.2  Thigh (cm): 51.2  Total Girth (cm): 194.9       Exercises:  Exercise 1: HEP pt educated on keeping her legs elevated

## 2024-01-02 ENCOUNTER — TELEPHONE (OUTPATIENT)
Dept: PRIMARY CARE CLINIC | Age: 74
End: 2024-01-02

## 2024-01-02 DIAGNOSIS — R74.8 ELEVATED ALKALINE PHOSPHATASE LEVEL: ICD-10-CM

## 2024-01-02 DIAGNOSIS — R79.89 ELEVATED LFTS: Primary | ICD-10-CM

## 2024-01-02 DIAGNOSIS — D64.9 ANEMIA, UNSPECIFIED TYPE: ICD-10-CM

## 2024-01-02 NOTE — TELEPHONE ENCOUNTER
Please advise hemoglobin slightly below normal which can likely indicate iron deficiency anemia or b12 deficiency which we will confirm with further testing.  Liver enzymes also elevated which have been for 1 year. Likely fatty liver or a possible medication causing the elevation. We will check a directed liver ultrasound to see the structure and follow closely.

## 2024-01-02 NOTE — TELEPHONE ENCOUNTER
----- Message from Norm Polk sent at 1/2/2024 11:05 AM EST -----  Subject: Results Request    QUESTIONS  Results: labs; Ordered by: Angelica Richter   Date Performed: 2023-12-28  ---------------------------------------------------------------------------  --------------  CALL BACK INFO    [phone]; OK to leave message on voicemail  ---------------------------------------------------------------------------  --------------

## 2024-01-04 ENCOUNTER — TELEPHONE (OUTPATIENT)
Dept: PRIMARY CARE CLINIC | Age: 74
End: 2024-01-04

## 2024-01-04 ENCOUNTER — HOSPITAL ENCOUNTER (OUTPATIENT)
Dept: PHYSICAL THERAPY | Age: 74
Setting detail: THERAPIES SERIES
Discharge: HOME | End: 2024-01-04
Payer: COMMERCIAL

## 2024-01-04 ENCOUNTER — APPOINTMENT (OUTPATIENT)
Dept: PHYSICAL THERAPY | Age: 74
End: 2024-01-04
Payer: COMMERCIAL

## 2024-01-04 DIAGNOSIS — I89.0 LYMPHEDEMA: Primary | ICD-10-CM

## 2024-01-04 PROCEDURE — 97016 VASOPNEUMATIC DEVICE THERAPY: CPT

## 2024-01-04 PROCEDURE — 97110 THERAPEUTIC EXERCISES: CPT

## 2024-01-04 PROCEDURE — 97140 MANUAL THERAPY 1/> REGIONS: CPT

## 2024-01-04 NOTE — PROGRESS NOTES
Therapy  Scale: Stage 2  Lymph Drainage Pattern: Lower extremity  Compression Technique: Vaso-pneumatic pump  Force (mmHg): 50 mmHg  Duration : 30 minutes  Other Decongestion Therapy  Force (mmHg): 50 mmHg  Duration : 30 minutes  Skin Integumentary  Skin Color: Hyperpigmentation, Pink  Skin Condition/Temp: Cool  Skin Integrity: Scars (comment)  Turgor: Shiney/hard  Other Decongestion Therapy  Force (mmHg): 50 mmHg  Duration : 30 minutes      Assessment  Assessment: MLD to B LE followed by compression pumps at 50 mmHg for 30'. PT. reportsno increased paina nd noted increased skin movement post compression pump use.  Therapy Prognosis: Good    Patient Education    Pt verbalized/demonstrated good understanding:     [x] Yes         [] No, pt required further clarification.    Goals  Short Term Goals  Time Frame for Short Term Goals: 3 weeks  Short Term Goal 1: Pt will be educated on her POC and lymphedema management-met  Short Term Goal 2: Pt will intiate pump protocol in order to reduce BLE girth measurements-met    Long Term Goals  Time Frame for Long Term Goals : 6 weeks  Long Term Goal 1: Pt will be safe and independent with her lymphedema management-progressing  Long Term Goal 2: Pt will demosntrate a 2-3 cm decrease in girth measurements in order to increase ambulation tolerance and reduce leg heaviness-progressing  Long Term Goal 3: Pt will be fitted for an at home pump in order to be independent with her lymphedema management-MET  Long Term Goal 4: Pt will report 70% improvement in symptoms-progressing        Minutes Tracking:  Time In: 0948  Time Out: 1050  Minutes: 62       Kya Parker PTA,   Date: 1/4/2024  Electronically signed by Kya Parker PTA on 1/4/2024 at 1:42 PM

## 2024-01-04 NOTE — TELEPHONE ENCOUNTER
Ranjit with [employer] called in in regards to patients DME order for compression stockings. They no longer do them. I spoke to Dr. Kent and he stated that he advises people to get them from [employer], how would you like to proceed?

## 2024-01-10 ENCOUNTER — TELEPHONE (OUTPATIENT)
Dept: PRIMARY CARE CLINIC | Age: 74
End: 2024-01-10

## 2024-01-10 ENCOUNTER — APPOINTMENT (OUTPATIENT)
Dept: PHYSICAL THERAPY | Age: 74
End: 2024-01-10
Payer: COMMERCIAL

## 2024-01-10 DIAGNOSIS — M51.36 DDD (DEGENERATIVE DISC DISEASE), LUMBAR: Primary | ICD-10-CM

## 2024-01-10 DIAGNOSIS — M15.9 PRIMARY OSTEOARTHRITIS INVOLVING MULTIPLE JOINTS: ICD-10-CM

## 2024-01-10 DIAGNOSIS — R53.81 PHYSICAL DECONDITIONING: ICD-10-CM

## 2024-01-10 NOTE — TELEPHONE ENCOUNTER
Pt needs a new referral to [facility] that has to say for medicaid nursing. Phone number [phone] for [facility]

## 2024-01-11 ENCOUNTER — TELEPHONE (OUTPATIENT)
Dept: PRIMARY CARE CLINIC | Age: 74
End: 2024-01-11

## 2024-01-15 ENCOUNTER — TELEPHONE (OUTPATIENT)
Dept: PRIMARY CARE CLINIC | Age: 74
End: 2024-01-15

## 2024-01-15 NOTE — TELEPHONE ENCOUNTER
Patient called regarding a note for therapy. Couldn't understand what she wanted. Something about Braces?

## 2024-01-17 ENCOUNTER — APPOINTMENT (OUTPATIENT)
Dept: PHYSICAL THERAPY | Age: 74
End: 2024-01-17
Payer: COMMERCIAL

## 2024-01-19 RX ORDER — ONDANSETRON 4 MG/1
TABLET, FILM COATED ORAL
Qty: 15 TABLET | Refills: 0 | Status: SHIPPED | OUTPATIENT
Start: 2024-01-19

## 2024-01-25 RX ORDER — AMLODIPINE BESYLATE 5 MG/1
5 TABLET ORAL DAILY
Qty: 90 TABLET | Refills: 1 | Status: SHIPPED | OUTPATIENT
Start: 2024-01-25 | End: 2024-07-23

## 2024-01-25 NOTE — TELEPHONE ENCOUNTER
Patient states this is the first time you will have to fill as she had some left from when she saw Abdoul.

## 2024-01-29 ENCOUNTER — OFFICE VISIT (OUTPATIENT)
Dept: PRIMARY CARE CLINIC | Age: 74
End: 2024-01-29
Payer: COMMERCIAL

## 2024-01-29 VITALS
HEART RATE: 65 BPM | DIASTOLIC BLOOD PRESSURE: 68 MMHG | OXYGEN SATURATION: 95 % | SYSTOLIC BLOOD PRESSURE: 132 MMHG | HEIGHT: 61 IN | WEIGHT: 191 LBS | BODY MASS INDEX: 36.06 KG/M2

## 2024-01-29 DIAGNOSIS — D64.9 ANEMIA, UNSPECIFIED TYPE: Primary | ICD-10-CM

## 2024-01-29 DIAGNOSIS — I72.8 SPLENIC ARTERY ANEURYSM (HCC): ICD-10-CM

## 2024-01-29 DIAGNOSIS — R47.89 ALTERATION IN SPEECH: ICD-10-CM

## 2024-01-29 DIAGNOSIS — I89.0 LYMPHEDEMA: ICD-10-CM

## 2024-01-29 DIAGNOSIS — F33.42 RECURRENT MAJOR DEPRESSIVE DISORDER, IN FULL REMISSION (HCC): ICD-10-CM

## 2024-01-29 DIAGNOSIS — E11.69 TYPE 2 DIABETES MELLITUS WITH OTHER SPECIFIED COMPLICATION, WITHOUT LONG-TERM CURRENT USE OF INSULIN (HCC): ICD-10-CM

## 2024-01-29 DIAGNOSIS — R10.32 LEFT LOWER QUADRANT PAIN: ICD-10-CM

## 2024-01-29 PROCEDURE — 3075F SYST BP GE 130 - 139MM HG: CPT | Performed by: FAMILY MEDICINE

## 2024-01-29 PROCEDURE — 99215 OFFICE O/P EST HI 40 MIN: CPT | Performed by: FAMILY MEDICINE

## 2024-01-29 PROCEDURE — G2211 COMPLEX E/M VISIT ADD ON: HCPCS | Performed by: FAMILY MEDICINE

## 2024-01-29 PROCEDURE — 1123F ACP DISCUSS/DSCN MKR DOCD: CPT | Performed by: FAMILY MEDICINE

## 2024-01-29 PROCEDURE — 3078F DIAST BP <80 MM HG: CPT | Performed by: FAMILY MEDICINE

## 2024-01-29 NOTE — PROGRESS NOTES
[facility]      Patient:  Elle Vincent 73 y.o. female     Date of Service: 12/12/23      Chief Complaint:   Chief Complaint   Patient presents with    Follow-up         History of Present Illness     Chronically maintained on Lipitor 10 mg daily for hyperlipidemia, doing well without muscle aches or intolerances.    History of type 2 diabetes most recent A1c 12/2023 4.1.  No current hyper/hypoglycemic symptoms.  Currently maintained on Januvia.  Tolerating medication well without side effects or intolerances.    Hypertension stable on medications, asymptomatic at this time.    Taking Zoloft 100 mg daily for history anxiety/depression. Doing well on the   Medication with good symptomatic improvement.  No current SI/HI.    Patient was also interested in obtaining a head CT for consideration of changes in her speech.  She notes that she occasionally will lose and forget words.  Otherwise doing well and remains at baseline neurologically otherwise.  Is able to conduct her own ADLs, IADLs.  Does not have any auditory or visual hallucinations.  Lives with sister does not currently lose her direction in her own place of residence. No recent head injury or head trauma     Also had concerns of 1 episode of left lower quadrant abdominal pain that lasted for few hours and was eliminated with the use of OTC medications ibuprofen, aspirin.  No current issues with voiding or stooling at this time.  No urinary symptoms/constipation issues    Allergies:    Ibuprofen    Medication List:    Current Outpatient Medications   Medication Sig Dispense Refill    amLODIPine (NORVASC) 5 MG tablet Take 1 tablet by mouth daily 90 tablet 1    ondansetron (ZOFRAN) 4 MG tablet TAKE 1 TABLET BY MOUTH EVERY 8 HOURS AS NEEDED FOR NAUSEA FOR VOMITING 15 tablet 0    metoclopramide (REGLAN) 5 MG tablet Take 1 tablet by mouth 4 times daily      furosemide (LASIX) 40 MG tablet Take 1 tablet by mouth daily 90 tablet 0

## 2024-01-30 ENCOUNTER — TELEPHONE (OUTPATIENT)
Dept: PRIMARY CARE CLINIC | Age: 74
End: 2024-01-30

## 2024-01-30 NOTE — TELEPHONE ENCOUNTER
Patient called in stating that yesterday her and the doctor discussed a colonoscopy. She stated she does not want to get that completed, but would complete a Cologuard. Please advise.

## 2024-02-07 ENCOUNTER — TELEPHONE (OUTPATIENT)
Dept: PRIMARY CARE CLINIC | Age: 74
End: 2024-02-07

## 2024-02-07 NOTE — TELEPHONE ENCOUNTER
Spoke with Medical service company regarding order. They don't offer these. [facility] Patient aware of this.

## 2024-02-23 ENCOUNTER — HOSPITAL ENCOUNTER (OUTPATIENT)
Age: 74
Discharge: HOME | End: 2024-02-23
Payer: COMMERCIAL

## 2024-02-23 DIAGNOSIS — D64.9 ANEMIA, UNSPECIFIED TYPE: ICD-10-CM

## 2024-02-23 DIAGNOSIS — R79.89 ELEVATED LFTS: ICD-10-CM

## 2024-02-23 DIAGNOSIS — R74.8 ELEVATED ALKALINE PHOSPHATASE LEVEL: ICD-10-CM

## 2024-02-23 LAB
BASOPHILS # BLD: 0.04 K/UL (ref 0–0.2)
BASOPHILS NFR BLD: 1 % (ref 0–2)
EOSINOPHIL # BLD: 0.15 K/UL (ref 0–0.44)
EOSINOPHILS RELATIVE PERCENT: 2 % (ref 1–4)
ERYTHROCYTE [DISTWIDTH] IN BLOOD BY AUTOMATED COUNT: 13.1 % (ref 11.8–14.4)
GGT SERPL-CCNC: 50 U/L (ref 5–36)
HCT VFR BLD AUTO: 36.8 % (ref 36.3–47.1)
HGB BLD-MCNC: 12 G/DL (ref 11.9–15.1)
IMM GRANULOCYTES # BLD AUTO: <0.03 K/UL (ref 0–0.3)
IMM GRANULOCYTES NFR BLD: 0 %
LYMPHOCYTES NFR BLD: 2.44 K/UL (ref 1.1–3.7)
LYMPHOCYTES RELATIVE PERCENT: 34 % (ref 24–43)
MCH RBC QN AUTO: 31.1 PG (ref 25.2–33.5)
MCHC RBC AUTO-ENTMCNC: 32.6 G/DL (ref 28.4–34.8)
MCV RBC AUTO: 95.3 FL (ref 82.6–102.9)
MONOCYTES NFR BLD: 0.37 K/UL (ref 0.1–1.2)
MONOCYTES NFR BLD: 5 % (ref 3–12)
NEUTROPHILS NFR BLD: 58 % (ref 36–65)
NEUTS SEG NFR BLD: 4.09 K/UL (ref 1.5–8.1)
NRBC BLD-RTO: 0 PER 100 WBC
PLATELET # BLD AUTO: 208 K/UL (ref 138–453)
PMV BLD AUTO: 10.7 FL (ref 8.1–13.5)
RBC # BLD AUTO: 3.86 M/UL (ref 3.95–5.11)
TRANSFERRIN SERPL-MCNC: 291 MG/DL (ref 200–360)
WBC OTHER # BLD: 7.1 K/UL (ref 3.5–11.3)

## 2024-02-23 PROCEDURE — 83550 IRON BINDING TEST: CPT

## 2024-02-23 PROCEDURE — 84466 ASSAY OF TRANSFERRIN: CPT

## 2024-02-23 PROCEDURE — 82728 ASSAY OF FERRITIN: CPT

## 2024-02-23 PROCEDURE — 83540 ASSAY OF IRON: CPT

## 2024-02-23 PROCEDURE — 82746 ASSAY OF FOLIC ACID SERUM: CPT

## 2024-02-23 PROCEDURE — 36415 COLL VENOUS BLD VENIPUNCTURE: CPT

## 2024-02-23 PROCEDURE — 82607 VITAMIN B-12: CPT

## 2024-02-23 PROCEDURE — 85025 COMPLETE CBC W/AUTO DIFF WBC: CPT

## 2024-02-23 PROCEDURE — 82977 ASSAY OF GGT: CPT

## 2024-02-24 LAB
FERRITIN SERPL-MCNC: 34 NG/ML (ref 13–150)
FOLATE SERPL-MCNC: >20 NG/ML (ref 4.8–24.2)
IRON SATN MFR SERPL: 23 % (ref 20–55)
IRON SERPL-MCNC: 84 UG/DL (ref 37–145)
TIBC SERPL-MCNC: 366 UG/DL (ref 250–450)
UNSATURATED IRON BINDING CAPACITY: 282 UG/DL (ref 112–347)
VIT B12 SERPL-MCNC: 667 PG/ML (ref 232–1245)

## 2024-02-28 DIAGNOSIS — R42 DIZZINESS: Primary | ICD-10-CM

## 2024-02-28 RX ORDER — BLOOD-GLUCOSE SENSOR
1 EACH MISCELLANEOUS DAILY
Qty: 2 EACH | Refills: 11 | Status: SHIPPED | OUTPATIENT
Start: 2024-02-28

## 2024-02-28 RX ORDER — BLOOD-GLUCOSE,RECEIVER,CONT
1 EACH MISCELLANEOUS DAILY
Qty: 1 EACH | Refills: 11 | Status: SHIPPED | OUTPATIENT
Start: 2024-02-28

## 2024-02-29 ENCOUNTER — TELEPHONE (OUTPATIENT)
Dept: PRIMARY CARE CLINIC | Age: 74
End: 2024-02-29

## 2024-02-29 RX ORDER — SERTRALINE HYDROCHLORIDE 100 MG/1
100 TABLET, FILM COATED ORAL DAILY
Qty: 90 TABLET | Refills: 0 | Status: SHIPPED | OUTPATIENT
Start: 2024-02-29

## 2024-02-29 RX ORDER — MECLIZINE HCL 12.5 MG/1
12.5 TABLET ORAL 2 TIMES DAILY PRN
Qty: 60 TABLET | Refills: 0 | Status: SHIPPED | OUTPATIENT
Start: 2024-02-29 | End: 2024-03-30

## 2024-02-29 RX ORDER — POTASSIUM CHLORIDE 750 MG/1
10 TABLET, EXTENDED RELEASE ORAL DAILY
Qty: 90 TABLET | Refills: 0 | Status: SHIPPED | OUTPATIENT
Start: 2024-02-29

## 2024-03-04 DIAGNOSIS — E11.69 TYPE 2 DIABETES MELLITUS WITH OTHER SPECIFIED COMPLICATION, WITHOUT LONG-TERM CURRENT USE OF INSULIN (HCC): Primary | ICD-10-CM

## 2024-03-04 DIAGNOSIS — M15.9 PRIMARY OSTEOARTHRITIS INVOLVING MULTIPLE JOINTS: ICD-10-CM

## 2024-03-04 DIAGNOSIS — M51.36 DDD (DEGENERATIVE DISC DISEASE), LUMBAR: ICD-10-CM

## 2024-03-04 NOTE — TELEPHONE ENCOUNTER
Patient called stating she needs the SMGBB 2 sensor and reader. A 3 was sent in, but she needs the 2 please.     Patient would like to get a referral to Dr. Jensen as well please.

## 2024-03-07 ENCOUNTER — TELEPHONE (OUTPATIENT)
Dept: PRIMARY CARE CLINIC | Age: 74
End: 2024-03-07

## 2024-03-07 DIAGNOSIS — E11.69 TYPE 2 DIABETES MELLITUS WITH OTHER SPECIFIED COMPLICATION, WITHOUT LONG-TERM CURRENT USE OF INSULIN (HCC): Primary | ICD-10-CM

## 2024-03-07 RX ORDER — ACYCLOVIR 400 MG/1
1 TABLET ORAL DAILY
Qty: 1 EACH | Refills: 0 | Status: SHIPPED | OUTPATIENT
Start: 2024-03-07

## 2024-03-07 RX ORDER — ACYCLOVIR 400 MG/1
1 TABLET ORAL DAILY
Qty: 2 EACH | Refills: 5 | Status: SHIPPED | OUTPATIENT
Start: 2024-03-07

## 2024-03-07 NOTE — TELEPHONE ENCOUNTER
Patient called stating none of the freestyle Shea's are compatible with her phone. Wondering if we can send in a dexcom for here? [place] [facility].

## 2024-03-08 RX ORDER — FUROSEMIDE 40 MG/1
40 TABLET ORAL DAILY
Qty: 90 TABLET | Refills: 0 | Status: SHIPPED | OUTPATIENT
Start: 2024-03-08

## 2024-03-11 ENCOUNTER — TELEPHONE (OUTPATIENT)
Dept: PRIMARY CARE CLINIC | Age: 74
End: 2024-03-11

## 2024-03-11 NOTE — TELEPHONE ENCOUNTER
Zoila from [facility] called stating that patient is currently experiencing 2-3 pitting edema on the rt leg and 1-2 on the left leg. Zoila stated that patient was out of lasix for awhile and just restarted it over the weekend. I did speak with Dr. Richter and he started to give it about a week and see how patient does and if she is not improving with being on the lasix's we will see her in office.

## 2024-03-19 DIAGNOSIS — E11.628 TYPE 2 DIABETES MELLITUS WITH OTHER SKIN COMPLICATION, WITHOUT LONG-TERM CURRENT USE OF INSULIN (HCC): ICD-10-CM

## 2024-03-19 DIAGNOSIS — E11.628 TYPE 2 DIABETES MELLITUS WITH OTHER SKIN COMPLICATION, WITHOUT LONG-TERM CURRENT USE OF INSULIN (HCC): Primary | ICD-10-CM

## 2024-03-19 RX ORDER — BLOOD SUGAR DIAGNOSTIC
STRIP MISCELLANEOUS
Qty: 100 STRIP | Refills: 2 | Status: CANCELLED | OUTPATIENT
Start: 2024-03-19

## 2024-03-19 RX ORDER — GLUCOSAMINE HCL/CHONDROITIN SU 500-400 MG
CAPSULE ORAL
Qty: 100 STRIP | Refills: 0 | Status: SHIPPED | OUTPATIENT
Start: 2024-03-19 | End: 2024-03-19 | Stop reason: SDUPTHER

## 2024-03-19 RX ORDER — BLOOD-GLUCOSE METER
EACH MISCELLANEOUS
Qty: 1 KIT | Refills: 0 | Status: CANCELLED | OUTPATIENT
Start: 2024-03-19

## 2024-03-19 NOTE — TELEPHONE ENCOUNTER
Patient called in stating that Dexcom is not going to be covered by her insurance. She is requesting we send over a new kit and some strips so she can continue to monitor her sugars like she was before. Pended for physician.

## 2024-03-19 NOTE — TELEPHONE ENCOUNTER
Lou from [facility] called stating that there had to be a diagnosis connected to the diabetic supply orders and that they can not states as need per medicare. Medicare very strict about that. Diagnosis attached to orders. PRN/as needed removed from orders.

## 2024-03-20 RX ORDER — GLUCOSAMINE HCL/CHONDROITIN SU 500-400 MG
CAPSULE ORAL
Qty: 100 STRIP | Refills: 11 | Status: SHIPPED | OUTPATIENT
Start: 2024-03-20

## 2024-03-25 ENCOUNTER — OFFICE VISIT (OUTPATIENT)
Dept: CARDIOLOGY | Age: 74
End: 2024-03-25
Payer: COMMERCIAL

## 2024-03-25 VITALS
HEIGHT: 61 IN | SYSTOLIC BLOOD PRESSURE: 103 MMHG | DIASTOLIC BLOOD PRESSURE: 67 MMHG | WEIGHT: 197.6 LBS | HEART RATE: 63 BPM | OXYGEN SATURATION: 98 % | RESPIRATION RATE: 18 BRPM | BODY MASS INDEX: 37.31 KG/M2

## 2024-03-25 DIAGNOSIS — I10 ESSENTIAL HYPERTENSION: ICD-10-CM

## 2024-03-25 DIAGNOSIS — R94.31 ABNORMAL EKG: ICD-10-CM

## 2024-03-25 DIAGNOSIS — R07.89 CHEST DISCOMFORT: Primary | ICD-10-CM

## 2024-03-25 DIAGNOSIS — I25.10 MILD CAD: ICD-10-CM

## 2024-03-25 DIAGNOSIS — R06.02 SOB (SHORTNESS OF BREATH): ICD-10-CM

## 2024-03-25 DIAGNOSIS — R42 DIZZINESS: ICD-10-CM

## 2024-03-25 DIAGNOSIS — R00.2 PALPITATIONS: ICD-10-CM

## 2024-03-25 DIAGNOSIS — E78.2 MIXED HYPERLIPIDEMIA: ICD-10-CM

## 2024-03-25 DIAGNOSIS — R42 LIGHTHEADED: ICD-10-CM

## 2024-03-25 PROCEDURE — 93000 ELECTROCARDIOGRAM COMPLETE: CPT | Performed by: FAMILY MEDICINE

## 2024-03-25 PROCEDURE — 1123F ACP DISCUSS/DSCN MKR DOCD: CPT | Performed by: FAMILY MEDICINE

## 2024-03-25 PROCEDURE — 3078F DIAST BP <80 MM HG: CPT | Performed by: FAMILY MEDICINE

## 2024-03-25 PROCEDURE — 3074F SYST BP LT 130 MM HG: CPT | Performed by: FAMILY MEDICINE

## 2024-03-25 PROCEDURE — 99214 OFFICE O/P EST MOD 30 MIN: CPT | Performed by: FAMILY MEDICINE

## 2024-03-25 NOTE — PROGRESS NOTES
I, Ayla Fine am scribing for and in the presence of Thomas Valderrama MD, MS, F.A.C.C..      Patient: Elle Vincent  : 1950  Date of Visit: 2023    REASON FOR VISIT / CONSULTATION: Cardiac Clearance (HX: mild CAD, palps, HTN, HLD, Abn EKG. Pt is here for cardiac clearance for her carpal tunnel on right wrist, no date set yet, with Dr. Shaffer at [facility]. SOB with exertion but this is nothing new for her not worsening. Denies CP, palps, light headed/dizziness. )    History of Present Illness:         Dear Abdoul Pulido, APRN - CNP    I had the pleasure of seeing Elle Vincent in my office today. Ms. Vincent is a 72 y.o. female who recently underwent a cardiovascular stress test because of increased shortness of breath which shown evidence of reversible ischemia. She also was feeling heart palpitations that have been occurring about a couple weeks ago. This can last a couple minutes in duration. She can hear it in her ears at times. Some episodes are more strong than other. She had gastric bypass in . She also reported a significant increase in shortness of breath with exertion leading to her recent stress test and echo. denies any previous heart related issues. She does have history of hypertensions and hyperlipidemia. Her mother had heart conditions that started in her 50's. She never was a smoker.     She had her stress test done on 2020 that was abnormal myocardial perfusion study. 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     She also had an Echo done on 2020 that showed an EF of 60% with mildly increased left ventricular wall thickness with a normal left ventricular cavity size and also evidence of mild diastolic dysfunction is seen.     Heart cath done on

## 2024-04-12 NOTE — TELEPHONE ENCOUNTER
HH nurse called stating pt has not had a bowel movement in a week. She is on miralax, senna, colace and fiber supp 2 times a day. Increased fluids and has walked, passing gas. What should she do??  
Notified patient. She will call the office back if she would like a soap suds enema  
98.6

## 2024-04-26 ENCOUNTER — HOSPITAL ENCOUNTER (OUTPATIENT)
Age: 74
Discharge: HOME | End: 2024-04-28
Attending: FAMILY MEDICINE
Payer: COMMERCIAL

## 2024-04-26 ENCOUNTER — HOSPITAL ENCOUNTER (OUTPATIENT)
Dept: NUCLEAR MEDICINE | Age: 74
Discharge: HOME | End: 2024-04-28
Attending: FAMILY MEDICINE
Payer: COMMERCIAL

## 2024-04-26 DIAGNOSIS — R00.2 PALPITATIONS: ICD-10-CM

## 2024-04-26 DIAGNOSIS — R06.02 SOB (SHORTNESS OF BREATH): ICD-10-CM

## 2024-04-26 DIAGNOSIS — I25.10 MILD CAD: ICD-10-CM

## 2024-04-26 DIAGNOSIS — R07.89 CHEST DISCOMFORT: ICD-10-CM

## 2024-04-26 DIAGNOSIS — R94.31 ABNORMAL EKG: ICD-10-CM

## 2024-04-26 DIAGNOSIS — R42 LIGHTHEADED: ICD-10-CM

## 2024-04-26 DIAGNOSIS — I10 ESSENTIAL HYPERTENSION: ICD-10-CM

## 2024-04-26 DIAGNOSIS — R42 DIZZINESS: ICD-10-CM

## 2024-04-26 DIAGNOSIS — E78.2 MIXED HYPERLIPIDEMIA: ICD-10-CM

## 2024-04-26 PROCEDURE — A9500 TC99M SESTAMIBI: HCPCS | Performed by: FAMILY MEDICINE

## 2024-04-26 PROCEDURE — 93017 CV STRESS TEST TRACING ONLY: CPT

## 2024-04-26 PROCEDURE — 6360000002 HC RX W HCPCS: Performed by: FAMILY MEDICINE

## 2024-04-26 PROCEDURE — 3430000000 HC RX DIAGNOSTIC RADIOPHARMACEUTICAL: Performed by: FAMILY MEDICINE

## 2024-04-26 RX ORDER — TETRAKIS(2-METHOXYISOBUTYLISOCYANIDE)COPPER(I) TETRAFLUOROBORATE 1 MG/ML
30 INJECTION, POWDER, LYOPHILIZED, FOR SOLUTION INTRAVENOUS
Status: COMPLETED | OUTPATIENT
Start: 2024-04-26 | End: 2024-04-26

## 2024-04-26 RX ORDER — REGADENOSON 0.08 MG/ML
0.4 INJECTION, SOLUTION INTRAVENOUS
Status: COMPLETED | OUTPATIENT
Start: 2024-04-26 | End: 2024-04-26

## 2024-04-26 RX ADMIN — REGADENOSON 0.4 MG: 0.08 INJECTION, SOLUTION INTRAVENOUS at 10:15

## 2024-04-26 RX ADMIN — Medication 30 MILLICURIE: at 10:48

## 2024-04-29 ENCOUNTER — APPOINTMENT (OUTPATIENT)
Dept: NUCLEAR MEDICINE | Age: 74
End: 2024-04-29
Attending: FAMILY MEDICINE
Payer: COMMERCIAL

## 2024-04-30 ENCOUNTER — HOSPITAL ENCOUNTER (OUTPATIENT)
Dept: NUCLEAR MEDICINE | Age: 74
Discharge: HOME | End: 2024-05-02
Attending: FAMILY MEDICINE
Payer: COMMERCIAL

## 2024-04-30 LAB
NUC STRESS EJECTION FRACTION: 74 %
STRESS BASELINE DIAS BP: 68 MMHG
STRESS BASELINE HR: 63 BPM
STRESS BASELINE ST DEPRESSION: 0 MM
STRESS BASELINE SYS BP: 118 MMHG
STRESS PEAK DIAS BP: 60 MMHG
STRESS PEAK SYS BP: 98 MMHG
STRESS PERCENT HR ACHIEVED: 62 %
STRESS POST PEAK HR: 90 BPM
STRESS RATE PRESSURE PRODUCT: 8820 BPM*MMHG
STRESS STAGE RECOVERY 1 BP: NORMAL MMHG
STRESS STAGE RECOVERY 1 DURATION: 0 MIN:SEC
STRESS STAGE RECOVERY 1 HR: 90 BPM
STRESS STAGE RECOVERY 2 DURATION: 1 MIN:SEC
STRESS STAGE RECOVERY 2 HR: 80 BPM
STRESS STAGE RECOVERY 3 BP: NORMAL MMHG
STRESS STAGE RECOVERY 3 DURATION: 3 MIN:SEC
STRESS STAGE RECOVERY 3 HR: 71 BPM
STRESS STAGE RECOVERY 4 BP: NORMAL MMHG
STRESS STAGE RECOVERY 4 DURATION: 5 MIN:SEC
STRESS STAGE RECOVERY 4 HR: 66 BPM
STRESS TARGET HR: 146 BPM
TID: 1.1

## 2024-04-30 PROCEDURE — 93016 CV STRESS TEST SUPVJ ONLY: CPT | Performed by: INTERNAL MEDICINE

## 2024-04-30 PROCEDURE — 93018 CV STRESS TEST I&R ONLY: CPT | Performed by: INTERNAL MEDICINE

## 2024-04-30 PROCEDURE — A9500 TC99M SESTAMIBI: HCPCS | Performed by: FAMILY MEDICINE

## 2024-04-30 PROCEDURE — 3430000000 HC RX DIAGNOSTIC RADIOPHARMACEUTICAL: Performed by: FAMILY MEDICINE

## 2024-04-30 PROCEDURE — 78452 HT MUSCLE IMAGE SPECT MULT: CPT | Performed by: INTERNAL MEDICINE

## 2024-04-30 RX ORDER — TETRAKIS(2-METHOXYISOBUTYLISOCYANIDE)COPPER(I) TETRAFLUOROBORATE 1 MG/ML
30 INJECTION, POWDER, LYOPHILIZED, FOR SOLUTION INTRAVENOUS
Status: COMPLETED | OUTPATIENT
Start: 2024-04-30 | End: 2024-04-30

## 2024-04-30 RX ADMIN — Medication 30 MILLICURIE: at 08:06

## 2024-05-01 ENCOUNTER — TELEPHONE (OUTPATIENT)
Dept: CARDIOLOGY | Age: 74
End: 2024-05-01

## 2024-05-01 NOTE — TELEPHONE ENCOUNTER
----- Message from Thomas Valderrama MD sent at 4/30/2024 11:52 PM EDT -----  Please make an appointment for Ms. Vincent with me in the next 2-3 weeks. Thanks.    Jannie

## 2024-05-03 ENCOUNTER — OFFICE VISIT (OUTPATIENT)
Dept: CARDIOLOGY | Age: 74
End: 2024-05-03
Payer: COMMERCIAL

## 2024-05-03 VITALS
OXYGEN SATURATION: 94 % | HEART RATE: 58 BPM | HEIGHT: 61 IN | BODY MASS INDEX: 37.19 KG/M2 | SYSTOLIC BLOOD PRESSURE: 108 MMHG | RESPIRATION RATE: 16 BRPM | DIASTOLIC BLOOD PRESSURE: 72 MMHG | WEIGHT: 197 LBS

## 2024-05-03 DIAGNOSIS — E78.2 MIXED HYPERLIPIDEMIA: ICD-10-CM

## 2024-05-03 DIAGNOSIS — I25.10 MILD CAD: ICD-10-CM

## 2024-05-03 DIAGNOSIS — R07.89 ATYPICAL CHEST PAIN: ICD-10-CM

## 2024-05-03 DIAGNOSIS — R00.2 HEART PALPITATIONS: ICD-10-CM

## 2024-05-03 DIAGNOSIS — R94.31 ABNORMAL EKG: ICD-10-CM

## 2024-05-03 DIAGNOSIS — R94.39 ABNORMAL STRESS TEST: Primary | ICD-10-CM

## 2024-05-03 DIAGNOSIS — I10 ESSENTIAL HYPERTENSION: ICD-10-CM

## 2024-05-03 PROCEDURE — 1123F ACP DISCUSS/DSCN MKR DOCD: CPT | Performed by: PHYSICIAN ASSISTANT

## 2024-05-03 PROCEDURE — 3078F DIAST BP <80 MM HG: CPT | Performed by: PHYSICIAN ASSISTANT

## 2024-05-03 PROCEDURE — 99214 OFFICE O/P EST MOD 30 MIN: CPT | Performed by: PHYSICIAN ASSISTANT

## 2024-05-03 PROCEDURE — 3074F SYST BP LT 130 MM HG: CPT | Performed by: PHYSICIAN ASSISTANT

## 2024-05-03 NOTE — PROGRESS NOTES
heart attack, death, or the need for further procedures. I also discussed the fact that although treatment with simple medical management is a potential treatment option in place of cardiac catheterization, I expressed my opinion that cardiac catheterization in order to define their coronary anatomy and rule out severe 3 vessel or left main coronary artery disease would significant help guide the most appropriate treatment strategy ranging from no treatment, to medications, stents, to even coronary bypass surgery.  The patient verbalized understanding of the risks and benefits and indicated that they would prefer to avoid the procedure and instead pursue a medical management approach which I think is an acceptable option.  I will discuss with Dr Valderrama Monday when he is back in the office. It appears there is a minor change in the stress test.  She had a heart cath in 2020 and 2022 with no intervention required. She denies any worsening chest pain over the past couple fo months. When she is describing her pain it is located mostly in her upper abdomen region, lasting seconds in duration.  Blood pressure and heart rate will not tolerate the addition of any medications.  Antiplatelet Agent: Not indicated at this time.   Beta Blocker: Continue Metoprolol succinate (Toprol XL) 25 mg daily.   Anti-anginal medications: Continue amlodipine (Norvasc) 5 mg once daily.  Cholesterol Reduction Therapy: Continue Atorvastatin (Lipitor) 10 mg daily     Additional Testing List: None    Atypical chest pain but still suspicious for possible myocardial ischemia:  Medical Management for Suspected coronary artery disease:  Antiplatelet Agent: Not indicated at this time.  Beta Blocker: Continue Metoprolol succinate (Toprol XL) 25 mg daily.   Anti-anginal medications: Continue amlodipine (Norvasc) 5 mg once daily.  Cholesterol Reduction Therapy: Continue Atorvastatin (Lipitor) 10 mg daily    Additional counseling: I advised them to call

## 2024-05-03 NOTE — PROGRESS NOTES
developed worsening or persistent chest pain or increased shortness of breath as this could be life threatening.     Mild CAD: S/p Heart Cath done on 9/15/2022 showed mild CAD with no intervention needed at that time.   Beta Blocker: Continue Metoprolol succinate (Toprol XL) 25 mg daily.   Anti-anginal medications: Continue amlodipine (Norvasc) 5 mg once daily.  Cholesterol Reduction Therapy: Continue Atorvastatin (Lipitor) 10 mg nightly.    Additional counseling: I advised them to call our office or go to the emergency room if they developed worsening or persistent chest pain or increased shortness of breath as this could be life threatening.    Recurrent intermittent palpitations:  Resolved since re-starting her beta blocker.    Beta Blocker: Continue Metoprolol succinate (Toprol XL) 25 mg daily.  Calcium Channel Blocker: Continue amlodipine (Norvasc) 5 mg once daily.      Essential Hypertension: Controlled  ACE Inibitor/ARB: Not indicated at this time.   Calcium Channel Blocker: Continue amlodipine (Norvasc) 5 mg once daily.     Hyperlipidemia: Mixed, LDL done on 1/4/2022 was 43 mg/dL  Cholesterol Reduction Therapy: Continue Atorvastatin (Lipitor) 10 mg daily.      History of Abnormal EKG: History of suspected false positive but questionable inferior and anterolateral MI  Will continue to monitor.   Mild CAD on recent heart cath 9/2022.  Asymptomatic at this time.    In the meantime, I encouraged Ms. Vincent to continue to take her other medications.     FOLLOW UP:   I told Ms. Vincent to call my office if she had any problems, but otherwise I asked her to No follow-ups on file. However, depending on the results of her heart catheterization we will reschedule a follow up appointment if needed. Otherwise, I would be happy to see her sooner should the need arise.     Sincerely,  Thomas Valderrama MD, MS, .A.C..  [facility] Cardiology Specialist    [address] [place] [address]  Phone: [phone],

## 2024-05-06 ENCOUNTER — TELEPHONE (OUTPATIENT)
Dept: CARDIOLOGY | Age: 74
End: 2024-05-06

## 2024-05-06 DIAGNOSIS — E11.42 DIABETIC POLYNEUROPATHY ASSOCIATED WITH TYPE 2 DIABETES MELLITUS (HCC): ICD-10-CM

## 2024-05-06 RX ORDER — GABAPENTIN 600 MG/1
600 TABLET ORAL 3 TIMES DAILY
Qty: 270 TABLET | Refills: 1 | Status: SHIPPED | OUTPATIENT
Start: 2024-05-06 | End: 2024-11-02

## 2024-05-06 NOTE — TELEPHONE ENCOUNTER
Pt left voicemail stating she was here Friday and you told her you would ask Dr Valderrama questions that she had and call her. She did not state what the questions were. Can you please advise

## 2024-05-08 ENCOUNTER — HOSPITAL ENCOUNTER (OUTPATIENT)
Dept: PHYSICAL THERAPY | Age: 74
Setting detail: THERAPIES SERIES
Discharge: HOME | End: 2024-05-08
Payer: COMMERCIAL

## 2024-05-08 ENCOUNTER — OFFICE VISIT (OUTPATIENT)
Dept: PRIMARY CARE CLINIC | Age: 74
End: 2024-05-08
Payer: COMMERCIAL

## 2024-05-08 VITALS
TEMPERATURE: 99.6 F | HEIGHT: 61 IN | HEART RATE: 73 BPM | BODY MASS INDEX: 37.34 KG/M2 | OXYGEN SATURATION: 98 % | SYSTOLIC BLOOD PRESSURE: 110 MMHG | DIASTOLIC BLOOD PRESSURE: 64 MMHG | WEIGHT: 197.8 LBS | RESPIRATION RATE: 16 BRPM

## 2024-05-08 DIAGNOSIS — E11.69 TYPE 2 DIABETES MELLITUS WITH OTHER SPECIFIED COMPLICATION, WITHOUT LONG-TERM CURRENT USE OF INSULIN (HCC): ICD-10-CM

## 2024-05-08 DIAGNOSIS — Z91.81 AT RISK FOR FALLING: Primary | ICD-10-CM

## 2024-05-08 DIAGNOSIS — R53.81 PHYSICAL DECONDITIONING: ICD-10-CM

## 2024-05-08 DIAGNOSIS — E66.01 SEVERE OBESITY (BMI 35.0-39.9) WITH COMORBIDITY (HCC): ICD-10-CM

## 2024-05-08 DIAGNOSIS — I10 ESSENTIAL HYPERTENSION: ICD-10-CM

## 2024-05-08 PROCEDURE — 3078F DIAST BP <80 MM HG: CPT | Performed by: FAMILY MEDICINE

## 2024-05-08 PROCEDURE — 1123F ACP DISCUSS/DSCN MKR DOCD: CPT | Performed by: FAMILY MEDICINE

## 2024-05-08 PROCEDURE — 99214 OFFICE O/P EST MOD 30 MIN: CPT | Performed by: FAMILY MEDICINE

## 2024-05-08 PROCEDURE — 3074F SYST BP LT 130 MM HG: CPT | Performed by: FAMILY MEDICINE

## 2024-05-08 PROCEDURE — 97140 MANUAL THERAPY 1/> REGIONS: CPT

## 2024-05-08 PROCEDURE — G2211 COMPLEX E/M VISIT ADD ON: HCPCS | Performed by: FAMILY MEDICINE

## 2024-05-08 NOTE — PROGRESS NOTES
[facility]      Patient:  Elle Vincent 73 y.o. female     Date of Service: 12/12/23      Chief Complaint:   Chief Complaint   Patient presents with    Anemia     Wants to discuss getting home health therapy.          History of Present Illness     Chronically maintained on Lipitor 10 mg daily for hyperlipidemia, doing well without muscle aches or intolerances.    History of type 2 diabetes most recent A1c 12/2023 4.1.  No current hyper/hypoglycemic symptoms.  Currently maintained on Januvia.  Tolerating medication well without side effects or intolerances.  Blood sugars averaging around 120    Hypertension stable on medications, asymptomatic at this time.    Taking Zoloft 100 mg daily for history anxiety/depression. Doing well on the   Medication with good symptomatic improvement.  No current SI/HI.  Allergies:    Ibuprofen    Medication List:    Current Outpatient Medications   Medication Sig Dispense Refill    gabapentin (NEURONTIN) 600 MG tablet Take 1 tablet by mouth 3 times daily for 180 days. 270 tablet 1    Blood Glucose Monitoring Suppl w/Device KIT 1 each by Does not apply route daily 1 kit 11    blood glucose monitor strips Test one time a day. 100 strip 11    Lancets Misc. MISC 1 each by Does not apply route daily 100 each 11    furosemide (LASIX) 40 MG tablet Take 1 tablet by mouth daily 90 tablet 0    potassium chloride (KLOR-CON M) 10 MEQ extended release tablet Take 1 tablet by mouth daily 90 tablet 0    sertraline (ZOLOFT) 100 MG tablet Take 1 tablet by mouth daily 90 tablet 0    amLODIPine (NORVASC) 5 MG tablet Take 1 tablet by mouth daily 90 tablet 1    ondansetron (ZOFRAN) 4 MG tablet TAKE 1 TABLET BY MOUTH EVERY 8 HOURS AS NEEDED FOR NAUSEA FOR VOMITING 15 tablet 0    metoclopramide (REGLAN) 5 MG tablet Take 1 tablet by mouth 4 times daily      metoprolol succinate (TOPROL XL) 25 MG extended release tablet Take 1 tablet by mouth daily 90 tablet 1    SITagliptin (JANUVIA)

## 2024-05-08 NOTE — PLAN OF CARE
[x] Patient Education/HEP   [x] Manual Therapy  [] Traction    [] Neuro-brenda        [x] Soft Tissue Mobs            [] Therapeutic Activity  [] Iontophoresis    [] Orthotic casting/fitting      [] Dry Needling  [] Blood Flow Restriction [x] Vasopneumatic Compression             Electronically signed by: Soren Mead PT, DPT    Date: 5/8/2024      ______________________________________ Date: 5/8/2024   Physician Signature

## 2024-05-08 NOTE — PROGRESS NOTES
Phone: [phone]                       [facility]          Fax: [phone]                      Outpatient Physical Therapy                                                             Lymphedema Treatment  Date: 2024  Patient: Elle Vincent  : 1950  [facility] #: [id number]  Referring Physician: Referring Provider (secondary): OLIVIA Chakraborty CNP    Diagnosis: BLE lymphedema    Treatment Diagnosis: BLE lymphedema  PT Insurance Information: [employer] Medicare  Total # of Visits Approved: 19   Total # of Visits to Date: 12  No Show: 0  Canceled Appointment: 0     Subjective  Subjective: Pt. arrives to therapy, denies pain and stated at home compression pump use is going well, using 1x daily for 1 hour.  Additional Pertinent Hx: Arthritis, HTN, DM, depression    Skin Integumentary:   Skin Integrity: Scars (comment)  Pitting Scale Area 1: 0  Skin Color: Hyperpigmentation, Pink  Skin Texture: Hyperkeratosis  Skin Condition/Temp: Cool  Turgor: Shiney/hard  Edema Rebound: Quick  Stemmer Sign: Positive    Signs of Constriction (if applicable):   Papilloma (benign tumor arising from an epithelial layer): No  Fibrotic Areas: Yes  Lymphorrhea: No    Stage of Lymphedema: Stage 2: Spontaneously Irreversible Stage  Description:      Lymphedema Classification:   Type: Secondary Lymphedema  Left: Moderate     Right: Moderate    Measurements:      Right Measurements Left Measurements   R LE Pre Girth Measurement (cm)  5th Tuberosity (cm): 22.8  Calf (cm): 46.3  Mid Knee (cm): 50.4  Thigh (cm): 56.2  Total Girth (cm): 175.7 L LE Pre Girth Measurement (cm)  5th Tuberosity (cm): 21.8  Calf (cm): 43.7  Mid Knee (cm): 46.5  Thigh (cm): 55.2  Total Girth (cm): 167.2       Exercises:  Exercise 1: HEP pt educated on keeping her legs elevated and compressed at 30mmHg, perform daily exrecises including ankle pumps and reduce sodium and sugar intake    Manual:  Skin Integumentary  Skin Color: Hyperpigmentation,

## 2024-05-15 RX ORDER — FUROSEMIDE 40 MG/1
40 TABLET ORAL DAILY
Qty: 90 TABLET | Refills: 0 | Status: SHIPPED | OUTPATIENT
Start: 2024-05-15

## 2024-05-20 RX ORDER — POTASSIUM CHLORIDE 750 MG/1
10 TABLET, EXTENDED RELEASE ORAL DAILY
Qty: 90 TABLET | Refills: 0 | Status: SHIPPED | OUTPATIENT
Start: 2024-05-20

## 2024-05-20 RX ORDER — OXYBUTYNIN CHLORIDE 5 MG/1
TABLET ORAL
Qty: 270 TABLET | Refills: 1 | Status: SHIPPED | OUTPATIENT
Start: 2024-05-20

## 2024-05-20 RX ORDER — OMEPRAZOLE 40 MG/1
40 CAPSULE, DELAYED RELEASE ORAL
Qty: 90 CAPSULE | Refills: 1 | Status: SHIPPED | OUTPATIENT
Start: 2024-05-20

## 2024-05-20 RX ORDER — ATORVASTATIN CALCIUM 10 MG/1
10 TABLET, FILM COATED ORAL DAILY
Qty: 90 TABLET | Refills: 1 | Status: SHIPPED | OUTPATIENT
Start: 2024-05-20

## 2024-05-24 RX ORDER — ONDANSETRON 4 MG/1
TABLET, FILM COATED ORAL
Qty: 15 TABLET | Refills: 0 | Status: SHIPPED | OUTPATIENT
Start: 2024-05-24

## 2024-06-05 ENCOUNTER — HOSPITAL ENCOUNTER (OUTPATIENT)
Dept: INFUSION THERAPY | Age: 74
Discharge: HOME | End: 2024-06-05
Payer: COMMERCIAL

## 2024-06-05 VITALS
HEART RATE: 55 BPM | DIASTOLIC BLOOD PRESSURE: 53 MMHG | RESPIRATION RATE: 18 BRPM | TEMPERATURE: 98.3 F | SYSTOLIC BLOOD PRESSURE: 110 MMHG

## 2024-06-05 DIAGNOSIS — M81.0 POSTMENOPAUSAL OSTEOPOROSIS: Primary | ICD-10-CM

## 2024-06-05 PROCEDURE — 96372 THER/PROPH/DIAG INJ SC/IM: CPT

## 2024-06-05 PROCEDURE — 6360000002 HC RX W HCPCS: Performed by: NURSE PRACTITIONER

## 2024-06-05 RX ORDER — SODIUM CHLORIDE 9 MG/ML
INJECTION, SOLUTION INTRAVENOUS CONTINUOUS
OUTPATIENT
Start: 2024-12-01

## 2024-06-05 RX ORDER — ONDANSETRON 2 MG/ML
8 INJECTION INTRAMUSCULAR; INTRAVENOUS
OUTPATIENT
Start: 2024-12-01

## 2024-06-05 RX ORDER — ALBUTEROL SULFATE 90 UG/1
4 AEROSOL, METERED RESPIRATORY (INHALATION) PRN
OUTPATIENT
Start: 2024-12-01

## 2024-06-05 RX ORDER — ACETAMINOPHEN 325 MG/1
650 TABLET ORAL
OUTPATIENT
Start: 2024-12-01

## 2024-06-05 RX ORDER — DIPHENHYDRAMINE HYDROCHLORIDE 50 MG/ML
50 INJECTION INTRAMUSCULAR; INTRAVENOUS
OUTPATIENT
Start: 2024-12-01

## 2024-06-05 RX ORDER — EPINEPHRINE 1 MG/ML
0.3 INJECTION, SOLUTION, CONCENTRATE INTRAVENOUS PRN
OUTPATIENT
Start: 2024-12-01

## 2024-06-05 RX ADMIN — DENOSUMAB 60 MG: 60 INJECTION SUBCUTANEOUS at 11:31

## 2024-06-05 ASSESSMENT — PAIN DESCRIPTION - PAIN TYPE: TYPE: CHRONIC PAIN

## 2024-06-05 ASSESSMENT — PAIN DESCRIPTION - LOCATION: LOCATION: BACK

## 2024-06-05 ASSESSMENT — PAIN DESCRIPTION - DESCRIPTORS: DESCRIPTORS: ACHING

## 2024-06-05 NOTE — DISCHARGE INSTRUCTIONS
Outpatient Discharge Instructions for Prolia Injections    [address] [address]   [phone]      You are advised to carry out the following Instructions:    Diet:  As prescribed by your Physician.    Activity:  As prescribed by your Physician.      Care of injection site:  If the injection site becomes red,sore,swollen,painful, has drainage,or you develop a fever,notify your Physician.      Other:    If you develop hives,rash,itching or trouble breathing, go to the nearest Emergency Room. These could be a sign of allergic reaction.  Continue to take your calcium and vitamin D.  Let your dentist know that you are taking Prolia.  Let your doctor know if you have any unusual jaw or leg bone pain.  Take good care of your teeth,see a dentist often.   Injection is every 6 months.      Follow up appointment:          ANY PROBLEMS OR CONCERNS NOTIFY YOUR PHYSICIAN   OR    GO THE NEAREST EMERGENCY ROOM

## 2024-06-06 ENCOUNTER — TELEPHONE (OUTPATIENT)
Dept: PRIMARY CARE CLINIC | Age: 74
End: 2024-06-06

## 2024-06-06 NOTE — TELEPHONE ENCOUNTER
Pt reports blood pressure has been running low lately:     Tues 5/28/24: 86/54  Wed 5/29/24: 88/49  Thurs 5/30/24: 89/52    Today (6/6/24): 70/49      Pt wants to know if she should adjust her dose of Amlodipine, such as cut in half or skip a day.  Please advise

## 2024-06-10 RX ORDER — METOPROLOL SUCCINATE 25 MG/1
25 TABLET, EXTENDED RELEASE ORAL DAILY
Qty: 90 TABLET | Refills: 1 | Status: SHIPPED | OUTPATIENT
Start: 2024-06-10

## 2024-06-10 RX ORDER — SERTRALINE HYDROCHLORIDE 100 MG/1
100 TABLET, FILM COATED ORAL DAILY
Qty: 90 TABLET | Refills: 0 | Status: SHIPPED | OUTPATIENT
Start: 2024-06-10

## 2024-07-02 ENCOUNTER — TELEPHONE (OUTPATIENT)
Dept: PRIMARY CARE CLINIC | Age: 74
End: 2024-07-02

## 2024-07-02 RX ORDER — DOCUSATE SODIUM 100 MG/1
100 CAPSULE, LIQUID FILLED ORAL 2 TIMES DAILY PRN
Qty: 60 CAPSULE | Refills: 1 | Status: SHIPPED | OUTPATIENT
Start: 2024-07-02 | End: 2024-08-31

## 2024-07-02 NOTE — TELEPHONE ENCOUNTER
Continue miralax. If colace has been working well for her, will continue this rx. Colace rx sent to pharmacy

## 2024-07-02 NOTE — TELEPHONE ENCOUNTER
I see miralax listed on the patient's chart. Is she taking this?     What was the name of the OTC stool softener? If this has been helpful, I can attempt to call this in rx

## 2024-07-02 NOTE — TELEPHONE ENCOUNTER
Pt states constipation hasn't been an issue lately with help from an OTC stool softener but patient stopped taking it and she's been constipated again for the last 3 days. Pt is planning on having company over the holiday and wants to know if there's a prescription strength that can help the process along a little faster. Pt states she has \"a lot of gas\" so she knows \"things are moving\" but not quite enough for a bowel movement. Pt uses [facility]

## 2024-07-03 DIAGNOSIS — E11.628 TYPE 2 DIABETES MELLITUS WITH OTHER SKIN COMPLICATION, WITHOUT LONG-TERM CURRENT USE OF INSULIN (HCC): ICD-10-CM

## 2024-07-03 RX ORDER — MECLIZINE HCL 12.5 MG/1
12.5 TABLET ORAL 2 TIMES DAILY PRN
Qty: 30 TABLET | Refills: 0 | Status: SHIPPED | OUTPATIENT
Start: 2024-07-03 | End: 2024-07-18

## 2024-07-03 RX ORDER — FUROSEMIDE 40 MG/1
40 TABLET ORAL DAILY
Qty: 90 TABLET | Refills: 0 | Status: SHIPPED | OUTPATIENT
Start: 2024-07-03

## 2024-07-03 RX ORDER — ONDANSETRON 4 MG/1
TABLET, FILM COATED ORAL
Qty: 30 TABLET | Refills: 0 | Status: SHIPPED | OUTPATIENT
Start: 2024-07-03

## 2024-07-03 RX ORDER — BLOOD-GLUCOSE METER
EACH MISCELLANEOUS
Qty: 1 KIT | Refills: 0 | Status: SHIPPED | OUTPATIENT
Start: 2024-07-03 | End: 2024-07-08

## 2024-07-03 RX ORDER — BLOOD SUGAR DIAGNOSTIC
STRIP MISCELLANEOUS
Qty: 100 STRIP | Refills: 0 | Status: SHIPPED | OUTPATIENT
Start: 2024-07-03 | End: 2024-07-08

## 2024-07-03 RX ORDER — LANCETS 33 GAUGE
EACH MISCELLANEOUS
Qty: 100 EACH | Refills: 0 | Status: SHIPPED | OUTPATIENT
Start: 2024-07-03 | End: 2024-07-10

## 2024-07-03 RX ORDER — AMLODIPINE BESYLATE 5 MG/1
5 TABLET ORAL DAILY
Qty: 90 TABLET | Refills: 0 | Status: SHIPPED | OUTPATIENT
Start: 2024-07-03 | End: 2024-07-10

## 2024-07-03 NOTE — TELEPHONE ENCOUNTER
[facility] pharmacy called states pt will be using their pharmacy now   They faxed over refill request I explained the dr was not in it could have gotten missed and not redirected properly I ask for a verbal   I confirm that they reviewed the pts medications list before requesting RX,   I called Elle and confirmed this with her as well as the meclizine that was not currently on her medication list .  Requesting minimum of 3 month supply.    Pt has 1 week left but need sent soon for mail order

## 2024-07-05 ENCOUNTER — HOSPITAL ENCOUNTER (EMERGENCY)
Age: 74
Discharge: HOME | End: 2024-07-05
Attending: EMERGENCY MEDICINE
Payer: COMMERCIAL

## 2024-07-05 ENCOUNTER — APPOINTMENT (OUTPATIENT)
Dept: CT IMAGING | Age: 74
End: 2024-07-05
Payer: COMMERCIAL

## 2024-07-05 VITALS
OXYGEN SATURATION: 91 % | TEMPERATURE: 97.6 F | WEIGHT: 197 LBS | BODY MASS INDEX: 37.22 KG/M2 | SYSTOLIC BLOOD PRESSURE: 135 MMHG | DIASTOLIC BLOOD PRESSURE: 93 MMHG | RESPIRATION RATE: 25 BRPM | HEART RATE: 68 BPM

## 2024-07-05 DIAGNOSIS — R19.7 DIARRHEA OF PRESUMED INFECTIOUS ORIGIN: Primary | ICD-10-CM

## 2024-07-05 DIAGNOSIS — K62.89 PROCTITIS: ICD-10-CM

## 2024-07-05 LAB
ALBUMIN SERPL-MCNC: 3.4 G/DL (ref 3.5–5.2)
ALBUMIN/GLOB SERPL: 1.3 {RATIO} (ref 1–2.5)
ALP SERPL-CCNC: 101 U/L (ref 35–104)
ALT SERPL-CCNC: 41 U/L (ref 5–33)
ANION GAP SERPL CALCULATED.3IONS-SCNC: 12 MMOL/L (ref 9–17)
AST SERPL-CCNC: 44 U/L
BACTERIA URNS QL MICRO: ABNORMAL
BASOPHILS # BLD: <0.03 K/UL (ref 0–0.2)
BASOPHILS NFR BLD: 0 % (ref 0–2)
BILIRUB SERPL-MCNC: 1.2 MG/DL (ref 0.3–1.2)
BILIRUB UR QL STRIP: ABNORMAL
BUN SERPL-MCNC: 8 MG/DL (ref 8–23)
BUN/CREAT SERPL: 20 (ref 9–20)
CALCIUM SERPL-MCNC: 8.3 MG/DL (ref 8.6–10.4)
CHLORIDE SERPL-SCNC: 97 MMOL/L (ref 98–107)
CLARITY UR: CLEAR
CO2 SERPL-SCNC: 25 MMOL/L (ref 20–31)
COLOR UR: YELLOW
CREAT SERPL-MCNC: 0.4 MG/DL (ref 0.5–0.9)
EOSINOPHIL # BLD: 0.07 K/UL (ref 0–0.44)
EOSINOPHILS RELATIVE PERCENT: 1 % (ref 1–4)
EPI CELLS #/AREA URNS HPF: ABNORMAL /HPF (ref 0–25)
ERYTHROCYTE [DISTWIDTH] IN BLOOD BY AUTOMATED COUNT: 12.4 % (ref 11.8–14.4)
GFR, ESTIMATED: >90 ML/MIN/1.73M2
GLUCOSE SERPL-MCNC: 162 MG/DL (ref 70–99)
GLUCOSE UR STRIP-MCNC: NEGATIVE MG/DL
HCT VFR BLD AUTO: 35.5 % (ref 36.3–47.1)
HGB BLD-MCNC: 11.9 G/DL (ref 11.9–15.1)
HGB UR QL STRIP.AUTO: NEGATIVE
IMM GRANULOCYTES # BLD AUTO: <0.03 K/UL (ref 0–0.3)
IMM GRANULOCYTES NFR BLD: 0 %
KETONES UR STRIP-MCNC: ABNORMAL MG/DL
LEUKOCYTE ESTERASE UR QL STRIP: ABNORMAL
LIPASE SERPL-CCNC: 27 U/L (ref 13–60)
LYMPHOCYTES NFR BLD: 1.35 K/UL (ref 1.1–3.7)
LYMPHOCYTES RELATIVE PERCENT: 27 % (ref 24–43)
MCH RBC QN AUTO: 31.5 PG (ref 25.2–33.5)
MCHC RBC AUTO-ENTMCNC: 33.5 G/DL (ref 28.4–34.8)
MCV RBC AUTO: 93.9 FL (ref 82.6–102.9)
MONOCYTES NFR BLD: 0.24 K/UL (ref 0.1–1.2)
MONOCYTES NFR BLD: 5 % (ref 3–12)
NEUTROPHILS NFR BLD: 67 % (ref 36–65)
NEUTS SEG NFR BLD: 3.36 K/UL (ref 1.5–8.1)
NITRITE UR QL STRIP: NEGATIVE
NRBC BLD-RTO: 0 PER 100 WBC
PH UR STRIP: 7 [PH] (ref 5–9)
PLATELET # BLD AUTO: 171 K/UL (ref 138–453)
PMV BLD AUTO: 10.1 FL (ref 8.1–13.5)
POTASSIUM SERPL-SCNC: 4 MMOL/L (ref 3.7–5.3)
PROT SERPL-MCNC: 6.1 G/DL (ref 6.4–8.3)
PROT UR STRIP-MCNC: NEGATIVE MG/DL
RBC # BLD AUTO: 3.78 M/UL (ref 3.95–5.11)
RBC #/AREA URNS HPF: ABNORMAL /HPF (ref 0–2)
SODIUM SERPL-SCNC: 134 MMOL/L (ref 135–144)
SP GR UR STRIP: 1.01 (ref 1.01–1.02)
TROPONIN I SERPL HS-MCNC: 16 NG/L (ref 0–14)
TROPONIN I SERPL HS-MCNC: 17 NG/L (ref 0–14)
UROBILINOGEN UR STRIP-ACNC: ABNORMAL EU/DL (ref 0–1)
WBC #/AREA URNS HPF: ABNORMAL /HPF (ref 0–5)
WBC OTHER # BLD: 5.1 K/UL (ref 3.5–11.3)

## 2024-07-05 PROCEDURE — 96361 HYDRATE IV INFUSION ADD-ON: CPT

## 2024-07-05 PROCEDURE — 96374 THER/PROPH/DIAG INJ IV PUSH: CPT

## 2024-07-05 PROCEDURE — 6360000002 HC RX W HCPCS: Performed by: EMERGENCY MEDICINE

## 2024-07-05 PROCEDURE — 83690 ASSAY OF LIPASE: CPT

## 2024-07-05 PROCEDURE — 6360000004 HC RX CONTRAST MEDICATION: Performed by: EMERGENCY MEDICINE

## 2024-07-05 PROCEDURE — 81001 URINALYSIS AUTO W/SCOPE: CPT

## 2024-07-05 PROCEDURE — 85025 COMPLETE CBC W/AUTO DIFF WBC: CPT

## 2024-07-05 PROCEDURE — 99285 EMERGENCY DEPT VISIT HI MDM: CPT

## 2024-07-05 PROCEDURE — 84484 ASSAY OF TROPONIN QUANT: CPT

## 2024-07-05 PROCEDURE — 74177 CT ABD & PELVIS W/CONTRAST: CPT

## 2024-07-05 PROCEDURE — 93005 ELECTROCARDIOGRAM TRACING: CPT | Performed by: EMERGENCY MEDICINE

## 2024-07-05 PROCEDURE — 36415 COLL VENOUS BLD VENIPUNCTURE: CPT

## 2024-07-05 PROCEDURE — 2580000003 HC RX 258: Performed by: EMERGENCY MEDICINE

## 2024-07-05 PROCEDURE — 6370000000 HC RX 637 (ALT 250 FOR IP): Performed by: EMERGENCY MEDICINE

## 2024-07-05 PROCEDURE — 80053 COMPREHEN METABOLIC PANEL: CPT

## 2024-07-05 RX ORDER — ONDANSETRON 2 MG/ML
4 INJECTION INTRAMUSCULAR; INTRAVENOUS ONCE
Status: COMPLETED | OUTPATIENT
Start: 2024-07-05 | End: 2024-07-05

## 2024-07-05 RX ORDER — 0.9 % SODIUM CHLORIDE 0.9 %
1000 INTRAVENOUS SOLUTION INTRAVENOUS ONCE
Status: COMPLETED | OUTPATIENT
Start: 2024-07-05 | End: 2024-07-05

## 2024-07-05 RX ORDER — OXYCODONE HYDROCHLORIDE AND ACETAMINOPHEN 5; 325 MG/1; MG/1
1 TABLET ORAL
Status: COMPLETED | OUTPATIENT
Start: 2024-07-05 | End: 2024-07-05

## 2024-07-05 RX ORDER — CIPROFLOXACIN 500 MG/1
500 TABLET, FILM COATED ORAL ONCE
Status: COMPLETED | OUTPATIENT
Start: 2024-07-05 | End: 2024-07-05

## 2024-07-05 RX ORDER — CIPROFLOXACIN 500 MG/1
500 TABLET, FILM COATED ORAL 2 TIMES DAILY
Qty: 20 TABLET | Refills: 0 | Status: SHIPPED | OUTPATIENT
Start: 2024-07-05 | End: 2024-07-15

## 2024-07-05 RX ADMIN — CIPROFLOXACIN 500 MG: 500 TABLET, FILM COATED ORAL at 14:27

## 2024-07-05 RX ADMIN — IOPAMIDOL 75 ML: 755 INJECTION, SOLUTION INTRAVENOUS at 12:13

## 2024-07-05 RX ADMIN — SODIUM CHLORIDE 1000 ML: 9 INJECTION, SOLUTION INTRAVENOUS at 12:31

## 2024-07-05 RX ADMIN — ONDANSETRON 4 MG: 2 INJECTION INTRAMUSCULAR; INTRAVENOUS at 12:26

## 2024-07-05 RX ADMIN — OXYCODONE HYDROCHLORIDE AND ACETAMINOPHEN 1 TABLET: 5; 325 TABLET ORAL at 12:27

## 2024-07-05 ASSESSMENT — PAIN SCALES - GENERAL
PAINLEVEL_OUTOF10: 4
PAINLEVEL_OUTOF10: 2

## 2024-07-05 ASSESSMENT — PAIN DESCRIPTION - LOCATION: LOCATION: ABDOMEN;ARM

## 2024-07-05 ASSESSMENT — PAIN - FUNCTIONAL ASSESSMENT: PAIN_FUNCTIONAL_ASSESSMENT: NONE - DENIES PAIN

## 2024-07-05 NOTE — ED TRIAGE NOTES
Pt states she was constipated x2 weeks her sister gave her a suppository without relief on Wednesday then administered the 2nd suppository and had diarrhea since. Also c/o nausea and palpitations onset today. Denies SOB

## 2024-07-05 NOTE — DISCHARGE INSTRUCTIONS
Patient history plenty of fluids.  Soft and liquid foods such as broth soups applesauce yogurt etc. until symptoms have resolved.  Continue current medication as prescribed.  Take Cipro as directed until completely separate if needed for nausea or vomiting please return immediately to develop any significant worsening pain fevers chills or any other acute concerns

## 2024-07-05 NOTE — ED PROVIDER NOTES
[facility] ED  EMERGENCY DEPARTMENT ENCOUNTER      Pt Name: Elle Vincent  MRN: [id number]  Birthdate 1950  Date of evaluation: 7/5/2024  Provider: Sia Mondragon MD    CHIEF COMPLAINT       Chief Complaint   Patient presents with    Palpitations    Diarrhea    Nausea         HISTORY OF PRESENT ILLNESS   (Location/Symptom, Timing/Onset, Context/Setting, Quality, Duration, Modifying Factors, Severity)  Note limiting factors.   Elle Vincent is a 73 y.o. female who presents to the emergency department ***     HPI    Nursing Notes were reviewed.    REVIEW OF SYSTEMS    (2-9 systems for level 4, 10 or more for level 5)     Review of Systems    Except as noted above the remainder of the review of systems was reviewed and negative.       PAST MEDICAL HISTORY     Past Medical History:   Diagnosis Date    Anemia     Arthritis     Depression     Diabetes mellitus (HCC)     History of cardiac cath 05/21/2020    [facility]/Dr. Valderrama/Left Ulner     Hypertension     Neck fracture (HCC) 2016    Obesity (BMI 30-39.9)     S/P cardiac cath 09/15/2022    [facility]/Dr. Valderrama/Right Radial    Splenic artery aneurysm (HCC)     small less than 1.5 cm, stable    Type II or unspecified type diabetes mellitus without mention of complication, not stated as uncontrolled     Ventral incisional hernia          SURGICAL HISTORY       Past Surgical History:   Procedure Laterality Date    APPENDECTOMY      CARPAL TUNNEL RELEASE Right 04/03/2023    CARPAL TUNNEL RELEASE (Right)- Dr. Shaffer    CARPAL TUNNEL RELEASE Right 4/3/2023    CARPAL TUNNEL RELEASE performed by Yordy Shaffer MD at [facility] OR    CATARACT REMOVAL WITH IMPLANT Left 12/12/2018    per Dr. Gomez    CATARACT REMOVAL WITH IMPLANT Right 01/07/2019    Dr. Gomez    CERVICAL LAMINECTOMY  03/24/2016    C 2- C 7    CHOLECYSTECTOMY      COLONOSCOPY  2014    COLONOSCOPY  01/04/2021    COLONOSCOPY N/A 01/04/2021    COLORECTAL CANCER SCREENING, NOT HIGH RISK  performed by Moni Jack DO at [facility] OR    COLONOSCOPY  02/05/2021    COLONOSCOPY N/A 02/05/2021    COLORECTAL CANCER SCREENING, HIGH RISK performed by Moni Jack DO at [facility] OR    DILATATION, ESOPHAGUS      GASTRIC BYPASS SURGERY  2001    HERNIA REPAIR      [facility]    INTRACAPSULAR CATARACT EXTRACTION Left 12/12/2018    EYE CATARACT EMULSIFICATION IOL IMPLANT performed by Jamie Gomez DO at [facility] OR    INTRACAPSULAR CATARACT EXTRACTION Right 01/07/2019    EYE CATARACT EMULSIFICATION IOL IMPLANT performed by Jamie Gomez DO at [facility] OR    WA OFFICE/OUTPT VISIT,PROCEDURE ONLY Right 06/15/2018    TOE HAMMER REPAIR, RIGHT 5TH DIGIT DEROTATIONAL SKINPLASTY/ARTHOPLASTY performed by Chidi Garcia DPM at [facility] OR    TONSILLECTOMY      UPPER GASTROINTESTINAL ENDOSCOPY N/A 11/26/2019    EGD DILATION SAVORY performed by Thomas Ndiaye MD at [facility] OR    UPPER GASTROINTESTINAL ENDOSCOPY  11/26/2019    -bx(normal)dilation,evidence of gastric bypass with very small gastric remnant    VENTRAL HERNIA REPAIR N/A 08/19/2021    Exploratory lap, lysis of adhesions, primary repair ventral hernia. performed by Moni Jack DO at [facility] OR         CURRENT MEDICATIONS       Previous Medications    AMLODIPINE (NORVASC) 5 MG TABLET    Take 1 tablet by mouth daily    ATORVASTATIN (LIPITOR) 10 MG TABLET    Take 1 tablet by mouth daily    B COMPLEX VITAMINS CAPSULE    Take 1 capsule by mouth daily    BIOTIN PO    Take 1 tablet by mouth daily    BLOOD GLUCOSE MONITORING SUPPL (ACCU-CHEK BRUNO) ANDREA    Use once daily.  Dx-E11.9 non-insulin dependant    BLOOD GLUCOSE MONITORING SUPPL (ONETOUCH VERIO) W/DEVICE KIT    TEST BLOOD SUGAR ONCE DAILY AS DIRECTED; ONE TOUCH VERIO METER DX:E11.9    BLOOD GLUCOSE MONITORING SUPPL W/DEVICE KIT    1 each by Does not apply route daily    BLOOD GLUCOSE TEST STRIPS (ONETOUCH VERIO) STRIP    TEST BLOOD SUGAR TWICE DAILY AS DIRECTED; ONE TOUCH VERIO METER DX:E11.9    CALCIUM CARBONATE 600 MG

## 2024-07-07 LAB
EKG ATRIAL RATE: 68 BPM
EKG P AXIS: 23 DEGREES
EKG P-R INTERVAL: 178 MS
EKG Q-T INTERVAL: 442 MS
EKG QRS DURATION: 82 MS
EKG QTC CALCULATION (BAZETT): 469 MS
EKG R AXIS: -11 DEGREES
EKG T AXIS: 16 DEGREES
EKG VENTRICULAR RATE: 68 BPM

## 2024-07-07 PROCEDURE — 93010 ELECTROCARDIOGRAM REPORT: CPT | Performed by: FAMILY MEDICINE

## 2024-07-08 DIAGNOSIS — E11.628 TYPE 2 DIABETES MELLITUS WITH OTHER SKIN COMPLICATION, WITHOUT LONG-TERM CURRENT USE OF INSULIN (HCC): Primary | ICD-10-CM

## 2024-07-08 RX ORDER — BLOOD-GLUCOSE METER
EACH MISCELLANEOUS
Qty: 1 KIT | Refills: 10 | OUTPATIENT
Start: 2024-07-08

## 2024-07-08 RX ORDER — BLOOD-GLUCOSE METER
EACH MISCELLANEOUS
Qty: 1 KIT | Refills: 10 | Status: SHIPPED | OUTPATIENT
Start: 2024-07-08 | End: 2024-07-10

## 2024-07-08 RX ORDER — BLOOD SUGAR DIAGNOSTIC
STRIP MISCELLANEOUS
Qty: 100 EACH | Refills: 10 | Status: SHIPPED | OUTPATIENT
Start: 2024-07-08 | End: 2024-07-10

## 2024-07-08 RX ORDER — MECLIZINE HCL 12.5 MG 12.5 MG/1
TABLET ORAL
Qty: 30 TABLET | Refills: 10 | OUTPATIENT
Start: 2024-07-08

## 2024-07-08 RX ORDER — ONDANSETRON 4 MG/1
TABLET, FILM COATED ORAL
Qty: 30 TABLET | Refills: 10 | OUTPATIENT
Start: 2024-07-08

## 2024-07-08 RX ORDER — BLOOD SUGAR DIAGNOSTIC
STRIP MISCELLANEOUS
Refills: 10 | OUTPATIENT
Start: 2024-07-08

## 2024-07-10 ENCOUNTER — OFFICE VISIT (OUTPATIENT)
Dept: PRIMARY CARE CLINIC | Age: 74
End: 2024-07-10
Payer: COMMERCIAL

## 2024-07-10 VITALS
OXYGEN SATURATION: 95 % | HEART RATE: 72 BPM | DIASTOLIC BLOOD PRESSURE: 80 MMHG | BODY MASS INDEX: 37.6 KG/M2 | WEIGHT: 199 LBS | SYSTOLIC BLOOD PRESSURE: 130 MMHG | TEMPERATURE: 97.1 F

## 2024-07-10 DIAGNOSIS — R19.7 DIARRHEA, UNSPECIFIED TYPE: Primary | ICD-10-CM

## 2024-07-10 DIAGNOSIS — E11.628 TYPE 2 DIABETES MELLITUS WITH OTHER SKIN COMPLICATION, WITHOUT LONG-TERM CURRENT USE OF INSULIN (HCC): ICD-10-CM

## 2024-07-10 DIAGNOSIS — K59.00 CONSTIPATION, UNSPECIFIED CONSTIPATION TYPE: ICD-10-CM

## 2024-07-10 DIAGNOSIS — N30.00 ACUTE CYSTITIS WITHOUT HEMATURIA: ICD-10-CM

## 2024-07-10 PROCEDURE — 3075F SYST BP GE 130 - 139MM HG: CPT | Performed by: NURSE PRACTITIONER

## 2024-07-10 PROCEDURE — 1123F ACP DISCUSS/DSCN MKR DOCD: CPT | Performed by: NURSE PRACTITIONER

## 2024-07-10 PROCEDURE — 99214 OFFICE O/P EST MOD 30 MIN: CPT | Performed by: NURSE PRACTITIONER

## 2024-07-10 PROCEDURE — 3079F DIAST BP 80-89 MM HG: CPT | Performed by: NURSE PRACTITIONER

## 2024-07-10 RX ORDER — BLOOD-GLUCOSE METER
EACH MISCELLANEOUS
Qty: 1 KIT | Refills: 10 | OUTPATIENT
Start: 2024-07-10

## 2024-07-10 RX ORDER — ATORVASTATIN CALCIUM 10 MG/1
10 TABLET, FILM COATED ORAL DAILY
Qty: 90 TABLET | Refills: 1 | Status: SHIPPED | OUTPATIENT
Start: 2024-07-10

## 2024-07-10 RX ORDER — BLOOD SUGAR DIAGNOSTIC
STRIP MISCELLANEOUS
Refills: 10 | OUTPATIENT
Start: 2024-07-10

## 2024-07-10 NOTE — PROGRESS NOTES
3. 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  Increase Colace to 100 mg twice daily.  Continue MiraLAX 1 capful daily.  High-fiber foods supplied to patient.  Remain well-hydrated  - Notify office if any persistent/worsening change in bowel habits.  Discussed if rectal bleeding, severe abdominal pain to present to the emergency department.    Medication clarification:  - Medications clarified with patient.  She confirms she is not taking amlodipine and has not been taking her metoprolol.  BP in office 130/80 today.  She was given blood pressure log/paper and encouraged to monitor at home daily-twice daily and bring in records in 2 weeks as scheduled.  At this time, will adjust medications as needed/restart amlodipine or metoprolol if appropriate.    -- Reviewed emergent signs and symptoms and when to seek care at the emergency department and/or call 911     Completed Refills   Requested Prescriptions     Signed Prescriptions Disp Refills    atorvastatin (LIPITOR) 10 MG tablet 90 tablet 1     Sig: Take 1 tablet by mouth daily       No orders of the defined types were placed in this encounter.       No results found for this visit on 07/10/24.    Return in about 2 weeks (around 7/24/2024), or if symptoms worsen or fail to improve, for HTN + DM f/u .    Electronically signed by OLIVIA Carrillo CNP on 07/11/24 at 9:47 AM.

## 2024-07-10 NOTE — PATIENT INSTRUCTIONS
-- Increase Colace (stool softener) to twice daily as needed for constipation   - Continue MiraLAX 1 capful daily  - Monitor blood pressure at home 1-2 times daily and bring in blood pressure record to next visit  - Continue OFF OF metoprolol (blood pressure medication) since you have been not taking this medication  -- Continue OFF OF amlodipine(blood pressure medication)

## 2024-07-15 RX ORDER — BLOOD SUGAR DIAGNOSTIC
STRIP MISCELLANEOUS
Refills: 10 | OUTPATIENT
Start: 2024-07-15

## 2024-07-15 RX ORDER — BLOOD-GLUCOSE METER
EACH MISCELLANEOUS
Qty: 1 KIT | Refills: 10 | OUTPATIENT
Start: 2024-07-15

## 2024-07-17 ENCOUNTER — TELEPHONE (OUTPATIENT)
Dept: PRIMARY CARE CLINIC | Age: 74
End: 2024-07-17

## 2024-07-17 NOTE — TELEPHONE ENCOUNTER
Pt is out of Reglan, states this was prescribed by a doctor in [place], does she need to continue this? Pt was under the impression she was taking this for water retention. Pt is currently taking Zofran for nausea as needed.

## 2024-07-18 NOTE — TELEPHONE ENCOUNTER
Spoke with patient, advised her that Reglan is not necessary anymore since she's taking Zofran as needed.

## 2024-07-23 ENCOUNTER — OFFICE VISIT (OUTPATIENT)
Dept: PRIMARY CARE CLINIC | Age: 74
End: 2024-07-23
Payer: COMMERCIAL

## 2024-07-23 ENCOUNTER — HOSPITAL ENCOUNTER (OUTPATIENT)
Age: 74
Discharge: HOME | End: 2024-07-23
Payer: COMMERCIAL

## 2024-07-23 VITALS
WEIGHT: 199.4 LBS | HEART RATE: 68 BPM | DIASTOLIC BLOOD PRESSURE: 72 MMHG | OXYGEN SATURATION: 94 % | BODY MASS INDEX: 37.68 KG/M2 | SYSTOLIC BLOOD PRESSURE: 128 MMHG

## 2024-07-23 DIAGNOSIS — K62.89 PROCTITIS: Primary | ICD-10-CM

## 2024-07-23 DIAGNOSIS — K59.00 CONSTIPATION, UNSPECIFIED CONSTIPATION TYPE: ICD-10-CM

## 2024-07-23 DIAGNOSIS — R19.7 DIARRHEA, UNSPECIFIED TYPE: ICD-10-CM

## 2024-07-23 DIAGNOSIS — E78.2 MIXED HYPERLIPIDEMIA: ICD-10-CM

## 2024-07-23 DIAGNOSIS — E11.69 TYPE 2 DIABETES MELLITUS WITH OTHER SPECIFIED COMPLICATION, WITHOUT LONG-TERM CURRENT USE OF INSULIN (HCC): ICD-10-CM

## 2024-07-23 DIAGNOSIS — I10 ESSENTIAL HYPERTENSION: ICD-10-CM

## 2024-07-23 DIAGNOSIS — E11.628 TYPE 2 DIABETES MELLITUS WITH OTHER SKIN COMPLICATION, WITHOUT LONG-TERM CURRENT USE OF INSULIN (HCC): ICD-10-CM

## 2024-07-23 LAB
ALBUMIN SERPL-MCNC: 3.8 G/DL (ref 3.5–5.2)
ALBUMIN/GLOB SERPL: 1.4 {RATIO} (ref 1–2.5)
ALP SERPL-CCNC: 89 U/L (ref 35–104)
ALT SERPL-CCNC: 42 U/L (ref 5–33)
ANION GAP SERPL CALCULATED.3IONS-SCNC: 9 MMOL/L (ref 9–17)
AST SERPL-CCNC: 49 U/L
BASOPHILS # BLD: <0.03 K/UL (ref 0–0.2)
BASOPHILS NFR BLD: 0 % (ref 0–2)
BILIRUB SERPL-MCNC: 0.6 MG/DL (ref 0.3–1.2)
BUN SERPL-MCNC: 16 MG/DL (ref 8–23)
BUN/CREAT SERPL: 32 (ref 9–20)
CALCIUM SERPL-MCNC: 8.5 MG/DL (ref 8.6–10.4)
CHLORIDE SERPL-SCNC: 96 MMOL/L (ref 98–107)
CO2 SERPL-SCNC: 26 MMOL/L (ref 20–31)
CREAT SERPL-MCNC: 0.5 MG/DL (ref 0.5–0.9)
EOSINOPHIL # BLD: 0.15 K/UL (ref 0–0.44)
EOSINOPHILS RELATIVE PERCENT: 3 % (ref 1–4)
ERYTHROCYTE [DISTWIDTH] IN BLOOD BY AUTOMATED COUNT: 12.5 % (ref 11.8–14.4)
EST. AVERAGE GLUCOSE BLD GHB EST-MCNC: 140 MG/DL
GFR, ESTIMATED: >90 ML/MIN/1.73M2
GLUCOSE SERPL-MCNC: 155 MG/DL (ref 70–99)
HBA1C MFR BLD: 6.5 % (ref 4–6)
HCT VFR BLD AUTO: 37.2 % (ref 36.3–47.1)
HGB BLD-MCNC: 12.5 G/DL (ref 11.9–15.1)
IMM GRANULOCYTES # BLD AUTO: <0.03 K/UL (ref 0–0.3)
IMM GRANULOCYTES NFR BLD: 0 %
LYMPHOCYTES NFR BLD: 2.4 K/UL (ref 1.1–3.7)
LYMPHOCYTES RELATIVE PERCENT: 41 % (ref 24–43)
MCH RBC QN AUTO: 31.2 PG (ref 25.2–33.5)
MCHC RBC AUTO-ENTMCNC: 33.6 G/DL (ref 28.4–34.8)
MCV RBC AUTO: 92.8 FL (ref 82.6–102.9)
MONOCYTES NFR BLD: 0.29 K/UL (ref 0.1–1.2)
MONOCYTES NFR BLD: 5 % (ref 3–12)
NEUTROPHILS NFR BLD: 51 % (ref 36–65)
NEUTS SEG NFR BLD: 2.96 K/UL (ref 1.5–8.1)
NRBC BLD-RTO: 0 PER 100 WBC
PLATELET # BLD AUTO: 235 K/UL (ref 138–453)
PMV BLD AUTO: 10.6 FL (ref 8.1–13.5)
POTASSIUM SERPL-SCNC: 4.5 MMOL/L (ref 3.7–5.3)
PROT SERPL-MCNC: 6.6 G/DL (ref 6.4–8.3)
RBC # BLD AUTO: 4.01 M/UL (ref 3.95–5.11)
SODIUM SERPL-SCNC: 131 MMOL/L (ref 135–144)
WBC OTHER # BLD: 5.8 K/UL (ref 3.5–11.3)

## 2024-07-23 PROCEDURE — 80053 COMPREHEN METABOLIC PANEL: CPT

## 2024-07-23 PROCEDURE — 3078F DIAST BP <80 MM HG: CPT | Performed by: FAMILY MEDICINE

## 2024-07-23 PROCEDURE — 36415 COLL VENOUS BLD VENIPUNCTURE: CPT

## 2024-07-23 PROCEDURE — 3074F SYST BP LT 130 MM HG: CPT | Performed by: FAMILY MEDICINE

## 2024-07-23 PROCEDURE — 85025 COMPLETE CBC W/AUTO DIFF WBC: CPT

## 2024-07-23 PROCEDURE — 83036 HEMOGLOBIN GLYCOSYLATED A1C: CPT

## 2024-07-23 PROCEDURE — 99214 OFFICE O/P EST MOD 30 MIN: CPT | Performed by: FAMILY MEDICINE

## 2024-07-23 PROCEDURE — 82043 UR ALBUMIN QUANTITATIVE: CPT

## 2024-07-23 PROCEDURE — 1123F ACP DISCUSS/DSCN MKR DOCD: CPT | Performed by: FAMILY MEDICINE

## 2024-07-23 PROCEDURE — 82570 ASSAY OF URINE CREATININE: CPT

## 2024-07-23 PROCEDURE — G2211 COMPLEX E/M VISIT ADD ON: HCPCS | Performed by: FAMILY MEDICINE

## 2024-07-23 NOTE — PROGRESS NOTES
[facility]      Patient:  Elle Vincent 74 y.o. female     Date of Service: 07/23/24    Chief Complaint:   Chief Complaint   Patient presents with    Hypertension     Monitors B/P aat home 110s/60's.  Denies headaches, chest pain.  On and off dizziness, not new.      Diabetes     Monitors glucose at home.  123-155 readings.           History of Present Illness     Chronically maintained on Lipitor 10 mg daily for hyperlipidemia, doing well without muscle aches or intolerances.    History of type 2 diabetes most recent A1c 12/2023 4.1 new labs pending.  No current hyper/hypoglycemic symptoms.  Previously taken off Januvia.   Blood sugars averaging around 120    Hypertension stable on medications, asymptomatic at this time.    Taking Zoloft 100 mg daily for history anxiety/depression. Doing well on the   Medication with good symptomatic improvement.    Was recently evaluated in the emergency department for concerns of acute onset abdominal pain, diarrhea.  CT scan was consistent with constipation VS proctitis.  Recommendations were for outpatient colonoscopy evaluation.  Allergies:    Ibuprofen    Medication List:    Current Outpatient Medications   Medication Sig Dispense Refill    atorvastatin (LIPITOR) 10 MG tablet Take 1 tablet by mouth daily 90 tablet 1    ondansetron (ZOFRAN) 4 MG tablet TAKE 1 TABLET BY MOUTH EVERY 8 HOURS AS NEEDED FOR NAUSEA FOR VOMITING 30 tablet 0    furosemide (LASIX) 40 MG tablet Take 1 tablet by mouth daily 90 tablet 0    sertraline (ZOLOFT) 100 MG tablet Take 1 tablet by mouth daily 90 tablet 0    omeprazole (PRILOSEC) 40 MG delayed release capsule Take 1 capsule by mouth every morning (before breakfast) 90 capsule 1    oxyBUTYnin (DITROPAN) 5 MG tablet TAKE 1 TABLET BY MOUTH THREE TIMES DAILY 270 tablet 1    potassium chloride (KLOR-CON M) 10 MEQ extended release tablet Take 1 tablet by mouth daily 90 tablet 0    gabapentin (NEURONTIN) 600 MG tablet Take 1

## 2024-07-24 LAB
CREAT UR-MCNC: 85.1 MG/DL (ref 28–217)
MICROALBUMIN UR-MCNC: <12 MG/L (ref 0–20)
MICROALBUMIN/CREAT UR-RTO: NORMAL MCG/MG CREAT (ref 0–25)

## 2024-07-30 RX ORDER — METFORMIN HYDROCHLORIDE 500 MG/1
500 TABLET, EXTENDED RELEASE ORAL 2 TIMES DAILY WITH MEALS
Qty: 180 TABLET | Refills: 0 | Status: SHIPPED | OUTPATIENT
Start: 2024-07-30 | End: 2024-10-28

## 2024-08-06 ENCOUNTER — TELEPHONE (OUTPATIENT)
Dept: PRIMARY CARE CLINIC | Age: 74
End: 2024-08-06

## 2024-08-06 NOTE — TELEPHONE ENCOUNTER
Patient called stating she has not been on any blood sugar medication for 2-3 months. Fasting glucose this am 185. Patient has not been keeping a log. Patient reports being a lot more tired as well. Patient has not received her metformin from [facility] yet. Provided patient with number for [facility] to check and see if medication has been shipped yet. Patient just wanted to update provider.

## 2024-08-26 ENCOUNTER — TELEPHONE (OUTPATIENT)
Dept: PRIMARY CARE CLINIC | Age: 74
End: 2024-08-26

## 2024-08-26 NOTE — TELEPHONE ENCOUNTER
Patient called stating she needs to renew her handicap placard. Ok to supply letter for this? Would like to  Tuesday or Wednesday if possible.

## 2024-08-30 ENCOUNTER — TELEPHONE (OUTPATIENT)
Dept: PRIMARY CARE CLINIC | Age: 74
End: 2024-08-30

## 2024-08-30 NOTE — TELEPHONE ENCOUNTER
Pt states she's had diarrhea for ~2 days, consistency varies with each bowel movement, no stomach pain, nausea or vomiting. [place] doesn't want to go with diarrhea. Is it ok for her to take Imodium?

## 2024-09-03 ENCOUNTER — HOSPITAL ENCOUNTER (OUTPATIENT)
Dept: PHYSICAL THERAPY | Age: 74
Setting detail: THERAPIES SERIES
Discharge: HOME | End: 2024-09-03
Payer: COMMERCIAL

## 2024-09-03 ENCOUNTER — TELEPHONE (OUTPATIENT)
Dept: SURGERY | Age: 74
End: 2024-09-03

## 2024-09-03 ENCOUNTER — OFFICE VISIT (OUTPATIENT)
Dept: SURGERY | Age: 74
End: 2024-09-03
Payer: COMMERCIAL

## 2024-09-03 VITALS
WEIGHT: 206 LBS | HEIGHT: 61 IN | DIASTOLIC BLOOD PRESSURE: 71 MMHG | BODY MASS INDEX: 38.89 KG/M2 | HEART RATE: 50 BPM | SYSTOLIC BLOOD PRESSURE: 111 MMHG

## 2024-09-03 DIAGNOSIS — R19.7 DIARRHEA, UNSPECIFIED TYPE: Primary | ICD-10-CM

## 2024-09-03 DIAGNOSIS — K62.89 PROCTITIS: ICD-10-CM

## 2024-09-03 PROCEDURE — 1123F ACP DISCUSS/DSCN MKR DOCD: CPT | Performed by: STUDENT IN AN ORGANIZED HEALTH CARE EDUCATION/TRAINING PROGRAM

## 2024-09-03 PROCEDURE — 97140 MANUAL THERAPY 1/> REGIONS: CPT

## 2024-09-03 PROCEDURE — 3078F DIAST BP <80 MM HG: CPT | Performed by: STUDENT IN AN ORGANIZED HEALTH CARE EDUCATION/TRAINING PROGRAM

## 2024-09-03 PROCEDURE — 99214 OFFICE O/P EST MOD 30 MIN: CPT | Performed by: STUDENT IN AN ORGANIZED HEALTH CARE EDUCATION/TRAINING PROGRAM

## 2024-09-03 PROCEDURE — 3074F SYST BP LT 130 MM HG: CPT | Performed by: STUDENT IN AN ORGANIZED HEALTH CARE EDUCATION/TRAINING PROGRAM

## 2024-09-03 RX ORDER — MECLIZINE HCL 12.5 MG 12.5 MG/1
TABLET ORAL
COMMUNITY
Start: 2024-07-19

## 2024-09-03 RX ORDER — POLYETHYLENE GLYCOL 3350, SODIUM SULFATE ANHYDROUS, SODIUM BICARBONATE, SODIUM CHLORIDE, POTASSIUM CHLORIDE 236; 22.74; 6.74; 5.86; 2.97 G/4L; G/4L; G/4L; G/4L; G/4L
4 POWDER, FOR SOLUTION ORAL ONCE
Qty: 4000 ML | Refills: 0 | Status: SHIPPED | OUTPATIENT
Start: 2024-09-03 | End: 2024-09-03

## 2024-09-03 RX ORDER — METOPROLOL SUCCINATE 25 MG/1
25 TABLET, EXTENDED RELEASE ORAL DAILY
COMMUNITY
Start: 2024-08-29

## 2024-09-03 NOTE — PROGRESS NOTES
Phone: [phone]                       [facility]          Fax: [phone]                      Outpatient Physical Therapy                                                             Lymphedema Treatment  Date: 9/3/2024  Patient: Elle Vincent  : 1950  [facility] #: [id number]  Referring Physician: Referring Provider (secondary): OLIVIA Chakraborty CNP    Diagnosis: BLE lymphedema    Treatment Diagnosis: BLE lymphedema  PT Insurance Information: [employer] Medicare  Total # of Visits Approved: 19   Total # of Visits to Date: 13  No Show: 0  Canceled Appointment: 2     Subjective  Subjective: Pt. arrives to therapy, denies pain and stated at home compression pump use is going well, using 1x daily for 1 hour and sometimes 2x daily. Compliant with compression garments.  Additional Pertinent Hx: Arthritis, HTN, DM, depression      Skin Integumentary:   Skin Integrity: Scars (comment)  Pitting Scale Area 1: 0  Skin Color: Hyperpigmentation, Pink  Skin Texture: Hyperkeratosis  Skin Condition/Temp: Cool  Turgor: Shiney/hard  Edema Rebound: Quick  Stemmer Sign: Positive    Signs of Constriction (if applicable):   Papilloma (benign tumor arising from an epithelial layer): No  Fibrotic Areas: Yes  Lymphorrhea: No    Stage of Lymphedema: Stage 2: Spontaneously Irreversible Stage  Description:      Lymphedema Classification:   Type: Secondary Lymphedema  Left: Moderate     Right: Moderate    Measurements:       Right Measurements Left Measurements   R LE Pre Girth Measurement (cm)  5th Tuberosity (cm): 22.8  Lateral malleolus: 26.6  Calf (cm): 46.3  Mid Knee (cm): 49.8  Thigh (cm): 58  Total Girth (cm): 203.5 L LE Pre Girth Measurement (cm)  5th Tuberosity (cm): 23.6  Lateral malleolus: 28.6  Calf (cm): 42.3  Mid Knee (cm): 47.8  Thigh (cm): 57.2  Total Girth (cm): 199.5       Exercises:  Exercise 1: HEP pt educated on keeping her legs elevated and compressed at 30mmHg, perform daily exrecises including ankle

## 2024-09-03 NOTE — PROGRESS NOTES
intermittently taking her medications.  Discussed with patient that she should continue to take fiber daily as well as keeping herself well-hydrated.  At this time recommend continuing the Colace daily to establish a steady state of regular bowel function.  If patient is not having regular bowel movements we will then add on the MiraLAX as needed.  Due to inflammation seen around the rectum on CT scan during recent hospitalization I do recommend colonoscopy to evaluate.  Patient's last prep during previous colonoscopy in 2021 was poor will proceed with 2-day prep including both MiraLAX and GoLytely.  Questions were answered and patient is in agreement with plan.    Zachariah Bush, DO  9/3/2024

## 2024-09-06 NOTE — DISCHARGE SUMMARY
Phone: [phone]                 [facility]          Fax: [phone]                            Outpatient Physical Therapy                                                                    Discharge Summary    Patient: Elle Vincent  : 1950  [facility] #: [id number]   Referring Physician: Abdoul Pulido CNP  Diagnosis: BLE lymphedema  Treatment Diagnosis: BLE lymphedema      Date Treatment Initiated: 23  Date of Last Treatment: 9/3/24      PT Visit Information  PT Insurance Information: [employer]na Medicare  Total # of Visits Approved: 19  Total # of Visits to Date: 13  Plan of Care/Certification Expiration Date: 24  No Show: 0  Canceled Appointment: 2  Referring Provider (secondary): OLIVIA Chakraborty CNP      Frequency/Duration  Plan Frequency: 2 times per week  Plan weeks: 6 weeks      Treatment Received  [] HP/CP      [] Electrical Stim   [x] Therapeutic Exercise      [x] Gait Training  [] Aquatics   [] Ultrasound         [x] Patient Education/HEP   [x] Manual Therapy  [] Traction    [x] Neuro-brenda        [x] Soft Tissue Mobs            [x] Therapeutic Act  [] Iontophoresis    [x] Vasopneumatic compression      [] Dry Needling    Assessment  Assessment: Patient attended 13 PT visits for B LE lymphedema and met all goals for independence with HEP and home pumps, decreased B LE girth measurements and improved strength. Will dc patient at this time d/t all goals met.       Goals  Short Term Goals  Time Frame for Short Term Goals: 3 weeks  Short Term Goal 1: Pt will be educated on her POC and lymphedema management-met  Short Term Goal 2: Pt will intiate pump protocol in order to reduce BLE girth measurements-met    Long Term Goals  Time Frame for Long Term Goals : 6 weeks  Long Term Goal 1: Pt will be safe and independent with her lymphedema management-MET  Long Term Goal 2: Pt will demosntrate a 2-3 cm decrease in girth measurements in order to increase ambulation tolerance and reduce

## 2024-09-09 DIAGNOSIS — E11.628 TYPE 2 DIABETES MELLITUS WITH OTHER SKIN COMPLICATION, WITHOUT LONG-TERM CURRENT USE OF INSULIN (HCC): Primary | ICD-10-CM

## 2024-09-09 RX ORDER — BLOOD-GLUCOSE METER
1 EACH MISCELLANEOUS DAILY
Qty: 1 KIT | Refills: 0 | Status: SHIPPED | OUTPATIENT
Start: 2024-09-09 | End: 2024-09-09

## 2024-09-09 RX ORDER — BLOOD-GLUCOSE METER
1 EACH MISCELLANEOUS 2 TIMES DAILY
Qty: 1 KIT | Refills: 0 | Status: SHIPPED | OUTPATIENT
Start: 2024-09-09

## 2024-09-16 DIAGNOSIS — E11.42 DIABETIC POLYNEUROPATHY ASSOCIATED WITH TYPE 2 DIABETES MELLITUS (HCC): ICD-10-CM

## 2024-09-16 DIAGNOSIS — E11.628 TYPE 2 DIABETES MELLITUS WITH OTHER SKIN COMPLICATION, WITHOUT LONG-TERM CURRENT USE OF INSULIN (HCC): ICD-10-CM

## 2024-09-16 RX ORDER — BLOOD-GLUCOSE METER
1 EACH MISCELLANEOUS 2 TIMES DAILY
Qty: 1 KIT | Refills: 0 | Status: SHIPPED | OUTPATIENT
Start: 2024-09-16

## 2024-09-16 RX ORDER — FUROSEMIDE 40 MG
40 TABLET ORAL DAILY
Qty: 90 TABLET | Refills: 1 | Status: SHIPPED | OUTPATIENT
Start: 2024-09-16

## 2024-09-16 RX ORDER — GABAPENTIN 600 MG/1
600 TABLET ORAL 3 TIMES DAILY
Qty: 270 TABLET | Refills: 1 | Status: SHIPPED | OUTPATIENT
Start: 2024-09-16 | End: 2024-09-20 | Stop reason: SDUPTHER

## 2024-09-16 RX ORDER — OXYBUTYNIN CHLORIDE 5 MG/1
TABLET ORAL
Qty: 270 TABLET | Refills: 1 | Status: SHIPPED | OUTPATIENT
Start: 2024-09-16 | End: 2024-09-20 | Stop reason: SDUPTHER

## 2024-09-16 RX ORDER — METFORMIN HCL 500 MG
500 TABLET, EXTENDED RELEASE 24 HR ORAL 2 TIMES DAILY WITH MEALS
Qty: 180 TABLET | Refills: 0 | Status: SHIPPED | OUTPATIENT
Start: 2024-09-16 | End: 2024-09-20 | Stop reason: SDUPTHER

## 2024-09-20 DIAGNOSIS — E11.42 DIABETIC POLYNEUROPATHY ASSOCIATED WITH TYPE 2 DIABETES MELLITUS (HCC): ICD-10-CM

## 2024-09-20 RX ORDER — OXYBUTYNIN CHLORIDE 5 MG/1
TABLET ORAL
Qty: 270 TABLET | Refills: 1 | Status: SHIPPED | OUTPATIENT
Start: 2024-09-20

## 2024-09-20 RX ORDER — METFORMIN HCL 500 MG
500 TABLET, EXTENDED RELEASE 24 HR ORAL 2 TIMES DAILY WITH MEALS
Qty: 180 TABLET | Refills: 0 | Status: SHIPPED | OUTPATIENT
Start: 2024-09-20 | End: 2024-12-19

## 2024-09-20 RX ORDER — OMEPRAZOLE 40 MG/1
40 CAPSULE, DELAYED RELEASE ORAL
Qty: 90 CAPSULE | Refills: 3 | Status: SHIPPED | OUTPATIENT
Start: 2024-09-20

## 2024-09-20 RX ORDER — GABAPENTIN 600 MG/1
600 TABLET ORAL 3 TIMES DAILY
Qty: 270 TABLET | Refills: 1 | Status: SHIPPED | OUTPATIENT
Start: 2024-09-20 | End: 2025-03-19

## 2024-09-20 RX ORDER — SERTRALINE HYDROCHLORIDE 100 MG/1
100 TABLET, FILM COATED ORAL DAILY
Qty: 90 TABLET | Refills: 0 | Status: SHIPPED | OUTPATIENT
Start: 2024-09-20

## 2024-09-23 RX ORDER — GLUCOSAMINE HCL/CHONDROITIN SU 500-400 MG
CAPSULE ORAL
Qty: 200 STRIP | Refills: 2 | Status: SHIPPED | OUTPATIENT
Start: 2024-09-23

## 2024-09-24 RX ORDER — ONDANSETRON 4 MG/1
TABLET, FILM COATED ORAL
Qty: 30 TABLET | Refills: 0 | Status: SHIPPED | OUTPATIENT
Start: 2024-09-24

## 2024-09-25 DIAGNOSIS — E11.42 DIABETIC POLYNEUROPATHY ASSOCIATED WITH TYPE 2 DIABETES MELLITUS (HCC): ICD-10-CM

## 2024-09-25 NOTE — TELEPHONE ENCOUNTER
Hello, please clarify Rx.     \"    Last ordered: 5 days ago (9/20/2024) by Angelica Richter MD   \"

## 2024-09-26 RX ORDER — GABAPENTIN 600 MG/1
600 TABLET ORAL 3 TIMES DAILY
Qty: 270 TABLET | Refills: 1 | OUTPATIENT
Start: 2024-09-26 | End: 2025-03-25

## 2024-09-26 RX ORDER — OXYBUTYNIN CHLORIDE 5 MG/1
TABLET ORAL
Qty: 270 TABLET | Refills: 1 | OUTPATIENT
Start: 2024-09-26

## 2024-09-26 RX ORDER — METFORMIN HCL 500 MG
500 TABLET, EXTENDED RELEASE 24 HR ORAL 2 TIMES DAILY WITH MEALS
Qty: 180 TABLET | Refills: 0 | OUTPATIENT
Start: 2024-09-26 | End: 2024-12-25

## 2024-09-27 DIAGNOSIS — E11.42 DIABETIC POLYNEUROPATHY ASSOCIATED WITH TYPE 2 DIABETES MELLITUS (HCC): ICD-10-CM

## 2024-09-27 RX ORDER — GABAPENTIN 600 MG/1
600 TABLET ORAL 3 TIMES DAILY
Qty: 270 TABLET | Refills: 0 | Status: SHIPPED | OUTPATIENT
Start: 2024-09-27 | End: 2025-03-26

## 2024-09-27 RX ORDER — OXYBUTYNIN CHLORIDE 5 MG/1
TABLET ORAL
Qty: 270 TABLET | Refills: 0 | Status: SHIPPED | OUTPATIENT
Start: 2024-09-27

## 2024-09-30 RX ORDER — SERTRALINE HYDROCHLORIDE 100 MG/1
100 TABLET, FILM COATED ORAL DAILY
Qty: 90 TABLET | Refills: 0 | Status: SHIPPED | OUTPATIENT
Start: 2024-09-30

## 2024-09-30 RX ORDER — METFORMIN HCL 500 MG
500 TABLET, EXTENDED RELEASE 24 HR ORAL 2 TIMES DAILY WITH MEALS
Qty: 180 TABLET | Refills: 0 | Status: SHIPPED | OUTPATIENT
Start: 2024-09-30 | End: 2024-12-29

## 2024-09-30 RX ORDER — METOPROLOL SUCCINATE 25 MG/1
25 TABLET, EXTENDED RELEASE ORAL DAILY
Qty: 90 TABLET | Refills: 0 | Status: SHIPPED | OUTPATIENT
Start: 2024-09-30

## 2024-09-30 NOTE — TELEPHONE ENCOUNTER
Rx request was received from different pharmacy than Rx were sent to on 09/20/2024. Rx re-pended.

## 2024-09-30 NOTE — PROGRESS NOTES
Patient states they received their colon prep instructions and home medications that are to be taken on the day of their procedure with a small sip of water only, from the physician's office. Patient will take gabapentin, metoprolol, oxybutynin, and sertraline with a small sip of water the morning of surgery prior to arrival to the hospital.

## 2024-10-01 ENCOUNTER — ANESTHESIA EVENT (OUTPATIENT)
Dept: OPERATING ROOM | Age: 74
End: 2024-10-01
Payer: COMMERCIAL

## 2024-10-01 RX ORDER — ONDANSETRON 4 MG/1
TABLET, FILM COATED ORAL
Qty: 30 TABLET | Refills: 10 | OUTPATIENT
Start: 2024-10-01

## 2024-10-02 ENCOUNTER — HOSPITAL ENCOUNTER (OUTPATIENT)
Age: 74
Setting detail: OUTPATIENT SURGERY
Discharge: HOME | End: 2024-10-02
Attending: STUDENT IN AN ORGANIZED HEALTH CARE EDUCATION/TRAINING PROGRAM | Admitting: STUDENT IN AN ORGANIZED HEALTH CARE EDUCATION/TRAINING PROGRAM
Payer: COMMERCIAL

## 2024-10-02 ENCOUNTER — ANESTHESIA (OUTPATIENT)
Dept: OPERATING ROOM | Age: 74
End: 2024-10-02
Payer: COMMERCIAL

## 2024-10-02 VITALS
BODY MASS INDEX: 35.45 KG/M2 | OXYGEN SATURATION: 96 % | HEART RATE: 78 BPM | RESPIRATION RATE: 18 BRPM | TEMPERATURE: 98.8 F | WEIGHT: 187.8 LBS | DIASTOLIC BLOOD PRESSURE: 70 MMHG | SYSTOLIC BLOOD PRESSURE: 140 MMHG | HEIGHT: 61 IN

## 2024-10-02 PROBLEM — K52.9 COLITIS: Status: ACTIVE | Noted: 2024-10-02

## 2024-10-02 PROCEDURE — 2580000003 HC RX 258

## 2024-10-02 PROCEDURE — 3609027000 HC COLONOSCOPY: Performed by: STUDENT IN AN ORGANIZED HEALTH CARE EDUCATION/TRAINING PROGRAM

## 2024-10-02 PROCEDURE — 3700000001 HC ADD 15 MINUTES (ANESTHESIA): Performed by: STUDENT IN AN ORGANIZED HEALTH CARE EDUCATION/TRAINING PROGRAM

## 2024-10-02 PROCEDURE — 7100000011 HC PHASE II RECOVERY - ADDTL 15 MIN: Performed by: STUDENT IN AN ORGANIZED HEALTH CARE EDUCATION/TRAINING PROGRAM

## 2024-10-02 PROCEDURE — 6360000002 HC RX W HCPCS

## 2024-10-02 PROCEDURE — G0121 COLON CA SCRN NOT HI RSK IND: HCPCS | Performed by: STUDENT IN AN ORGANIZED HEALTH CARE EDUCATION/TRAINING PROGRAM

## 2024-10-02 PROCEDURE — 7100000010 HC PHASE II RECOVERY - FIRST 15 MIN: Performed by: STUDENT IN AN ORGANIZED HEALTH CARE EDUCATION/TRAINING PROGRAM

## 2024-10-02 PROCEDURE — 2500000003 HC RX 250 WO HCPCS

## 2024-10-02 PROCEDURE — 2709999900 HC NON-CHARGEABLE SUPPLY: Performed by: STUDENT IN AN ORGANIZED HEALTH CARE EDUCATION/TRAINING PROGRAM

## 2024-10-02 PROCEDURE — 3700000000 HC ANESTHESIA ATTENDED CARE: Performed by: STUDENT IN AN ORGANIZED HEALTH CARE EDUCATION/TRAINING PROGRAM

## 2024-10-02 RX ORDER — PROPOFOL 10 MG/ML
INJECTION, EMULSION INTRAVENOUS
Status: DISCONTINUED | OUTPATIENT
Start: 2024-10-02 | End: 2024-10-02 | Stop reason: SDUPTHER

## 2024-10-02 RX ORDER — NALOXONE HYDROCHLORIDE 0.4 MG/ML
INJECTION, SOLUTION INTRAMUSCULAR; INTRAVENOUS; SUBCUTANEOUS PRN
Status: DISCONTINUED | OUTPATIENT
Start: 2024-10-02 | End: 2024-10-02 | Stop reason: HOSPADM

## 2024-10-02 RX ORDER — METOCLOPRAMIDE HYDROCHLORIDE 5 MG/ML
10 INJECTION INTRAMUSCULAR; INTRAVENOUS
Status: DISCONTINUED | OUTPATIENT
Start: 2024-10-02 | End: 2024-10-02 | Stop reason: HOSPADM

## 2024-10-02 RX ORDER — LIDOCAINE HYDROCHLORIDE 20 MG/ML
INJECTION, SOLUTION EPIDURAL; INFILTRATION; INTRACAUDAL; PERINEURAL
Status: DISCONTINUED | OUTPATIENT
Start: 2024-10-02 | End: 2024-10-02 | Stop reason: SDUPTHER

## 2024-10-02 RX ORDER — ONDANSETRON 2 MG/ML
4 INJECTION INTRAMUSCULAR; INTRAVENOUS
Status: DISCONTINUED | OUTPATIENT
Start: 2024-10-02 | End: 2024-10-02 | Stop reason: HOSPADM

## 2024-10-02 RX ORDER — SODIUM CHLORIDE, SODIUM LACTATE, POTASSIUM CHLORIDE, CALCIUM CHLORIDE 600; 310; 30; 20 MG/100ML; MG/100ML; MG/100ML; MG/100ML
INJECTION, SOLUTION INTRAVENOUS CONTINUOUS
Status: DISCONTINUED | OUTPATIENT
Start: 2024-10-02 | End: 2024-10-02 | Stop reason: HOSPADM

## 2024-10-02 RX ADMIN — PROPOFOL 20 MG: 10 INJECTION, EMULSION INTRAVENOUS at 16:14

## 2024-10-02 RX ADMIN — PROPOFOL 20 MG: 10 INJECTION, EMULSION INTRAVENOUS at 16:24

## 2024-10-02 RX ADMIN — LIDOCAINE HYDROCHLORIDE 80 MG: 20 INJECTION, SOLUTION EPIDURAL; INFILTRATION; INTRACAUDAL; PERINEURAL at 16:13

## 2024-10-02 RX ADMIN — PROPOFOL 20 MG: 10 INJECTION, EMULSION INTRAVENOUS at 16:21

## 2024-10-02 RX ADMIN — PROPOFOL 20 MG: 10 INJECTION, EMULSION INTRAVENOUS at 16:18

## 2024-10-02 RX ADMIN — PROPOFOL 20 MG: 10 INJECTION, EMULSION INTRAVENOUS at 16:20

## 2024-10-02 RX ADMIN — PROPOFOL 30 MG: 10 INJECTION, EMULSION INTRAVENOUS at 16:27

## 2024-10-02 RX ADMIN — PROPOFOL 20 MG: 10 INJECTION, EMULSION INTRAVENOUS at 16:17

## 2024-10-02 RX ADMIN — PROPOFOL 20 MG: 10 INJECTION, EMULSION INTRAVENOUS at 16:23

## 2024-10-02 RX ADMIN — PROPOFOL 30 MG: 10 INJECTION, EMULSION INTRAVENOUS at 16:25

## 2024-10-02 RX ADMIN — PROPOFOL 20 MG: 10 INJECTION, EMULSION INTRAVENOUS at 16:15

## 2024-10-02 RX ADMIN — PROPOFOL 30 MG: 10 INJECTION, EMULSION INTRAVENOUS at 16:34

## 2024-10-02 RX ADMIN — PROPOFOL 70 MG: 10 INJECTION, EMULSION INTRAVENOUS at 16:13

## 2024-10-02 RX ADMIN — PROPOFOL 20 MG: 10 INJECTION, EMULSION INTRAVENOUS at 16:33

## 2024-10-02 RX ADMIN — PROPOFOL 30 MG: 10 INJECTION, EMULSION INTRAVENOUS at 16:31

## 2024-10-02 RX ADMIN — PROPOFOL 30 MG: 10 INJECTION, EMULSION INTRAVENOUS at 16:29

## 2024-10-02 RX ADMIN — PROPOFOL 20 MG: 10 INJECTION, EMULSION INTRAVENOUS at 16:19

## 2024-10-02 RX ADMIN — PROPOFOL 30 MG: 10 INJECTION, EMULSION INTRAVENOUS at 16:32

## 2024-10-02 RX ADMIN — PROPOFOL 20 MG: 10 INJECTION, EMULSION INTRAVENOUS at 16:16

## 2024-10-02 RX ADMIN — PROPOFOL 20 MG: 10 INJECTION, EMULSION INTRAVENOUS at 16:22

## 2024-10-02 RX ADMIN — SODIUM CHLORIDE, POTASSIUM CHLORIDE, SODIUM LACTATE AND CALCIUM CHLORIDE: 600; 310; 30; 20 INJECTION, SOLUTION INTRAVENOUS at 14:17

## 2024-10-02 RX ADMIN — PROPOFOL 20 MG: 10 INJECTION, EMULSION INTRAVENOUS at 16:35

## 2024-10-02 ASSESSMENT — PAIN - FUNCTIONAL ASSESSMENT
PAIN_FUNCTIONAL_ASSESSMENT: NONE - DENIES PAIN

## 2024-10-02 ASSESSMENT — LIFESTYLE VARIABLES: SMOKING_STATUS: 0

## 2024-10-02 ASSESSMENT — ENCOUNTER SYMPTOMS: SHORTNESS OF BREATH: 0

## 2024-10-02 NOTE — OP NOTE
Operative Note      Patient: Elle Vincent  YOB: 1950  MRN: [id number]    Date of Procedure: 10/2/2024    Pre-Op Diagnosis Codes:      * Screening for colon cancer [Z12.11]    Post-Op Diagnosis: Same       Procedure(s):  COLORECTAL CANCER SCREENING, NOT HIGH RISK    Surgeon(s):  Zachariah Bush DO    Assistant:   * No surgical staff found *    Anesthesia: Monitor Anesthesia Care    Estimated Blood Loss (mL): Minimal    Complications: None    Specimens:   * No specimens in log *    Implants:  * No implants in log *      Drains: * No LDAs found *    Findings:  Infection Present At Time Of Surgery (PATOS) (choose all levels that have infection present):  No infection present  Other Findings: lots of diverticula, lots of colon spasm    Detailed Description of Procedure:        PROCEDURE: The patient was given IV conscious sedation per anesthesia. The patient was given 4 L of O2 /minute by nasal cannula.  The patient's SPO2 remained above 90% throughout the procedure. The colonoscope was inserted per rectum and advanced under direct vision to the cecum without difficulty. Prep was adequate. Colon had a lot of spasm.    Findings:  Cecum/Ascending colon: normal    Transverse colon: abnormal: diverticula    Descending/Sigmoid colon: abnormal: diverticula    Rectum/Anus: abnormal: diverticula, grade 1 internal hemorrhoids    The colon was decompressed and the scope was removed.  The patient tolerated the procedure well.     IMPRESSION/PLAN:   F/u as needed        Electronically signed by Zachariah Bush DO on 10/2/2024 at 4:38 PM

## 2024-10-02 NOTE — H&P
Update History & Physical    The patient's History and Physical of September 3, 2024 was reviewed with the patient and I examined the patient. There was no change. The surgical site was confirmed by the patient and me.     PE:  GEN: Denies recent weight loss, fatigue, fevers, chills.  CV: No history of MI, recent chest pain.  RESP: Denies new shortness of breath, asthma.  GI: Denies abdominal pain, intermittent diarrhea and constipation, denies nausea or emesis      Plan: The risks, benefits, expected outcome, and alternative to the recommended procedure have been discussed with the patient. Patient understands and wants to proceed with the procedure.     Electronically signed by Zachariah Bush DO on 10/2/2024 at 2:12 PM     [facility] SURGERY CLINIC NOTE     DATE: September 3, 2024      SUBJECTIVE:  DIA CALZADA is a 74 y.o. female who presents to the surgery clinic due to intermittent diarrhea and constipation.  Patient states she has had intermittent issues with her bowels since 2019 when she had a spinal cord injury.  However more recently in July of this year she had an episode of severe abdominal pain followed by multiple days of diarrhea.  She presented to the emergency department and was admitted for concern for infectious versus inflammatory diarrhea.  Patient was treated with antibiotics and fluids.  CT scan at that time showed inflammation around the rectum and sigmoid colon.  Since leaving the hospital patient states she continues to have intermittent constipation and diarrhea.  When she has episodes of diarrhea she is incontinent and unable to control her bowels.  Patient states that she has been very intermittent with medications.  She is taking fiber however intermittently taking Colace and MiraLAX.  She states after becoming constipated she increases the Colace and MiraLAX and then inevitably develops diarrhea and has an incontinent episode.  Patient has not noticed any andreia blood in her  DVTs.  ENDO: Positive for history of diabetes  NEURO: Denies history of spinal cord injury suffered from fall and subsequent fracture of cervical spine  Notes reviewed, and agree with documentation in pt's chart.      PHYSICAL EXAM:      Vitals:     09/03/24 1305   BP: 111/71   Pulse: 50      GEN: Alert and oriented x 3. In no acute distress. Appears stated age.  HEENT: EOMI  NECK: trachea midline  HEART: Regular rate and rhythm    LUNGS: symmetrical chest rise bilaterally  ABDOMEN: Soft, nontender, nondistended, well-healed surgical incisions  EXT: no cyanosis, clubbing or edema present    NEURO: Does have focal deficits in fingertips and toes from spinal cord injury  SKIN: No rashes or nodules noted.     IMAGING:  From recent hospitalization was reviewed     LABS:  Not available        ASSESSMENT:    Diagnosis Orders   1. Diarrhea, unspecified type          2. Proctitis                PLAN:  Discussed with patient the many causes of her possible diarrhea and constipation.  At this time is hard to gauge exactly what is going on as she is only intermittently taking her medications.  Discussed with patient that she should continue to take fiber daily as well as keeping herself well-hydrated.  At this time recommend continuing the Colace daily to establish a steady state of regular bowel function.  If patient is not having regular bowel movements we will then add on the MiraLAX as needed.  Due to inflammation seen around the rectum on CT scan during recent hospitalization I do recommend colonoscopy to evaluate.  Patient's last prep during previous colonoscopy in 2021 was poor will proceed with 2-day prep including both MiraLAX and GoLytely.  Questions were answered and patient is in agreement with plan.

## 2024-10-02 NOTE — ANESTHESIA POSTPROCEDURE EVALUATION
Department of Anesthesiology  Postprocedure Note    Patient: Elle Vincent  MRN: [id number]  YOB: 1950  Date of evaluation: 10/2/2024    Procedure Summary       Date: 10/02/24 Room / Location: [place] [address] / [facility]    Anesthesia Start: 1608 Anesthesia Stop: 1644    Procedure: COLORECTAL CANCER SCREENING, NOT HIGH RISK (Abdomen) Diagnosis:       Screening for colon cancer      (Screening for colon cancer [Z12.11])    Surgeons: Zachariah Bush DO Responsible Provider: Myla Quintero APRN - CRNA    Anesthesia Type: general, TIVA ASA Status: 3            Anesthesia Type: No value filed.    Thor Phase I: Thor Score: 10    Thor Phase II: Thor Score: 10    Anesthesia Post Evaluation    Patient location during evaluation: bedside  Patient participation: complete - patient participated  Level of consciousness: awake and alert  Pain score: 0  Airway patency: patent  Nausea & Vomiting: no nausea and no vomiting  Cardiovascular status: hemodynamically stable  Respiratory status: acceptable  Hydration status: stable  Pain management: adequate        No notable events documented.

## 2024-10-02 NOTE — PROGRESS NOTES
Pt verbalized readiness to go home.    Discharge Criteria    Inpatients must meet Criteria 1 through 7. All other patients are either YES or N/A. If a NO is chosen then Anesthesia or Surgeon must be notified.      1.  Minimum 30 minutes after last dose of sedative medication.    Yes      2.  Systolic BP between 90 - 160. Diastolic BP between 60 - 90.    Yes      3.  Pulse between 60 - 120    Yes      4.  Respirations between 8 - 25.    Yes      5.  SpO2 92% - 100%.    Yes      6.  Able to cough and swallow or return to baseline function.    Yes      7.  Alert and oriented or return to baseline mental status.    Yes      8.  Demonstrates controlled, coordinated movements, ambulates with steady gait, or return to baseline activity function.    Yes      9.  Minimal or no pain or nausea, or at a level tolerable and acceptable to patient.    Yes      10. Takes and retains oral fluids as allowed.    Yes      11. Procedural / perioperative site stable.  Minimal or no bleeding.    Yes          12. If GI endoscopy procedure, minimal or no abdominal distention or passing flatus.    Yes      13. Written discharge instructions and emergency telephone number provided.    Yes      14. Accompanied by a responsible adult.    Yes

## 2024-10-02 NOTE — ANESTHESIA PRE PROCEDURE
Department of Anesthesiology  Preprocedure Note       Name:  Elle Vincent   Age:  74 y.o.  :  1950                                          MRN:  [id number]         Date:  10/2/2024      Surgeon: Surgeon(s):  Zachariah Bush DO    Procedure: Procedure(s):  COLORECTAL CANCER SCREENING, NOT HIGH RISK    Medications prior to admission:   Prior to Admission medications    Medication Sig Start Date End Date Taking? Authorizing Provider   metFORMIN (GLUCOPHAGE-XR) 500 MG extended release tablet Take 1 tablet by mouth with breakfast and with evening meal 24 Yes Angelica Richter MD   metoprolol succinate (TOPROL XL) 25 MG extended release tablet Take 1 tablet by mouth daily 24  Yes Angelica Richter MD   sertraline (ZOLOFT) 100 MG tablet Take 1 tablet by mouth daily 24  Yes Angelica Richter MD   gabapentin (NEURONTIN) 600 MG tablet Take 1 tablet by mouth 3 times daily for 180 days. 9/27/24 3/26/25 Yes Carter Kent MD   oxyBUTYnin (DITROPAN) 5 MG tablet TAKE 1 TABLET BY MOUTH THREE TIMES DAILY 24  Yes Carter Kent MD   ondansetron (ZOFRAN) 4 MG tablet TAKE 1 TABLET BY MOUTH EVERY 8 HOURS AS NEEDED FOR NAUSEA FOR VOMITING 24  Yes Snehal Weston APRN - CNP   blood glucose monitor strips Test 2 times a day & as needed for symptoms of irregular blood glucose. Dispense sufficient amount for indicated testing frequency plus additional to accommodate PRN testing needs. 24  Yes Snehal Weston APRN - CNP   omeprazole (PRILOSEC) 40 MG delayed release capsule Take 1 capsule by mouth every morning (before breakfast) 24  Yes Angelica Richter MD   furosemide (LASIX) 40 MG tablet TAKE 1 TABLET BY MOUTH DAILY 24  Yes Angelica Richter MD   Blood Glucose Monitoring Suppl (ONE TOUCH ULTRA 2) w/Device KIT 1 kit by Does not apply route in the morning and at bedtime Test two times daily and PRN. 24  Yes Angelica Richter MD   meclizine (ANTIVERT)

## 2024-10-10 ENCOUNTER — TELEPHONE (OUTPATIENT)
Dept: PRIMARY CARE CLINIC | Age: 74
End: 2024-10-10

## 2024-10-10 NOTE — TELEPHONE ENCOUNTER
Patient called in requesting to be put on Linzess. She has her follow up coming up with you on the 24th of this month.

## 2024-10-14 ENCOUNTER — TELEPHONE (OUTPATIENT)
Dept: PRIMARY CARE CLINIC | Age: 74
End: 2024-10-14

## 2024-10-14 NOTE — TELEPHONE ENCOUNTER
Pt. Called in to check in on medication that she had called in last week to get filled. DR. Richter stated in another note that the would like to see her at her next appt. To discuss meds.

## 2024-10-20 SDOH — HEALTH STABILITY: PHYSICAL HEALTH: ON AVERAGE, HOW MANY DAYS PER WEEK DO YOU ENGAGE IN MODERATE TO STRENUOUS EXERCISE (LIKE A BRISK WALK)?: 2 DAYS

## 2024-10-20 ASSESSMENT — LIFESTYLE VARIABLES
HOW MANY STANDARD DRINKS CONTAINING ALCOHOL DO YOU HAVE ON A TYPICAL DAY: 0
HOW OFTEN DO YOU HAVE SIX OR MORE DRINKS ON ONE OCCASION: 1
HOW OFTEN DO YOU HAVE A DRINK CONTAINING ALCOHOL: NEVER
HOW MANY STANDARD DRINKS CONTAINING ALCOHOL DO YOU HAVE ON A TYPICAL DAY: PATIENT DOES NOT DRINK
HOW OFTEN DO YOU HAVE A DRINK CONTAINING ALCOHOL: 1

## 2024-10-20 ASSESSMENT — PATIENT HEALTH QUESTIONNAIRE - PHQ9
2. FEELING DOWN, DEPRESSED OR HOPELESS: SEVERAL DAYS
SUM OF ALL RESPONSES TO PHQ9 QUESTIONS 1 & 2: 2
SUM OF ALL RESPONSES TO PHQ QUESTIONS 1-9: 2
SUM OF ALL RESPONSES TO PHQ QUESTIONS 1-9: 2
1. LITTLE INTEREST OR PLEASURE IN DOING THINGS: SEVERAL DAYS
SUM OF ALL RESPONSES TO PHQ QUESTIONS 1-9: 2
SUM OF ALL RESPONSES TO PHQ QUESTIONS 1-9: 2

## 2024-10-22 RX ORDER — ATORVASTATIN CALCIUM 10 MG/1
10 TABLET, FILM COATED ORAL DAILY
Qty: 90 TABLET | Refills: 1 | Status: SHIPPED | OUTPATIENT
Start: 2024-10-22

## 2024-10-24 ENCOUNTER — OFFICE VISIT (OUTPATIENT)
Dept: PRIMARY CARE CLINIC | Age: 74
End: 2024-10-24

## 2024-10-24 VITALS
HEART RATE: 72 BPM | DIASTOLIC BLOOD PRESSURE: 64 MMHG | SYSTOLIC BLOOD PRESSURE: 122 MMHG | BODY MASS INDEX: 36.44 KG/M2 | HEIGHT: 61 IN | WEIGHT: 193 LBS | OXYGEN SATURATION: 97 %

## 2024-10-24 DIAGNOSIS — K59.09 CHRONIC CONSTIPATION: ICD-10-CM

## 2024-10-24 DIAGNOSIS — F33.42 RECURRENT MAJOR DEPRESSIVE DISORDER, IN FULL REMISSION (HCC): ICD-10-CM

## 2024-10-24 DIAGNOSIS — M19.042 PRIMARY OSTEOARTHRITIS OF BOTH HANDS: ICD-10-CM

## 2024-10-24 DIAGNOSIS — M19.041 PRIMARY OSTEOARTHRITIS OF BOTH HANDS: ICD-10-CM

## 2024-10-24 DIAGNOSIS — Z00.00 MEDICARE ANNUAL WELLNESS VISIT, SUBSEQUENT: Primary | ICD-10-CM

## 2024-10-24 DIAGNOSIS — E11.628 TYPE 2 DIABETES MELLITUS WITH OTHER SKIN COMPLICATION, WITHOUT LONG-TERM CURRENT USE OF INSULIN (HCC): ICD-10-CM

## 2024-10-24 DIAGNOSIS — Z74.09 MOBILITY IMPAIRED: ICD-10-CM

## 2024-10-24 DIAGNOSIS — E78.2 MIXED HYPERLIPIDEMIA: ICD-10-CM

## 2024-10-24 RX ORDER — SERTRALINE HYDROCHLORIDE 100 MG/1
TABLET, FILM COATED ORAL
Qty: 90 TABLET | Refills: 0 | Status: SHIPPED | OUTPATIENT
Start: 2024-10-24

## 2024-10-24 RX ORDER — SERTRALINE HYDROCHLORIDE 25 MG/1
TABLET, FILM COATED ORAL
Qty: 90 TABLET | Refills: 0 | Status: SHIPPED | OUTPATIENT
Start: 2024-10-24

## 2024-10-24 RX ORDER — LANCETS 33 GAUGE
EACH MISCELLANEOUS
COMMUNITY
Start: 2024-10-15

## 2024-10-24 ASSESSMENT — PATIENT HEALTH QUESTIONNAIRE - PHQ9
7. TROUBLE CONCENTRATING ON THINGS, SUCH AS READING THE NEWSPAPER OR WATCHING TELEVISION: NEARLY EVERY DAY
9. THOUGHTS THAT YOU WOULD BE BETTER OFF DEAD, OR OF HURTING YOURSELF: NOT AT ALL
SUM OF ALL RESPONSES TO PHQ QUESTIONS 1-9: 11
6. FEELING BAD ABOUT YOURSELF - OR THAT YOU ARE A FAILURE OR HAVE LET YOURSELF OR YOUR FAMILY DOWN: NOT AT ALL
10. IF YOU CHECKED OFF ANY PROBLEMS, HOW DIFFICULT HAVE THESE PROBLEMS MADE IT FOR YOU TO DO YOUR WORK, TAKE CARE OF THINGS AT HOME, OR GET ALONG WITH OTHER PEOPLE: VERY DIFFICULT
2. FEELING DOWN, DEPRESSED OR HOPELESS: SEVERAL DAYS
SUM OF ALL RESPONSES TO PHQ QUESTIONS 1-9: 11
8. MOVING OR SPEAKING SO SLOWLY THAT OTHER PEOPLE COULD HAVE NOTICED. OR THE OPPOSITE, BEING SO FIGETY OR RESTLESS THAT YOU HAVE BEEN MOVING AROUND A LOT MORE THAN USUAL: SEVERAL DAYS
SUM OF ALL RESPONSES TO PHQ9 QUESTIONS 1 & 2: 2
4. FEELING TIRED OR HAVING LITTLE ENERGY: NEARLY EVERY DAY
3. TROUBLE FALLING OR STAYING ASLEEP: SEVERAL DAYS
SUM OF ALL RESPONSES TO PHQ QUESTIONS 1-9: 11
5. POOR APPETITE OR OVEREATING: SEVERAL DAYS
SUM OF ALL RESPONSES TO PHQ QUESTIONS 1-9: 11
1. LITTLE INTEREST OR PLEASURE IN DOING THINGS: SEVERAL DAYS

## 2024-10-24 ASSESSMENT — COLUMBIA-SUICIDE SEVERITY RATING SCALE - C-SSRS
2. HAVE YOU ACTUALLY HAD ANY THOUGHTS OF KILLING YOURSELF?: NO
1. WITHIN THE PAST MONTH, HAVE YOU WISHED YOU WERE DEAD OR WISHED YOU COULD GO TO SLEEP AND NOT WAKE UP?: NO
6. HAVE YOU EVER DONE ANYTHING, STARTED TO DO ANYTHING, OR PREPARED TO DO ANYTHING TO END YOUR LIFE?: NO

## 2024-10-24 NOTE — PROGRESS NOTES
Vitamins-Minerals (THERAPEUTIC MULTIVITAMIN-MINERALS) tablet Take 1 tablet by mouth daily Yes ProviderShayan MD   calcium carbonate 600 MG TABS tablet Take 1 tablet by mouth daily Yes Shayan Sin MD   vitamin D3 (CHOLECALCIFEROL) 10 MCG (400 UNIT) TABS tablet Take 1 tablet by mouth daily Yes Shayan Sin MD   b complex vitamins capsule Take 1 capsule by mouth daily Yes ProviderShayan MD       CareTeam (Including outside providers/suppliers regularly involved in providing care):   Patient Care Team:  Angelica Richter MD as PCP - General (Family Medicine)  Angelica Richter MD as PCP - Empaneled Provider      Reviewed and updated this visit:  Allergies  Meds

## 2024-10-30 ENCOUNTER — OFFICE VISIT (OUTPATIENT)
Dept: CARDIOLOGY | Age: 74
End: 2024-10-30

## 2024-10-30 VITALS
RESPIRATION RATE: 18 BRPM | HEART RATE: 67 BPM | DIASTOLIC BLOOD PRESSURE: 66 MMHG | HEIGHT: 61 IN | WEIGHT: 192 LBS | OXYGEN SATURATION: 93 % | SYSTOLIC BLOOD PRESSURE: 113 MMHG | BODY MASS INDEX: 36.25 KG/M2

## 2024-10-30 DIAGNOSIS — R07.89 ATYPICAL CHEST PAIN: ICD-10-CM

## 2024-10-30 DIAGNOSIS — I25.10 MILD CAD: ICD-10-CM

## 2024-10-30 DIAGNOSIS — I10 ESSENTIAL HYPERTENSION: ICD-10-CM

## 2024-10-30 DIAGNOSIS — R94.31 ABNORMAL EKG: ICD-10-CM

## 2024-10-30 DIAGNOSIS — R00.2 HEART PALPITATIONS: ICD-10-CM

## 2024-10-30 DIAGNOSIS — R94.39 ABNORMAL STRESS TEST: Primary | ICD-10-CM

## 2024-10-30 DIAGNOSIS — E78.2 MIXED HYPERLIPIDEMIA: ICD-10-CM

## 2024-10-30 NOTE — PROGRESS NOTES
(Lipitor) 10 mg daily     Additional Testing List: None    Atypical chest pain but still suspicious for possible myocardial ischemia:  Medical Management for Suspected coronary artery disease:  Antiplatelet Agent: Not indicated at this time.  Beta Blocker: Continue Metoprolol succinate (Toprol XL) 25 mg daily.   Anti-anginal medications: Continue amlodipine (Norvasc) 5 mg once daily.  Cholesterol Reduction Therapy: Continue Atorvastatin (Lipitor) 10 mg daily    Additional counseling: I advised them to call our office or go to the emergency room if they developed worsening or persistent chest pain or increased shortness of breath as this could be life threatening.     Mild CAD: S/p Heart Cath done on 9/15/2022 showed mild CAD with no intervention needed at that time.   Beta Blocker: Continue Metoprolol succinate (Toprol XL) 25 mg daily.   Anti-anginal medications: Continue amlodipine (Norvasc) 5 mg once daily.  Cholesterol Reduction Therapy: Continue Atorvastatin (Lipitor) 10 mg nightly.    Additional counseling: I advised them to call our office or go to the emergency room if they developed worsening or persistent chest pain or increased shortness of breath as this could be life threatening.    Recurrent intermittent palpitations:   Beta Blocker: Continue Metoprolol succinate (Toprol XL) 25 mg daily.  Calcium Channel Blocker: Continue amlodipine (Norvasc) 5 mg once daily.      Essential Hypertension: Controlled  ACE Inibitor/ARB: Not indicated at this time.   Calcium Channel Blocker: Continue amlodipine (Norvasc) 5 mg once daily.     Hyperlipidemia: Mixed - Last LDL done on 1/4/2023 was 43 mg/dL  Cholesterol Reduction Therapy: Continue Atorvastatin (Lipitor) 10 mg daily.      History of Abnormal EKG: History of suspected false positive but questionable inferior and anterolateral MI  Will continue to monitor.   Mild CAD on recent heart cath 9/2022.  Asymptomatic at this time.      In the meantime, I encouraged Ms.

## 2024-11-05 ENCOUNTER — TELEPHONE (OUTPATIENT)
Dept: PRIMARY CARE CLINIC | Age: 74
End: 2024-11-05

## 2024-11-05 NOTE — TELEPHONE ENCOUNTER
Patient states she is no longer taking the Linzess states it gave her uncontrollable diarrhea. Will go back to take ex-lax. Just wanted to update you.

## 2024-11-07 RX ORDER — METFORMIN HYDROCHLORIDE 500 MG/1
500 TABLET, EXTENDED RELEASE ORAL 2 TIMES DAILY WITH MEALS
Qty: 180 TABLET | Refills: 0 | Status: SHIPPED | OUTPATIENT
Start: 2024-11-07 | End: 2025-02-05

## 2024-11-26 ENCOUNTER — TELEPHONE (OUTPATIENT)
Dept: PRIMARY CARE CLINIC | Age: 74
End: 2024-11-26

## 2024-11-26 NOTE — TELEPHONE ENCOUNTER
Patient called back stating that she had received paperwork from [facility] with all her medications and her Sertraline 100mg and 25mg does not show on her list of mediations taken she wanted to make sure she is still supposed to be taking both. She did state she is taking the metformin 500mg and had previously talked to [facility] in regards to this.

## 2024-11-26 NOTE — TELEPHONE ENCOUNTER
Patient called in stating that she was thinking the doctor switched her Metformin. I do not see any changes in the computer, should she still be taking 500 MG BID?

## 2024-11-27 RX ORDER — SERTRALINE HYDROCHLORIDE 100 MG/1
TABLET, FILM COATED ORAL
Qty: 90 TABLET | Refills: 0 | Status: SHIPPED | OUTPATIENT
Start: 2024-11-27

## 2024-11-27 RX ORDER — SERTRALINE HYDROCHLORIDE 25 MG/1
TABLET, FILM COATED ORAL
Qty: 90 TABLET | Refills: 0 | Status: SHIPPED | OUTPATIENT
Start: 2024-11-27

## 2024-11-27 NOTE — TELEPHONE ENCOUNTER
Yes both medications are fine the metformin 500 mg twice daily and the Zoloft is a 100 mg tablet with a 25 mg tablet for a total of 125 mg.  Likely it is in separate sections on the papers

## 2024-11-27 NOTE — TELEPHONE ENCOUNTER
Patient called in to get a refil on her Zoloft, order is pended and pharmacy verified to [facility]

## 2024-12-10 DIAGNOSIS — E11.42 DIABETIC POLYNEUROPATHY ASSOCIATED WITH TYPE 2 DIABETES MELLITUS (HCC): ICD-10-CM

## 2024-12-11 ENCOUNTER — HOSPITAL ENCOUNTER (OUTPATIENT)
Dept: INFUSION THERAPY | Age: 74
Discharge: HOME | End: 2024-12-11
Payer: COMMERCIAL

## 2024-12-11 VITALS
SYSTOLIC BLOOD PRESSURE: 121 MMHG | HEART RATE: 82 BPM | RESPIRATION RATE: 18 BRPM | DIASTOLIC BLOOD PRESSURE: 72 MMHG | TEMPERATURE: 96.6 F

## 2024-12-11 DIAGNOSIS — M81.0 POSTMENOPAUSAL OSTEOPOROSIS: Primary | ICD-10-CM

## 2024-12-11 PROCEDURE — 6360000002 HC RX W HCPCS: Performed by: FAMILY MEDICINE

## 2024-12-11 PROCEDURE — 96372 THER/PROPH/DIAG INJ SC/IM: CPT

## 2024-12-11 RX ORDER — ONDANSETRON 2 MG/ML
8 INJECTION INTRAMUSCULAR; INTRAVENOUS
OUTPATIENT
Start: 2024-12-11

## 2024-12-11 RX ORDER — GABAPENTIN 600 MG/1
600 TABLET ORAL 3 TIMES DAILY
Qty: 90 TABLET | Refills: 5 | Status: SHIPPED | OUTPATIENT
Start: 2024-12-11 | End: 2025-06-09

## 2024-12-11 RX ORDER — ACETAMINOPHEN 325 MG/1
650 TABLET ORAL
OUTPATIENT
Start: 2024-12-11

## 2024-12-11 RX ORDER — ALBUTEROL SULFATE 90 UG/1
4 INHALANT RESPIRATORY (INHALATION) PRN
OUTPATIENT
Start: 2024-12-11

## 2024-12-11 RX ORDER — HYDROCORTISONE SODIUM SUCCINATE 100 MG/2ML
100 INJECTION INTRAMUSCULAR; INTRAVENOUS
OUTPATIENT
Start: 2024-12-11

## 2024-12-11 RX ORDER — EPINEPHRINE 1 MG/ML
0.3 INJECTION, SOLUTION, CONCENTRATE INTRAVENOUS PRN
OUTPATIENT
Start: 2024-12-11

## 2024-12-11 RX ORDER — SODIUM CHLORIDE 9 MG/ML
INJECTION, SOLUTION INTRAVENOUS CONTINUOUS
OUTPATIENT
Start: 2024-12-11

## 2024-12-11 RX ORDER — DIPHENHYDRAMINE HYDROCHLORIDE 50 MG/ML
50 INJECTION INTRAMUSCULAR; INTRAVENOUS
OUTPATIENT
Start: 2024-12-11

## 2024-12-11 RX ORDER — OXYBUTYNIN CHLORIDE 5 MG/1
TABLET ORAL
Qty: 90 TABLET | Refills: 1 | Status: SHIPPED | OUTPATIENT
Start: 2024-12-11

## 2024-12-11 RX ADMIN — DENOSUMAB 60 MG: 60 INJECTION SUBCUTANEOUS at 10:45

## 2024-12-11 NOTE — DISCHARGE INSTRUCTIONS
Outpatient Discharge Instructions for Prolia Injections    [address] [address]   [phone]      You are advised to carry out the following Instructions:    Diet:  As prescribed by your Physician.    Activity:  As prescribed by your Physician.      Care of injection site:  If the injection site becomes red,sore,swollen,painful, has drainage,or you develop a fever,notify your Physician.      Other:    If you develop hives,rash,itching or trouble breathing, go to the nearest Emergency Room. These could be a sign of allergic reaction.  Continue to take your calcium and vitamin D.  Let your dentist know that you are taking Prolia.  Let your doctor know if you have any unusual jaw or leg bone pain.  Take good care of your teeth,see a dentist often.   Injection is every 6 months.      Follow up appointment:          ANY PROBLEMS OR CONCERNS NOTIFY YOUR PHYSICIAN   OR    GO THE NEAREST EMERGENCY ROOM

## 2024-12-17 ENCOUNTER — TELEPHONE (OUTPATIENT)
Dept: PRIMARY CARE CLINIC | Age: 74
End: 2024-12-17

## 2024-12-17 DIAGNOSIS — I10 ESSENTIAL HYPERTENSION: Primary | ICD-10-CM

## 2024-12-17 RX ORDER — METOPROLOL SUCCINATE 25 MG/1
25 TABLET, EXTENDED RELEASE ORAL DAILY
Qty: 90 TABLET | Refills: 3 | Status: SHIPPED | OUTPATIENT
Start: 2024-12-17

## 2024-12-17 RX ORDER — BLOOD PRESSURE TEST KIT
1 KIT MISCELLANEOUS DAILY
Qty: 1 KIT | Refills: 0 | Status: SHIPPED | OUTPATIENT
Start: 2024-12-17

## 2025-01-20 RX ORDER — MECLIZINE HCL 12.5 MG 12.5 MG/1
12.5 TABLET ORAL 2 TIMES DAILY PRN
Qty: 60 TABLET | Refills: 1 | Status: SHIPPED | OUTPATIENT
Start: 2025-01-20

## 2025-01-28 ENCOUNTER — OFFICE VISIT (OUTPATIENT)
Dept: PRIMARY CARE CLINIC | Age: 75
End: 2025-01-28
Payer: MEDICARE

## 2025-01-28 VITALS
HEART RATE: 61 BPM | SYSTOLIC BLOOD PRESSURE: 92 MMHG | DIASTOLIC BLOOD PRESSURE: 60 MMHG | WEIGHT: 191 LBS | OXYGEN SATURATION: 96 % | BODY MASS INDEX: 36.09 KG/M2

## 2025-01-28 DIAGNOSIS — R42 DIZZINESS: ICD-10-CM

## 2025-01-28 DIAGNOSIS — E11.628 TYPE 2 DIABETES MELLITUS WITH OTHER SKIN COMPLICATION, WITHOUT LONG-TERM CURRENT USE OF INSULIN (HCC): ICD-10-CM

## 2025-01-28 DIAGNOSIS — E78.2 MIXED HYPERLIPIDEMIA: ICD-10-CM

## 2025-01-28 DIAGNOSIS — I10 ESSENTIAL HYPERTENSION: ICD-10-CM

## 2025-01-28 DIAGNOSIS — F33.42 RECURRENT MAJOR DEPRESSIVE DISORDER, IN FULL REMISSION (HCC): Primary | ICD-10-CM

## 2025-01-28 PROCEDURE — G2211 COMPLEX E/M VISIT ADD ON: HCPCS | Performed by: FAMILY MEDICINE

## 2025-01-28 PROCEDURE — G8417 CALC BMI ABV UP PARAM F/U: HCPCS | Performed by: FAMILY MEDICINE

## 2025-01-28 PROCEDURE — 1090F PRES/ABSN URINE INCON ASSESS: CPT | Performed by: FAMILY MEDICINE

## 2025-01-28 PROCEDURE — 3046F HEMOGLOBIN A1C LEVEL >9.0%: CPT | Performed by: FAMILY MEDICINE

## 2025-01-28 PROCEDURE — 1036F TOBACCO NON-USER: CPT | Performed by: FAMILY MEDICINE

## 2025-01-28 PROCEDURE — 2022F DILAT RTA XM EVC RTNOPTHY: CPT | Performed by: FAMILY MEDICINE

## 2025-01-28 PROCEDURE — 1123F ACP DISCUSS/DSCN MKR DOCD: CPT | Performed by: FAMILY MEDICINE

## 2025-01-28 PROCEDURE — G8399 PT W/DXA RESULTS DOCUMENT: HCPCS | Performed by: FAMILY MEDICINE

## 2025-01-28 PROCEDURE — G8427 DOCREV CUR MEDS BY ELIG CLIN: HCPCS | Performed by: FAMILY MEDICINE

## 2025-01-28 PROCEDURE — 3017F COLORECTAL CA SCREEN DOC REV: CPT | Performed by: FAMILY MEDICINE

## 2025-01-28 PROCEDURE — 3074F SYST BP LT 130 MM HG: CPT | Performed by: FAMILY MEDICINE

## 2025-01-28 PROCEDURE — 99214 OFFICE O/P EST MOD 30 MIN: CPT | Performed by: FAMILY MEDICINE

## 2025-01-28 PROCEDURE — 1159F MED LIST DOCD IN RCRD: CPT | Performed by: FAMILY MEDICINE

## 2025-01-28 PROCEDURE — 3078F DIAST BP <80 MM HG: CPT | Performed by: FAMILY MEDICINE

## 2025-01-28 RX ORDER — SERTRALINE HYDROCHLORIDE 100 MG/1
TABLET, FILM COATED ORAL
Qty: 90 TABLET | Refills: 0 | Status: SHIPPED | OUTPATIENT
Start: 2025-01-28

## 2025-01-28 RX ORDER — SERTRALINE HYDROCHLORIDE 25 MG/1
TABLET, FILM COATED ORAL
Qty: 90 TABLET | Refills: 0 | Status: SHIPPED | OUTPATIENT
Start: 2025-01-28

## 2025-01-28 SDOH — ECONOMIC STABILITY: FOOD INSECURITY: WITHIN THE PAST 12 MONTHS, THE FOOD YOU BOUGHT JUST DIDN'T LAST AND YOU DIDN'T HAVE MONEY TO GET MORE.: NEVER TRUE

## 2025-01-28 SDOH — ECONOMIC STABILITY: FOOD INSECURITY: WITHIN THE PAST 12 MONTHS, YOU WORRIED THAT YOUR FOOD WOULD RUN OUT BEFORE YOU GOT MONEY TO BUY MORE.: NEVER TRUE

## 2025-01-28 ASSESSMENT — ANXIETY QUESTIONNAIRES
5. BEING SO RESTLESS THAT IT IS HARD TO SIT STILL: NOT AT ALL
2. NOT BEING ABLE TO STOP OR CONTROL WORRYING: NOT AT ALL
6. BECOMING EASILY ANNOYED OR IRRITABLE: MORE THAN HALF THE DAYS
3. WORRYING TOO MUCH ABOUT DIFFERENT THINGS: SEVERAL DAYS
4. TROUBLE RELAXING: NOT AT ALL
1. FEELING NERVOUS, ANXIOUS, OR ON EDGE: NOT AT ALL
GAD7 TOTAL SCORE: 3
7. FEELING AFRAID AS IF SOMETHING AWFUL MIGHT HAPPEN: NOT AT ALL
IF YOU CHECKED OFF ANY PROBLEMS ON THIS QUESTIONNAIRE, HOW DIFFICULT HAVE THESE PROBLEMS MADE IT FOR YOU TO DO YOUR WORK, TAKE CARE OF THINGS AT HOME, OR GET ALONG WITH OTHER PEOPLE: NOT DIFFICULT AT ALL

## 2025-01-28 ASSESSMENT — PATIENT HEALTH QUESTIONNAIRE - PHQ9
1. LITTLE INTEREST OR PLEASURE IN DOING THINGS: NOT AT ALL
7. TROUBLE CONCENTRATING ON THINGS, SUCH AS READING THE NEWSPAPER OR WATCHING TELEVISION: NOT AT ALL
SUM OF ALL RESPONSES TO PHQ9 QUESTIONS 1 & 2: 2
SUM OF ALL RESPONSES TO PHQ QUESTIONS 1-9: 5
9. THOUGHTS THAT YOU WOULD BE BETTER OFF DEAD, OR OF HURTING YOURSELF: NOT AT ALL
8. MOVING OR SPEAKING SO SLOWLY THAT OTHER PEOPLE COULD HAVE NOTICED. OR THE OPPOSITE, BEING SO FIGETY OR RESTLESS THAT YOU HAVE BEEN MOVING AROUND A LOT MORE THAN USUAL: NOT AT ALL
5. POOR APPETITE OR OVEREATING: NOT AT ALL
10. IF YOU CHECKED OFF ANY PROBLEMS, HOW DIFFICULT HAVE THESE PROBLEMS MADE IT FOR YOU TO DO YOUR WORK, TAKE CARE OF THINGS AT HOME, OR GET ALONG WITH OTHER PEOPLE: SOMEWHAT DIFFICULT
4. FEELING TIRED OR HAVING LITTLE ENERGY: NEARLY EVERY DAY
SUM OF ALL RESPONSES TO PHQ QUESTIONS 1-9: 5
2. FEELING DOWN, DEPRESSED OR HOPELESS: MORE THAN HALF THE DAYS
3. TROUBLE FALLING OR STAYING ASLEEP: NOT AT ALL
6. FEELING BAD ABOUT YOURSELF - OR THAT YOU ARE A FAILURE OR HAVE LET YOURSELF OR YOUR FAMILY DOWN: NOT AT ALL

## 2025-01-28 NOTE — PATIENT INSTRUCTIONS
Dont take your metoprolol for 1 week. At that time call our office and let us know how you are doing

## 2025-01-28 NOTE — PROGRESS NOTES
[facility]      Patient:  Elle Vincent 74 y.o. female     Date of Service: 01/28/25    Chief Complaint:   Chief Complaint   Patient presents with    Anxiety     Depression and anxiety-taking Zoloft 125mg-PHQ9 -JOSUE 7 done  Some feelings of sadness throughout the week still.    Constipation     Still having constipation issues         History of Present Illness     Chronically maintained on Lipitor 10 mg daily for hyperlipidemia, doing well without muscle aches or intolerances.    History of type 2 diabetes most recent A1c 7/24 6.5.  No current hyper/hypoglycemic symptoms.  Previously taken off Januvia.   Blood sugars averaging around 120    Hypertension stable on medications, asymptomatic at this time.    Taking Zoloft 100 mg daily for history of anxiety/depression.  Previously the medication was increased to 125 however patient was not able to obtain from pharmacy.  She has been experiencing significant stressors in recent times with interpersonal relationships and family relationships.      Allergies:    Ibuprofen    Medication List:    Current Outpatient Medications   Medication Sig Dispense Refill    meclizine (ANTIVERT) 12.5 MG tablet Take 1 tablet by mouth 2 times daily as needed for Dizziness 60 tablet 1    metoprolol succinate (TOPROL XL) 25 MG extended release tablet Take 1 tablet by mouth daily 90 tablet 3    oxyBUTYnin (DITROPAN) 5 MG tablet TAKE 1 TABLET BY MOUTH THREE TIMES DAILY 90 tablet 1    gabapentin (NEURONTIN) 600 MG tablet Take 1 tablet by mouth 3 times daily for 180 days. 90 tablet 5    sertraline (ZOLOFT) 100 MG tablet Take 1 tablet by mouth daily along with a 25mg tablet for a daily total of 125mg 90 tablet 0    sertraline (ZOLOFT) 25 MG tablet Take 1 tablet by mouth daily along with a 100mg tablet for a daily total of 125mg 90 tablet 0    metFORMIN (GLUCOPHAGE-XR) 500 MG extended release tablet Take 1 tablet by mouth with breakfast and with evening meal 180 tablet 0  Otezla Pregnancy And Lactation Text: This medication is Pregnancy Category C and it isn't known if it is safe during pregnancy. It is unknown if it is excreted in breast milk.

## 2025-02-11 RX ORDER — OXYBUTYNIN CHLORIDE 5 MG/1
TABLET ORAL
Qty: 90 TABLET | Refills: 1 | Status: SHIPPED | OUTPATIENT
Start: 2025-02-11

## 2025-02-17 DIAGNOSIS — N39.498 OTHER URINARY INCONTINENCE: Primary | ICD-10-CM

## 2025-02-17 RX ORDER — OXYBUTYNIN CHLORIDE 5 MG/1
TABLET ORAL
Qty: 90 TABLET | Refills: 1 | Status: SHIPPED | OUTPATIENT
Start: 2025-02-17

## 2025-03-03 DIAGNOSIS — N39.498 OTHER URINARY INCONTINENCE: ICD-10-CM

## 2025-03-03 RX ORDER — OXYBUTYNIN CHLORIDE 5 MG/1
TABLET ORAL
Qty: 90 TABLET | Refills: 1 | Status: SHIPPED | OUTPATIENT
Start: 2025-03-03

## 2025-03-05 ENCOUNTER — TELEPHONE (OUTPATIENT)
Dept: PRIMARY CARE CLINIC | Age: 75
End: 2025-03-05

## 2025-03-05 NOTE — TELEPHONE ENCOUNTER
Called pt to verify what the [employer] message was stating. This writer stated DR. Kent is a 6 month wait . This writer thought message was pertaining to her sister wanting to become a patient.  DR. Kent is a 6 month wait for new patients. This writer asked again if this was for her sister or her and she stated for herself. The pt declined to switch providers.

## 2025-03-12 RX ORDER — METFORMIN HYDROCHLORIDE 500 MG/1
500 TABLET, EXTENDED RELEASE ORAL 2 TIMES DAILY WITH MEALS
Qty: 180 TABLET | Refills: 0 | Status: SHIPPED | OUTPATIENT
Start: 2025-03-12 | End: 2025-06-10

## 2025-03-12 RX ORDER — FUROSEMIDE 40 MG/1
40 TABLET ORAL DAILY
Qty: 90 TABLET | Refills: 1 | Status: SHIPPED | OUTPATIENT
Start: 2025-03-12

## 2025-03-17 ENCOUNTER — OFFICE VISIT (OUTPATIENT)
Dept: PRIMARY CARE CLINIC | Age: 75
End: 2025-03-17

## 2025-03-17 VITALS
HEART RATE: 59 BPM | WEIGHT: 193 LBS | SYSTOLIC BLOOD PRESSURE: 106 MMHG | OXYGEN SATURATION: 94 % | DIASTOLIC BLOOD PRESSURE: 66 MMHG | BODY MASS INDEX: 36.47 KG/M2

## 2025-03-17 DIAGNOSIS — F41.9 ANXIETY AND DEPRESSION: ICD-10-CM

## 2025-03-17 DIAGNOSIS — E55.9 VITAMIN D INSUFFICIENCY: ICD-10-CM

## 2025-03-17 DIAGNOSIS — F33.42 RECURRENT MAJOR DEPRESSIVE DISORDER, IN FULL REMISSION: ICD-10-CM

## 2025-03-17 DIAGNOSIS — E78.2 MIXED HYPERLIPIDEMIA: ICD-10-CM

## 2025-03-17 DIAGNOSIS — E11.628 TYPE 2 DIABETES MELLITUS WITH OTHER SKIN COMPLICATION, WITHOUT LONG-TERM CURRENT USE OF INSULIN: ICD-10-CM

## 2025-03-17 DIAGNOSIS — H61.23 BILATERAL IMPACTED CERUMEN: ICD-10-CM

## 2025-03-17 DIAGNOSIS — F32.A ANXIETY AND DEPRESSION: ICD-10-CM

## 2025-03-17 DIAGNOSIS — I10 ESSENTIAL HYPERTENSION: Primary | ICD-10-CM

## 2025-03-17 ASSESSMENT — PATIENT HEALTH QUESTIONNAIRE - PHQ9
3. TROUBLE FALLING OR STAYING ASLEEP: NOT AT ALL
4. FEELING TIRED OR HAVING LITTLE ENERGY: NEARLY EVERY DAY
9. THOUGHTS THAT YOU WOULD BE BETTER OFF DEAD, OR OF HURTING YOURSELF: NOT AT ALL
8. MOVING OR SPEAKING SO SLOWLY THAT OTHER PEOPLE COULD HAVE NOTICED. OR THE OPPOSITE, BEING SO FIGETY OR RESTLESS THAT YOU HAVE BEEN MOVING AROUND A LOT MORE THAN USUAL: SEVERAL DAYS
7. TROUBLE CONCENTRATING ON THINGS, SUCH AS READING THE NEWSPAPER OR WATCHING TELEVISION: MORE THAN HALF THE DAYS
1. LITTLE INTEREST OR PLEASURE IN DOING THINGS: MORE THAN HALF THE DAYS
6. FEELING BAD ABOUT YOURSELF - OR THAT YOU ARE A FAILURE OR HAVE LET YOURSELF OR YOUR FAMILY DOWN: NOT AT ALL
SUM OF ALL RESPONSES TO PHQ QUESTIONS 1-9: 10
10. IF YOU CHECKED OFF ANY PROBLEMS, HOW DIFFICULT HAVE THESE PROBLEMS MADE IT FOR YOU TO DO YOUR WORK, TAKE CARE OF THINGS AT HOME, OR GET ALONG WITH OTHER PEOPLE: SOMEWHAT DIFFICULT
SUM OF ALL RESPONSES TO PHQ QUESTIONS 1-9: 10
SUM OF ALL RESPONSES TO PHQ QUESTIONS 1-9: 10
2. FEELING DOWN, DEPRESSED OR HOPELESS: MORE THAN HALF THE DAYS
5. POOR APPETITE OR OVEREATING: NOT AT ALL
SUM OF ALL RESPONSES TO PHQ QUESTIONS 1-9: 10

## 2025-03-17 NOTE — PATIENT INSTRUCTIONS
increase dose based on response and tolerability in increments of 25 mg weekly to a maximum of 200 mg/day  Currently you are on 125mg of zoloft

## 2025-03-17 NOTE — PROGRESS NOTES
Neurological:      Mental Status: Alert and oriented to person, place, and time.  Intact strength UE/LE  Psychiatric:         Behavior: Behavior normal.     Vitals:    03/17/25 1041   BP: 106/66   Pulse: 59   SpO2: 94%         Assessment and Plan     Type 2 diabetes stable at this time, continue meds, fu labs    Hypertension stable at this time, continue present management, continue Toprol-XL.    Hx of anx/dep, patient currently does endorse increased anx and depressive symptoms in recent times given difficulty with familial relationships.  At this point we discussed medication management and patient is agreeable to increase dosage of Zoloft.  Advised her to increase dosage of Zoloft at 25 mg increments weekly up to a maximum of 200 mg.  Will plan to check back for reevaluation and proceed with further steps.  Also recommend outpatient counseling to target symptoms.  If no significant improvement with medication adjustments will have her switch to an alternative such as Lexapro.    Hld stable continue meds    Cerumen impaction-irrigation today

## 2025-03-18 RX ORDER — SERTRALINE HYDROCHLORIDE 100 MG/1
TABLET, FILM COATED ORAL
Qty: 90 TABLET | Refills: 0 | Status: SHIPPED | OUTPATIENT
Start: 2025-03-18

## 2025-03-18 RX ORDER — SERTRALINE HYDROCHLORIDE 25 MG/1
TABLET, FILM COATED ORAL
Qty: 90 TABLET | Refills: 0 | Status: SHIPPED | OUTPATIENT
Start: 2025-03-18

## 2025-03-18 NOTE — TELEPHONE ENCOUNTER
Patient called stating that her sertraline was increased to 125mg. Patient needs refills on 100mg and 25mg please. [facility]. Order pended.

## 2025-03-24 DIAGNOSIS — N39.498 OTHER URINARY INCONTINENCE: ICD-10-CM

## 2025-03-24 RX ORDER — OXYBUTYNIN CHLORIDE 5 MG/1
TABLET ORAL
Qty: 90 TABLET | Refills: 1 | Status: SHIPPED | OUTPATIENT
Start: 2025-03-24

## 2025-03-27 DIAGNOSIS — F33.42 RECURRENT MAJOR DEPRESSIVE DISORDER, IN FULL REMISSION: Primary | ICD-10-CM

## 2025-03-27 NOTE — TELEPHONE ENCOUNTER
Patient called in stating Dr. Richter told her she could increase her sertraline and she would really like to have 50mg tablets instead of taking the 25mg. Writer did look in his note and he told her to start increasing weekly and ending at 200mg. Rx pended

## 2025-03-28 ENCOUNTER — HOSPITAL ENCOUNTER (OUTPATIENT)
Age: 75
Discharge: HOME | End: 2025-03-28
Payer: MEDICARE

## 2025-03-28 DIAGNOSIS — F33.42 RECURRENT MAJOR DEPRESSIVE DISORDER, IN FULL REMISSION: ICD-10-CM

## 2025-03-28 DIAGNOSIS — E78.2 MIXED HYPERLIPIDEMIA: ICD-10-CM

## 2025-03-28 DIAGNOSIS — E55.9 VITAMIN D INSUFFICIENCY: ICD-10-CM

## 2025-03-28 DIAGNOSIS — I10 ESSENTIAL HYPERTENSION: ICD-10-CM

## 2025-03-28 DIAGNOSIS — E11.628 TYPE 2 DIABETES MELLITUS WITH OTHER SKIN COMPLICATION, WITHOUT LONG-TERM CURRENT USE OF INSULIN: ICD-10-CM

## 2025-03-28 LAB
EST. AVERAGE GLUCOSE BLD GHB EST-MCNC: 157 MG/DL
HBA1C MFR BLD: 7.1 % (ref 4–6)

## 2025-03-28 PROCEDURE — 82306 VITAMIN D 25 HYDROXY: CPT

## 2025-03-28 PROCEDURE — 83036 HEMOGLOBIN GLYCOSYLATED A1C: CPT

## 2025-03-28 PROCEDURE — 80048 BASIC METABOLIC PNL TOTAL CA: CPT

## 2025-03-28 PROCEDURE — 36415 COLL VENOUS BLD VENIPUNCTURE: CPT

## 2025-03-28 PROCEDURE — 80061 LIPID PANEL: CPT

## 2025-03-29 LAB
25(OH)D3 SERPL-MCNC: 48.9 NG/ML (ref 30–100)
ANION GAP SERPL CALCULATED.3IONS-SCNC: 14 MMOL/L (ref 9–16)
BUN SERPL-MCNC: 18 MG/DL (ref 8–23)
CALCIUM SERPL-MCNC: 8.9 MG/DL (ref 8.6–10.4)
CHLORIDE SERPL-SCNC: 101 MMOL/L (ref 98–107)
CHOLEST SERPL-MCNC: 116 MG/DL (ref 0–199)
CHOLESTEROL/HDL RATIO: 2.2
CO2 SERPL-SCNC: 24 MMOL/L (ref 20–31)
CREAT SERPL-MCNC: 0.7 MG/DL (ref 0.6–0.9)
GFR, ESTIMATED: >90 ML/MIN/1.73M2
GLUCOSE SERPL-MCNC: 166 MG/DL (ref 74–99)
HDLC SERPL-MCNC: 53 MG/DL
LDLC SERPL CALC-MCNC: 46 MG/DL (ref 0–100)
POTASSIUM SERPL-SCNC: 4.8 MMOL/L (ref 3.7–5.3)
SODIUM SERPL-SCNC: 139 MMOL/L (ref 136–145)
TRIGL SERPL-MCNC: 84 MG/DL
VLDLC SERPL CALC-MCNC: 17 MG/DL (ref 1–30)

## 2025-03-30 ENCOUNTER — RESULTS FOLLOW-UP (OUTPATIENT)
Dept: PRIMARY CARE CLINIC | Age: 75
End: 2025-03-30

## 2025-04-02 DIAGNOSIS — E11.628 TYPE 2 DIABETES MELLITUS WITH OTHER SKIN COMPLICATION, WITHOUT LONG-TERM CURRENT USE OF INSULIN: Primary | ICD-10-CM

## 2025-04-02 DIAGNOSIS — F33.42 RECURRENT MAJOR DEPRESSIVE DISORDER, IN FULL REMISSION: ICD-10-CM

## 2025-04-02 RX ORDER — METFORMIN HYDROCHLORIDE 500 MG/1
1500 TABLET, EXTENDED RELEASE ORAL
Qty: 270 TABLET | Refills: 1 | Status: CANCELLED | OUTPATIENT
Start: 2025-04-02

## 2025-04-02 RX ORDER — METFORMIN HYDROCHLORIDE 500 MG/1
TABLET, EXTENDED RELEASE ORAL
Qty: 90 TABLET | Refills: 5 | Status: SHIPPED | OUTPATIENT
Start: 2025-04-02

## 2025-04-02 NOTE — TELEPHONE ENCOUNTER
Pt agrees to 3 tablets daily, two in the morning with breakfast and 1 in the evening with dinner. Pt also needs her 50 mg tablet of Sertraline resent to [facility]     Both medications pending in refill encounter

## 2025-04-10 RX ORDER — ATORVASTATIN CALCIUM 10 MG/1
10 TABLET, FILM COATED ORAL DAILY
Qty: 90 TABLET | Refills: 1 | Status: SHIPPED | OUTPATIENT
Start: 2025-04-10

## 2025-04-17 ENCOUNTER — OFFICE VISIT (OUTPATIENT)
Dept: PRIMARY CARE CLINIC | Age: 75
End: 2025-04-17
Payer: MEDICARE

## 2025-04-17 VITALS
SYSTOLIC BLOOD PRESSURE: 124 MMHG | HEART RATE: 62 BPM | WEIGHT: 191 LBS | OXYGEN SATURATION: 96 % | DIASTOLIC BLOOD PRESSURE: 70 MMHG | BODY MASS INDEX: 36.09 KG/M2

## 2025-04-17 DIAGNOSIS — I10 ESSENTIAL HYPERTENSION: ICD-10-CM

## 2025-04-17 DIAGNOSIS — F33.42 RECURRENT MAJOR DEPRESSIVE DISORDER, IN FULL REMISSION: ICD-10-CM

## 2025-04-17 DIAGNOSIS — R41.3 MEMORY LOSS: ICD-10-CM

## 2025-04-17 DIAGNOSIS — R07.89 CHEST WALL PAIN: Primary | ICD-10-CM

## 2025-04-17 DIAGNOSIS — L82.1 SK (SEBORRHEIC KERATOSIS): ICD-10-CM

## 2025-04-17 DIAGNOSIS — E78.2 MIXED HYPERLIPIDEMIA: ICD-10-CM

## 2025-04-17 PROCEDURE — 3074F SYST BP LT 130 MM HG: CPT | Performed by: FAMILY MEDICINE

## 2025-04-17 PROCEDURE — G2211 COMPLEX E/M VISIT ADD ON: HCPCS | Performed by: FAMILY MEDICINE

## 2025-04-17 PROCEDURE — 3078F DIAST BP <80 MM HG: CPT | Performed by: FAMILY MEDICINE

## 2025-04-17 PROCEDURE — 99214 OFFICE O/P EST MOD 30 MIN: CPT | Performed by: FAMILY MEDICINE

## 2025-04-17 PROCEDURE — 1159F MED LIST DOCD IN RCRD: CPT | Performed by: FAMILY MEDICINE

## 2025-04-17 PROCEDURE — 1123F ACP DISCUSS/DSCN MKR DOCD: CPT | Performed by: FAMILY MEDICINE

## 2025-04-17 RX ORDER — ONDANSETRON 4 MG/1
TABLET, FILM COATED ORAL
Qty: 30 TABLET | Refills: 0 | Status: SHIPPED | OUTPATIENT
Start: 2025-04-17

## 2025-04-17 RX ORDER — POTASSIUM CHLORIDE 750 MG/1
10 TABLET, EXTENDED RELEASE ORAL DAILY
COMMUNITY
Start: 2025-04-09

## 2025-04-17 RX ORDER — SERTRALINE HYDROCHLORIDE 25 MG/1
TABLET, FILM COATED ORAL
Qty: 90 TABLET | Refills: 0 | Status: SHIPPED | OUTPATIENT
Start: 2025-04-17

## 2025-04-17 RX ORDER — SERTRALINE HYDROCHLORIDE 100 MG/1
TABLET, FILM COATED ORAL
Qty: 90 TABLET | Refills: 0 | Status: SHIPPED | OUTPATIENT
Start: 2025-04-17

## 2025-04-17 SDOH — ECONOMIC STABILITY: FOOD INSECURITY: WITHIN THE PAST 12 MONTHS, YOU WORRIED THAT YOUR FOOD WOULD RUN OUT BEFORE YOU GOT MONEY TO BUY MORE.: NEVER TRUE

## 2025-04-17 SDOH — ECONOMIC STABILITY: FOOD INSECURITY: WITHIN THE PAST 12 MONTHS, THE FOOD YOU BOUGHT JUST DIDN'T LAST AND YOU DIDN'T HAVE MONEY TO GET MORE.: NEVER TRUE

## 2025-04-17 ASSESSMENT — PATIENT HEALTH QUESTIONNAIRE - PHQ9
SUM OF ALL RESPONSES TO PHQ QUESTIONS 1-9: 2
9. THOUGHTS THAT YOU WOULD BE BETTER OFF DEAD, OR OF HURTING YOURSELF: NOT AT ALL
SUM OF ALL RESPONSES TO PHQ QUESTIONS 1-9: 2
SUM OF ALL RESPONSES TO PHQ QUESTIONS 1-9: 2
4. FEELING TIRED OR HAVING LITTLE ENERGY: SEVERAL DAYS
2. FEELING DOWN, DEPRESSED OR HOPELESS: NOT AT ALL
10. IF YOU CHECKED OFF ANY PROBLEMS, HOW DIFFICULT HAVE THESE PROBLEMS MADE IT FOR YOU TO DO YOUR WORK, TAKE CARE OF THINGS AT HOME, OR GET ALONG WITH OTHER PEOPLE: NOT DIFFICULT AT ALL
5. POOR APPETITE OR OVEREATING: NOT AT ALL
8. MOVING OR SPEAKING SO SLOWLY THAT OTHER PEOPLE COULD HAVE NOTICED. OR THE OPPOSITE, BEING SO FIGETY OR RESTLESS THAT YOU HAVE BEEN MOVING AROUND A LOT MORE THAN USUAL: NOT AT ALL
3. TROUBLE FALLING OR STAYING ASLEEP: NOT AT ALL
6. FEELING BAD ABOUT YOURSELF - OR THAT YOU ARE A FAILURE OR HAVE LET YOURSELF OR YOUR FAMILY DOWN: NOT AT ALL
7. TROUBLE CONCENTRATING ON THINGS, SUCH AS READING THE NEWSPAPER OR WATCHING TELEVISION: NOT AT ALL
1. LITTLE INTEREST OR PLEASURE IN DOING THINGS: SEVERAL DAYS
SUM OF ALL RESPONSES TO PHQ QUESTIONS 1-9: 2

## 2025-04-17 NOTE — PROGRESS NOTES
Normal range of motion.   Skin:     General: Skin is warm.      Findings: No erythema.   SK left abdomen  Neurological:      Mental Status: Alert and oriented to person, place, and time.  Intact strength UE/LE  Psychiatric:         Behavior: Behavior normal.     Vitals:    04/17/25 1034   BP: 124/70   Pulse: 62   SpO2: 96%         Assessment and Plan   Hypertension stable at this time, continue present management, continue Toprol-XL.    Hx of anx/dep, patient currently does endorse increased anx and depressive symptoms in recent times given difficulty with familial relationships.  Since the recent increase to 150 mg daily she has had meaningful symptomatic improvement.  Will have her increase to 175 mg daily and then eventually to maximum dosage of 200 mg daily.    Will plan to check back for reevaluation and proceed with further steps.  Also recommend outpatient counseling to target symptoms.     Hld stable continue meds    Patient had concerns of memory loss, independent at this time, conducts her own ADLs, IADLs.  Requesting neuroevaluation.  Referral sent.    Chest wall pain-ibuprofen, Tylenol combination.  Topical Voltaren gel recommended    SK left abdomen-observation

## 2025-04-17 NOTE — PATIENT INSTRUCTIONS
Take 1 tablet of 100mg zoloft with 1 tablet of 50mg zoloft and with 1 tablet of 25mg zoloft  This equals out to 175mg total of zoloft

## 2025-04-23 ENCOUNTER — TELEPHONE (OUTPATIENT)
Dept: PRIMARY CARE CLINIC | Age: 75
End: 2025-04-23

## 2025-04-23 NOTE — TELEPHONE ENCOUNTER
Pt received refill of Metormin in the mail with the old sig, called and spoke with pharmacist at [facility] and it was confirmed that med was sent with old directions. Pharmacy will correct error and send additional medication.     Sertraline and Zofran scripts sent on 4/17/25 were \"not received\" by pharmacy even though escript shows they were received. Gave verbal for all 4

## 2025-05-01 ENCOUNTER — OFFICE VISIT (OUTPATIENT)
Dept: NEUROLOGY | Age: 75
End: 2025-05-01
Payer: MEDICARE

## 2025-05-01 VITALS
RESPIRATION RATE: 20 BRPM | HEIGHT: 61 IN | WEIGHT: 193 LBS | SYSTOLIC BLOOD PRESSURE: 130 MMHG | TEMPERATURE: 96.8 F | DIASTOLIC BLOOD PRESSURE: 76 MMHG | HEART RATE: 63 BPM | BODY MASS INDEX: 36.44 KG/M2

## 2025-05-01 DIAGNOSIS — R41.3 MEMORY CHANGES: Primary | ICD-10-CM

## 2025-05-01 DIAGNOSIS — R26.89 POOR BALANCE: ICD-10-CM

## 2025-05-01 PROCEDURE — 1159F MED LIST DOCD IN RCRD: CPT | Performed by: NEUROMUSCULOSKELETAL MEDICINE, SPORTS MEDICINE

## 2025-05-01 PROCEDURE — 3078F DIAST BP <80 MM HG: CPT | Performed by: NEUROMUSCULOSKELETAL MEDICINE, SPORTS MEDICINE

## 2025-05-01 PROCEDURE — 1125F AMNT PAIN NOTED PAIN PRSNT: CPT | Performed by: NEUROMUSCULOSKELETAL MEDICINE, SPORTS MEDICINE

## 2025-05-01 PROCEDURE — 1123F ACP DISCUSS/DSCN MKR DOCD: CPT | Performed by: NEUROMUSCULOSKELETAL MEDICINE, SPORTS MEDICINE

## 2025-05-01 PROCEDURE — 3075F SYST BP GE 130 - 139MM HG: CPT | Performed by: NEUROMUSCULOSKELETAL MEDICINE, SPORTS MEDICINE

## 2025-05-01 PROCEDURE — 99204 OFFICE O/P NEW MOD 45 MIN: CPT | Performed by: NEUROMUSCULOSKELETAL MEDICINE, SPORTS MEDICINE

## 2025-05-01 NOTE — PATIENT INSTRUCTIONS
SURVEY:    Thank you for allowing us to care for you today.    You may be receiving a survey from [facility] regarding your visit today- electronically or via mail.      Please help us by completing the survey as this will provide the needed feedback to ensure we are providing the very best care for you and your family.    If you cannot score us a very good on any question, please call the office to discuss how we could have made your experience a very good one.    Thank you.       STAFF:    Gloria Gamino, Jasmina JEAN-BAPTISTE      CLINICAL STAFF:    Jenna DE DIOS, Candice JEAN-BAPTISTE, Livia JEAN-BAPTISTE, Ivonne DE DIOS

## 2025-05-01 NOTE — PROGRESS NOTES
NEUROLOGY CONSULT    Patient Name:  Elle Vincent  :   1950  Clinic Visit Date: 2025    I saw Ms. Elle Vincent  in the neurology clinic today for memory problems. 74-year-old right-handed lady with hypertension, diabetes, diabetic neuropathy, hyperlipidemia, osteoarthritis, chronic neck and lower back pain due to degenerative joint/disc disease, chronic depression, presents with complaints of intermittent difficulty with short-term memory, difficulty with speech manifesting as inability to find words in conversation.  These symptoms reportedly began several years ago and has been progressively getting worse.  \"I cannot complete sentences easily \", and she has been frustrated because of this.  Over the past several years she has had several falls related to poor balance.  CT scans of the brain in 2016 demonstrated mild diffuse atrophy.  No history of headache, abnormal visual symptoms slurred speech facial numbness, or focal extremity weakness.  She has incontinence of urine for which she is on oxybutynin as wears a diaper..  She lives with her sister and has had no difficulty with simple ADLs..  Does not drive.  B12 level: Normal.  TSH: Normal.  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     REVIEW OF SYSTEMS    Constitutional Weight changes: absent, change in appetite: absent Fatigue: present;Fevers : absent, Any recent hospitalizations:  absent   HEENT Ears: normal,  Visual disturbance: absent   Respiratory Shortness of breath: absent, choking:  absent, Cough: absent, Snoring : absent   Cardiovascular Chest pain: absent, Leg swelling :absent, palpitations : absent, fainting : absent   GI Constipation: absent, Diarrhea: absent, Swallowing change: absent    Urinary frequency: absent, Urinary urgency: absent, Urinary incontinence: present   Musculoskeletal Neck pain: present, Back pain: present, Stiffness: present, Muscle pain: absent, Joint

## 2025-05-05 ENCOUNTER — TELEPHONE (OUTPATIENT)
Dept: PRIMARY CARE CLINIC | Age: 75
End: 2025-05-05

## 2025-05-05 DIAGNOSIS — R53.81 PHYSICAL DECONDITIONING: Primary | ICD-10-CM

## 2025-05-09 DIAGNOSIS — N39.498 OTHER URINARY INCONTINENCE: ICD-10-CM

## 2025-05-12 RX ORDER — OXYBUTYNIN CHLORIDE 5 MG/1
TABLET ORAL
Qty: 90 TABLET | Refills: 1 | Status: SHIPPED | OUTPATIENT
Start: 2025-05-12

## 2025-05-21 RX ORDER — LIDOCAINE 50 MG/G
1 PATCH TOPICAL DAILY
Qty: 10 PATCH | Refills: 5 | Status: SHIPPED | OUTPATIENT
Start: 2025-05-21 | End: 2025-05-31

## 2025-05-21 NOTE — TELEPHONE ENCOUNTER
Pt is requesting Lidocaine patches for her shoulder and back pain, states she's used these in the past but they've never been prescribed by Dr Richter.     Pt is also inquiring if there's a prescription version of Gas X, the OTC hasn't been much help.

## 2025-05-22 ENCOUNTER — TELEPHONE (OUTPATIENT)
Dept: PRIMARY CARE CLINIC | Age: 75
End: 2025-05-22

## 2025-05-22 ENCOUNTER — HOSPITAL ENCOUNTER (OUTPATIENT)
Dept: PHYSICAL THERAPY | Age: 75
Setting detail: THERAPIES SERIES
Discharge: HOME | End: 2025-05-22
Payer: MEDICARE

## 2025-05-22 PROCEDURE — 97110 THERAPEUTIC EXERCISES: CPT

## 2025-05-22 PROCEDURE — 97161 PT EVAL LOW COMPLEX 20 MIN: CPT

## 2025-05-22 NOTE — PLAN OF CARE
[facility]           Phone: [phone]             Outpatient Physical Therapy  Fax: [phone]                                           Date: 2025  Patient: Elle Vincent : 1950 CSN #: [id number]   Referring Physician: Angelica Richter MD      [x] Plan of Care   [] Updated Plan of Care    Dates of Service to Include: 2025 to 25    Diagnosis:  General weakness, R53.81     Rehab (Treatment) Diagnosis:  General weakness, B shoulder pain           Attendance  Total # of Visits to Date: 1 No Show: 0 Canceled Appointment: 0    Assessment  Assessment: Patient is 74 year old female with dx of general weakness who presents with decreased B LE strength, decreased functional mobility and endurance and decreased postural control and B shoulder pain. Patient to benefit from physical therapy to improve general strength and mobility and return to PLOF.      Goals  Short Term Goals  Time Frame for Short Term Goals: 3 weeks  Short Term Goal 1: Patient to initiate HEP for improved scapular strength and B LE strength.  Short Term Goal 2: Patient to be instructed in general strengthening of B LE's and UE's to improve mobility.  Short Term Goal 3: Patient to tolerate 45 min of ther ex/act for improved endurance.  Long Term Goals  Time Frame for Long Term Goals : 6 weeks  Long Term Goal 1: Patient to  be independent and compliant with HEP.  Long Term Goal 2: Patient to have improved B mid/lower trap strength >/=4/5 grossly all planes for improved  Long Term Goal 3: Patient to have improved core and B LE strength >/=4/5 grossly for improved lumbar stability with gait and transfers.  Long Term Goal 4: Patient to report >/=75% improvement in symptoms for improved QOL.     Prognosis  Therapy Prognosis: Good    Treatment Plan   Plan Frequency: 2  Plan weeks: 4  [x] HP/CP      [x] Electrical Stim   [x] Therapeutic

## 2025-05-22 NOTE — TELEPHONE ENCOUNTER
Called pt. To her know that the lidocaine patches were denied for insurance coverage. Dr. Richter stated she could get 4% lidocaine patches OTC at the store if she would like to still use them. Pt. Understood and aware.

## 2025-05-22 NOTE — PROGRESS NOTES
Phone: [phone]                       [facility] [place]          Fax: [phone]                      Outpatient Physical Therapy                                                                      Evaluation  Date: 2025  Patient: Elle Vincent  : 1950  [facility] #: [id number]    Referring Physician: Angelica Richter MD    Medical Diagnosis: General weakness, R53.81    Treatment Diagnosis: General weakness, B shoulder pain  PT Insurance Information: [facility] Medicare  Total # of Visits Approved: 12   Total # of Visits to Date: 1  No Show: 0  Canceled Appointment: 0    [x] This writer acknowledges review of patient history form     Subjective  Subjective: Patient reports lately she is feeling weaker in her legs and having more B shoulder pain. She occasionally does her leg exercises but doesn't know what to do to make her shoulder pain better.  Additional Pertinent Hx: DM, HTN, heart problems    Observations:   General Observations  Description: Pt amb with FWW and fwd flexed posture. Sits with moderate fwd head and shoulders.    Objective    Strength       Strength LUE  L Shoulder Flexion: 4-/5  L Shoulder ABduction: 4-/5  L Shoulder Internal Rotation: 4-/5  L Shoulder External Rotation: 3+/5  Strength LLE  L Hip Flexion: 3+/5  L Hip ABduction: 3+/5  L Hip ADduction: 3+/5  L Knee Flexion: 4-/5  L Knee Extension: 4-/5  L Ankle Dorsiflexion: 4-/5       Exercises:  Exercise 1: HEP: seated B LE ther ex; scap retracs, retro shoulder rolls, B shoulder ER      Functional Outcome Measures  Open a tight or new jar: Moderate Difficulty  Do heavy household chores (e.g., wash walls, floors): Unable  Carry a shopping bag or briefcase: Moderate Difficulty  Wash your back: Severe Difficulty  Use a knife to cut food: Moderate Difficulty  Recreational activities in which you take some force or impact through your arm, shoulder, or hand (e.g., golf, hammering, tennis, etc.): Unable  During the past week, to what

## 2025-05-27 ENCOUNTER — APPOINTMENT (OUTPATIENT)
Dept: PHYSICAL THERAPY | Age: 75
End: 2025-05-27
Payer: MEDICARE

## 2025-05-29 ENCOUNTER — HOSPITAL ENCOUNTER (OUTPATIENT)
Dept: PHYSICAL THERAPY | Age: 75
Setting detail: THERAPIES SERIES
Discharge: HOME | End: 2025-05-29
Payer: MEDICARE

## 2025-05-29 NOTE — PROGRESS NOTES
Physical Therapy  [facility]  Inpatient/Observation/Outpatient Rehabilitation    Date: 2025  Patient Name: Elle Vincent       [] Inpatient Acute/Observation       [x]  Outpatient  : 1950       Plan of Care/Recert ends      [] Pt refused/declined therapy at this time due to:           [x] Pt cancelled due to:  [] No Reason Given   [x] Sick/ill   [] Other:      [] Evaluation held by RN/Provider/Physical Therapist due to:    [] High Heart Rate   [] High Blood Pressure   [] Orthopedic Consult   [] Hgb < 7   [] Other:    [] Pt ordered brace per physician request:  [] Proper fit will be completed and education for wearing/skin checks    [] Pt does not require skilled services due to:      Therapist/Assistant will attempt to see this patient, at our earliest opportunity.       Dana Curry Date: 2025

## 2025-06-02 ENCOUNTER — HOSPITAL ENCOUNTER (OUTPATIENT)
Dept: PHYSICAL THERAPY | Age: 75
Setting detail: THERAPIES SERIES
Discharge: HOME | End: 2025-06-02
Payer: MEDICARE

## 2025-06-02 PROCEDURE — 97110 THERAPEUTIC EXERCISES: CPT

## 2025-06-02 NOTE — PROGRESS NOTES
Physical Therapy  Phone: [phone]                 [facility]           Fax: [phone]                           Outpatient Physical Therapy                                                                            Daily Note    Patient: Elle Vincent : 1950  [facility] #: [id number]   Referring Physician: Angelica Richter MD  Date: 2025     Treatment Diagnosis: General weakness, B shoulder pain    PT Insurance Information: [employer] Medicare  Total # of Visits Approved: 12 Per Physician Order  Total # of Visits to Date: 2  No Show: 0  Canceled Appointment: 0    25 Plan of Care/Recert Due    Pre-Treatment Pain:  4/10  Subjective: Pt states she has been using bike at home for about 30 -40 minutes 2-3x weekly at L5. Pt notes she would like to focus mostly on UE strength this date. Pt rates pain /10 in R/L shld/bicep region.    Exercises:  Exercise 2: Seated: bicep curls 2# x20, tricep press down YTB x20, ER/IR YTB x20, scap retracts x20, shrugs x20, over head press x10  Exercise 3: Standing at counter with support: hip ext/abd, marches/HS curls, HR x10 ea, sidestepping gait x3 laps  Exercise 4: Seated LAQ 3# x15, Hs curls YTB x15    Assessment  Assessment: Pt having poor tolerance to standing, performed UE exer in seated position. Cues given with ER/IR to avoid rotational and shld abd compensations. Pt having mild numbnesss in all digits with Overhead press but subsided after completion of exer. Pt demoing fwd flexed posture with all standing exer/ambulation, cues provided to correct. Pt able to stand x8 minutes this date.    Activity Tolerance  Activity Tolerance: Patient tolerated treatment well, Patient limited by fatigue, Patient limited by endurance    Patient Education  Patient Education: Educated pt to continue with HEP for improved strengthening and endurance.  Pt verbalized/demonstrated good understanding:     [x] Yes         [] No, pt required further clarification.     Post

## 2025-06-05 ENCOUNTER — HOSPITAL ENCOUNTER (OUTPATIENT)
Dept: PHYSICAL THERAPY | Age: 75
Setting detail: THERAPIES SERIES
Discharge: HOME | End: 2025-06-05
Payer: MEDICARE

## 2025-06-05 PROCEDURE — 97110 THERAPEUTIC EXERCISES: CPT

## 2025-06-05 NOTE — PROGRESS NOTES
Physical Therapy  Phone: [phone]                 [facility]           Fax: [phone]                           Outpatient Physical Therapy                                                                            Daily Note    Patient: Elle Vincent : 1950  [facility] #: [id number]   Referring Physician: Angelica Richter MD  Date: 2025     Treatment Diagnosis: General weakness, B shoulder pain    PT Insurance Information: [facility] Medicare  Total # of Visits Approved: 12 Per Physician Order  Total # of Visits to Date: 3  No Show: 0  Canceled Appointment: 0    25 Plan of Care/Recert Due    Pre-Treatment Pain:  2-3/10  Subjective: Pt rates pain in R UE -3/10. Pt states she had some increased R Ue pain last night, unsure what caused discomfort.    Exercises:  Exercise 2: Seated: bicep curls 2# x20, tricep press down YTB x20, ER/IR YTB x20, scap retracts x20, shrugs x20  Exercise 4: Seated LAQ 3# x20, Hs curls YTB x20  Exercise 5: sidestepping x3 laps, miley taps 6 in x15  Exercise 6: step ups 3inch x10 B LE    Assessment  Assessment: VCs provided with ER/IR to avoid shld abd and trunk rotation compensations. Pt limited with miley taps this date, having difficulty completing full ROM. Pt continues to demo fwd flexed posture with ambulation and standing exer. Pt required use of B UE support to complete step ups.    Activity Tolerance  Activity Tolerance: Patient tolerated treatment well, Patient limited by fatigue, Patient limited by endurance    Patient Education  Patient Education: Educated pt continue with HEP.  Pt verbalized/demonstrated good understanding:     [x] Yes         [] No, pt required further clarification.     Post Treatment Pain:  2/10    Plan  Plan Frequency: 2  Plan weeks: 4     Goals  (Total # of Visits to Date: 3)   Short Term Goals  Time Frame for Short Term Goals: 3 weeks  Short Term Goal 1: Patient to initiate HEP for improved scapular strength and B LE strength.

## 2025-06-09 ENCOUNTER — HOSPITAL ENCOUNTER (OUTPATIENT)
Dept: PHYSICAL THERAPY | Age: 75
Setting detail: THERAPIES SERIES
Discharge: HOME | End: 2025-06-09
Payer: MEDICARE

## 2025-06-09 ENCOUNTER — HOSPITAL ENCOUNTER (OUTPATIENT)
Dept: CT IMAGING | Age: 75
Discharge: HOME | End: 2025-06-11
Payer: MEDICARE

## 2025-06-09 DIAGNOSIS — R41.3 MEMORY CHANGES: ICD-10-CM

## 2025-06-09 DIAGNOSIS — R26.89 POOR BALANCE: ICD-10-CM

## 2025-06-09 PROCEDURE — 97110 THERAPEUTIC EXERCISES: CPT

## 2025-06-09 PROCEDURE — 70450 CT HEAD/BRAIN W/O DYE: CPT

## 2025-06-09 NOTE — PROGRESS NOTES
Phone: [phone]                 [facility]           Fax: [phone]                           Outpatient Physical Therapy                                                                            Daily Note    Patient: Elle Vincent : 1950  [facility] #: [id number]   Referring Physician: Angelica Richter MD  Date: 2025    Treatment Diagnosis: General weakness, B shoulder pain    PT Insurance Information: [facility] Medicare  Total # of Visits Approved: 12 Per Physician Order  Total # of Visits to Date: 4  No Show: 0  Canceled Appointment: 0    25 Plan of Care/Recert Due    Pre-Treatment Pain:  0/10  Subjective: Pt states that she is doing good this date, did not report pain this date.    Exercises:  Exercise 2: Seated: bicep curls 2# x20, tricep press down YTB x20, ER/IR YTB x20, scap retracts x20, shrugs x20--no ER/IR or tricep press down  Exercise 3: Standing at counter with support: hip ext/abd, marches/HS curls, HR x10 ea, sidestepping gait x3 laps  Exercise 4: Seated LAQ 3# x20, Hs curls YTB x20 // marches, hip abd x20 with #3, hip add with LAQ x20 ea #3  Exercise 7: STS x10 --BUE assistance    Assessment  Assessment: VC's throughout session for maintaining upright posture with fair carryover noted. Pt with mild fatgiue and required frequent RB's throughout session. VC's for proper technique of STS with pt requiring use of UE support with performance. Will continue to progress as tolerated.    Activity Tolerance  Activity Tolerance: Patient tolerated treatment well, Patient limited by fatigue, Patient limited by endurance    Patient Education  Patient Education: Educated pt continue with HEP.  Pt verbalized/demonstrated good understanding:     [x] Yes         [] No, pt required further clarification.      Post Treatment Pain:  0/10      Plan  Plan Frequency: 2  Plan weeks: 4       Goals  (Total # of Visits to Date: 4)      Short Term Goals  Time Frame for Short Term Goals: 3

## 2025-06-10 DIAGNOSIS — E11.42 DIABETIC POLYNEUROPATHY ASSOCIATED WITH TYPE 2 DIABETES MELLITUS (HCC): ICD-10-CM

## 2025-06-10 RX ORDER — GABAPENTIN 600 MG/1
600 TABLET ORAL 3 TIMES DAILY
Qty: 90 TABLET | Refills: 5 | Status: SHIPPED | OUTPATIENT
Start: 2025-06-10 | End: 2025-12-07

## 2025-06-12 ENCOUNTER — HOSPITAL ENCOUNTER (OUTPATIENT)
Dept: INFUSION THERAPY | Age: 75
End: 2025-06-12

## 2025-06-12 ENCOUNTER — HOSPITAL ENCOUNTER (OUTPATIENT)
Dept: PHYSICAL THERAPY | Age: 75
Setting detail: THERAPIES SERIES
Discharge: HOME | End: 2025-06-12
Payer: MEDICARE

## 2025-06-12 ENCOUNTER — TELEPHONE (OUTPATIENT)
Dept: INFUSION THERAPY | Age: 75
End: 2025-06-12

## 2025-06-12 ENCOUNTER — HOSPITAL ENCOUNTER (OUTPATIENT)
Dept: PHYSICAL THERAPY | Age: 75
Setting detail: THERAPIES SERIES
End: 2025-06-12
Payer: MEDICARE

## 2025-06-12 NOTE — TELEPHONE ENCOUNTER
Writer received phone call from family member stating patient will have to cancel today's appointment due to family emergency. Declined to reschedule at this time.

## 2025-06-12 NOTE — PROGRESS NOTES
Physical Therapy  [facility]  Inpatient/Observation/Outpatient Rehabilitation    Date: 2025  Patient Name: Elle Vincent       [] Inpatient Acute/Observation       [x]  Outpatient  : 1950       Plan of Care/Recert ends      [] Pt refused/declined therapy at this time due to:           [x] Pt cancelled due to:  [] No Reason Given   [] Sick/ill   [x] Other:  sister is in the hospital    [] Evaluation held by RN/Provider/Physical Therapist due to:    [] High Heart Rate   [] High Blood Pressure   [] Orthopedic Consult   [] Hgb < 7   [] Other:    [] Pt ordered brace per physician request:  [] Proper fit will be completed and education for wearing/skin checks    [] Pt does not require skilled services due to:      Therapist/Assistant will attempt to see this patient, at our earliest opportunity.       Dana Curry Date: 2025

## 2025-06-16 ENCOUNTER — APPOINTMENT (OUTPATIENT)
Dept: PHYSICAL THERAPY | Age: 75
End: 2025-06-16
Payer: MEDICARE

## 2025-06-16 ENCOUNTER — HOSPITAL ENCOUNTER (OUTPATIENT)
Dept: PHYSICAL THERAPY | Age: 75
Setting detail: THERAPIES SERIES
Discharge: HOME | End: 2025-06-16
Payer: MEDICARE

## 2025-06-16 PROCEDURE — 97110 THERAPEUTIC EXERCISES: CPT

## 2025-06-16 NOTE — PROGRESS NOTES
Phone: [phone]                 [facility]           Fax: [phone]                           Outpatient Physical Therapy                                                                            Daily Note    Patient: Elle Vincent : 1950  [facility] #: [id number]   Referring Physician: Angelica Richter MD  Date: 2025       Treatment Diagnosis: General weakness, B shoulder pain    PT Insurance Information: [facility] Medicare  Total # of Visits Approved: 12 Per Physician Order  Total # of Visits to Date: 5  No Show: 0  Canceled Appointment: 0    25 Plan of Care/Recert Due    Pre-Treatment Pain:  2/10  Subjective: Pt. reports pa in in R UE this date and stated she has been working around the home.    Exercises:  Exercise 1: HEP: seated B LE ther ex; scap retracs, retro shoulder rolls, B shoulder ER  Exercise 2: Seated: bicep curls 2# x20, tricep press down YTB x20, ER/IR YTB x20, scap retracts x20, shrugs x20--no ER/IR or tricep press down  Exercise 3: Standing at counter with support: hip ext/abd, marches/HS curls, HR x10 ea, sidestepping gait x3 laps  Exercise 4: Seated LAQ 3# x20, Hs curls YTB x20 // marches, hip abd x20 with #3, hip add with LAQ x20 ea #3  Exercise 7: STS x10 --BUE assistance      Assessment  Assessment: Pt. tolerated treatment fair. Focused on UE and LE strength and endurance with fair tolerance from pt. No reports of increased pain. Noted decreased posture awareness with STS and required v/c. Will progress per pt. tolerance.    Activity Tolerance  Activity Tolerance: Patient tolerated treatment well, Patient limited by fatigue, Patient limited by endurance    Patient Education  Patient Education: Educated pt continue with HEP.  Pt verbalized/demonstrated good understanding:     [x] Yes         [] No, pt required further clarification.       Post Treatment Pain:  1/10      Plan  Plan Frequency: 2  Plan weeks: 4       Goals  (Total # of Visits to Date: 5)      Short

## 2025-06-18 ENCOUNTER — HOSPITAL ENCOUNTER (OUTPATIENT)
Dept: PHYSICAL THERAPY | Age: 75
Setting detail: THERAPIES SERIES
Discharge: HOME | End: 2025-06-18
Payer: MEDICARE

## 2025-06-18 PROCEDURE — 97110 THERAPEUTIC EXERCISES: CPT

## 2025-06-18 NOTE — PROGRESS NOTES
Phone: [phone]                 [facility]           Fax: [phone]                           Outpatient Physical Therapy                                                                            Daily Note    Patient: Elle Vincent : 1950  [facility] #: [id number]   Referring Physician: Angelica Richter MD  Date: 2025    Diagnosis: General weakness, R53.81  Treatment Diagnosis: General weakness, B shoulder pain    PT Insurance Information: [facility] Medicare  Total # of Visits Approved: 16 Per Physician Order  Total # of Visits to Date: 6  No Show: 0  Canceled Appointment: 0    25 Plan of Care/Recert Due    Pre-Treatment Pain:  4/10  Subjective: Pt with no new complaints. Would like to continue therapy to keep improving strength and endurance.    Exercises:  Exercise 1: HEP: seated B LE ther ex; scap retracs, retro shoulder rolls, B shoulder ER  Exercise 2: Seated: bicep curls 2# x20, tricep press down YTB x20, ER/IR YTB x20, scap retracts x20, shrugs x20--no ER/IR or tricep press down  Exercise 3: Standing at counter with support: hip ext/abd, marches/HS curls, HR x10 ea, sidestepping gait x3 laps  Exercise 4: Seated LAQ 3# x20, Hs curls YTB x20 // marches, hip abd x20 with #3, hip add with LAQ x20 ea #3  Exercise 8: UBE x5min    Assessment  Assessment: Patient has attended 6 PT visits for general weakness and B shoulder pain and met goals for initiating HEP and functional strengthening and is making slow but steady progress toward goals for improved strength and endurance. Added seated arm bike to progress B UE strength and endurance. Pt to benefit from continued PT 2x/wk for up to 4 more weeks to meet remaining goals and return to PLOF.    Activity Tolerance  Activity Tolerance: Patient tolerated treatment well, Patient limited by fatigue, Patient limited by endurance    Patient Education  Exercise technique, UPOC   Pt verbalized/demonstrated good understanding:     [x] Yes

## 2025-06-18 NOTE — PLAN OF CARE
[facility]           Phone: [phone]             Outpatient Physical Therapy  Fax: [phone]                                           Date: 2025  Patient: Elle Vincent : 1950 CSN #: [id number]   Referring Physician: Angelica Richter MD      [] Plan of Care   [x] Updated Plan of Care    Dates of Service to Include: 2025 to 25    Diagnosis:  General weakness, R53.81     Rehab (Treatment) Diagnosis:  General weakness, B shoulder pain           Attendance  Total # of Visits to Date: 6 No Show: 0 Canceled Appointment: 0    Assessment  Assessment: Patient has attended 6 PT visits for general weakness and B shoulder pain and met goals for initiating HEP and functional strengthening and is making slow but steady progress toward goals for improved strength and endurance. Added seated arm bike to progress B UE strength and endurance. Pt to benefit from continued PT 2x/wk for up to 4 more weeks to meet remaining goals and return to PLOF.      Goals  Short Term Goals  Time Frame for Short Term Goals: 3 weeks  Short Term Goal 1: Patient to initiate HEP for improved scapular strength and B LE strength. - MET  Short Term Goal 2: Patient to be instructed in general strengthening of B LE's and UE's to improve mobility. - MET  Short Term Goal 3: Patient to tolerate 45 min of ther ex/act for improved endurance. - MET  Long Term Goals  Time Frame for Long Term Goals : 6 weeks  Long Term Goal 1: Patient to  be independent and compliant with HEP.  Long Term Goal 2: Patient to have improved B mid/lower trap strength >/=4/5 grossly all planes for improved  Long Term Goal 3: Patient to have improved core and B LE strength >/=4/5 grossly for improved lumbar stability with gait and transfers.  Long Term Goal 4: Patient to report >/=75% improvement in symptoms for improved QOL.     Prognosis  Therapy Prognosis:

## 2025-06-20 ENCOUNTER — APPOINTMENT (OUTPATIENT)
Dept: PHYSICAL THERAPY | Age: 75
End: 2025-06-20
Payer: MEDICARE

## 2025-06-23 ENCOUNTER — APPOINTMENT (OUTPATIENT)
Dept: PHYSICAL THERAPY | Age: 75
End: 2025-06-23
Payer: MEDICARE

## 2025-06-23 ENCOUNTER — HOSPITAL ENCOUNTER (OUTPATIENT)
Dept: PHYSICAL THERAPY | Age: 75
Setting detail: THERAPIES SERIES
Discharge: HOME | End: 2025-06-23
Payer: MEDICARE

## 2025-06-23 PROCEDURE — 97110 THERAPEUTIC EXERCISES: CPT

## 2025-06-23 NOTE — PROGRESS NOTES
Phone: [phone]                 [facility]           Fax: [phone]                           Outpatient Physical Therapy                                                                            Daily Note    Patient: Elle Vincent : 1950  [facility] #: [id number]   Referring Physician: Angelica Richter MD  Date: 2025    Diagnosis: General weakness, R53.81  Treatment Diagnosis: General weakness, B shoulder pain    PT Insurance Information: [facility] Medicare  Total # of Visits Approved: 16 Per Physician Order  Total # of Visits to Date: 7  No Show: 0  Canceled Appointment: 0    25 Plan of Care/Recert Due    Pre-Treatment Pain:  5/10  Subjective: Pt reports lower back pain after sleeping on the couch the other night. Pt arrived 10 min late to session; time limited as a result.    Exercises:  Exercise 1: HEP: seated B LE ther ex; scap retracs, retro shoulder rolls, B shoulder ER  Exercise 2: Seated: bicep curls 2# x20, tricep press down YTB x20, ER/IR YTB x20, scap retracts x20, shrugs x20--no ER/IR or tricep press down  Exercise 4: Seated LAQ 3# x20, Hs curls YTB x20 // marches, hip abd x20 with #3, hip add with LAQ x20 ea #3  Exercise 7: STS x15 --BUE assistance  Exercise 9: Seated ball rollouts x10 ea way, abd iso's x10    Assessment  Assessment: Pt initially reported increased R knee pain with sit/stands but the pain went away after ~10repetitions. Encouraged patient to stay more active at home to prevent arthritic pain and stiffness. Will continue to progress as tolerated.    Activity Tolerance  Activity Tolerance: Patient tolerated treatment well, Patient limited by fatigue, Patient limited by endurance    Patient Education  Exercise technique   Pt verbalized/demonstrated good understanding:     [x] Yes         [] No, pt required further clarification.       Post Treatment Pain:  3/10      Plan  Plan Frequency: 2  Plan weeks: 4       Goals  (Total # of Visits to Date: 7)      Short

## 2025-06-26 ENCOUNTER — HOSPITAL ENCOUNTER (OUTPATIENT)
Dept: PHYSICAL THERAPY | Age: 75
Setting detail: THERAPIES SERIES
Discharge: HOME | End: 2025-06-26
Payer: MEDICARE

## 2025-06-26 NOTE — PROGRESS NOTES
Physical Therapy  [facility]  Inpatient/Observation/Outpatient Rehabilitation    Date: 2025  Patient Name: Elle Vincent       [] Inpatient Acute/Observation       [x]  Outpatient  : 1950       Plan of Care/Recert ends      [] Pt refused/declined therapy at this time due to:           [x] Pt cancelled due to:  [] No Reason Given   [] Sick/ill   [x] Other:  too hot    [] Evaluation held by RN/Provider/Physical Therapist due to:    [] High Heart Rate   [] High Blood Pressure   [] Orthopedic Consult   [] Hgb < 7   [] Other:    [] Pt ordered brace per physician request:  [] Proper fit will be completed and education for wearing/skin checks    [] Pt does not require skilled services due to:      Therapist/Assistant will attempt to see this patient, at our earliest opportunity.       Dana Curry Date: 2025

## 2025-06-30 ENCOUNTER — HOSPITAL ENCOUNTER (OUTPATIENT)
Dept: PHYSICAL THERAPY | Age: 75
Setting detail: THERAPIES SERIES
Discharge: HOME | End: 2025-06-30
Payer: MEDICARE

## 2025-06-30 PROCEDURE — 97110 THERAPEUTIC EXERCISES: CPT

## 2025-06-30 NOTE — PROGRESS NOTES
Phone: [phone]                 [facility]           Fax: [phone]                           Outpatient Physical Therapy                                                                            Daily Note    Patient: Elle Vincent : 1950  [facility] #: [id number]   Referring Physician: Angelica Richter MD  Date: 2025    Treatment Diagnosis: General weakness, B shoulder pain      2025 Plan of Care/Recert Due    Pre-Treatment Pain:  0/10      Exercises:  Exercise 1: HEP: seated B LE ther ex; scap retracs, retro shoulder rolls, B shoulder ER  Exercise 2: Seated: bicep curls 2# x20, tricep press down YTB x20, ER/IR YTB x20, scap retracts x20, shrugs x20--no ER/IR or tricep press down  Exercise 3: Standing at counter with support: hip ext/abd, marches/HS curls, HR x10 ea, sidestepping gait x3 laps  Exercise 4: Seated LAQ 3# x20, Hs curls YTB x20 // marches, hip abd x20 with #3, hip add with LAQ x20 ea #3  Exercise 7: STS x15 --BUE assistance  Exercise 8: UBE x5min  Exercise 9: Seated ball rollouts x10 ea way, abd iso's x10    Assessment  Assessment: Pt. tolerated treatment well with no reports of increaed pain. Noted pt. having better body mechanics with STS this date. Will progress per pt. toelrance.    Activity Tolerance  Activity Tolerance: Patient tolerated treatment well, Patient limited by fatigue, Patient limited by endurance    Patient Education  Patient Education: Educated pt continue with HEP.  Pt verbalized/demonstrated good understanding:     [x] Yes         [] No, pt required further clarification.       Post Treatment Pain:  0/10      Plan  Plan Frequency: 2  Plan weeks: 4       Goals  ( )      Short Term Goals  Time Frame for Short Term Goals: 3 weeks  Short Term Goal 1: Patient to initiate HEP for improved scapular strength and B LE strength. - MET  Short Term Goal 2: Patient to be instructed in general strengthening of B LE's and UE's to improve mobility. - MET  Short

## 2025-07-03 ENCOUNTER — HOSPITAL ENCOUNTER (OUTPATIENT)
Dept: PHYSICAL THERAPY | Age: 75
Setting detail: THERAPIES SERIES
Discharge: HOME | End: 2025-07-03

## 2025-07-03 NOTE — PROGRESS NOTES
Physical Therapy  [facility]  Inpatient/Observation/Outpatient Rehabilitation    Date: 7/3/2025  Patient Name: Elle Vincent       [] Inpatient Acute/Observation       [x]  Outpatient  : 1950       Plan of Care/Recert ends      [] Pt refused/declined therapy at this time due to:           [x] Pt cancelled due to:  [] No Reason Given   [] Sick/ill   [x] Other:  having dizzy spells, doc told her not to come    [] Evaluation held by RN/Provider/Physical Therapist due to:    [] High Heart Rate   [] High Blood Pressure   [] Orthopedic Consult   [] Hgb < 7   [] Other:    [] Pt ordered brace per physician request:  [] Proper fit will be completed and education for wearing/skin checks    [] Pt does not require skilled services due to:      Therapist/Assistant will attempt to see this patient, at our earliest opportunity.       Dana Curry Date: 7/3/2025

## 2025-07-07 ENCOUNTER — HOSPITAL ENCOUNTER (OUTPATIENT)
Dept: PHYSICAL THERAPY | Age: 75
Setting detail: THERAPIES SERIES
Discharge: HOME | End: 2025-07-07
Payer: MEDICARE

## 2025-07-07 PROCEDURE — 97110 THERAPEUTIC EXERCISES: CPT

## 2025-07-07 NOTE — PROGRESS NOTES
Phone: [phone]                 [facility]           Fax: [phone]                           Outpatient Physical Therapy                                                                            Daily Note    Patient: Elle Vincent : 1950  [facility] #: [id number]   Referring Physician: Angelica Richter MD  Date: 2025    Treatment Diagnosis: General weakness, B shoulder pain    PT Insurance Information: [facility] Medicare  Total # of Visits Approved: 16 Per Physician Order  Total # of Visits to Date: 8  No Show: 0  Canceled Appointment: 0    25 Plan of Care/Recert Due    Pre-Treatment Pain:  0/10  Subjective: Pt did not report any pain prior to session. States that her dizziness is better and that she has been keeping up with her medication. Denies any dizziness upon arrival.    Exercises:  Exercise 1: HEP: seated B LE ther ex; scap retracs, retro shoulder rolls, B shoulder ER  Exercise 4: Seated LAQ 3# x20, Hs curls YTB x20 // marches, hip abd x20 with #3, hip add with LAQ x20 ea #3  Exercise 7: STS x15 --BUE assistance  Exercise 8: UBE 3'/3' // pulleys 3' flexion/ 3' abd  Exercise 10: SciFit bike x5 minutes lvl 1    Assessment  Assessment: Continued with strength and endurance this date. Initiated scifit bike this date with good carryover noted. Pt with mild fatigue throughout session. Pt request performance of pulley's post session. No increase in pain reported. Will continue to progress as tolerated.    Activity Tolerance  Activity Tolerance: Patient tolerated treatment well, Patient limited by fatigue, Patient limited by endurance    Patient Education  Patient Education: Educated pt continue with HEP.  Pt verbalized/demonstrated good understanding:     [x] Yes         [] No, pt required further clarification.      Post Treatment Pain:  0/10      Plan  Plan Frequency: 2  Plan weeks: 4       Goals  (Total # of Visits to Date: 8)      Short Term Goals  Time Frame for Short Term Goals:

## 2025-07-10 ENCOUNTER — HOSPITAL ENCOUNTER (OUTPATIENT)
Dept: INFUSION THERAPY | Age: 75
Discharge: HOME | End: 2025-07-10
Payer: MEDICARE

## 2025-07-10 ENCOUNTER — HOSPITAL ENCOUNTER (OUTPATIENT)
Dept: PHYSICAL THERAPY | Age: 75
Setting detail: THERAPIES SERIES
Discharge: HOME | End: 2025-07-10
Payer: MEDICARE

## 2025-07-10 VITALS
DIASTOLIC BLOOD PRESSURE: 70 MMHG | HEART RATE: 61 BPM | SYSTOLIC BLOOD PRESSURE: 107 MMHG | TEMPERATURE: 96.9 F | RESPIRATION RATE: 18 BRPM

## 2025-07-10 DIAGNOSIS — M81.0 POSTMENOPAUSAL OSTEOPOROSIS: Primary | ICD-10-CM

## 2025-07-10 PROCEDURE — 97110 THERAPEUTIC EXERCISES: CPT

## 2025-07-10 PROCEDURE — 6360000002 HC RX W HCPCS: Performed by: FAMILY MEDICINE

## 2025-07-10 PROCEDURE — 96372 THER/PROPH/DIAG INJ SC/IM: CPT

## 2025-07-10 RX ORDER — EPINEPHRINE 1 MG/ML
0.3 INJECTION, SOLUTION, CONCENTRATE INTRAVENOUS PRN
OUTPATIENT
Start: 2025-11-30

## 2025-07-10 RX ORDER — ONDANSETRON 2 MG/ML
8 INJECTION INTRAMUSCULAR; INTRAVENOUS
OUTPATIENT
Start: 2025-11-30

## 2025-07-10 RX ORDER — SODIUM CHLORIDE 9 MG/ML
INJECTION, SOLUTION INTRAVENOUS CONTINUOUS
OUTPATIENT
Start: 2025-11-30

## 2025-07-10 RX ORDER — ACETAMINOPHEN 325 MG/1
650 TABLET ORAL
OUTPATIENT
Start: 2025-11-30

## 2025-07-10 RX ORDER — ALBUTEROL SULFATE 90 UG/1
4 INHALANT RESPIRATORY (INHALATION) PRN
OUTPATIENT
Start: 2025-11-30

## 2025-07-10 RX ORDER — HYDROCORTISONE SODIUM SUCCINATE 100 MG/2ML
100 INJECTION INTRAMUSCULAR; INTRAVENOUS
OUTPATIENT
Start: 2025-11-30

## 2025-07-10 RX ORDER — DIPHENHYDRAMINE HYDROCHLORIDE 50 MG/ML
50 INJECTION, SOLUTION INTRAMUSCULAR; INTRAVENOUS
OUTPATIENT
Start: 2025-11-30

## 2025-07-10 RX ADMIN — DENOSUMAB 60 MG: 60 INJECTION SUBCUTANEOUS at 13:55

## 2025-07-10 NOTE — DISCHARGE INSTRUCTIONS
Outpatient Discharge Instructions for Prolia Injections    [address] [address]   [phone]      You are advised to carry out the following Instructions:    Diet:  As prescribed by your Physician.    Activity:  As prescribed by your Physician.      Care of injection site:  If the injection site becomes red,sore,swollen,painful, has drainage,or you develop a fever,notify your Physician.      Other:    If you develop hives,rash,itching or trouble breathing, go to the nearest Emergency Room. These could be a sign of allergic reaction.  Continue to take your calcium and vitamin D.  Let your dentist know that you are taking Prolia.  Let your doctor know if you have any unusual jaw or leg bone pain.  Take good care of your teeth,see a dentist often.   Injection is every 6 months.      Follow up appointment:          ANY PROBLEMS OR CONCERNS NOTIFY YOUR PHYSICIAN   OR    GO THE NEAREST EMERGENCY ROOM

## 2025-07-10 NOTE — PROGRESS NOTES
Phone: [phone]                 [facility]           Fax: [phone]                           Outpatient Physical Therapy                                                                            Daily Note    Patient: Elle Vincent : 1950  [facility] #: [id number]   Referring Physician: Angelica Richter MD  Date: 7/10/2025    Treatment Diagnosis: General weakness, B shoulder pain    PT Insurance Information: [employer] Medicare  Total # of Visits Approved: 16 Per Physician Order  Total # of Visits to Date: 9  No Show: 0  Canceled Appointment: 0    25 Plan of Care/Recert Due    Pre-Treatment Pain:  0/10  Subjective: Pt denies pain prior to session, states that she is doing good today.    Exercises:  Exercise 7: STS x15 --BUE assistance  Exercise 8: UBE 3'/3' // pulleys 3' flexion/ 3' abd--UBE only this date  Exercise 9: Seated ball rollouts x10 ea way, abd iso's x10  Exercise 10: SciFit bike x8 minutes lvl 1  Exercise 11: seated x3 cone taps x5 ea    Assessment  Assessment: Pt able to complete 8 minutes on bike this date. Completed multiple STS transfers from different surfaces with no LOB and B UE assistance needed. Included cone taps with VC's on technique and performance with good carryover noted. Treatment time shortened due to patient having another appointment after therapy. Will continue to progress as tolerated.    Activity Tolerance  Activity Tolerance: Patient tolerated treatment well, Patient limited by fatigue, Patient limited by endurance    Patient Education  Patient Education: Educated pt continue with HEP.  Pt verbalized/demonstrated good understanding:     [x] Yes         [] No, pt required further clarification.      Post Treatment Pain:  0/10      Plan  Plan Frequency: 2  Plan weeks: 4       Goals  (Total # of Visits to Date: 9)      Short Term Goals  Time Frame for Short Term Goals: 3 weeks  Short Term Goal 1: Patient to initiate HEP for improved scapular strength and B LE

## 2025-07-11 DIAGNOSIS — N39.498 OTHER URINARY INCONTINENCE: ICD-10-CM

## 2025-07-14 ENCOUNTER — HOSPITAL ENCOUNTER (OUTPATIENT)
Dept: PHYSICAL THERAPY | Age: 75
Setting detail: THERAPIES SERIES
Discharge: HOME | End: 2025-07-14
Payer: MEDICARE

## 2025-07-14 DIAGNOSIS — E11.628 TYPE 2 DIABETES MELLITUS WITH OTHER SKIN COMPLICATION, WITHOUT LONG-TERM CURRENT USE OF INSULIN (HCC): Primary | ICD-10-CM

## 2025-07-14 PROCEDURE — 97110 THERAPEUTIC EXERCISES: CPT

## 2025-07-14 RX ORDER — OXYBUTYNIN CHLORIDE 5 MG/1
TABLET ORAL
Qty: 90 TABLET | Refills: 1 | Status: SHIPPED | OUTPATIENT
Start: 2025-07-14

## 2025-07-14 NOTE — PROGRESS NOTES
Phone: [phone]                 [facility]           Fax: [phone]                           Outpatient Physical Therapy                                                                            Daily Note    Patient: Elle Vincent : 1950  [facility] #: [id number]   Referring Physician: Angelica Richter MD  Date: 2025    Treatment Diagnosis: General weakness, B shoulder pain    PT Insurance Information: [facility] Medicare  Total # of Visits Approved: 16 Per Physician Order  Total # of Visits to Date: 10  No Show: 0  Canceled Appointment: 4    25 Plan of Care/Recert Due    Pre-Treatment Pain:  0/10  Subjective: Pt denies pain prior to session, states that she is doing good today.    Exercises:  Exercise 2: Seated: bicep curls 3# x20, shrugs x20--#3 weight, rolls x20 // shoulder flexion #2 bar x15  Exercise 4: Seated LAQ 3# x20, Hs curls YTB x20 // marches, hip abd x20 with #3, hip add with LAQ x20 ea #3--LAQ and marches only this date  Exercise 8: UBE 3'/3' // pulleys 3' flexion/ 3' abd--UBE only this date  Exercise 9: Seated ball rollouts x15 ea way, abd iso's x10  Exercise 10: SciFit bike x10 minutes lvl 1  Exercise 11: seated x3 cone taps x5 ea      Assessment  Assessment: Continued with charted therex this date with VC's needed on proper technique and posture. Focused on UE strenghtening with good tolerance noted. Patient with limited ROM noted with performance of marches. Will continue to progress as tolerated.    Activity Tolerance  Activity Tolerance: Patient tolerated treatment well, Patient limited by fatigue, Patient limited by endurance    Patient Education  Patient Education: Educated pt continue with HEP.  Pt verbalized/demonstrated good understanding:     [x] Yes         [] No, pt required further clarification.      Post Treatment Pain:  0/10      Plan  Plan Frequency: 2  Plan weeks: 4       Goals  (Total # of Visits to Date: 10)      Short Term Goals  Time Frame for

## 2025-07-15 RX ORDER — LANCETS 33 GAUGE
1 EACH MISCELLANEOUS DAILY
Qty: 100 EACH | Refills: 2 | Status: SHIPPED | OUTPATIENT
Start: 2025-07-15

## 2025-07-17 ENCOUNTER — HOSPITAL ENCOUNTER (OUTPATIENT)
Dept: PHYSICAL THERAPY | Age: 75
Setting detail: THERAPIES SERIES
Discharge: HOME | End: 2025-07-17
Payer: MEDICARE

## 2025-07-17 ENCOUNTER — HOSPITAL ENCOUNTER (OUTPATIENT)
Dept: PHYSICAL THERAPY | Age: 75
Setting detail: THERAPIES SERIES
End: 2025-07-17
Payer: MEDICARE

## 2025-07-17 NOTE — PROGRESS NOTES
[facility]  Inpatient/Observation/Outpatient Rehabilitation    Date: 2025  Patient Name: Elle Vincent       [] Inpatient Acute/Observation       [x]  Outpatient  : 1950           [] Pt refused/declined therapy at this time due to:           [x] Pt cancelled due to:  [] No Reason Given   [x] Sick/ill   [] Other:      [] Evaluation held by RN/Provider/Physical Therapist due to:    [] High Heart Rate   [] High Blood Pressure   [] Orthopedic Consult   [] Hgb < 7   [] Other:    [] Pt ordered brace per physician request:  [] Proper fit will be completed and education for wearing/skin checks    [] Pt does not require skilled services due to:      Therapist/Assistant will attempt to see this patient, at our earliest opportunity.       Rona Gonzalez Date: 2025

## 2025-07-21 DIAGNOSIS — F33.42 RECURRENT MAJOR DEPRESSIVE DISORDER, IN FULL REMISSION: ICD-10-CM

## 2025-07-21 DIAGNOSIS — E11.628 TYPE 2 DIABETES MELLITUS WITH OTHER SKIN COMPLICATION, WITHOUT LONG-TERM CURRENT USE OF INSULIN (HCC): ICD-10-CM

## 2025-07-21 RX ORDER — SERTRALINE HYDROCHLORIDE 100 MG/1
TABLET, FILM COATED ORAL
Qty: 90 TABLET | Refills: 0 | Status: SHIPPED | OUTPATIENT
Start: 2025-07-21 | End: 2025-07-22 | Stop reason: SDUPTHER

## 2025-07-21 RX ORDER — LANCETS 33 GAUGE
1 EACH MISCELLANEOUS DAILY
Qty: 100 EACH | Refills: 2 | Status: SHIPPED | OUTPATIENT
Start: 2025-07-21

## 2025-07-21 RX ORDER — SERTRALINE HYDROCHLORIDE 25 MG/1
TABLET, FILM COATED ORAL
Qty: 90 TABLET | Refills: 0 | Status: SHIPPED | OUTPATIENT
Start: 2025-07-21 | End: 2025-07-22

## 2025-07-21 NOTE — TELEPHONE ENCOUNTER
Pharmacy called in to get refills on Carols medication, Sertraline 100 mg, 50 mg, 25mg, and One Touch Lancets. All orders are pended and pharmacy is verified.

## 2025-07-22 ENCOUNTER — HOSPITAL ENCOUNTER (OUTPATIENT)
Dept: PHYSICAL THERAPY | Age: 75
Setting detail: THERAPIES SERIES
Discharge: HOME | End: 2025-07-22
Payer: MEDICARE

## 2025-07-22 ENCOUNTER — OFFICE VISIT (OUTPATIENT)
Dept: PRIMARY CARE CLINIC | Age: 75
End: 2025-07-22
Payer: MEDICARE

## 2025-07-22 VITALS
BODY MASS INDEX: 35.71 KG/M2 | WEIGHT: 189 LBS | SYSTOLIC BLOOD PRESSURE: 110 MMHG | DIASTOLIC BLOOD PRESSURE: 68 MMHG | HEART RATE: 61 BPM | OXYGEN SATURATION: 96 %

## 2025-07-22 DIAGNOSIS — E11.628 TYPE 2 DIABETES MELLITUS WITH OTHER SKIN COMPLICATION, WITHOUT LONG-TERM CURRENT USE OF INSULIN (HCC): ICD-10-CM

## 2025-07-22 DIAGNOSIS — E78.2 MIXED HYPERLIPIDEMIA: ICD-10-CM

## 2025-07-22 DIAGNOSIS — F33.42 RECURRENT MAJOR DEPRESSIVE DISORDER, IN FULL REMISSION: Primary | ICD-10-CM

## 2025-07-22 DIAGNOSIS — I10 ESSENTIAL HYPERTENSION: ICD-10-CM

## 2025-07-22 DIAGNOSIS — R42 DIZZINESS: Primary | ICD-10-CM

## 2025-07-22 PROCEDURE — 3078F DIAST BP <80 MM HG: CPT | Performed by: FAMILY MEDICINE

## 2025-07-22 PROCEDURE — 97110 THERAPEUTIC EXERCISES: CPT

## 2025-07-22 PROCEDURE — 1123F ACP DISCUSS/DSCN MKR DOCD: CPT | Performed by: FAMILY MEDICINE

## 2025-07-22 PROCEDURE — 3074F SYST BP LT 130 MM HG: CPT | Performed by: FAMILY MEDICINE

## 2025-07-22 PROCEDURE — G2211 COMPLEX E/M VISIT ADD ON: HCPCS | Performed by: FAMILY MEDICINE

## 2025-07-22 PROCEDURE — 99214 OFFICE O/P EST MOD 30 MIN: CPT | Performed by: FAMILY MEDICINE

## 2025-07-22 PROCEDURE — 1159F MED LIST DOCD IN RCRD: CPT | Performed by: FAMILY MEDICINE

## 2025-07-22 PROCEDURE — 3051F HG A1C>EQUAL 7.0%<8.0%: CPT | Performed by: FAMILY MEDICINE

## 2025-07-22 RX ORDER — MECLIZINE HCL 12.5 MG 12.5 MG/1
12.5 TABLET ORAL 2 TIMES DAILY PRN
Qty: 60 TABLET | Refills: 1 | Status: SHIPPED | OUTPATIENT
Start: 2025-07-22

## 2025-07-22 RX ORDER — ONDANSETRON 4 MG/1
TABLET, FILM COATED ORAL
Qty: 30 TABLET | Refills: 0 | Status: SHIPPED | OUTPATIENT
Start: 2025-07-22

## 2025-07-22 RX ORDER — SERTRALINE HYDROCHLORIDE 100 MG/1
200 TABLET, FILM COATED ORAL DAILY
Qty: 180 TABLET | Refills: 1 | Status: SHIPPED | OUTPATIENT
Start: 2025-07-22 | End: 2026-01-18

## 2025-07-22 SDOH — ECONOMIC STABILITY: FOOD INSECURITY: WITHIN THE PAST 12 MONTHS, YOU WORRIED THAT YOUR FOOD WOULD RUN OUT BEFORE YOU GOT MONEY TO BUY MORE.: NEVER TRUE

## 2025-07-22 SDOH — ECONOMIC STABILITY: FOOD INSECURITY: WITHIN THE PAST 12 MONTHS, THE FOOD YOU BOUGHT JUST DIDN'T LAST AND YOU DIDN'T HAVE MONEY TO GET MORE.: NEVER TRUE

## 2025-07-22 ASSESSMENT — PATIENT HEALTH QUESTIONNAIRE - PHQ9
2. FEELING DOWN, DEPRESSED OR HOPELESS: SEVERAL DAYS
SUM OF ALL RESPONSES TO PHQ QUESTIONS 1-9: 2
1. LITTLE INTEREST OR PLEASURE IN DOING THINGS: SEVERAL DAYS

## 2025-07-22 NOTE — TELEPHONE ENCOUNTER
Pt has an appointment today but the aide that helps her with her meds wanted her to request refills while she was with her.     Pt needs refills of Zofran 4 mg and Antivert 12.5 mg. Please send to [facility] in [place]

## 2025-07-22 NOTE — PLAN OF CARE
[facility]           Phone: [phone]             Outpatient Physical Therapy  Fax: [phone]                                           Date: 2025  Patient: Elle Vincent : 1950 CSN #: [id number]   Referring Physician: Angelica Richter MD      [] Plan of Care   [x] Updated Plan of Care    Dates of Service to Include: 2025 to 25    Diagnosis:  Generalized weakness    Rehab (Treatment) Diagnosis:  General weakness, B shoulder pain         Onset Date:       Attendance  Total # of Visits to Date: 11 No Show: 0 Canceled Appointment: 4    Assessment  Assessment: Patient resumed LE and UE strength and endurance exercises to work toward her long term goals.  The patient required a couple of seated rest breaks due to fatigue.  She reports continued difficulty with standing upright and with mobility in her home.  She had to cancel a couple of visits and would like to continue at this time.  Her POC date will be extended at this time.      Goals  Short Term Goals  Time Frame for Short Term Goals: 3 weeks  Short Term Goal 1: Patient to initiate HEP for improved scapular strength and B LE strength. - MET  Short Term Goal 2: Patient to be instructed in general strengthening of B LE's and UE's to improve mobility. - MET  Short Term Goal 3: Patient to tolerate 45 min of ther ex/act for improved endurance. - MET  Long Term Goals  Time Frame for Long Term Goals : 6 weeks  Long Term Goal 1: Patient to  be independent and compliant with HEP.  Long Term Goal 2: Patient to have improved B mid/lower trap strength >/=4/5 grossly all planes for improved  Long Term Goal 3: Patient to have improved core and B LE strength >/=4/5 grossly for improved lumbar stability with gait and transfers.  Long Term Goal 4: Patient to report >/=75% improvement in symptoms for improved QOL.     Prognosis  Therapy Prognosis:

## 2025-07-22 NOTE — PROGRESS NOTES
Phone: [phone]                 [facility]           Fax: [phone]                           Outpatient Physical Therapy                                                                            Daily Note    Patient: Elle Vincent : 1950  [facility] #: [id number]   Referring Physician: Angelica Richter MD  Date: 2025       Treatment Diagnosis: General weakness, B shoulder pain    PT Insurance Information: [facility] Medicare  Total # of Visits Approved: 16 Per Physician Order  Total # of Visits to Date: 11  No Show: 0  Canceled Appointment: 4    25 Plan of Care/Recert Due    Pre-Treatment Pain:  0/10  Subjective: Patient denies pain coming into therapy today. No complaints after her last therapy session.    Exercises:  Exercise 2: Seated: bicep curls 3# x20, shrugs x20--#3 weight, rolls x20 // shoulder flexion #2 bar x15  Exercise 3: Standing at counter with support: hip ext/abd, marches/HS curls, HR x10 ea, sidestepping gait x3 laps  Exercise 4: Seated LAQ 3# x15 ea, marches 3# x15 ea  Exercise 5: sidestepping x3 laps  Exercise 7: STS x15 --BUE assistance  Exercise 8: UBE 3'/3' // pulleys 3' flexion  Exercise 10: SciFit bike x10 minutes lvl 1    Assessment  Assessment: Patient resumed LE and UE strength and endurance exercises to work toward her long term goals.  The patient required a couple of seated rest breaks due to fatigue.  She reports continued difficulty with standing upright and with mobility in her home.  She had to cancel a couple of visits and would like to continue at this time.  Her POC date will be extended at this time.    Activity Tolerance  Activity Tolerance: Patient tolerated treatment well, Patient limited by fatigue, Patient limited by endurance    Patient Education  Patient Education: Educated pt continue with HEP.  Pt verbalized/demonstrated good understanding:     [x] Yes         [] No, pt required further clarification.       Post Treatment Pain:

## 2025-07-22 NOTE — PROGRESS NOTES
[facility]      Patient:  Elle Vincent 75 y.o. female     Date of Service: 07/22/25    Chief Complaint:   Chief Complaint   Patient presents with    Medication Check     Tolerating medications well   No new concerns    Depression     Not concerned with any depression sx right now- feeling ok at this time         History of Present Illness     Chronically maintained on Lipitor 10 mg daily for hyperlipidemia, doing well without muscle aches or intolerances.    Hypertension stable on medications, asymptomatic at this time.    Recently uptitrated to Zoloft 175 mg daily for history of anxiety/depression.  She notes that her has now been strong symptomatic improvement with this recent increase.  Tolerating the medication well with no side effects or intolerances      Allergies:    Ibuprofen    Medication List:    Current Outpatient Medications   Medication Sig Dispense Refill    meclizine (ANTIVERT) 12.5 MG tablet Take 1 tablet by mouth 2 times daily as needed for Dizziness 60 tablet 1    ondansetron (ZOFRAN) 4 MG tablet TAKE 1 TABLET BY MOUTH EVERY 8 HOURS AS NEEDED FOR NAUSEA FOR VOMITING 30 tablet 0    sertraline (ZOLOFT) 100 MG tablet Take 1 tablet by mouth daily along with a 50mg tablet and a 25 mg tablet for a daily total of 175mg 90 tablet 0    sertraline (ZOLOFT) 25 MG tablet Take 1 tablet by mouth daily along with a 100mg tablet and a 50 mg tablet for a daily total of 175mg 90 tablet 0    sertraline (ZOLOFT) 50 MG tablet Take 1 tablet by mouth daily along with a 100mg tablet and a 25 mg tablet for a daily total of 175mg 90 tablet 0    oxyBUTYnin (DITROPAN) 5 MG tablet TAKE 1 TABLET BY MOUTH THREE TIMES DAILY 90 tablet 1    gabapentin (NEURONTIN) 600 MG tablet Take 1 tablet by mouth 3 times daily for 180 days. 90 tablet 5    potassium chloride (KLOR-CON) 10 MEQ extended release tablet Take 1 tablet by mouth daily      atorvastatin (LIPITOR) 10 MG tablet Take 1 tablet by mouth daily 90

## 2025-07-28 DIAGNOSIS — E11.628 TYPE 2 DIABETES MELLITUS WITH OTHER SKIN COMPLICATION, WITHOUT LONG-TERM CURRENT USE OF INSULIN (HCC): Primary | ICD-10-CM

## 2025-07-28 RX ORDER — BLOOD-GLUCOSE METER
1 KIT MISCELLANEOUS DAILY
Qty: 1 KIT | Refills: 0 | Status: SHIPPED | OUTPATIENT
Start: 2025-07-28

## 2025-07-29 ENCOUNTER — HOSPITAL ENCOUNTER (OUTPATIENT)
Dept: PHYSICAL THERAPY | Age: 75
Setting detail: THERAPIES SERIES
Discharge: HOME | End: 2025-07-29
Payer: MEDICARE

## 2025-07-29 NOTE — PROGRESS NOTES
[facility]  Inpatient/Observation/Outpatient Rehabilitation    Date: 2025  Patient Name: Elle Vincent       [] Inpatient Acute/Observation       [x]  Outpatient  : 1950     [] Pt refused/declined therapy at this time due to:          [] Pt cancelled due to:  [] No Reason Given   [x] Sick/ill   [] Other:      [] Evaluation held by RN/Provider/Physical Therapist due to:    [] High Heart Rate   [] High Blood Pressure   [] Orthopedic Consult   [] Hgb < 7   [] Other:    [] Pt ordered brace per physician request:  [] Proper fit will be completed and education for wearing/skin checks    [] Pt does not require skilled services due to:      Therapist/Assistant will attempt to see this patient, at our earliest opportunity.       Rona Gonzalez Date: 2025

## 2025-07-31 ENCOUNTER — HOSPITAL ENCOUNTER (OUTPATIENT)
Dept: PHYSICAL THERAPY | Age: 75
Setting detail: THERAPIES SERIES
Discharge: HOME | End: 2025-07-31
Payer: MEDICARE

## 2025-07-31 NOTE — PROGRESS NOTES
Physical Therapy  [facility]  Inpatient/Observation/Outpatient Rehabilitation    Date: 2025  Patient Name: Elle Vincent       [] Inpatient Acute/Observation       [x]  Outpatient  : 1950       Plan of Care/Recert ends      [] Pt refused/declined therapy at this time due to:           [x] Pt cancelled due to:  [] No Reason Given   [] Sick/ill   [x] Other:  just got over being sick    [] Evaluation held by RN/Provider/Physical Therapist due to:    [] High Heart Rate   [] High Blood Pressure   [] Orthopedic Consult   [] Hgb < 7   [] Other:    [] Pt ordered brace per physician request:  [] Proper fit will be completed and education for wearing/skin checks    [] Pt does not require skilled services due to:      Therapist/Assistant will attempt to see this patient, at our earliest opportunity.       Dana Curry Date: 2025

## 2025-08-06 ENCOUNTER — HOSPITAL ENCOUNTER (OUTPATIENT)
Dept: PHYSICAL THERAPY | Age: 75
Setting detail: THERAPIES SERIES
Discharge: HOME | End: 2025-08-06
Payer: MEDICARE

## 2025-08-06 PROCEDURE — 97110 THERAPEUTIC EXERCISES: CPT

## 2025-08-07 ENCOUNTER — OFFICE VISIT (OUTPATIENT)
Dept: NEUROLOGY | Age: 75
End: 2025-08-07
Payer: MEDICARE

## 2025-08-07 ENCOUNTER — APPOINTMENT (OUTPATIENT)
Dept: PHYSICAL THERAPY | Age: 75
End: 2025-08-07
Payer: MEDICARE

## 2025-08-07 VITALS
BODY MASS INDEX: 35.5 KG/M2 | DIASTOLIC BLOOD PRESSURE: 56 MMHG | SYSTOLIC BLOOD PRESSURE: 122 MMHG | HEART RATE: 62 BPM | WEIGHT: 188 LBS | RESPIRATION RATE: 20 BRPM | HEIGHT: 61 IN | TEMPERATURE: 97.5 F

## 2025-08-07 DIAGNOSIS — R41.3 MEMORY CHANGES: Primary | ICD-10-CM

## 2025-08-07 DIAGNOSIS — R26.89 POOR BALANCE: ICD-10-CM

## 2025-08-07 PROCEDURE — 1123F ACP DISCUSS/DSCN MKR DOCD: CPT | Performed by: NEUROMUSCULOSKELETAL MEDICINE, SPORTS MEDICINE

## 2025-08-07 PROCEDURE — 3074F SYST BP LT 130 MM HG: CPT | Performed by: NEUROMUSCULOSKELETAL MEDICINE, SPORTS MEDICINE

## 2025-08-07 PROCEDURE — 1159F MED LIST DOCD IN RCRD: CPT | Performed by: NEUROMUSCULOSKELETAL MEDICINE, SPORTS MEDICINE

## 2025-08-07 PROCEDURE — 99214 OFFICE O/P EST MOD 30 MIN: CPT | Performed by: NEUROMUSCULOSKELETAL MEDICINE, SPORTS MEDICINE

## 2025-08-07 PROCEDURE — 1126F AMNT PAIN NOTED NONE PRSNT: CPT | Performed by: NEUROMUSCULOSKELETAL MEDICINE, SPORTS MEDICINE

## 2025-08-07 PROCEDURE — 3078F DIAST BP <80 MM HG: CPT | Performed by: NEUROMUSCULOSKELETAL MEDICINE, SPORTS MEDICINE

## 2025-08-07 RX ORDER — DONEPEZIL HYDROCHLORIDE 5 MG/1
5 TABLET, FILM COATED ORAL NIGHTLY
Qty: 30 TABLET | Refills: 2 | Status: SHIPPED | OUTPATIENT
Start: 2025-08-07

## 2025-08-08 ENCOUNTER — HOSPITAL ENCOUNTER (OUTPATIENT)
Dept: PHYSICAL THERAPY | Age: 75
Setting detail: THERAPIES SERIES
Discharge: HOME | End: 2025-08-08
Payer: MEDICARE

## 2025-08-12 ENCOUNTER — HOSPITAL ENCOUNTER (OUTPATIENT)
Dept: PHYSICAL THERAPY | Age: 75
Setting detail: THERAPIES SERIES
Discharge: HOME | End: 2025-08-12
Payer: MEDICARE

## 2025-08-12 PROCEDURE — 97110 THERAPEUTIC EXERCISES: CPT

## 2025-08-14 ENCOUNTER — HOSPITAL ENCOUNTER (OUTPATIENT)
Dept: PHYSICAL THERAPY | Age: 75
Setting detail: THERAPIES SERIES
Discharge: HOME | End: 2025-08-14
Payer: MEDICARE

## 2025-08-14 PROCEDURE — 97110 THERAPEUTIC EXERCISES: CPT

## 2025-08-19 ENCOUNTER — HOSPITAL ENCOUNTER (OUTPATIENT)
Dept: PHYSICAL THERAPY | Age: 75
Setting detail: THERAPIES SERIES
Discharge: HOME | End: 2025-08-19
Payer: MEDICARE

## 2025-08-19 PROCEDURE — 97110 THERAPEUTIC EXERCISES: CPT

## 2025-08-21 ENCOUNTER — HOSPITAL ENCOUNTER (OUTPATIENT)
Dept: PHYSICAL THERAPY | Age: 75
Setting detail: THERAPIES SERIES
Discharge: HOME | End: 2025-08-21
Payer: MEDICARE

## 2025-08-21 PROCEDURE — 97110 THERAPEUTIC EXERCISES: CPT

## 2025-09-02 ENCOUNTER — HOSPITAL ENCOUNTER (OUTPATIENT)
Dept: PHYSICAL THERAPY | Age: 75
Setting detail: THERAPIES SERIES
Discharge: HOME | End: 2025-09-02
Payer: MEDICARE

## 2025-09-02 PROCEDURE — 97110 THERAPEUTIC EXERCISES: CPT

## 2025-09-04 ENCOUNTER — HOSPITAL ENCOUNTER (OUTPATIENT)
Dept: PHYSICAL THERAPY | Age: 75
Setting detail: THERAPIES SERIES
Discharge: HOME | End: 2025-09-04
Payer: MEDICARE

## 2025-09-04 PROCEDURE — 97110 THERAPEUTIC EXERCISES: CPT

## (undated) DEVICE — AIRLIFE™ NASAL OXYGEN CANNULA CURVED, FLARED TIP, WITH 7 FEET (2.1 M) CRUSH RESISTANT TUBING, OVER-THE-EAR STYLE: Brand: AIRLIFE™

## (undated) DEVICE — GAMMEX® NON-LATEX PI ORTHO SIZE 7, STERILE POLYISOPRENE POWDER-FREE SURGICAL GLOVE: Brand: GAMMEX

## (undated) DEVICE — 3M™ PRECISE™ VISTA DISPOSABLE SKIN STAPLER 3995: Brand: 3M™ PRECISE™

## (undated) DEVICE — SUTURE MCRYL SZ 4-0 L18IN ABSRB UD P-3 L13MM 3/8 CIR PRIM Y494G

## (undated) DEVICE — SOLUTION IV IRRIG POUR BRL 0.9% SODIUM CHL 2F7124

## (undated) DEVICE — MEDI-VAC NON-CONDUCTIVE TUBING7MM X 30.5 (100FT): Brand: CARDINAL HEALTH

## (undated) DEVICE — BETADINE 5% EYE SOL

## (undated) DEVICE — PREMIUM DRY TRAY LF: Brand: MEDLINE INDUSTRIES, INC.

## (undated) DEVICE — DRAIN SURG 19FR 100% SIL RADPQ RND CHN FULL FLUT

## (undated) DEVICE — TUBING, SUCTION, 9/32" X 12', STRAIGHT: Brand: MEDLINE INDUSTRIES, INC.

## (undated) DEVICE — SOFT SHIELD® COLLAGEN SHIELD, 12 HOURS (CE): Brand: SOFT SHIELD® COLLAGEN SHIELDS

## (undated) DEVICE — GLOVE ORANGE PI 8 1/2   MSG9085

## (undated) DEVICE — SYRINGE MED 10ML LUERLOCK TIP W/O SFTY DISP

## (undated) DEVICE — PRECISION THIN (9.0 X 0.38 X 18.5MM)

## (undated) DEVICE — SEALER ENDOSCP NANO COAT OPN DIV CRV L JAW LIGASURE IMPACT

## (undated) DEVICE — HYPODERMIC SAFETY NEEDLE: Brand: MAGELLAN

## (undated) DEVICE — SPONGE LAP W18XL18IN WHT COT 4 PLY FLD STRUNG RADPQ DISP ST

## (undated) DEVICE — Device: Brand: ALLEGRO 1X SILICONE I/A HANDPIECE (6)

## (undated) DEVICE — SUTURE ETHLN SZ 4-0 L18IN NONABSORBABLE BLK L19MM PS-2 3/8 1667H

## (undated) DEVICE — OCCLUSIVE GAUZE STRIP,3% BISMUTH TRIBROMOPHENATE IN PETROLATUM BLEND: Brand: XEROFORM

## (undated) DEVICE — TOWEL SURG SM W12XL18IN CLR PLAS TEAR RESIST REINF ADH FRST

## (undated) DEVICE — FORCEPS BX L240CM JAW DIA2.4MM ORNG L CAP W/ NDL DISP RAD

## (undated) DEVICE — SYRINGE MED 50ML LUERLOCK TIP

## (undated) DEVICE — SUTURE VCRL + SZ 3-0 L27IN ABSRB UD L26MM SH 1/2 CIR VCP416H

## (undated) DEVICE — HAND AND FT PK

## (undated) DEVICE — GAUZE,SPONGE,FLUFF,6"X6.75",STRL,5/TRAY: Brand: MEDLINE

## (undated) DEVICE — SUTURE VCRL + SZ 2-0 L27IN ABSRB VLT SH 1/2 CIR TAPERPOINT VCP317H

## (undated) DEVICE — NEEDLE,25GX1.5",REG,BEVEL: Brand: MEDLINE

## (undated) DEVICE — BINDER ABD UNISX 4 PNL PREM 6INX6INX12IN L XL 4

## (undated) DEVICE — SOLUTION SCRB 4OZ 4% CHG CLN BASE FOR PT SKIN ANTISEPSIS

## (undated) DEVICE — SUTURE PDS II SZ 0 L60IN ABSRB VLT L65MM TP-1 1/2 CIR Z991G

## (undated) DEVICE — Device

## (undated) DEVICE — 20 ML SYRINGE LUER-LOCK TIP: Brand: MONOJECT

## (undated) DEVICE — MASTISOL ADHESIVE LIQ 2/3ML

## (undated) DEVICE — SOLUTION IRRIG 1000ML 0.9% SOD CHL USP POUR PLAS BTL

## (undated) DEVICE — CANNULA ORAL NSL AD CO2 N INTUB O2 DEL DISP TRU LNK

## (undated) DEVICE — SOLUTION IV IRRIG WATER 1000ML POUR BRL 2F7114

## (undated) DEVICE — SYRINGE, LUER LOCK, 10ML: Brand: MEDLINE

## (undated) DEVICE — KNIFE OPHTH DIA22MM 45DEG SLT W HNDL SHRP ANG PNT DEL DBL

## (undated) DEVICE — NEEDLE HYPO 25GA L1.5IN BLU POLYPR HUB S STL REG BVL STR

## (undated) DEVICE — BLADE OPHTH 180DEG CUT SURF BLU STR SHRP DBL BVL GRINDLESS

## (undated) DEVICE — CONTROL SYRINGE LUER-LOCK TIP: Brand: MONOJECT

## (undated) DEVICE — INTENDED FOR TISSUE SEPARATION, AND OTHER PROCEDURES THAT REQUIRE A SHARP SURGICAL BLADE TO PUNCTURE OR CUT.: Brand: BARD-PARKER ® CARBON RIB-BACK BLADES

## (undated) DEVICE — SUTURE PDS + SZ 0 L36IN ABSRB VLT CT 1 L36MM 1 2 CIR PDP346H

## (undated) DEVICE — TUBING SUCT NON-STRL 9/32X100 W/CNNT

## (undated) DEVICE — OPTIFOAM GENTLE AG,POST-OP STRIP,3.5X10: Brand: MEDLINE

## (undated) DEVICE — RESERVOIR,SUCTION,100CC,SILICONE: Brand: MEDLINE

## (undated) DEVICE — SPONGE GZ W4XL4IN COT 12 PLY TYP VII WVN C FLD DSGN

## (undated) DEVICE — GLOVE SURG SZ 75 L12IN FNGR THK87MIL DK GRN LTX FREE ISOLEX

## (undated) DEVICE — XEROFORM OCCLUSIVE GAUZE STRIP OVERWRAP, 3% BISMUTH TRIBROMOPHENATE IN PETROLATUM BLEND: Brand: XEROFORM

## (undated) DEVICE — CANNULA IV 18GA L15IN BLNT FILL LUERLOCK HUB MJCT

## (undated) DEVICE — TUBING SUCT 12FR MAL ALUM SHFT FN CAP VENT UNIV CONN W/ OBT

## (undated) DEVICE — SUTURE ETHLN SZ 3-0 L18IN NONABSORBABLE BLK L24MM PS-1 3/8 1663G

## (undated) DEVICE — GOWN,AURORA,NON-REINFORCED,2XL: Brand: MEDLINE

## (undated) DEVICE — GLOVE ORANGE PI 7 1/2   MSG9075

## (undated) DEVICE — SHOE POSTOP SM M 5.5-7 WOM 6.5-8 HI ANK SQUARED STRP RIG

## (undated) DEVICE — YANKAUER,FLEXIBLE HANDLE,REGLR CAPACITY: Brand: MEDLINE INDUSTRIES, INC.